# Patient Record
Sex: FEMALE | Race: WHITE | Employment: OTHER | URBAN - METROPOLITAN AREA
[De-identification: names, ages, dates, MRNs, and addresses within clinical notes are randomized per-mention and may not be internally consistent; named-entity substitution may affect disease eponyms.]

---

## 2019-08-29 ENCOUNTER — APPOINTMENT (OUTPATIENT)
Dept: CT IMAGING | Age: 69
DRG: 580 | End: 2019-08-29
Payer: MEDICARE

## 2019-08-29 ENCOUNTER — HOSPITAL ENCOUNTER (INPATIENT)
Age: 69
LOS: 1 days | Discharge: HOME | DRG: 580 | End: 2019-08-30
Attending: EMERGENCY MEDICINE | Admitting: INTERNAL MEDICINE
Payer: MEDICARE

## 2019-08-29 DIAGNOSIS — N63.20 BREAST MASS, LEFT: ICD-10-CM

## 2019-08-29 DIAGNOSIS — E87.1 HYPONATREMIA: Primary | ICD-10-CM

## 2019-08-29 PROBLEM — N63.0 BREAST MASS: Status: ACTIVE | Noted: 2019-08-29

## 2019-08-29 LAB
ALBUMIN SERPL-MCNC: 3.9 G/DL (ref 3.5–5.2)
ALP BLD-CCNC: 113 U/L (ref 35–104)
ALT SERPL-CCNC: 53 U/L (ref 0–32)
ANION GAP SERPL CALCULATED.3IONS-SCNC: 11 MMOL/L (ref 7–16)
APTT: 31.7 SEC (ref 24.5–35.1)
AST SERPL-CCNC: 49 U/L (ref 0–31)
BACTERIA: NORMAL /HPF
BASOPHILS ABSOLUTE: 0.02 E9/L (ref 0–0.2)
BASOPHILS RELATIVE PERCENT: 0.7 % (ref 0–2)
BILIRUB SERPL-MCNC: 0.4 MG/DL (ref 0–1.2)
BILIRUBIN URINE: NEGATIVE
BLOOD, URINE: NORMAL
BUN BLDV-MCNC: 8 MG/DL (ref 8–23)
CALCIUM SERPL-MCNC: 8.9 MG/DL (ref 8.6–10.2)
CHLORIDE BLD-SCNC: 96 MMOL/L (ref 98–107)
CLARITY: CLEAR
CO2: 21 MMOL/L (ref 22–29)
COLOR: YELLOW
CREAT SERPL-MCNC: 0.6 MG/DL (ref 0.5–1)
EKG ATRIAL RATE: 78 BPM
EKG P AXIS: 71 DEGREES
EKG P-R INTERVAL: 146 MS
EKG Q-T INTERVAL: 402 MS
EKG QRS DURATION: 78 MS
EKG QTC CALCULATION (BAZETT): 458 MS
EKG R AXIS: 26 DEGREES
EKG T AXIS: 37 DEGREES
EKG VENTRICULAR RATE: 78 BPM
EOSINOPHILS ABSOLUTE: 0.01 E9/L (ref 0.05–0.5)
EOSINOPHILS RELATIVE PERCENT: 0.3 % (ref 0–6)
GFR AFRICAN AMERICAN: >60
GFR NON-AFRICAN AMERICAN: >60 ML/MIN/1.73
GLUCOSE BLD-MCNC: 91 MG/DL (ref 74–99)
GLUCOSE URINE: NEGATIVE MG/DL
HCT VFR BLD CALC: 33.5 % (ref 34–48)
HEMOGLOBIN: 10.9 G/DL (ref 11.5–15.5)
IMMATURE GRANULOCYTES #: 0.01 E9/L
IMMATURE GRANULOCYTES %: 0.3 % (ref 0–5)
INR BLD: 1.2
KETONES, URINE: NEGATIVE MG/DL
LACTIC ACID: 0.9 MMOL/L (ref 0.5–2.2)
LEUKOCYTE ESTERASE, URINE: NEGATIVE
LYMPHOCYTES ABSOLUTE: 0.72 E9/L (ref 1.5–4)
LYMPHOCYTES RELATIVE PERCENT: 24.2 % (ref 20–42)
MCH RBC QN AUTO: 28 PG (ref 26–35)
MCHC RBC AUTO-ENTMCNC: 32.5 % (ref 32–34.5)
MCV RBC AUTO: 86.1 FL (ref 80–99.9)
MONOCYTES ABSOLUTE: 0.35 E9/L (ref 0.1–0.95)
MONOCYTES RELATIVE PERCENT: 11.8 % (ref 2–12)
NEUTROPHILS ABSOLUTE: 1.86 E9/L (ref 1.8–7.3)
NEUTROPHILS RELATIVE PERCENT: 62.7 % (ref 43–80)
NITRITE, URINE: NEGATIVE
PDW BLD-RTO: 13.8 FL (ref 11.5–15)
PH UA: 6 (ref 5–9)
PLATELET # BLD: 202 E9/L (ref 130–450)
PMV BLD AUTO: 9.8 FL (ref 7–12)
POTASSIUM REFLEX MAGNESIUM: 3.6 MMOL/L (ref 3.5–5)
PRO-BNP: 75 PG/ML (ref 0–125)
PROTEIN UA: NEGATIVE MG/DL
PROTHROMBIN TIME: 13.1 SEC (ref 9.3–12.4)
RBC # BLD: 3.89 E12/L (ref 3.5–5.5)
RBC UA: NORMAL /HPF (ref 0–2)
SODIUM BLD-SCNC: 128 MMOL/L (ref 132–146)
SPECIFIC GRAVITY UA: <=1.005 (ref 1–1.03)
TOTAL PROTEIN: 9.1 G/DL (ref 6.4–8.3)
TROPONIN: <0.01 NG/ML (ref 0–0.03)
UROBILINOGEN, URINE: 0.2 E.U./DL
WBC # BLD: 3 E9/L (ref 4.5–11.5)
WBC UA: NORMAL /HPF (ref 0–5)

## 2019-08-29 PROCEDURE — 85025 COMPLETE CBC W/AUTO DIFF WBC: CPT

## 2019-08-29 PROCEDURE — 2580000003 HC RX 258: Performed by: RADIOLOGY

## 2019-08-29 PROCEDURE — 80053 COMPREHEN METABOLIC PANEL: CPT

## 2019-08-29 PROCEDURE — 2580000003 HC RX 258: Performed by: NURSE PRACTITIONER

## 2019-08-29 PROCEDURE — 6360000004 HC RX CONTRAST MEDICATION: Performed by: RADIOLOGY

## 2019-08-29 PROCEDURE — 99223 1ST HOSP IP/OBS HIGH 75: CPT | Performed by: INTERNAL MEDICINE

## 2019-08-29 PROCEDURE — 85730 THROMBOPLASTIN TIME PARTIAL: CPT

## 2019-08-29 PROCEDURE — 81001 URINALYSIS AUTO W/SCOPE: CPT

## 2019-08-29 PROCEDURE — 2580000003 HC RX 258: Performed by: INTERNAL MEDICINE

## 2019-08-29 PROCEDURE — 83605 ASSAY OF LACTIC ACID: CPT

## 2019-08-29 PROCEDURE — 99285 EMERGENCY DEPT VISIT HI MDM: CPT

## 2019-08-29 PROCEDURE — 93010 ELECTROCARDIOGRAM REPORT: CPT | Performed by: INTERNAL MEDICINE

## 2019-08-29 PROCEDURE — 83880 ASSAY OF NATRIURETIC PEPTIDE: CPT

## 2019-08-29 PROCEDURE — 85610 PROTHROMBIN TIME: CPT

## 2019-08-29 PROCEDURE — 84484 ASSAY OF TROPONIN QUANT: CPT

## 2019-08-29 PROCEDURE — 1200000000 HC SEMI PRIVATE

## 2019-08-29 PROCEDURE — 71260 CT THORAX DX C+: CPT

## 2019-08-29 PROCEDURE — 93005 ELECTROCARDIOGRAM TRACING: CPT | Performed by: NURSE PRACTITIONER

## 2019-08-29 RX ORDER — SODIUM CHLORIDE 0.9 % (FLUSH) 0.9 %
10 SYRINGE (ML) INJECTION EVERY 12 HOURS SCHEDULED
Status: DISCONTINUED | OUTPATIENT
Start: 2019-08-29 | End: 2019-08-30 | Stop reason: HOSPADM

## 2019-08-29 RX ORDER — 0.9 % SODIUM CHLORIDE 0.9 %
1000 INTRAVENOUS SOLUTION INTRAVENOUS ONCE
Status: COMPLETED | OUTPATIENT
Start: 2019-08-29 | End: 2019-08-29

## 2019-08-29 RX ORDER — SODIUM CHLORIDE 0.9 % (FLUSH) 0.9 %
10 SYRINGE (ML) INJECTION PRN
Status: DISCONTINUED | OUTPATIENT
Start: 2019-08-29 | End: 2019-08-30 | Stop reason: HOSPADM

## 2019-08-29 RX ORDER — ONDANSETRON 2 MG/ML
4 INJECTION INTRAMUSCULAR; INTRAVENOUS EVERY 6 HOURS PRN
Status: DISCONTINUED | OUTPATIENT
Start: 2019-08-29 | End: 2019-08-30 | Stop reason: HOSPADM

## 2019-08-29 RX ADMIN — Medication 10 ML: at 21:23

## 2019-08-29 RX ADMIN — SODIUM CHLORIDE 1000 ML: 9 INJECTION, SOLUTION INTRAVENOUS at 14:48

## 2019-08-29 RX ADMIN — IOPAMIDOL 80 ML: 755 INJECTION, SOLUTION INTRAVENOUS at 13:31

## 2019-08-29 RX ADMIN — Medication 10 ML: at 13:31

## 2019-08-29 ASSESSMENT — PAIN SCALES - GENERAL
PAINLEVEL_OUTOF10: 0

## 2019-08-29 NOTE — ED PROVIDER NOTES
neoplasm and has left axillary metastatic lymphadenopathy. WBC 3000, Hyponatremic at 128. Mild bilateral pitting edema but normal BNP. No evidence of CHF. Patient will be admitted for hyponatremia and probable malignant breast mass. Counseling: The emergency provider has spoken with the patient and family member sister and discussed todays results, in addition to providing specific details for the plan of care and counseling regarding the diagnosis and prognosis. Questions are answered at this time and they are agreeable with the plan. Assessment      1. Hyponatremia    2. Breast mass, left      Plan   Admit to med/surg floor  Patient condition is stable    New Medications     Current Discharge Medication List        Electronically signed by ADOLFO Mayers CNP   DD: 8/29/19  **This report was transcribed using voice recognition software. Every effort was made to ensure accuracy; however, inadvertent computerized transcription errors may be present.   END OF ED PROVIDER NOTE       ADOLFO Mayers CNP  08/29/19 [id number]

## 2019-08-29 NOTE — CONSULTS
compensated    Vertigo      Patient Active Problem List   Diagnosis    Hydrocephalus, adult    Hydrocephalus    Hyponatremia    Breast mass        Past Surgical History:      Procedure Laterality Date    ARM SURGERY  9/15/2009    HUMERUS FRACTURE SURGERY  2009    Broke left arm        Family History:  Family History   Problem Relation Age of Onset    Cancer Mother         colon    Heart Disease Father     Diabetes Father        Medications:  Reviewed and reconciled.     Social History:  Social History     Socioeconomic History    Marital status: Single     Spouse name: Not on file    Number of children: Not on file    Years of education: Not on file    Highest education level: Not on file   Occupational History    Not on file   Social Needs    Financial resource strain: Not on file    Food insecurity:     Worry: Not on file     Inability: Not on file    Transportation needs:     Medical: Not on file     Non-medical: Not on file   Tobacco Use    Smoking status: Never Smoker    Smokeless tobacco: Never Used   Substance and Sexual Activity    Alcohol use: Yes     Comment: drinks it occasional on social events    Drug use: No    Sexual activity: Not on file   Lifestyle    Physical activity:     Days per week: Not on file     Minutes per session: Not on file    Stress: Not on file   Relationships    Social connections:     Talks on phone: Not on file     Gets together: Not on file     Attends Judaism service: Not on file     Active member of club or organization: Not on file     Attends meetings of clubs or organizations: Not on file     Relationship status: Not on file    Intimate partner violence:     Fear of current or ex partner: Not on file     Emotionally abused: Not on file     Physically abused: Not on file     Forced sexual activity: Not on file   Other Topics Concern    Not on file   Social History Narrative    Not on file       Allergies:  No Known Allergies     OB/GYN:  Patient is , she had her menarche age of 13, menopause when she was in her 46s, she did not use HRT. Physical Exam:  /62   Pulse 76   Temp 98.5 °F (36.9 °C) (Oral)   Resp 16   Ht 5' 6\" (1.676 m)   Wt 120 lb (54.4 kg)   SpO2 96%   BMI 19.37 kg/m²   GENERAL: Alert, oriented x 3, not in acute distress. HEENT: PERRLA; EOMI. Oropharynx clear. NECK: Supple. No palpable cervical or supraclavicular lymphadenopathy. LUNGS: Good air entry bilaterally. No wheezing, crackles or rhonchi. CARDIOVASCULAR: Regular rate. No murmurs, rubs or gallops. BREASTS: The right breast exam is negative for any skin changes, nipple discharge, no palpable masses, no palpable right axillary adenopathy, the left breast exam is remarkable for a fungating mass, measuring about 8 cm, several skin nodules, palpable left axillary lymphadenopathy. ABDOMEN: Soft. Non-tender, non-distended. Positive bowel sounds. EXTREMITIES: Without clubbing, cyanosis, or edema. NEUROLOGIC: No focal deficits. ECOG PS 1    Impression/Plan:     The patient is a 75-year-old lady, with a past medical history significant for hydrocephalus, who had presented with left breast changes, she had noticed a dark spot on her left breast, then she had developed induration, she thinks that she had the breast changes for months. The patient had never had a mammogram done, had a CT scan of the chest done, revealing a solid left breast mass measuring at least 6.8 x 2.9 cm in size is identified. The mass appears to extend to the skin surface. The deep margin of the mass abuts the chest wall but a fat plane appears to be preserved. Pathologically enlarged left  axillary lymph nodes are seen measuring up to 4.5 x 1.9 cm in size. Her CBCD was remarkable for leukopenia with a white count of 3,000, lymphocytopenia, with an absolute lymphocyte count of 720, sodium 128, total protein 9.1, ALT 53, AST 49.   Patient with at least a locally advanced left breast cancer,

## 2019-08-30 ENCOUNTER — APPOINTMENT (OUTPATIENT)
Dept: NUCLEAR MEDICINE | Age: 69
DRG: 580 | End: 2019-08-30
Payer: MEDICARE

## 2019-08-30 ENCOUNTER — APPOINTMENT (OUTPATIENT)
Dept: CT IMAGING | Age: 69
DRG: 580 | End: 2019-08-30
Payer: MEDICARE

## 2019-08-30 VITALS
WEIGHT: 120 LBS | SYSTOLIC BLOOD PRESSURE: 150 MMHG | DIASTOLIC BLOOD PRESSURE: 68 MMHG | TEMPERATURE: 97.5 F | OXYGEN SATURATION: 100 % | BODY MASS INDEX: 19.29 KG/M2 | HEIGHT: 66 IN | HEART RATE: 68 BPM | RESPIRATION RATE: 16 BRPM

## 2019-08-30 LAB
ANION GAP SERPL CALCULATED.3IONS-SCNC: 11 MMOL/L (ref 7–16)
BUN BLDV-MCNC: 7 MG/DL (ref 8–23)
CALCIUM SERPL-MCNC: 8.7 MG/DL (ref 8.6–10.2)
CHLORIDE BLD-SCNC: 101 MMOL/L (ref 98–107)
CO2: 22 MMOL/L (ref 22–29)
CREAT SERPL-MCNC: 0.6 MG/DL (ref 0.5–1)
GFR AFRICAN AMERICAN: >60
GFR NON-AFRICAN AMERICAN: >60 ML/MIN/1.73
GLUCOSE BLD-MCNC: 79 MG/DL (ref 74–99)
OSMOLALITY URINE: 492 MOSM/KG (ref 300–900)
OSMOLALITY: 291 MOSM/KG (ref 285–310)
POTASSIUM SERPL-SCNC: 3.4 MMOL/L (ref 3.5–5)
SODIUM BLD-SCNC: 134 MMOL/L (ref 132–146)
SODIUM URINE: 56 MMOL/L

## 2019-08-30 PROCEDURE — 36415 COLL VENOUS BLD VENIPUNCTURE: CPT

## 2019-08-30 PROCEDURE — 88305 TISSUE EXAM BY PATHOLOGIST: CPT

## 2019-08-30 PROCEDURE — 99239 HOSP IP/OBS DSCHRG MGMT >30: CPT | Performed by: INTERNAL MEDICINE

## 2019-08-30 PROCEDURE — 88342 IMHCHEM/IMCYTCHM 1ST ANTB: CPT

## 2019-08-30 PROCEDURE — 84300 ASSAY OF URINE SODIUM: CPT

## 2019-08-30 PROCEDURE — 84165 PROTEIN E-PHORESIS SERUM: CPT

## 2019-08-30 PROCEDURE — 83935 ASSAY OF URINE OSMOLALITY: CPT

## 2019-08-30 PROCEDURE — 78306 BONE IMAGING WHOLE BODY: CPT

## 2019-08-30 PROCEDURE — 88185 FLOWCYTOMETRY/TC ADD-ON: CPT

## 2019-08-30 PROCEDURE — 88184 FLOWCYTOMETRY/ TC 1 MARKER: CPT

## 2019-08-30 PROCEDURE — 80048 BASIC METABOLIC PNL TOTAL CA: CPT

## 2019-08-30 PROCEDURE — 07B63ZX EXCISION OF LEFT AXILLARY LYMPHATIC, PERCUTANEOUS APPROACH, DIAGNOSTIC: ICD-10-PCS | Performed by: RADIOLOGY

## 2019-08-30 PROCEDURE — 83930 ASSAY OF BLOOD OSMOLALITY: CPT

## 2019-08-30 PROCEDURE — 74177 CT ABD & PELVIS W/CONTRAST: CPT

## 2019-08-30 PROCEDURE — 88360 TUMOR IMMUNOHISTOCHEM/MANUAL: CPT

## 2019-08-30 PROCEDURE — A9503 TC99M MEDRONATE: HCPCS | Performed by: RADIOLOGY

## 2019-08-30 PROCEDURE — 3430000000 HC RX DIAGNOSTIC RADIOPHARMACEUTICAL: Performed by: RADIOLOGY

## 2019-08-30 PROCEDURE — 2580000003 HC RX 258: Performed by: INTERNAL MEDICINE

## 2019-08-30 PROCEDURE — 0HBUXZX: ICD-10-PCS | Performed by: SURGERY

## 2019-08-30 PROCEDURE — 6360000004 HC RX CONTRAST MEDICATION: Performed by: RADIOLOGY

## 2019-08-30 PROCEDURE — 38505 NEEDLE BIOPSY LYMPH NODES: CPT

## 2019-08-30 PROCEDURE — 77012 CT SCAN FOR NEEDLE BIOPSY: CPT

## 2019-08-30 RX ORDER — LIDOCAINE HYDROCHLORIDE 20 MG/ML
10 INJECTION, SOLUTION EPIDURAL; INFILTRATION; INTRACAUDAL; PERINEURAL ONCE
Status: DISCONTINUED | OUTPATIENT
Start: 2019-08-30 | End: 2019-08-30 | Stop reason: HOSPADM

## 2019-08-30 RX ORDER — LIDOCAINE HYDROCHLORIDE AND EPINEPHRINE 10; 10 MG/ML; UG/ML
20 INJECTION, SOLUTION INFILTRATION; PERINEURAL ONCE
Status: DISCONTINUED | OUTPATIENT
Start: 2019-08-30 | End: 2019-08-30 | Stop reason: HOSPADM

## 2019-08-30 RX ORDER — SODIUM CHLORIDE 0.9 % (FLUSH) 0.9 %
10 SYRINGE (ML) INJECTION PRN
Status: ACTIVE | OUTPATIENT
Start: 2019-08-30 | End: 2019-08-30

## 2019-08-30 RX ORDER — TC 99M MEDRONATE 20 MG/10ML
25 INJECTION, POWDER, LYOPHILIZED, FOR SOLUTION INTRAVENOUS
Status: COMPLETED | OUTPATIENT
Start: 2019-08-30 | End: 2019-08-30

## 2019-08-30 RX ADMIN — IOPAMIDOL 110 ML: 755 INJECTION, SOLUTION INTRAVENOUS at 11:32

## 2019-08-30 RX ADMIN — TC 99M MEDRONATE 25 MILLICURIE: 20 INJECTION, POWDER, LYOPHILIZED, FOR SOLUTION INTRAVENOUS at 09:02

## 2019-08-30 RX ADMIN — Medication 10 ML: at 10:10

## 2019-08-30 RX ADMIN — IOHEXOL 50 ML: 240 INJECTION, SOLUTION INTRATHECAL; INTRAVASCULAR; INTRAVENOUS; ORAL at 11:32

## 2019-08-30 RX ADMIN — Medication 10 ML: at 11:33

## 2019-08-30 NOTE — PROGRESS NOTES
IRFLOWSHEET    Date: 8/30/2019    Time: 3:11 PM     Exam: Image Guided Left Axillary Lymph Node Biopsy    Radiologist Performing Procedure:  [facility]    Nurse Reporting/Phone: [phone]     Nurse Receiving: [place]    Permit signed:      Yes    ID Band Checklist: Yes    Allergies: Patient has no known allergies. TIME OUT: 1517    PROCEDURE START TIME: 1518    Puncture Site: Left Axillary Region    Puncture Time: 6306    Catheters: 18x10    LABS:   Lab Results   Component Value Date    INR 1.2 08/29/2019    PROTIME 13.1 (H) 08/29/2019           Lab Results   Component Value Date    CREATININE 0.6 08/30/2019    BUN 7 (L) 08/30/2019          Lab Results   Component Value Date    HGB 10.9 (L) 08/29/2019    HCT 33.5 (L) 08/29/2019     08/29/2019         PROCEDURE END TIME: 1526    Total Contrast: N/A    Fluoroscopy Time: N/A    Complications:  None    Comments: Pt brought to department from nursing unit. Pt is alert and oriented, Dr. Willams spoke to patient regarding procedure including possible risks. Pt verbalizes understanding of procedure and consent form signed. Pt placed on ct table in supine position, connected to cardiac monitor, oxygen for constant monitoring during procedure. Procedure done under sterile technique and guidance of ct imaging. 2% Lidocaine is used during procedure for comfort measures. Pt tolerated procedure well, a total of 7 samples taken, gel foam used after samples taken. A dressing  Applied to site with no bleeding noted. Pt transferred to cart, post procedure orders placed into epic. Pt transferred back to floor via cart by transport staff.

## 2019-08-30 NOTE — PROGRESS NOTES
[facility] Progress Note    Admitting Date and Time: 8/29/2019 11:46 AM  Admit Dx: Hyponatremia [E87.1]  Hyponatremia [E87.1]  Breast mass [N63.0]    Subjective:  Patient is being followed for Hyponatremia [E87.1]  Hyponatremia [E87.1]  Breast mass [N63.0]   Pt feels pretty good today. The left breast mass began to bleed last night after she hit it accidentally. She says her hip pain has resolved completely. She has not had a bowel movement since she has been here and cannot remember her last bowel movement prior to coming to the hospital. She would like to leave and expressed a strong distrust of medical professionals, as well as people in general.  Per RN: She is very anxious due to poor experiences of family members who dealt with cancer. ROS: Admits to anxiety. Denies pain, fever, chills, night sweats, sob, n/v, diarrhea, HA unless stated above.  sodium chloride flush  10 mL Intravenous 2 times per day    enoxaparin  40 mg Subcutaneous Daily       sodium chloride flush 10 mL PRN   sodium chloride flush 10 mL PRN   magnesium hydroxide 30 mL Daily PRN   ondansetron 4 mg Q6H PRN        Objective:    /60   Pulse 75   Temp 98 °F (36.7 °C) (Oral)   Resp 14   Ht 5' 6\" (1.676 m)   Wt 120 lb (54.4 kg)   SpO2 95%   BMI 19.37 kg/m²     General Appearance: alert and oriented to person, place and time and in no acute distress  Skin: warm and dry.  Erythematous, hard, crusted lesion encompassing most of left breast and displacing nipple  Head: normocephalic and atraumatic  Eyes: pupils equal, round, and reactive to light, extraocular eye movements intact, conjunctivae normal  Neck: neck supple and non tender without mass   Pulmonary/Chest: clear to auscultation bilaterally- no wheezes, rales or rhonchi, normal air movement, no respiratory distress  Cardiovascular: normal rate, normal S1 and S2 and no carotid bruits  Abdomen: soft, non-tender, non-distended, normal bowel sounds, no masses or organomegaly  Extremities: no cyanosis, no clubbing. Bilateral nonpitting edema  Neurologic: no cranial nerve deficit and speech normal        Recent Labs     08/29/19  1211   *   K 3.6   CL 96*   CO2 21*   BUN 8   CREATININE 0.6   GLUCOSE 91   CALCIUM 8.9       Recent Labs     08/29/19  1211   WBC 3.0*   RBC 3.89   HGB 10.9*   HCT 33.5*   MCV 86.1   MCH 28.0   MCHC 32.5   RDW 13.8      MPV 9.8       Radiology:   CT CHEST W CONTRAST (8/29/2019)   Final Result       1. Solid left breast mass concerning for a neoplasm with left axillary   metastatic lymphadenopathy. Correlation with mammography and breast   ultrasound is recommended. 2. Thyroid goiter.       ALERT:  THIS IS AN ABNORMAL REPORT     EKG (8/29/2019)  Normal sinus rhythm  Normal ECG  No previous ECGs available    Assessment:    Active Problems:    Hyponatremia    Breast mass  Resolved Problems:    * No resolved hospital problems. *      Plan:  1. Left breast mass with left metastatic axillary lymph node   L axillary lymph node Bx today   Gen surg following: punch Bx   Heme/onc following: staging workup, CT scan abdomen/pelvis and bone scan today  2. Hyponatremia   Na 128   Get serum/urine osm   Restrict fluids  3. Anemia   Hb 10.9   Get Fe, Fe sat, TIBC, Ferritin    NOTE: This report was transcribed using voice recognition software. Every effort was made to ensure accuracy; however, inadvertent computerized transcription errors may be present.   Electronically signed by Ishaan Rea on 8/30/2019 at 9:00 AM

## 2019-08-30 NOTE — INTERVAL H&P NOTE
H&P Update    Patient's History and Physical from August 29, 2019 was reviewed. Patient examined. There has been no change.     Electronically signed by Brittny Cotton MD on 8/30/19 at 11:00 AM

## 2019-08-30 NOTE — PROGRESS NOTES
[facility] Progress Note    Admitting Date and Time: 8/29/2019 11:46 AM  Admit Dx: Hyponatremia [E87.1]  Hyponatremia [E87.1]  Breast mass [N63.0]    Subjective:  Patient is being followed for Hyponatremia [E87.1]  Hyponatremia [E87.1]  Breast mass [N63.0]   Pt feels ok, had no complaints    ROS: denies fever, chills, cp, sob, n/v, HA unless stated above.       sodium chloride flush  10 mL Intravenous 2 times per day    enoxaparin  40 mg Subcutaneous Daily       sodium chloride flush 10 mL PRN   sodium chloride flush 10 mL PRN   magnesium hydroxide 30 mL Daily PRN   ondansetron 4 mg Q6H PRN        Objective:    /60   Pulse 75   Temp 98 °F (36.7 °C) (Oral)   Resp 14   Ht 5' 6\" (1.676 m)   Wt 120 lb (54.4 kg)   SpO2 95%   BMI 19.37 kg/m²   General Appearance: alert and oriented to person, place and time  Skin: warm and dry  Head: normocephalic and atraumatic  Eyes: pupils equal, round, and reactive to light, extraocular eye movements intact, conjunctivae normal  Neck: neck supple and non tender without mass   Pulmonary/Chest: clear to auscultation bilaterally- no wheezes, rales or rhonchi, normal air movement, no respiratory distress  Left breast with large mass measuring at least 15 cm x 12 cm hard lump, skin lesion with induration and thickness around the nipple involving the nipple with no discharge from the nipple, granulous tissue, nontender  Left axillary lymph node is larger about 10 x 3 cm  Cardiovascular: normal rate, normal S1 and S2 and no carotid bruits  Abdomen: soft, non-tender, non-distended, normal bowel sounds, no masses or organomegaly  Extremities: no cyanosis, no clubbing and +1 edema  Neurologic: no cranial nerve deficit and speech normal        Recent Labs     08/29/19  1211   *   K 3.6   CL 96*   CO2 21*   BUN 8   CREATININE 0.6   GLUCOSE 91   CALCIUM 8.9       Recent Labs     08/29/19  1211   WBC 3.0*   RBC 3.89   HGB 10.9*   HCT 33.5*   MCV 86.1   MCH

## 2019-08-30 NOTE — BRIEF OP NOTE
Brief Postoperative Note    Joseph Birch  YOB: 1950  [id number]    Pre-operative Diagnosis and Procedure: 67 yo F with a new left breast mass and an enlarged left lymph node. Here for biopsy. Post-operative Diagnosis: Same    Anesthesia: Local    Estimated Blood Loss: < 10 cc    Surgeon: Heydi Spring MD    Complications: none    Specimen obtained: 2 cores sent in flow cytometry media and 8 cores sent in formalin. Findings: New left breast mass and an enlarged left lymph node. CT guided biopsies of the lymph node.      Heydi Spring MD   8/30/2019 1:40 PM

## 2019-08-30 NOTE — DISCHARGE SUMMARY
[facility] Physician Discharge Summary       Feliberto Elizabeth MD  [address] [facility]  [place] fuchs [phone]    In 1 week  breast cancer      Activity level: As tolerated    Dispo: Home    Condition on discharge: Stable    Patient ID:  Roseline Rm  [id number]  87 y.o.  1950    Admit date: 2019    Discharge date and time:  2019  3:42 PM    Admission Diagnoses: Active Problems:    Hyponatremia    Breast mass  Resolved Problems:    * No resolved hospital problems. *      Discharge Diagnoses: Active Problems:    Hyponatremia    Breast mass  Resolved Problems:    * No resolved hospital problems. *      Consults:  IP CONSULT TO GENERAL SURGERY  IP CONSULT TO ONCOLOGY    Procedures: Breast punch biopsy, axillary lymph node biopsy    Hospital Course:   Patient Roseline Rm is a 68 y.o. presented with breast mass, will admit the patient for evaluation and treated her for the followin.  Left breast mass most likely cancer with mets to the lymph nodes evident on the CAT scan of the chest,  surgery who performed a punch biopsy, IR did left axillary lymph node biopsy, I appreciate oncology input, we obtained bone scan and CT abd and pelvis with IV contrast with pending results upon discharge. I personally reviewed the images of the bone scan and the patient probably has missed 2 right rib and left hip, pending ER/AR/Her-2 Neucan be discharged after. This with oncology and with the patient, will discharge patient stable to follow-up with surgery as well as oncology for the results of the biopsy and the scans. We will set the patient up with a PCP. Nelli Hartley 2.  Hyponatremia, will give fluid restriction, hyponatremia resolved, unfortunately urine and serum osmolality were not obtained until hyponatremia resolved.   We could not tell whether it was psychogenic dyspnea versus SIADH  3.  Left hip pain,

## 2019-09-03 ENCOUNTER — TELEPHONE (OUTPATIENT)
Dept: ONCOLOGY | Age: 69
End: 2019-09-03

## 2019-09-05 LAB
Lab: NORMAL
REPORT: NORMAL
THIS TEST SENT TO: NORMAL

## 2019-09-06 ENCOUNTER — TELEPHONE (OUTPATIENT)
Dept: SURGERY | Age: 69
End: 2019-09-06

## 2019-09-06 LAB
ALBUMIN SERPL-MCNC: 3.1 G/DL (ref 3.5–4.7)
ALPHA-1-GLOBULIN: 0.2 G/DL (ref 0.2–0.4)
ALPHA-2-GLOBULIN: 0.7 G/FL (ref 0.5–1)
BETA GLOBULIN: 0.8 G/DL (ref 0.8–1.3)
ELECTROPHORESIS: ABNORMAL
GAMMA GLOBULIN: 2.9 G/DL (ref 0.7–1.6)
TOTAL PROTEIN: 7.7 G/DL (ref 6.4–8.3)

## 2019-09-09 ENCOUNTER — HOSPITAL ENCOUNTER (OUTPATIENT)
Age: 69
Discharge: HOME | End: 2019-09-11
Payer: MEDICARE

## 2019-09-09 ENCOUNTER — OFFICE VISIT (OUTPATIENT)
Dept: FAMILY MEDICINE CLINIC | Age: 69
End: 2019-09-09
Payer: MEDICARE

## 2019-09-09 VITALS
HEIGHT: 66 IN | BODY MASS INDEX: 22.98 KG/M2 | DIASTOLIC BLOOD PRESSURE: 62 MMHG | TEMPERATURE: 98.1 F | SYSTOLIC BLOOD PRESSURE: 104 MMHG | HEART RATE: 79 BPM | RESPIRATION RATE: 18 BRPM | WEIGHT: 143 LBS | OXYGEN SATURATION: 99 %

## 2019-09-09 DIAGNOSIS — N63.0 BREAST MASS: ICD-10-CM

## 2019-09-09 DIAGNOSIS — E87.1 HYPONATREMIA: ICD-10-CM

## 2019-09-09 DIAGNOSIS — Z12.11 SCREENING FOR COLON CANCER: ICD-10-CM

## 2019-09-09 DIAGNOSIS — Z00.00 ENCOUNTER FOR MEDICAL EXAMINATION TO ESTABLISH CARE: Primary | ICD-10-CM

## 2019-09-09 PROCEDURE — 1090F PRES/ABSN URINE INCON ASSESS: CPT | Performed by: FAMILY MEDICINE

## 2019-09-09 PROCEDURE — G8427 DOCREV CUR MEDS BY ELIG CLIN: HCPCS | Performed by: FAMILY MEDICINE

## 2019-09-09 PROCEDURE — 99203 OFFICE O/P NEW LOW 30 MIN: CPT | Performed by: FAMILY MEDICINE

## 2019-09-09 PROCEDURE — 1123F ACP DISCUSS/DSCN MKR DOCD: CPT | Performed by: FAMILY MEDICINE

## 2019-09-09 PROCEDURE — G8400 PT W/DXA NO RESULTS DOC: HCPCS | Performed by: FAMILY MEDICINE

## 2019-09-09 PROCEDURE — 1111F DSCHRG MED/CURRENT MED MERGE: CPT | Performed by: FAMILY MEDICINE

## 2019-09-09 PROCEDURE — G8420 CALC BMI NORM PARAMETERS: HCPCS | Performed by: FAMILY MEDICINE

## 2019-09-09 PROCEDURE — 4040F PNEUMOC VAC/ADMIN/RCVD: CPT | Performed by: FAMILY MEDICINE

## 2019-09-09 PROCEDURE — 1036F TOBACCO NON-USER: CPT | Performed by: FAMILY MEDICINE

## 2019-09-09 PROCEDURE — 3017F COLORECTAL CA SCREEN DOC REV: CPT | Performed by: FAMILY MEDICINE

## 2019-09-09 ASSESSMENT — PATIENT HEALTH QUESTIONNAIRE - PHQ9
SUM OF ALL RESPONSES TO PHQ9 QUESTIONS 1 & 2: 0
1. LITTLE INTEREST OR PLEASURE IN DOING THINGS: 0
2. FEELING DOWN, DEPRESSED OR HOPELESS: 0
SUM OF ALL RESPONSES TO PHQ QUESTIONS 1-9: 0
SUM OF ALL RESPONSES TO PHQ QUESTIONS 1-9: 0

## 2019-09-09 NOTE — PATIENT INSTRUCTIONS
Patient Education        Breast Cancer: Care Instructions  Your Care Instructions    Breast cancer occurs when abnormal cells grow out of control in the breast. These cancer cells can spread within the breast, to nearby lymph nodes and other tissues, and to other parts of the body. Being treated for cancer can weaken your body, and you may feel very tired. Get the rest your body needs so you can feel better. Finding out that you have cancer is scary. You may feel many emotions and may need some help coping. Seek out family, friends, and counselors for support. You also can do things at home to make yourself feel better while you go through treatment. Call the [employer] (6-664.378.3212) or visit its website at [employer] for more information. Follow-up care is a key part of your treatment and safety. Be sure to make and go to all appointments, and call your doctor if you are having problems. It's also a good idea to know your test results and keep a list of the medicines you take. How can you care for yourself at home? · Take your medicines exactly as prescribed. Call your doctor if you think you are having a problem with your medicine. You may get medicine for nausea and vomiting if you have these side effects. · Follow your doctor's instructions to relieve pain. Pain from cancer and surgery can almost always be controlled. Use pain medicine when you first notice pain, before it becomes severe. · Eat healthy food. If you do not feel like eating, try to eat food that has protein and extra calories to keep up your strength and prevent weight loss. Drink liquid meal replacements for extra calories and protein. Try to eat your main meal early. · Get some physical activity every day, but do not get too tired. Keep doing the hobbies you enjoy as your energy allows. · Do not smoke. Smoking can make your cancer worse.  If you need help quitting, talk to your doctor about stop-smoking programs and medicines. These can increase your chances of quitting for good. · Take steps to control your stress and workload. Learn relaxation techniques. ? Share your feelings. Stress and tension affect our emotions. By expressing your feelings to others, you may be able to understand and cope with them. ? Consider joining a support group. Talking about a problem with your spouse, a good friend, or other people with similar problems is a good way to reduce tension and stress. ? Express yourself through art. Try writing, crafts, dance, or art to relieve stress. Some dance, writing, or art groups may be available just for people who have cancer. ? Be kind to your body and mind. Getting enough sleep, eating a healthy diet, and taking time to do things you enjoy can contribute to an overall feeling of balance in your life and can help reduce stress. ? Get help if you need it. Discuss your concerns with your doctor or counselor. · If you are vomiting or have diarrhea:  ? Drink plenty of fluids (enough so that your urine is light yellow or clear like water) to prevent dehydration. Choose water and other caffeine-free clear liquids. If you have kidney, heart, or liver disease and have to limit fluids, talk with your doctor before you increase the amount of fluids you drink. ? When you are able to eat, try clear soups, mild foods, and liquids until all symptoms are gone for 12 to 48 hours. Other good choices include dry toast, crackers, cooked cereal, and gelatin dessert, such as Jell-O.  · If you have not already done so, prepare a list of advance directives. Advance directives are instructions to your doctor and family members about what kind of care you want if you become unable to speak or express yourself. When should you call for help? Call 911 anytime you think you may need emergency care.  For example, call if:    · You passed out (lost consciousness).    Call your doctor now or seek immediate medical care if:

## 2019-09-10 ENCOUNTER — TELEPHONE (OUTPATIENT)
Dept: FAMILY MEDICINE CLINIC | Age: 69
End: 2019-09-10

## 2019-09-10 LAB
REASON FOR REJECTION: NORMAL
REJECTED TEST: NORMAL

## 2019-09-10 NOTE — TELEPHONE ENCOUNTER
Lab called stating CMP collected on 9/9/19 was severely hemolyzed & will need to be recollected. Patient is scheduled 9/18/19.

## 2019-09-13 ENCOUNTER — OFFICE VISIT (OUTPATIENT)
Dept: ONCOLOGY | Age: 69
End: 2019-09-13
Payer: MEDICARE

## 2019-09-13 ENCOUNTER — HOSPITAL ENCOUNTER (OUTPATIENT)
Dept: INFUSION THERAPY | Age: 69
Discharge: HOME | End: 2019-09-13
Payer: MEDICARE

## 2019-09-13 VITALS
SYSTOLIC BLOOD PRESSURE: 140 MMHG | DIASTOLIC BLOOD PRESSURE: 62 MMHG | BODY MASS INDEX: 23.21 KG/M2 | HEART RATE: 69 BPM | WEIGHT: 144.4 LBS | HEIGHT: 66 IN | TEMPERATURE: 98.5 F

## 2019-09-13 DIAGNOSIS — C50.919 CARCINOMA OF BREAST METASTATIC TO BONE, UNSPECIFIED LATERALITY (HCC): ICD-10-CM

## 2019-09-13 DIAGNOSIS — N63.0 BREAST MASS: Primary | ICD-10-CM

## 2019-09-13 DIAGNOSIS — C50.612 MALIGNANT NEOPLASM OF AXILLARY TAIL OF LEFT FEMALE BREAST, UNSPECIFIED ESTROGEN RECEPTOR STATUS (HCC): ICD-10-CM

## 2019-09-13 DIAGNOSIS — C79.51 CARCINOMA OF BREAST METASTATIC TO BONE, UNSPECIFIED LATERALITY (HCC): ICD-10-CM

## 2019-09-13 LAB
ALBUMIN SERPL-MCNC: 3.6 G/DL (ref 3.5–5.2)
ALP BLD-CCNC: 175 U/L (ref 35–104)
ALT SERPL-CCNC: 36 U/L (ref 0–32)
ANION GAP SERPL CALCULATED.3IONS-SCNC: 8 MMOL/L (ref 7–16)
AST SERPL-CCNC: 46 U/L (ref 0–31)
BASOPHILS ABSOLUTE: 0.02 E9/L (ref 0–0.2)
BASOPHILS RELATIVE PERCENT: 0.6 % (ref 0–2)
BILIRUB SERPL-MCNC: 0.2 MG/DL (ref 0–1.2)
BUN BLDV-MCNC: 13 MG/DL (ref 8–23)
CALCIUM SERPL-MCNC: 8.9 MG/DL (ref 8.6–10.2)
CEA: 2.1 NG/ML (ref 0–5.2)
CHLORIDE BLD-SCNC: 103 MMOL/L (ref 98–107)
CO2: 27 MMOL/L (ref 22–29)
CREAT SERPL-MCNC: 0.6 MG/DL (ref 0.5–1)
EOSINOPHILS ABSOLUTE: 0.07 E9/L (ref 0.05–0.5)
EOSINOPHILS RELATIVE PERCENT: 2 % (ref 0–6)
GFR AFRICAN AMERICAN: >60
GFR NON-AFRICAN AMERICAN: >60 ML/MIN/1.73
GLUCOSE BLD-MCNC: 97 MG/DL (ref 74–99)
HCT VFR BLD CALC: 33.3 % (ref 34–48)
HEMOGLOBIN: 10.3 G/DL (ref 11.5–15.5)
IMMATURE GRANULOCYTES #: 0.02 E9/L
IMMATURE GRANULOCYTES %: 0.6 % (ref 0–5)
LYMPHOCYTES ABSOLUTE: 0.76 E9/L (ref 1.5–4)
LYMPHOCYTES RELATIVE PERCENT: 21.3 % (ref 20–42)
MCH RBC QN AUTO: 27.5 PG (ref 26–35)
MCHC RBC AUTO-ENTMCNC: 30.9 % (ref 32–34.5)
MCV RBC AUTO: 88.8 FL (ref 80–99.9)
MONOCYTES ABSOLUTE: 0.3 E9/L (ref 0.1–0.95)
MONOCYTES RELATIVE PERCENT: 8.4 % (ref 2–12)
NEUTROPHILS ABSOLUTE: 2.39 E9/L (ref 1.8–7.3)
NEUTROPHILS RELATIVE PERCENT: 67.1 % (ref 43–80)
PDW BLD-RTO: 14.6 FL (ref 11.5–15)
PLATELET # BLD: 253 E9/L (ref 130–450)
PMV BLD AUTO: 10.3 FL (ref 7–12)
POTASSIUM SERPL-SCNC: 4 MMOL/L (ref 3.5–5)
RBC # BLD: 3.75 E12/L (ref 3.5–5.5)
SODIUM BLD-SCNC: 138 MMOL/L (ref 132–146)
TOTAL PROTEIN: 8.7 G/DL (ref 6.4–8.3)
WBC # BLD: 3.6 E9/L (ref 4.5–11.5)

## 2019-09-13 PROCEDURE — 80053 COMPREHEN METABOLIC PANEL: CPT

## 2019-09-13 PROCEDURE — 1111F DSCHRG MED/CURRENT MED MERGE: CPT | Performed by: INTERNAL MEDICINE

## 2019-09-13 PROCEDURE — G8427 DOCREV CUR MEDS BY ELIG CLIN: HCPCS | Performed by: INTERNAL MEDICINE

## 2019-09-13 PROCEDURE — 1036F TOBACCO NON-USER: CPT | Performed by: INTERNAL MEDICINE

## 2019-09-13 PROCEDURE — 4040F PNEUMOC VAC/ADMIN/RCVD: CPT | Performed by: INTERNAL MEDICINE

## 2019-09-13 PROCEDURE — 1090F PRES/ABSN URINE INCON ASSESS: CPT | Performed by: INTERNAL MEDICINE

## 2019-09-13 PROCEDURE — G8400 PT W/DXA NO RESULTS DOC: HCPCS | Performed by: INTERNAL MEDICINE

## 2019-09-13 PROCEDURE — 86300 IMMUNOASSAY TUMOR CA 15-3: CPT

## 2019-09-13 PROCEDURE — 3017F COLORECTAL CA SCREEN DOC REV: CPT | Performed by: INTERNAL MEDICINE

## 2019-09-13 PROCEDURE — G8420 CALC BMI NORM PARAMETERS: HCPCS | Performed by: INTERNAL MEDICINE

## 2019-09-13 PROCEDURE — 82378 CARCINOEMBRYONIC ANTIGEN: CPT

## 2019-09-13 PROCEDURE — 99215 OFFICE O/P EST HI 40 MIN: CPT | Performed by: INTERNAL MEDICINE

## 2019-09-13 PROCEDURE — 36415 COLL VENOUS BLD VENIPUNCTURE: CPT | Performed by: INTERNAL MEDICINE

## 2019-09-13 PROCEDURE — 99214 OFFICE O/P EST MOD 30 MIN: CPT | Performed by: INTERNAL MEDICINE

## 2019-09-13 PROCEDURE — 1123F ACP DISCUSS/DSCN MKR DOCD: CPT | Performed by: INTERNAL MEDICINE

## 2019-09-13 PROCEDURE — 85025 COMPLETE CBC W/AUTO DIFF WBC: CPT

## 2019-09-13 RX ORDER — LETROZOLE 2.5 MG/1
2.5 TABLET, FILM COATED ORAL DAILY
Qty: 30 TABLET | Refills: 1 | Status: SHIPPED | OUTPATIENT
Start: 2019-09-13 | End: 2019-11-07 | Stop reason: SDUPTHER

## 2019-09-13 NOTE — PROGRESS NOTES
Marli Banda  1950 68 y.o. Referring Physician: Inpatient consult    PCP: Edwar Pickens DO    Vitals:    19 0822   BP: (!) 140/62   Pulse: 69   Temp: 98.5 °F (36.9 °C)        Wt Readings from Last 3 Encounters:   19 144 lb 6.4 oz (65.5 kg)   19 143 lb (64.9 kg)   19 120 lb (54.4 kg)        Body mass index is 23.31 kg/m². Chief Complaint: Patient states referred d/t breast cancer, metastatic ductal carcinoma of the left breast, ER positive, [place] positive, Her 2 negative. Chief Complaint   Patient presents with    New Patient         Cancer Staging  No matching staging information was found for the patient. Prior Radiation Therapy? NO    Concurrent Chemo/radiation? NO    Prior Chemotherapy? NO    Prior Hormonal Therapy? NO    Head and Neck Cancer? No, patient does NOT have HN cancer. LMP: unknown    Age at first Menses: 13 yrs    : 0    Para: 0          Current Outpatient Medications:     Multiple Vitamins-Minerals (THERAPEUTIC MULTIVITAMIN-MINERALS) tablet, Take 1 tablet by mouth daily.  Mail order vitamin made by her cardiologist., Disp: , Rfl:        Past Medical History:   Diagnosis Date    Arm fracture, left     Balance problems since     Hydrocephalus     compensated    Vertigo        Past Surgical History:   Procedure Laterality Date    ARM SURGERY  9/15/2009    HUMERUS FRACTURE SURGERY      Broke left arm        Family History   Problem Relation Age of Onset    Colon Cancer Mother 68    Heart Disease Father     Diabetes Father        Social History     Socioeconomic History    Marital status: Single     Spouse name: Not on file    Number of children: Not on file    Years of education: Not on file    Highest education level: Not on file   Occupational History    Not on file   Social Needs    Financial resource strain: Not on file    Food insecurity:     Worry: Not on file     Inability: Not on file   [place]

## 2019-09-16 ENCOUNTER — HOSPITAL ENCOUNTER (OUTPATIENT)
Dept: PET IMAGING | Age: 69
Discharge: HOME | End: 2019-09-18
Payer: MEDICARE

## 2019-09-16 ENCOUNTER — HOSPITAL ENCOUNTER (OUTPATIENT)
Dept: MRI IMAGING | Age: 69
Discharge: HOME | End: 2019-09-18
Payer: MEDICARE

## 2019-09-16 DIAGNOSIS — N63.0 BREAST MASS: ICD-10-CM

## 2019-09-16 DIAGNOSIS — C50.612 MALIGNANT NEOPLASM OF AXILLARY TAIL OF LEFT FEMALE BREAST, UNSPECIFIED ESTROGEN RECEPTOR STATUS (HCC): ICD-10-CM

## 2019-09-16 LAB — CA 15-3: 10 U/ML (ref 0–31)

## 2019-09-16 PROCEDURE — 6360000004 HC RX CONTRAST MEDICATION: Performed by: RADIOLOGY

## 2019-09-16 PROCEDURE — A9577 INJ MULTIHANCE: HCPCS | Performed by: RADIOLOGY

## 2019-09-16 PROCEDURE — 70553 MRI BRAIN STEM W/O & W/DYE: CPT

## 2019-09-16 RX ADMIN — GADOBENATE DIMEGLUMINE 13 ML: 529 INJECTION, SOLUTION INTRAVENOUS at 15:33

## 2019-09-17 ENCOUNTER — HOSPITAL ENCOUNTER (OUTPATIENT)
Dept: PET IMAGING | Age: 69
Discharge: HOME | End: 2019-09-19
Payer: MEDICARE

## 2019-09-17 DIAGNOSIS — N63.0 BREAST MASS: ICD-10-CM

## 2019-09-17 DIAGNOSIS — C50.612 MALIGNANT NEOPLASM OF AXILLARY TAIL OF LEFT FEMALE BREAST, UNSPECIFIED ESTROGEN RECEPTOR STATUS (HCC): ICD-10-CM

## 2019-09-17 LAB — METER GLUCOSE: 77 MG/DL (ref 74–99)

## 2019-09-17 PROCEDURE — A9552 F18 FDG: HCPCS | Performed by: RADIOLOGY

## 2019-09-17 PROCEDURE — 82962 GLUCOSE BLOOD TEST: CPT

## 2019-09-17 PROCEDURE — 78815 PET IMAGE W/CT SKULL-THIGH: CPT

## 2019-09-17 PROCEDURE — 3430000000 HC RX DIAGNOSTIC RADIOPHARMACEUTICAL: Performed by: RADIOLOGY

## 2019-09-17 RX ORDER — FLUDEOXYGLUCOSE F 18 200 MCI/ML
15 INJECTION, SOLUTION INTRAVENOUS
Status: COMPLETED | OUTPATIENT
Start: 2019-09-17 | End: 2019-09-17

## 2019-09-17 RX ADMIN — FLUDEOXYGLUCOSE F 18 15 MILLICURIE: 200 INJECTION, SOLUTION INTRAVENOUS at 10:10

## 2019-09-18 ENCOUNTER — OFFICE VISIT (OUTPATIENT)
Dept: FAMILY MEDICINE CLINIC | Age: 69
End: 2019-09-18
Payer: MEDICARE

## 2019-09-18 VITALS
BODY MASS INDEX: 22.5 KG/M2 | DIASTOLIC BLOOD PRESSURE: 76 MMHG | HEART RATE: 64 BPM | TEMPERATURE: 97.8 F | WEIGHT: 140 LBS | SYSTOLIC BLOOD PRESSURE: 130 MMHG | RESPIRATION RATE: 16 BRPM | HEIGHT: 66 IN | OXYGEN SATURATION: 93 %

## 2019-09-18 DIAGNOSIS — Z00.00 ROUTINE GENERAL MEDICAL EXAMINATION AT A HEALTH CARE FACILITY: Primary | ICD-10-CM

## 2019-09-18 PROCEDURE — G0438 PPPS, INITIAL VISIT: HCPCS | Performed by: FAMILY MEDICINE

## 2019-09-18 PROCEDURE — 3017F COLORECTAL CA SCREEN DOC REV: CPT | Performed by: FAMILY MEDICINE

## 2019-09-18 PROCEDURE — 1123F ACP DISCUSS/DSCN MKR DOCD: CPT | Performed by: FAMILY MEDICINE

## 2019-09-18 PROCEDURE — 4040F PNEUMOC VAC/ADMIN/RCVD: CPT | Performed by: FAMILY MEDICINE

## 2019-09-18 ASSESSMENT — PATIENT HEALTH QUESTIONNAIRE - PHQ9
SUM OF ALL RESPONSES TO PHQ QUESTIONS 1-9: 0
SUM OF ALL RESPONSES TO PHQ QUESTIONS 1-9: 0

## 2019-09-18 ASSESSMENT — LIFESTYLE VARIABLES: HOW OFTEN DO YOU HAVE A DRINK CONTAINING ALCOHOL: 0

## 2019-09-18 NOTE — PROGRESS NOTES
Medicare Annual Wellness Visit  Name: Rehana Meridian Date: 2019   MRN: [id number] Sex: Female   Age: 68 y.o. Ethnicity: Non-/Non    : 1950 Race: Luis M Salmon is here for Medicare AWV    Screenings for behavioral, psychosocial and functional/safety risks, and cognitive dysfunction are all negative except as indicated below. These results, as well as other patient data from the [email or web address] form, are documented in Flowsheets linked to this Encounter. No Known Allergies    Prior to Visit Medications    Medication Sig Taking? Authorizing Provider   palbociclib (IBRANCE) 125 MG capsule Take 125 mg by mouth daily for 3 weeks then off for 1 week and repeat every 28 days. Yes Bettina Sahu MD   letrozole (FEMARA) 2.5 MG tablet Take 1 tablet by mouth daily Yes Bettina Sahu MD   Multiple Vitamins-Minerals (THERAPEUTIC MULTIVITAMIN-MINERALS) tablet Take 1 tablet by mouth daily. Mail order vitamin made by her cardiologist. Yes Historical Provider, MD   palbociclib (IBRANCE) 125 MG capsule Take 125 mg by mouth daily for 21 days, then stop for 7 days.   Bettina Sahu MD       Past Medical History:   Diagnosis Date    Arm fracture, left 2009    Balance problems since     Hydrocephalus     compensated    Vertigo      Past Surgical History:   Procedure Laterality Date    ARM SURGERY  9/15/2009    HUMERUS FRACTURE SURGERY      Broke left arm        Family History   Problem Relation Age of Onset    Colon Cancer Mother 68    Heart Disease Father     Diabetes Father        CareTeam (Including outside providers/suppliers regularly involved in providing care):   Patient Care Team:  Andre Hare DO as PCP - General (Family Medicine)  Andre Hare DO as PCP - [facility] Empaneled Provider  Sue Sosa MD as Consulting Physician (Neurosurgery)    Wt Readings from Last 3 Encounters:   19 140 lb (63.5 kg)   19 144 lb 6.4 oz (65.5 kg) answers are Yes): (!) yes  2 or more falls in past year?: no  Fall with injury in past year?: no  Fall Risk Interventions:    · Home safety tips provided    General Health:  General  In general, how would you say your health is?: Very Good  In the past 7 days, have you experienced any of the following? New or Increased Pain, New or Increased Fatigue, Loneliness, Social Isolation, Stress or Anger?: None of These  Do you get the social and emotional support that you need?: Yes  Do you have a Living Will?: (!) No  General Health Risk Interventions:  · No Living Will: provided the state-specific advance directive document to the patient and patient declined    Health Habits/Nutrition:  Health Habits/Nutrition  Do you exercise for at least 20 minutes 2-3 times per week?: (!) No  Have you lost any weight without trying in the past 3 months?: No  Do you eat fewer than 2 meals per day?: No  Have you seen a dentist within the past year?: (!) No  Body mass index is 22.6 kg/m².   Health Habits/Nutrition Interventions:  · Inadequate physical activity:  patient is not ready to increase his/her physical activity level at this time  · Dental exam overdue:  patient declines dental evaluation, patient has issues with a previous dentist and is declining further evaluation    Safety:  Safety  Do you have working smoke detectors?: (!) No  Have all throw rugs been removed or fastened?: (!) No  Do you have non-slip mats or surfaces in all bathtubs/showers?: (!) No  Do all of your stairways have a railing or banister?: Yes  Are your doorways, halls and stairs free of clutter?: Yes  Do you always fasten your seatbelt when you are in a car?: Yes  Safety Interventions:  · Home safety tips provided    Personalized Preventive Plan   Current Health Maintenance Status  Immunization History   Administered Date(s) Administered    Influenza 10/21/2013    Influenza Vaccine, unspecified formulation 10/21/2013        Health Maintenance   Topic Date

## 2019-09-30 ENCOUNTER — TELEPHONE (OUTPATIENT)
Dept: PHARMACY | Age: 69
End: 2019-09-30

## 2019-10-01 ENCOUNTER — HOSPITAL ENCOUNTER (OUTPATIENT)
Dept: INFUSION THERAPY | Age: 69
Discharge: HOME | End: 2019-10-01
Payer: MEDICARE

## 2019-10-01 ENCOUNTER — OFFICE VISIT (OUTPATIENT)
Dept: ONCOLOGY | Age: 69
End: 2019-10-01
Payer: MEDICARE

## 2019-10-01 VITALS
HEART RATE: 75 BPM | SYSTOLIC BLOOD PRESSURE: 133 MMHG | BODY MASS INDEX: 22.29 KG/M2 | WEIGHT: 138.7 LBS | TEMPERATURE: 98.6 F | DIASTOLIC BLOOD PRESSURE: 60 MMHG | HEIGHT: 66 IN

## 2019-10-01 DIAGNOSIS — C50.612 MALIGNANT NEOPLASM OF AXILLARY TAIL OF LEFT FEMALE BREAST, UNSPECIFIED ESTROGEN RECEPTOR STATUS (HCC): ICD-10-CM

## 2019-10-01 DIAGNOSIS — C50.919 CARCINOMA OF BREAST METASTATIC TO BONE, UNSPECIFIED LATERALITY (HCC): ICD-10-CM

## 2019-10-01 DIAGNOSIS — N63.0 BREAST MASS: Primary | ICD-10-CM

## 2019-10-01 DIAGNOSIS — C79.51 CARCINOMA OF BREAST METASTATIC TO BONE, UNSPECIFIED LATERALITY (HCC): ICD-10-CM

## 2019-10-01 DIAGNOSIS — R53.83 FATIGUE DUE TO TREATMENT: ICD-10-CM

## 2019-10-01 DIAGNOSIS — N63.0 BREAST MASS: ICD-10-CM

## 2019-10-01 LAB
ALBUMIN SERPL-MCNC: 3.9 G/DL (ref 3.5–5.2)
ALP BLD-CCNC: 136 U/L (ref 35–104)
ALT SERPL-CCNC: 24 U/L (ref 0–32)
ANION GAP SERPL CALCULATED.3IONS-SCNC: 7 MMOL/L (ref 7–16)
ANISOCYTOSIS: ABNORMAL
AST SERPL-CCNC: 29 U/L (ref 0–31)
BASOPHILS ABSOLUTE: 0.07 E9/L (ref 0–0.2)
BASOPHILS RELATIVE PERCENT: 3.5 % (ref 0–2)
BILIRUB SERPL-MCNC: 0.3 MG/DL (ref 0–1.2)
BUN BLDV-MCNC: 18 MG/DL (ref 8–23)
CALCIUM SERPL-MCNC: 9.3 MG/DL (ref 8.6–10.2)
CEA: 2 NG/ML (ref 0–5.2)
CHLORIDE BLD-SCNC: 102 MMOL/L (ref 98–107)
CO2: 29 MMOL/L (ref 22–29)
CREAT SERPL-MCNC: 0.9 MG/DL (ref 0.5–1)
EOSINOPHILS ABSOLUTE: 0.04 E9/L (ref 0.05–0.5)
EOSINOPHILS RELATIVE PERCENT: 1.8 % (ref 0–6)
GFR AFRICAN AMERICAN: >60
GFR NON-AFRICAN AMERICAN: >60 ML/MIN/1.73
GLUCOSE BLD-MCNC: 99 MG/DL (ref 74–99)
HCT VFR BLD CALC: 34.1 % (ref 34–48)
HEMOGLOBIN: 10.8 G/DL (ref 11.5–15.5)
LYMPHOCYTES ABSOLUTE: 0.61 E9/L (ref 1.5–4)
LYMPHOCYTES RELATIVE PERCENT: 29.2 % (ref 20–42)
MCH RBC QN AUTO: 28 PG (ref 26–35)
MCHC RBC AUTO-ENTMCNC: 31.7 % (ref 32–34.5)
MCV RBC AUTO: 88.3 FL (ref 80–99.9)
MONOCYTES ABSOLUTE: 0.08 E9/L (ref 0.1–0.95)
MONOCYTES RELATIVE PERCENT: 3.5 % (ref 2–12)
NEUTROPHILS ABSOLUTE: 1.3 E9/L (ref 1.8–7.3)
NEUTROPHILS RELATIVE PERCENT: 61.9 % (ref 43–80)
PDW BLD-RTO: 16.1 FL (ref 11.5–15)
PLATELET # BLD: 146 E9/L (ref 130–450)
PMV BLD AUTO: 9.5 FL (ref 7–12)
POIKILOCYTES: ABNORMAL
POTASSIUM SERPL-SCNC: 4.5 MMOL/L (ref 3.5–5)
RBC # BLD: 3.86 E12/L (ref 3.5–5.5)
SCHISTOCYTES: ABNORMAL
SODIUM BLD-SCNC: 138 MMOL/L (ref 132–146)
TEAR DROP CELLS: ABNORMAL
TOTAL PROTEIN: 9.4 G/DL (ref 6.4–8.3)
WBC # BLD: 2.1 E9/L (ref 4.5–11.5)

## 2019-10-01 PROCEDURE — 99214 OFFICE O/P EST MOD 30 MIN: CPT | Performed by: INTERNAL MEDICINE

## 2019-10-01 PROCEDURE — G8400 PT W/DXA NO RESULTS DOC: HCPCS | Performed by: INTERNAL MEDICINE

## 2019-10-01 PROCEDURE — 3017F COLORECTAL CA SCREEN DOC REV: CPT | Performed by: INTERNAL MEDICINE

## 2019-10-01 PROCEDURE — 1123F ACP DISCUSS/DSCN MKR DOCD: CPT | Performed by: INTERNAL MEDICINE

## 2019-10-01 PROCEDURE — 99213 OFFICE O/P EST LOW 20 MIN: CPT | Performed by: INTERNAL MEDICINE

## 2019-10-01 PROCEDURE — G8420 CALC BMI NORM PARAMETERS: HCPCS | Performed by: INTERNAL MEDICINE

## 2019-10-01 PROCEDURE — 1036F TOBACCO NON-USER: CPT | Performed by: INTERNAL MEDICINE

## 2019-10-01 PROCEDURE — 4040F PNEUMOC VAC/ADMIN/RCVD: CPT | Performed by: INTERNAL MEDICINE

## 2019-10-01 PROCEDURE — 86300 IMMUNOASSAY TUMOR CA 15-3: CPT

## 2019-10-01 PROCEDURE — 85025 COMPLETE CBC W/AUTO DIFF WBC: CPT

## 2019-10-01 PROCEDURE — 82378 CARCINOEMBRYONIC ANTIGEN: CPT

## 2019-10-01 PROCEDURE — 1090F PRES/ABSN URINE INCON ASSESS: CPT | Performed by: INTERNAL MEDICINE

## 2019-10-01 PROCEDURE — G8427 DOCREV CUR MEDS BY ELIG CLIN: HCPCS | Performed by: INTERNAL MEDICINE

## 2019-10-01 PROCEDURE — G8484 FLU IMMUNIZE NO ADMIN: HCPCS | Performed by: INTERNAL MEDICINE

## 2019-10-01 PROCEDURE — 80053 COMPREHEN METABOLIC PANEL: CPT

## 2019-10-03 LAB — CA 15-3: 11 U/ML (ref 0–31)

## 2019-10-08 ENCOUNTER — TELEPHONE (OUTPATIENT)
Dept: PHARMACY | Age: 69
End: 2019-10-08

## 2019-10-08 DIAGNOSIS — C50.919 CARCINOMA OF BREAST METASTATIC TO BONE, UNSPECIFIED LATERALITY (HCC): ICD-10-CM

## 2019-10-08 DIAGNOSIS — C79.51 CARCINOMA OF BREAST METASTATIC TO BONE, UNSPECIFIED LATERALITY (HCC): ICD-10-CM

## 2019-10-08 DIAGNOSIS — N63.0 BREAST MASS: ICD-10-CM

## 2019-10-08 DIAGNOSIS — C50.612 MALIGNANT NEOPLASM OF AXILLARY TAIL OF LEFT FEMALE BREAST, UNSPECIFIED ESTROGEN RECEPTOR STATUS (HCC): ICD-10-CM

## 2019-10-09 ENCOUNTER — TELEPHONE (OUTPATIENT)
Dept: PHARMACY | Age: 69
End: 2019-10-09

## 2019-10-11 ENCOUNTER — OFFICE VISIT (OUTPATIENT)
Dept: ONCOLOGY | Age: 69
End: 2019-10-11
Payer: MEDICARE

## 2019-10-11 ENCOUNTER — HOSPITAL ENCOUNTER (OUTPATIENT)
Dept: INFUSION THERAPY | Age: 69
Discharge: HOME | End: 2019-10-11
Payer: MEDICARE

## 2019-10-11 VITALS
WEIGHT: 140.1 LBS | SYSTOLIC BLOOD PRESSURE: 119 MMHG | TEMPERATURE: 98.3 F | HEIGHT: 66 IN | BODY MASS INDEX: 22.52 KG/M2 | DIASTOLIC BLOOD PRESSURE: 59 MMHG | HEART RATE: 65 BPM

## 2019-10-11 DIAGNOSIS — E03.8 OTHER SPECIFIED HYPOTHYROIDISM: Primary | ICD-10-CM

## 2019-10-11 DIAGNOSIS — C50.919 CARCINOMA OF BREAST METASTATIC TO BONE, UNSPECIFIED LATERALITY (HCC): ICD-10-CM

## 2019-10-11 DIAGNOSIS — C50.612 MALIGNANT NEOPLASM OF AXILLARY TAIL OF LEFT FEMALE BREAST, UNSPECIFIED ESTROGEN RECEPTOR STATUS (HCC): ICD-10-CM

## 2019-10-11 DIAGNOSIS — R53.83 FATIGUE DUE TO TREATMENT: ICD-10-CM

## 2019-10-11 DIAGNOSIS — C79.51 CARCINOMA OF BREAST METASTATIC TO BONE, UNSPECIFIED LATERALITY (HCC): ICD-10-CM

## 2019-10-11 LAB
ALBUMIN SERPL-MCNC: 4 G/DL (ref 3.5–5.2)
ALP BLD-CCNC: 120 U/L (ref 35–104)
ALT SERPL-CCNC: 32 U/L (ref 0–32)
ANION GAP SERPL CALCULATED.3IONS-SCNC: 7 MMOL/L (ref 7–16)
AST SERPL-CCNC: 41 U/L (ref 0–31)
BASOPHILS ABSOLUTE: 0.03 E9/L (ref 0–0.2)
BASOPHILS RELATIVE PERCENT: 1.5 % (ref 0–2)
BILIRUB SERPL-MCNC: 0.2 MG/DL (ref 0–1.2)
BUN BLDV-MCNC: 16 MG/DL (ref 8–23)
CALCIUM SERPL-MCNC: 9.4 MG/DL (ref 8.6–10.2)
CEA: 2.2 NG/ML (ref 0–5.2)
CHLORIDE BLD-SCNC: 103 MMOL/L (ref 98–107)
CO2: 26 MMOL/L (ref 22–29)
CREAT SERPL-MCNC: 0.6 MG/DL (ref 0.5–1)
EOSINOPHILS ABSOLUTE: 0.03 E9/L (ref 0.05–0.5)
EOSINOPHILS RELATIVE PERCENT: 1.5 % (ref 0–6)
GFR AFRICAN AMERICAN: >60
GFR NON-AFRICAN AMERICAN: >60 ML/MIN/1.73
GLUCOSE BLD-MCNC: 101 MG/DL (ref 74–99)
HCT VFR BLD CALC: 34 % (ref 34–48)
HEMOGLOBIN: 11.1 G/DL (ref 11.5–15.5)
IMMATURE GRANULOCYTES #: 0.01 E9/L
IMMATURE GRANULOCYTES %: 0.5 % (ref 0–5)
LYMPHOCYTES ABSOLUTE: 0.63 E9/L (ref 1.5–4)
LYMPHOCYTES RELATIVE PERCENT: 31.5 % (ref 20–42)
MCH RBC QN AUTO: 29.2 PG (ref 26–35)
MCHC RBC AUTO-ENTMCNC: 32.6 % (ref 32–34.5)
MCV RBC AUTO: 89.5 FL (ref 80–99.9)
MONOCYTES ABSOLUTE: 0.23 E9/L (ref 0.1–0.95)
MONOCYTES RELATIVE PERCENT: 11.5 % (ref 2–12)
NEUTROPHILS ABSOLUTE: 1.07 E9/L (ref 1.8–7.3)
NEUTROPHILS RELATIVE PERCENT: 53.5 % (ref 43–80)
PDW BLD-RTO: 17.6 FL (ref 11.5–15)
PLATELET # BLD: 205 E9/L (ref 130–450)
PMV BLD AUTO: 9.3 FL (ref 7–12)
POTASSIUM SERPL-SCNC: 4.1 MMOL/L (ref 3.5–5)
RBC # BLD: 3.8 E12/L (ref 3.5–5.5)
SODIUM BLD-SCNC: 136 MMOL/L (ref 132–146)
TOTAL PROTEIN: 9.6 G/DL (ref 6.4–8.3)
TSH SERPL DL<=0.05 MIU/L-ACNC: 11.67 UIU/ML (ref 0.27–4.2)
WBC # BLD: 2 E9/L (ref 4.5–11.5)

## 2019-10-11 PROCEDURE — 99214 OFFICE O/P EST MOD 30 MIN: CPT | Performed by: INTERNAL MEDICINE

## 2019-10-11 PROCEDURE — G8420 CALC BMI NORM PARAMETERS: HCPCS | Performed by: INTERNAL MEDICINE

## 2019-10-11 PROCEDURE — 1090F PRES/ABSN URINE INCON ASSESS: CPT | Performed by: INTERNAL MEDICINE

## 2019-10-11 PROCEDURE — 1123F ACP DISCUSS/DSCN MKR DOCD: CPT | Performed by: INTERNAL MEDICINE

## 2019-10-11 PROCEDURE — 99212 OFFICE O/P EST SF 10 MIN: CPT | Performed by: INTERNAL MEDICINE

## 2019-10-11 PROCEDURE — 84443 ASSAY THYROID STIM HORMONE: CPT

## 2019-10-11 PROCEDURE — 80053 COMPREHEN METABOLIC PANEL: CPT

## 2019-10-11 PROCEDURE — 82378 CARCINOEMBRYONIC ANTIGEN: CPT

## 2019-10-11 PROCEDURE — G8400 PT W/DXA NO RESULTS DOC: HCPCS | Performed by: INTERNAL MEDICINE

## 2019-10-11 PROCEDURE — 36415 COLL VENOUS BLD VENIPUNCTURE: CPT | Performed by: INTERNAL MEDICINE

## 2019-10-11 PROCEDURE — G8427 DOCREV CUR MEDS BY ELIG CLIN: HCPCS | Performed by: INTERNAL MEDICINE

## 2019-10-11 PROCEDURE — 1036F TOBACCO NON-USER: CPT | Performed by: INTERNAL MEDICINE

## 2019-10-11 PROCEDURE — 86300 IMMUNOASSAY TUMOR CA 15-3: CPT

## 2019-10-11 PROCEDURE — G8484 FLU IMMUNIZE NO ADMIN: HCPCS | Performed by: INTERNAL MEDICINE

## 2019-10-11 PROCEDURE — 4040F PNEUMOC VAC/ADMIN/RCVD: CPT | Performed by: INTERNAL MEDICINE

## 2019-10-11 PROCEDURE — 85025 COMPLETE CBC W/AUTO DIFF WBC: CPT

## 2019-10-11 PROCEDURE — 3017F COLORECTAL CA SCREEN DOC REV: CPT | Performed by: INTERNAL MEDICINE

## 2019-10-13 LAB — CA 15-3: 13 U/ML (ref 0–31)

## 2019-10-14 ENCOUNTER — TELEPHONE (OUTPATIENT)
Dept: PHARMACY | Age: 69
End: 2019-10-14

## 2019-10-25 ENCOUNTER — HOSPITAL ENCOUNTER (OUTPATIENT)
Dept: INFUSION THERAPY | Age: 69
Discharge: HOME | End: 2019-10-25
Payer: MEDICARE

## 2019-10-25 ENCOUNTER — OFFICE VISIT (OUTPATIENT)
Dept: ONCOLOGY | Age: 69
End: 2019-10-25
Payer: MEDICARE

## 2019-10-25 VITALS
TEMPERATURE: 98 F | DIASTOLIC BLOOD PRESSURE: 70 MMHG | HEIGHT: 66 IN | HEART RATE: 74 BPM | SYSTOLIC BLOOD PRESSURE: 122 MMHG | WEIGHT: 140.1 LBS | BODY MASS INDEX: 22.52 KG/M2

## 2019-10-25 DIAGNOSIS — C79.51 CARCINOMA OF BREAST METASTATIC TO BONE, UNSPECIFIED LATERALITY (HCC): ICD-10-CM

## 2019-10-25 DIAGNOSIS — C79.51 CARCINOMA OF BREAST METASTATIC TO BONE, UNSPECIFIED LATERALITY (HCC): Primary | ICD-10-CM

## 2019-10-25 DIAGNOSIS — E03.8 OTHER SPECIFIED HYPOTHYROIDISM: ICD-10-CM

## 2019-10-25 DIAGNOSIS — C50.919 CARCINOMA OF BREAST METASTATIC TO BONE, UNSPECIFIED LATERALITY (HCC): ICD-10-CM

## 2019-10-25 DIAGNOSIS — C50.919 CARCINOMA OF BREAST METASTATIC TO BONE, UNSPECIFIED LATERALITY (HCC): Primary | ICD-10-CM

## 2019-10-25 LAB
ANISOCYTOSIS: ABNORMAL
ATYPICAL LYMPHOCYTE RELATIVE PERCENT: 0.9 % (ref 0–4)
BASOPHILS ABSOLUTE: 0 E9/L (ref 0–0.2)
BASOPHILS RELATIVE PERCENT: 2.3 % (ref 0–2)
EOSINOPHILS ABSOLUTE: 0 E9/L (ref 0.05–0.5)
EOSINOPHILS RELATIVE PERCENT: 0.5 % (ref 0–6)
HCT VFR BLD CALC: 35.5 % (ref 34–48)
HEMOGLOBIN: 11.4 G/DL (ref 11.5–15.5)
LYMPHOCYTES ABSOLUTE: 0.7 E9/L (ref 1.5–4)
LYMPHOCYTES RELATIVE PERCENT: 30.7 % (ref 20–42)
MCH RBC QN AUTO: 29.2 PG (ref 26–35)
MCHC RBC AUTO-ENTMCNC: 32.1 % (ref 32–34.5)
MCV RBC AUTO: 90.8 FL (ref 80–99.9)
MONOCYTES ABSOLUTE: 0.13 E9/L (ref 0.1–0.95)
MONOCYTES RELATIVE PERCENT: 6.1 % (ref 2–12)
NEUTROPHILS ABSOLUTE: 1.36 E9/L (ref 1.8–7.3)
NEUTROPHILS RELATIVE PERCENT: 62.3 % (ref 43–80)
PDW BLD-RTO: 18.7 FL (ref 11.5–15)
PLATELET # BLD: 233 E9/L (ref 130–450)
PMV BLD AUTO: 10.1 FL (ref 7–12)
RBC # BLD: 3.91 E12/L (ref 3.5–5.5)
T3 TOTAL: 114.3 NG/DL (ref 80–200)
T4 FREE: 0.72 NG/DL (ref 0.93–1.7)
WBC # BLD: 2.2 E9/L (ref 4.5–11.5)

## 2019-10-25 PROCEDURE — 99212 OFFICE O/P EST SF 10 MIN: CPT

## 2019-10-25 PROCEDURE — G8400 PT W/DXA NO RESULTS DOC: HCPCS | Performed by: INTERNAL MEDICINE

## 2019-10-25 PROCEDURE — G8420 CALC BMI NORM PARAMETERS: HCPCS | Performed by: INTERNAL MEDICINE

## 2019-10-25 PROCEDURE — 3017F COLORECTAL CA SCREEN DOC REV: CPT | Performed by: INTERNAL MEDICINE

## 2019-10-25 PROCEDURE — 99214 OFFICE O/P EST MOD 30 MIN: CPT | Performed by: INTERNAL MEDICINE

## 2019-10-25 PROCEDURE — 36415 COLL VENOUS BLD VENIPUNCTURE: CPT

## 2019-10-25 PROCEDURE — 84480 ASSAY TRIIODOTHYRONINE (T3): CPT

## 2019-10-25 PROCEDURE — 1036F TOBACCO NON-USER: CPT | Performed by: INTERNAL MEDICINE

## 2019-10-25 PROCEDURE — 4040F PNEUMOC VAC/ADMIN/RCVD: CPT | Performed by: INTERNAL MEDICINE

## 2019-10-25 PROCEDURE — 84439 ASSAY OF FREE THYROXINE: CPT

## 2019-10-25 PROCEDURE — 1090F PRES/ABSN URINE INCON ASSESS: CPT | Performed by: INTERNAL MEDICINE

## 2019-10-25 PROCEDURE — G8484 FLU IMMUNIZE NO ADMIN: HCPCS | Performed by: INTERNAL MEDICINE

## 2019-10-25 PROCEDURE — 1123F ACP DISCUSS/DSCN MKR DOCD: CPT | Performed by: INTERNAL MEDICINE

## 2019-10-25 PROCEDURE — 85025 COMPLETE CBC W/AUTO DIFF WBC: CPT

## 2019-10-25 PROCEDURE — G8427 DOCREV CUR MEDS BY ELIG CLIN: HCPCS | Performed by: INTERNAL MEDICINE

## 2019-10-31 DIAGNOSIS — C79.51 CARCINOMA OF BREAST METASTATIC TO BONE, UNSPECIFIED LATERALITY (HCC): ICD-10-CM

## 2019-10-31 DIAGNOSIS — N63.0 BREAST MASS: ICD-10-CM

## 2019-10-31 DIAGNOSIS — C50.612 MALIGNANT NEOPLASM OF AXILLARY TAIL OF LEFT FEMALE BREAST, UNSPECIFIED ESTROGEN RECEPTOR STATUS (HCC): ICD-10-CM

## 2019-10-31 DIAGNOSIS — C50.919 CARCINOMA OF BREAST METASTATIC TO BONE, UNSPECIFIED LATERALITY (HCC): ICD-10-CM

## 2019-11-07 DIAGNOSIS — N63.0 BREAST MASS: ICD-10-CM

## 2019-11-07 DIAGNOSIS — C50.612 MALIGNANT NEOPLASM OF AXILLARY TAIL OF LEFT FEMALE BREAST, UNSPECIFIED ESTROGEN RECEPTOR STATUS (HCC): ICD-10-CM

## 2019-11-07 DIAGNOSIS — C50.919 CARCINOMA OF BREAST METASTATIC TO BONE, UNSPECIFIED LATERALITY (HCC): ICD-10-CM

## 2019-11-07 DIAGNOSIS — C79.51 CARCINOMA OF BREAST METASTATIC TO BONE, UNSPECIFIED LATERALITY (HCC): ICD-10-CM

## 2019-11-07 RX ORDER — LETROZOLE 2.5 MG/1
2.5 TABLET, FILM COATED ORAL DAILY
Qty: 30 TABLET | Refills: 3 | Status: SHIPPED
Start: 2019-11-07 | End: 2020-02-11 | Stop reason: SDUPTHER

## 2019-11-08 ENCOUNTER — TELEPHONE (OUTPATIENT)
Dept: FAMILY MEDICINE CLINIC | Age: 69
End: 2019-11-08

## 2019-11-08 ENCOUNTER — OFFICE VISIT (OUTPATIENT)
Dept: ONCOLOGY | Age: 69
End: 2019-11-08
Payer: MEDICARE

## 2019-11-08 ENCOUNTER — HOSPITAL ENCOUNTER (OUTPATIENT)
Dept: INFUSION THERAPY | Age: 69
Discharge: HOME | End: 2019-11-08
Payer: MEDICARE

## 2019-11-08 VITALS
BODY MASS INDEX: 22.66 KG/M2 | SYSTOLIC BLOOD PRESSURE: 141 MMHG | TEMPERATURE: 98.2 F | WEIGHT: 141 LBS | HEIGHT: 66 IN | HEART RATE: 74 BPM | DIASTOLIC BLOOD PRESSURE: 65 MMHG

## 2019-11-08 DIAGNOSIS — C50.612 MALIGNANT NEOPLASM OF AXILLARY TAIL OF LEFT FEMALE BREAST, UNSPECIFIED ESTROGEN RECEPTOR STATUS (HCC): ICD-10-CM

## 2019-11-08 DIAGNOSIS — C79.51 CARCINOMA OF BREAST METASTATIC TO BONE, UNSPECIFIED LATERALITY (HCC): ICD-10-CM

## 2019-11-08 DIAGNOSIS — C50.612 MALIGNANT NEOPLASM OF AXILLARY TAIL OF LEFT FEMALE BREAST, UNSPECIFIED ESTROGEN RECEPTOR STATUS (HCC): Primary | ICD-10-CM

## 2019-11-08 DIAGNOSIS — C50.919 CARCINOMA OF BREAST METASTATIC TO BONE, UNSPECIFIED LATERALITY (HCC): ICD-10-CM

## 2019-11-08 LAB
ALBUMIN SERPL-MCNC: 4.1 G/DL (ref 3.5–5.2)
ALP BLD-CCNC: 112 U/L (ref 35–104)
ALT SERPL-CCNC: 33 U/L (ref 0–32)
ANION GAP SERPL CALCULATED.3IONS-SCNC: 9 MMOL/L (ref 7–16)
ANISOCYTOSIS: ABNORMAL
AST SERPL-CCNC: 41 U/L (ref 0–31)
BASOPHILS ABSOLUTE: 0.03 E9/L (ref 0–0.2)
BASOPHILS RELATIVE PERCENT: 1.8 % (ref 0–2)
BILIRUB SERPL-MCNC: 0.3 MG/DL (ref 0–1.2)
BUN BLDV-MCNC: 9 MG/DL (ref 8–23)
CALCIUM SERPL-MCNC: 8.8 MG/DL (ref 8.6–10.2)
CEA: 1.8 NG/ML (ref 0–5.2)
CHLORIDE BLD-SCNC: 102 MMOL/L (ref 98–107)
CO2: 26 MMOL/L (ref 22–29)
CREAT SERPL-MCNC: 0.7 MG/DL (ref 0.5–1)
EOSINOPHILS ABSOLUTE: 0 E9/L (ref 0.05–0.5)
EOSINOPHILS RELATIVE PERCENT: 0.6 % (ref 0–6)
GFR AFRICAN AMERICAN: >60
GFR NON-AFRICAN AMERICAN: >60 ML/MIN/1.73
GLUCOSE BLD-MCNC: 94 MG/DL (ref 74–99)
HCT VFR BLD CALC: 35 % (ref 34–48)
HEMOGLOBIN: 11.2 G/DL (ref 11.5–15.5)
LYMPHOCYTES ABSOLUTE: 0.29 E9/L (ref 1.5–4)
LYMPHOCYTES RELATIVE PERCENT: 17.5 % (ref 20–42)
MCH RBC QN AUTO: 29.9 PG (ref 26–35)
MCHC RBC AUTO-ENTMCNC: 32 % (ref 32–34.5)
MCV RBC AUTO: 93.3 FL (ref 80–99.9)
MONOCYTES ABSOLUTE: 0.16 E9/L (ref 0.1–0.95)
MONOCYTES RELATIVE PERCENT: 9.6 % (ref 2–12)
MYELOCYTE PERCENT: 0.9 % (ref 0–0)
NEUTROPHILS ABSOLUTE: 1.14 E9/L (ref 1.8–7.3)
NEUTROPHILS RELATIVE PERCENT: 70.2 % (ref 43–80)
OVALOCYTES: ABNORMAL
PDW BLD-RTO: 19.2 FL (ref 11.5–15)
PLATELET # BLD: 154 E9/L (ref 130–450)
PMV BLD AUTO: 9.6 FL (ref 7–12)
POIKILOCYTES: ABNORMAL
POTASSIUM SERPL-SCNC: 4.1 MMOL/L (ref 3.5–5)
RBC # BLD: 3.75 E12/L (ref 3.5–5.5)
SODIUM BLD-SCNC: 137 MMOL/L (ref 132–146)
TOTAL PROTEIN: 9.1 G/DL (ref 6.4–8.3)
WBC # BLD: 1.6 E9/L (ref 4.5–11.5)

## 2019-11-08 PROCEDURE — 86300 IMMUNOASSAY TUMOR CA 15-3: CPT

## 2019-11-08 PROCEDURE — 99212 OFFICE O/P EST SF 10 MIN: CPT | Performed by: INTERNAL MEDICINE

## 2019-11-08 PROCEDURE — 1123F ACP DISCUSS/DSCN MKR DOCD: CPT | Performed by: INTERNAL MEDICINE

## 2019-11-08 PROCEDURE — 3017F COLORECTAL CA SCREEN DOC REV: CPT | Performed by: INTERNAL MEDICINE

## 2019-11-08 PROCEDURE — G8420 CALC BMI NORM PARAMETERS: HCPCS | Performed by: INTERNAL MEDICINE

## 2019-11-08 PROCEDURE — 80053 COMPREHEN METABOLIC PANEL: CPT

## 2019-11-08 PROCEDURE — 36415 COLL VENOUS BLD VENIPUNCTURE: CPT | Performed by: INTERNAL MEDICINE

## 2019-11-08 PROCEDURE — 1036F TOBACCO NON-USER: CPT | Performed by: INTERNAL MEDICINE

## 2019-11-08 PROCEDURE — 99214 OFFICE O/P EST MOD 30 MIN: CPT | Performed by: INTERNAL MEDICINE

## 2019-11-08 PROCEDURE — 85025 COMPLETE CBC W/AUTO DIFF WBC: CPT

## 2019-11-08 PROCEDURE — G8484 FLU IMMUNIZE NO ADMIN: HCPCS | Performed by: INTERNAL MEDICINE

## 2019-11-08 PROCEDURE — G8427 DOCREV CUR MEDS BY ELIG CLIN: HCPCS | Performed by: INTERNAL MEDICINE

## 2019-11-08 PROCEDURE — 1090F PRES/ABSN URINE INCON ASSESS: CPT | Performed by: INTERNAL MEDICINE

## 2019-11-08 PROCEDURE — G8400 PT W/DXA NO RESULTS DOC: HCPCS | Performed by: INTERNAL MEDICINE

## 2019-11-08 PROCEDURE — 4040F PNEUMOC VAC/ADMIN/RCVD: CPT | Performed by: INTERNAL MEDICINE

## 2019-11-08 PROCEDURE — 82378 CARCINOEMBRYONIC ANTIGEN: CPT

## 2019-11-10 LAB — CA 15-3: 13 U/ML (ref 0–31)

## 2019-12-06 ENCOUNTER — OFFICE VISIT (OUTPATIENT)
Dept: ONCOLOGY | Age: 69
End: 2019-12-06
Payer: MEDICARE

## 2019-12-06 ENCOUNTER — TELEPHONE (OUTPATIENT)
Dept: INFUSION THERAPY | Age: 69
End: 2019-12-06

## 2019-12-06 ENCOUNTER — HOSPITAL ENCOUNTER (OUTPATIENT)
Dept: INFUSION THERAPY | Age: 69
Discharge: HOME | End: 2019-12-06
Payer: MEDICARE

## 2019-12-06 VITALS
HEIGHT: 66 IN | WEIGHT: 138.5 LBS | TEMPERATURE: 100 F | HEART RATE: 81 BPM | BODY MASS INDEX: 22.26 KG/M2 | DIASTOLIC BLOOD PRESSURE: 61 MMHG | SYSTOLIC BLOOD PRESSURE: 127 MMHG | OXYGEN SATURATION: 99 %

## 2019-12-06 DIAGNOSIS — C50.612 MALIGNANT NEOPLASM OF AXILLARY TAIL OF LEFT FEMALE BREAST, UNSPECIFIED ESTROGEN RECEPTOR STATUS (HCC): Primary | ICD-10-CM

## 2019-12-06 DIAGNOSIS — C50.612 MALIGNANT NEOPLASM OF AXILLARY TAIL OF LEFT FEMALE BREAST, UNSPECIFIED ESTROGEN RECEPTOR STATUS (HCC): ICD-10-CM

## 2019-12-06 LAB
ALBUMIN SERPL-MCNC: 3.7 G/DL (ref 3.5–5.2)
ALP BLD-CCNC: 97 U/L (ref 35–104)
ALT SERPL-CCNC: 38 U/L (ref 0–32)
ANION GAP SERPL CALCULATED.3IONS-SCNC: 8 MMOL/L (ref 7–16)
ANISOCYTOSIS: ABNORMAL
AST SERPL-CCNC: 44 U/L (ref 0–31)
BASOPHILS ABSOLUTE: 0 E9/L (ref 0–0.2)
BASOPHILS RELATIVE PERCENT: 1.5 % (ref 0–2)
BILIRUB SERPL-MCNC: 0.4 MG/DL (ref 0–1.2)
BUN BLDV-MCNC: 10 MG/DL (ref 8–23)
CALCIUM SERPL-MCNC: 8.9 MG/DL (ref 8.6–10.2)
CEA: 1.5 NG/ML (ref 0–5.2)
CHLORIDE BLD-SCNC: 104 MMOL/L (ref 98–107)
CO2: 25 MMOL/L (ref 22–29)
CREAT SERPL-MCNC: 0.7 MG/DL (ref 0.5–1)
EOSINOPHILS ABSOLUTE: 0.02 E9/L (ref 0.05–0.5)
EOSINOPHILS RELATIVE PERCENT: 0.9 % (ref 0–6)
GFR AFRICAN AMERICAN: >60
GFR NON-AFRICAN AMERICAN: >60 ML/MIN/1.73
GLUCOSE BLD-MCNC: 91 MG/DL (ref 74–99)
HCT VFR BLD CALC: 29.6 % (ref 34–48)
HEMOGLOBIN: 9.5 G/DL (ref 11.5–15.5)
HYPOCHROMIA: ABNORMAL
LYMPHOCYTES ABSOLUTE: 0.24 E9/L (ref 1.5–4)
LYMPHOCYTES RELATIVE PERCENT: 12.2 % (ref 20–42)
MCH RBC QN AUTO: 30.4 PG (ref 26–35)
MCHC RBC AUTO-ENTMCNC: 32.1 % (ref 32–34.5)
MCV RBC AUTO: 94.9 FL (ref 80–99.9)
MONOCYTES ABSOLUTE: 0.02 E9/L (ref 0.1–0.95)
MONOCYTES RELATIVE PERCENT: 0.9 % (ref 2–12)
MYELOCYTE PERCENT: 0.9 % (ref 0–0)
NEUTROPHILS ABSOLUTE: 1.72 E9/L (ref 1.8–7.3)
NEUTROPHILS RELATIVE PERCENT: 85.2 % (ref 43–80)
OVALOCYTES: ABNORMAL
PDW BLD-RTO: 16.5 FL (ref 11.5–15)
PLATELET # BLD: 177 E9/L (ref 130–450)
PMV BLD AUTO: 9.7 FL (ref 7–12)
POIKILOCYTES: ABNORMAL
POLYCHROMASIA: ABNORMAL
POTASSIUM SERPL-SCNC: 3.9 MMOL/L (ref 3.5–5)
RBC # BLD: 3.12 E12/L (ref 3.5–5.5)
SCHISTOCYTES: ABNORMAL
SODIUM BLD-SCNC: 137 MMOL/L (ref 132–146)
TOTAL PROTEIN: 8.6 G/DL (ref 6.4–8.3)
WBC # BLD: 2 E9/L (ref 4.5–11.5)

## 2019-12-06 PROCEDURE — G8420 CALC BMI NORM PARAMETERS: HCPCS | Performed by: INTERNAL MEDICINE

## 2019-12-06 PROCEDURE — 82378 CARCINOEMBRYONIC ANTIGEN: CPT

## 2019-12-06 PROCEDURE — 99214 OFFICE O/P EST MOD 30 MIN: CPT | Performed by: INTERNAL MEDICINE

## 2019-12-06 PROCEDURE — 99213 OFFICE O/P EST LOW 20 MIN: CPT | Performed by: INTERNAL MEDICINE

## 2019-12-06 PROCEDURE — 1090F PRES/ABSN URINE INCON ASSESS: CPT | Performed by: INTERNAL MEDICINE

## 2019-12-06 PROCEDURE — 36415 COLL VENOUS BLD VENIPUNCTURE: CPT | Performed by: INTERNAL MEDICINE

## 2019-12-06 PROCEDURE — 85025 COMPLETE CBC W/AUTO DIFF WBC: CPT

## 2019-12-06 PROCEDURE — 3017F COLORECTAL CA SCREEN DOC REV: CPT | Performed by: INTERNAL MEDICINE

## 2019-12-06 PROCEDURE — 80053 COMPREHEN METABOLIC PANEL: CPT

## 2019-12-06 PROCEDURE — 1123F ACP DISCUSS/DSCN MKR DOCD: CPT | Performed by: INTERNAL MEDICINE

## 2019-12-06 PROCEDURE — 4040F PNEUMOC VAC/ADMIN/RCVD: CPT | Performed by: INTERNAL MEDICINE

## 2019-12-06 PROCEDURE — G8484 FLU IMMUNIZE NO ADMIN: HCPCS | Performed by: INTERNAL MEDICINE

## 2019-12-06 PROCEDURE — 86300 IMMUNOASSAY TUMOR CA 15-3: CPT

## 2019-12-06 PROCEDURE — G8427 DOCREV CUR MEDS BY ELIG CLIN: HCPCS | Performed by: INTERNAL MEDICINE

## 2019-12-06 PROCEDURE — 1036F TOBACCO NON-USER: CPT | Performed by: INTERNAL MEDICINE

## 2019-12-06 PROCEDURE — G8400 PT W/DXA NO RESULTS DOC: HCPCS | Performed by: INTERNAL MEDICINE

## 2019-12-09 ENCOUNTER — OFFICE VISIT (OUTPATIENT)
Dept: FAMILY MEDICINE CLINIC | Age: 69
End: 2019-12-09
Payer: MEDICARE

## 2019-12-09 ENCOUNTER — HOSPITAL ENCOUNTER (OUTPATIENT)
Age: 69
Discharge: HOME | End: 2019-12-11
Payer: MEDICARE

## 2019-12-09 VITALS
HEART RATE: 75 BPM | WEIGHT: 140.2 LBS | TEMPERATURE: 97.9 F | OXYGEN SATURATION: 97 % | RESPIRATION RATE: 14 BRPM | BODY MASS INDEX: 22.53 KG/M2 | HEIGHT: 66 IN | SYSTOLIC BLOOD PRESSURE: 120 MMHG | DIASTOLIC BLOOD PRESSURE: 66 MMHG

## 2019-12-09 DIAGNOSIS — R53.82 CHRONIC FATIGUE, UNSPECIFIED: ICD-10-CM

## 2019-12-09 DIAGNOSIS — R79.89 ABNORMAL TSH: Primary | ICD-10-CM

## 2019-12-09 DIAGNOSIS — E55.9 VITAMIN D DEFICIENCY: ICD-10-CM

## 2019-12-09 DIAGNOSIS — R79.89 ABNORMAL TSH: ICD-10-CM

## 2019-12-09 LAB — CA 15-3: 14 U/ML (ref 0–31)

## 2019-12-09 PROCEDURE — 99213 OFFICE O/P EST LOW 20 MIN: CPT | Performed by: FAMILY MEDICINE

## 2019-12-09 PROCEDURE — 1090F PRES/ABSN URINE INCON ASSESS: CPT | Performed by: FAMILY MEDICINE

## 2019-12-09 PROCEDURE — 84443 ASSAY THYROID STIM HORMONE: CPT

## 2019-12-09 PROCEDURE — 82306 VITAMIN D 25 HYDROXY: CPT

## 2019-12-09 PROCEDURE — G8427 DOCREV CUR MEDS BY ELIG CLIN: HCPCS | Performed by: FAMILY MEDICINE

## 2019-12-09 PROCEDURE — 3017F COLORECTAL CA SCREEN DOC REV: CPT | Performed by: FAMILY MEDICINE

## 2019-12-09 PROCEDURE — G8420 CALC BMI NORM PARAMETERS: HCPCS | Performed by: FAMILY MEDICINE

## 2019-12-09 PROCEDURE — 1123F ACP DISCUSS/DSCN MKR DOCD: CPT | Performed by: FAMILY MEDICINE

## 2019-12-09 PROCEDURE — 4040F PNEUMOC VAC/ADMIN/RCVD: CPT | Performed by: FAMILY MEDICINE

## 2019-12-09 PROCEDURE — 1036F TOBACCO NON-USER: CPT | Performed by: FAMILY MEDICINE

## 2019-12-09 PROCEDURE — G8400 PT W/DXA NO RESULTS DOC: HCPCS | Performed by: FAMILY MEDICINE

## 2019-12-09 PROCEDURE — 84439 ASSAY OF FREE THYROXINE: CPT

## 2019-12-09 PROCEDURE — G8484 FLU IMMUNIZE NO ADMIN: HCPCS | Performed by: FAMILY MEDICINE

## 2019-12-09 ASSESSMENT — ENCOUNTER SYMPTOMS
CONSTIPATION: 0
WHEEZING: 0
RHINORRHEA: 0
BACK PAIN: 0
ABDOMINAL PAIN: 0
SINUS PRESSURE: 0
VOMITING: 0
DIARRHEA: 0
SORE THROAT: 0
COUGH: 0
SHORTNESS OF BREATH: 0
NAUSEA: 0

## 2019-12-10 ENCOUNTER — TELEPHONE (OUTPATIENT)
Dept: FAMILY MEDICINE CLINIC | Age: 69
End: 2019-12-10

## 2019-12-10 ENCOUNTER — TELEPHONE (OUTPATIENT)
Dept: PHARMACY | Age: 69
End: 2019-12-10

## 2019-12-10 DIAGNOSIS — E55.9 VITAMIN D DEFICIENCY: ICD-10-CM

## 2019-12-10 DIAGNOSIS — E03.9 HYPOTHYROIDISM, UNSPECIFIED TYPE: Primary | ICD-10-CM

## 2019-12-10 LAB
T4 FREE: 0.84 NG/DL (ref 0.93–1.7)
TSH SERPL DL<=0.05 MIU/L-ACNC: 6.46 UIU/ML (ref 0.27–4.2)
VITAMIN D 25-HYDROXY: 16 NG/ML (ref 30–100)

## 2019-12-10 RX ORDER — ERGOCALCIFEROL 1.25 MG/1
50000 CAPSULE ORAL WEEKLY
Qty: 12 CAPSULE | Refills: 1 | Status: SHIPPED
Start: 2019-12-10 | End: 2020-04-13

## 2019-12-10 RX ORDER — LEVOTHYROXINE SODIUM 0.03 MG/1
25 TABLET ORAL DAILY
Qty: 30 TABLET | Refills: 1 | Status: SHIPPED | OUTPATIENT
Start: 2019-12-10 | End: 2020-01-20 | Stop reason: SDUPTHER

## 2019-12-17 ENCOUNTER — HOSPITAL ENCOUNTER (OUTPATIENT)
Dept: CT IMAGING | Age: 69
Discharge: HOME | End: 2019-12-19
Payer: MEDICARE

## 2019-12-17 ENCOUNTER — HOSPITAL ENCOUNTER (OUTPATIENT)
Dept: NUCLEAR MEDICINE | Age: 69
Discharge: HOME | End: 2019-12-17
Payer: MEDICARE

## 2019-12-17 ENCOUNTER — TELEPHONE (OUTPATIENT)
Dept: PHARMACY | Age: 69
End: 2019-12-17

## 2019-12-17 DIAGNOSIS — C50.612 MALIGNANT NEOPLASM OF AXILLARY TAIL OF LEFT FEMALE BREAST, UNSPECIFIED ESTROGEN RECEPTOR STATUS (HCC): ICD-10-CM

## 2019-12-17 PROCEDURE — A9503 TC99M MEDRONATE: HCPCS | Performed by: RADIOLOGY

## 2019-12-17 PROCEDURE — 6360000004 HC RX CONTRAST MEDICATION: Performed by: RADIOLOGY

## 2019-12-17 PROCEDURE — 74177 CT ABD & PELVIS W/CONTRAST: CPT

## 2019-12-17 PROCEDURE — 3430000000 HC RX DIAGNOSTIC RADIOPHARMACEUTICAL: Performed by: RADIOLOGY

## 2019-12-17 PROCEDURE — 71260 CT THORAX DX C+: CPT

## 2019-12-17 PROCEDURE — 78306 BONE IMAGING WHOLE BODY: CPT

## 2019-12-17 RX ORDER — TC 99M MEDRONATE 20 MG/10ML
25 INJECTION, POWDER, LYOPHILIZED, FOR SOLUTION INTRAVENOUS
Status: COMPLETED | OUTPATIENT
Start: 2019-12-17 | End: 2019-12-17

## 2019-12-17 RX ADMIN — IOHEXOL 50 ML: 240 INJECTION, SOLUTION INTRATHECAL; INTRAVASCULAR; INTRAVENOUS; ORAL at 10:13

## 2019-12-17 RX ADMIN — TC 99M MEDRONATE 25 MILLICURIE: 20 INJECTION, POWDER, LYOPHILIZED, FOR SOLUTION INTRAVENOUS at 12:12

## 2019-12-17 RX ADMIN — IOPAMIDOL 110 ML: 755 INJECTION, SOLUTION INTRAVENOUS at 10:13

## 2020-01-02 ENCOUNTER — TELEPHONE (OUTPATIENT)
Dept: INFUSION THERAPY | Age: 70
End: 2020-01-02

## 2020-01-03 ENCOUNTER — OFFICE VISIT (OUTPATIENT)
Dept: ONCOLOGY | Age: 70
End: 2020-01-03
Payer: MEDICARE

## 2020-01-03 ENCOUNTER — HOSPITAL ENCOUNTER (OUTPATIENT)
Dept: INFUSION THERAPY | Age: 70
Discharge: HOME | End: 2020-01-03
Payer: MEDICARE

## 2020-01-03 VITALS
SYSTOLIC BLOOD PRESSURE: 124 MMHG | HEART RATE: 72 BPM | DIASTOLIC BLOOD PRESSURE: 70 MMHG | WEIGHT: 135.7 LBS | BODY MASS INDEX: 21.81 KG/M2 | HEIGHT: 66 IN | TEMPERATURE: 98.6 F | RESPIRATION RATE: 18 BRPM

## 2020-01-03 PROCEDURE — 99212 OFFICE O/P EST SF 10 MIN: CPT | Performed by: INTERNAL MEDICINE

## 2020-01-03 PROCEDURE — 1036F TOBACCO NON-USER: CPT | Performed by: INTERNAL MEDICINE

## 2020-01-03 PROCEDURE — 1090F PRES/ABSN URINE INCON ASSESS: CPT | Performed by: INTERNAL MEDICINE

## 2020-01-03 PROCEDURE — 3017F COLORECTAL CA SCREEN DOC REV: CPT | Performed by: INTERNAL MEDICINE

## 2020-01-03 PROCEDURE — G8420 CALC BMI NORM PARAMETERS: HCPCS | Performed by: INTERNAL MEDICINE

## 2020-01-03 PROCEDURE — 4040F PNEUMOC VAC/ADMIN/RCVD: CPT | Performed by: INTERNAL MEDICINE

## 2020-01-03 PROCEDURE — G8484 FLU IMMUNIZE NO ADMIN: HCPCS | Performed by: INTERNAL MEDICINE

## 2020-01-03 PROCEDURE — 1123F ACP DISCUSS/DSCN MKR DOCD: CPT | Performed by: INTERNAL MEDICINE

## 2020-01-03 PROCEDURE — 99214 OFFICE O/P EST MOD 30 MIN: CPT | Performed by: INTERNAL MEDICINE

## 2020-01-03 PROCEDURE — G8427 DOCREV CUR MEDS BY ELIG CLIN: HCPCS | Performed by: INTERNAL MEDICINE

## 2020-01-03 PROCEDURE — G8400 PT W/DXA NO RESULTS DOC: HCPCS | Performed by: INTERNAL MEDICINE

## 2020-01-03 NOTE — PROGRESS NOTES
[address] MED ONCOLOGY  [facility] [address]  Dept: [phone]  Attending Progress Note      Reason for Visit:   Metastatic breast cancer. PCP:  Terri Abbott DO    History of Present Illness: The patient is a 68 y.o. lady, with a past medical history significant for hydrocephalus, who had presented with left breast changes, she had noticed a dark spot on her left breast, then she had developed induration, she thinks that she had the breast changes for months. The patient had never had a mammogram done, had a CT scan of the chest done, revealing a solid left breast mass measuring at least 6.8 x 2.9 cm in size is identified. The mass appears to extend to the skin surface. The deep margin of the mass abuts the chest wall but a fat plane appears to be preserved. Pathologically enlarged left  axillary lymph nodes are seen measuring up to 4.5 x 1.9 cm in size. CT abdomen and pelvis was done on 8/30/2019 revealing partial visualization of soft tissue mass in the left breast, nonspecific lymph nodes in the upper abdomen. Bone scan was done on 8/30/2019, revealing a Prominent focus of radiotracer uptake in the left anterior superior iliac spine, with corresponding abnormality on recent CT, suggestive of metastasis. More subtle focal radiotracer uptake in the right anterior superior iliac spine could be due to metastasis or  degenerative change.   2. Focal radiotracer uptake in the lateral aspect of the right ninth  rib, which appears to be due to a nondisplaced healing fracture. Correlation with history of recent trauma is recommended. In light of  other findings, this could represent a pathologic fracture with an  underlying metastatic lesion. 3. Osteoarthritic uptake in the shoulders, knees, wrists, and hands.     She underwent on 8/30/2019 a biopsy of 1 of the left axillary lymph nodes, she was consistent with metastatic ductal carcinoma, nuclear grade 2, ER positive greater than 95% PA +70% HER-2/elder 1+, by IHC, negative. She has been having drainage from the left breast mass, her sister has been assisting with the dressing changes. The patient was started on systemic therapy with Femara and Ibrance on 9/13/2019. She denies any side effects from the treatment. The breast tumor had dried up, no drainage, doing well overall. Review of Systems;  CONSTITUTIONAL: No fever, chills. Good appetite and energy level. ENMT: Eyes: No diplopia; Nose: No epistaxis. Mouth: No sore throat. RESPIRATORY: No hemoptysis, shortness of breath, cough. CARDIOVASCULAR: No chest pain, palpitations. GASTROINTESTINAL: No nausea/vomiting, abdominal pain, diarrhea/constipation. GENITOURINARY: No dysuria, urinary frequency, hematuria. NEURO: No syncope, presyncope, headache. MSK: She has pain in the left hip. Remainder:  ROS NEGATIVE    Past Medical History:      Diagnosis Date    Arm fracture, left 2009    Balance problems since April, 2011    Hydrocephalus [facility])     compensated    Vertigo      Patient Active Problem List   Diagnosis    [facility]    Hydrocephalus [facility]    Hyponatremia    Breast mass    Malignant neoplasm of axillary tail of left female breast ([facility]    Breast cancer metastasized to bone (HCC)    Carcinoma of breast metastatic to bone [facility])        Past Surgical History:      Procedure Laterality Date    ARM SURGERY  9/15/2009    HUMERUS FRACTURE SURGERY  2009    Broke left arm        Family History:  Family History   Problem Relation Age of Onset    Colon Cancer Mother 68    Heart Disease Father     Diabetes Father        Medications:  Reviewed and reconciled.     Social History:  Social History     Socioeconomic History    Marital status: Single     Spouse name: Not on file    Number of children: Not on file    Years of education: Not on file    Highest education level: Not on file   Occupational History  Not on file   Social Needs    Financial resource strain: Not on file    Food insecurity:     Worry: Not on file     Inability: Not on file    Transportation needs:     Medical: Not on file     Non-medical: Not on file   Tobacco Use    Smoking status: Never Smoker    Smokeless tobacco: Never Used   Substance and Sexual Activity    Alcohol use: Yes     Comment: drinks it occasional on social events    Drug use: No    Sexual activity: Never   Lifestyle    Physical activity:     Days per week: Not on file     Minutes per session: Not on file    Stress: Not on file   Relationships    Social connections:     Talks on phone: Not on file     Gets together: Not on file     Attends [employer] service: Not on file     Active member of club or organization: Not on file     Attends meetings of clubs or organizations: Not on file     Relationship status: Not on file    Intimate partner violence:     Fear of current or ex partner: Not on file     Emotionally abused: Not on file     Physically abused: Not on file     Forced sexual activity: Not on file   Other Topics Concern    Not on file   Social History Narrative    Not on file       Allergies:  No Known Allergies    Physical Exam:  /70 (Site: Left Upper Arm, Position: Sitting, Cuff Size: Medium Adult)   Pulse 72   Temp 98.6 °F (37 °C) (Temporal)   Resp 18   Ht 5' 6\" (1.676 m)   Wt 135 lb 11.2 oz (61.6 kg)   BMI 21.90 kg/m²     GENERAL: Alert, oriented x 3, not in acute distress. HEENT: PERRLA; EOMI. Oropharynx clear. NECK: Supple. No palpable cervical or supraclavicular lymphadenopathy. LUNGS: Good air entry bilaterally. No wheezing, crackles or rhonchi. CARDIOVASCULAR: Regular rate. No murmurs, rubs or gallops.    BREASTS: The right breast exam is negative for any skin changes, nipple discharge, no palpable masses, no palpable right axillary adenopathy, left breast appears had markedly improved, the nodules nodules had resolved, the mass at the dental clinic, we are waiting for until clearance to start Xgeva. Paraproteinemia, could be secondary to polyclonal gammopathy secondary to malignancy,ordred SPEP. Bone scan done on 12/17/2019: Redemonstration of focus of radiotracer uptake involving posterior lateral right ninth rib suggestive of healing fracture. New radiotracer activity is seen at level of lateral right 10th rib and anterior right seventh rib which may be related to new healing fractures. Metastases is unlikely. Clinical correlation recommended. No additional abnormal areas of increased radiotracer uptake. CT chest done on 12/17/2019: Significant interval decrease in size of the large mass seen in the left breast 8/29/2019. Interval decrease in size of enlarged left axillary lymph nodes. Cardiomegaly. Enlarged thyroid, unchanged. Small hiatal hernia. Diffuse interstitial changes and multifocal atelectasis or scarring in both lungs. Stable small nodular density in the right upper lobe. Sclerotic changes involving the lateral right ninth rib. CT abdomen/pelvis done on 12/17/2019: Small right renal cyst. Retroverted uterus. Distended colon and rectum containing a large amount of stool. Mildly enlarged retroperitoneal and periportal lymph nodes, unchanged. Findings in the left iliac bone as described. The results/images of the scans were reviewed with the patient and her daughter, she is responding nicely to the [facility], will continue the same treatment, she is scheduled to start cycle #5 on 1/14/2020, blood work done prior to starting the [employer]. RTC start of the next cycle. Thank you for allowing us to participate in the care of Ms. Melissa Ferguson.     Ginger Stanton MD   HEMATOLOGY/MEDICAL ONCOLOGY  [address] [place] [address] MED ONCOLOGY  [facility] [address] [facility] [address]  Dept: [phone]

## 2020-01-14 ENCOUNTER — OFFICE VISIT (OUTPATIENT)
Dept: ONCOLOGY | Age: 70
End: 2020-01-14
Payer: MEDICARE

## 2020-01-14 ENCOUNTER — HOSPITAL ENCOUNTER (OUTPATIENT)
Dept: INFUSION THERAPY | Age: 70
Discharge: HOME | End: 2020-01-14
Payer: MEDICARE

## 2020-01-14 VITALS
BODY MASS INDEX: 22.24 KG/M2 | OXYGEN SATURATION: 100 % | SYSTOLIC BLOOD PRESSURE: 155 MMHG | HEART RATE: 71 BPM | HEIGHT: 66 IN | DIASTOLIC BLOOD PRESSURE: 68 MMHG | TEMPERATURE: 99 F | WEIGHT: 138.4 LBS

## 2020-01-14 LAB
ALBUMIN SERPL-MCNC: 3.8 G/DL (ref 3.5–5.2)
ALP BLD-CCNC: 90 U/L (ref 35–104)
ALT SERPL-CCNC: 23 U/L (ref 0–32)
ANION GAP SERPL CALCULATED.3IONS-SCNC: 11 MMOL/L (ref 7–16)
AST SERPL-CCNC: 33 U/L (ref 0–31)
BASOPHILS ABSOLUTE: 0.07 E9/L (ref 0–0.2)
BASOPHILS RELATIVE PERCENT: 3.2 % (ref 0–2)
BILIRUB SERPL-MCNC: 0.3 MG/DL (ref 0–1.2)
BUN BLDV-MCNC: 8 MG/DL (ref 8–23)
CALCIUM SERPL-MCNC: 9 MG/DL (ref 8.6–10.2)
CEA: 2 NG/ML (ref 0–5.2)
CHLORIDE BLD-SCNC: 99 MMOL/L (ref 98–107)
CO2: 24 MMOL/L (ref 22–29)
CREAT SERPL-MCNC: 0.7 MG/DL (ref 0.5–1)
EOSINOPHILS ABSOLUTE: 0.03 E9/L (ref 0.05–0.5)
EOSINOPHILS RELATIVE PERCENT: 1.4 % (ref 0–6)
GFR AFRICAN AMERICAN: >60
GFR NON-AFRICAN AMERICAN: >60 ML/MIN/1.73
GLUCOSE BLD-MCNC: 100 MG/DL (ref 74–99)
HCT VFR BLD CALC: 31 % (ref 34–48)
HEMOGLOBIN: 9.5 G/DL (ref 11.5–15.5)
IMMATURE GRANULOCYTES #: 0.01 E9/L
IMMATURE GRANULOCYTES %: 0.5 % (ref 0–5)
LYMPHOCYTES ABSOLUTE: 0.67 E9/L (ref 1.5–4)
LYMPHOCYTES RELATIVE PERCENT: 30.3 % (ref 20–42)
MCH RBC QN AUTO: 31.1 PG (ref 26–35)
MCHC RBC AUTO-ENTMCNC: 30.6 % (ref 32–34.5)
MCV RBC AUTO: 101.6 FL (ref 80–99.9)
MONOCYTES ABSOLUTE: 0.35 E9/L (ref 0.1–0.95)
MONOCYTES RELATIVE PERCENT: 15.8 % (ref 2–12)
NEUTROPHILS ABSOLUTE: 1.08 E9/L (ref 1.8–7.3)
NEUTROPHILS RELATIVE PERCENT: 48.8 % (ref 43–80)
PDW BLD-RTO: 19 FL (ref 11.5–15)
PLATELET # BLD: 191 E9/L (ref 130–450)
PMV BLD AUTO: 9.8 FL (ref 7–12)
POTASSIUM SERPL-SCNC: 3.8 MMOL/L (ref 3.5–5)
RBC # BLD: 3.05 E12/L (ref 3.5–5.5)
SODIUM BLD-SCNC: 134 MMOL/L (ref 132–146)
TOTAL PROTEIN: 8.7 G/DL (ref 6.4–8.3)
WBC # BLD: 2.2 E9/L (ref 4.5–11.5)

## 2020-01-14 PROCEDURE — 99214 OFFICE O/P EST MOD 30 MIN: CPT | Performed by: INTERNAL MEDICINE

## 2020-01-14 PROCEDURE — G8400 PT W/DXA NO RESULTS DOC: HCPCS | Performed by: INTERNAL MEDICINE

## 2020-01-14 PROCEDURE — 4040F PNEUMOC VAC/ADMIN/RCVD: CPT | Performed by: INTERNAL MEDICINE

## 2020-01-14 PROCEDURE — 1123F ACP DISCUSS/DSCN MKR DOCD: CPT | Performed by: INTERNAL MEDICINE

## 2020-01-14 PROCEDURE — 85025 COMPLETE CBC W/AUTO DIFF WBC: CPT

## 2020-01-14 PROCEDURE — 82378 CARCINOEMBRYONIC ANTIGEN: CPT

## 2020-01-14 PROCEDURE — 99212 OFFICE O/P EST SF 10 MIN: CPT | Performed by: INTERNAL MEDICINE

## 2020-01-14 PROCEDURE — 36415 COLL VENOUS BLD VENIPUNCTURE: CPT | Performed by: INTERNAL MEDICINE

## 2020-01-14 PROCEDURE — G8427 DOCREV CUR MEDS BY ELIG CLIN: HCPCS | Performed by: INTERNAL MEDICINE

## 2020-01-14 PROCEDURE — 3017F COLORECTAL CA SCREEN DOC REV: CPT | Performed by: INTERNAL MEDICINE

## 2020-01-14 PROCEDURE — 1036F TOBACCO NON-USER: CPT | Performed by: INTERNAL MEDICINE

## 2020-01-14 PROCEDURE — 1090F PRES/ABSN URINE INCON ASSESS: CPT | Performed by: INTERNAL MEDICINE

## 2020-01-14 PROCEDURE — G8484 FLU IMMUNIZE NO ADMIN: HCPCS | Performed by: INTERNAL MEDICINE

## 2020-01-14 PROCEDURE — G8420 CALC BMI NORM PARAMETERS: HCPCS | Performed by: INTERNAL MEDICINE

## 2020-01-14 PROCEDURE — 86300 IMMUNOASSAY TUMOR CA 15-3: CPT

## 2020-01-14 PROCEDURE — 80053 COMPREHEN METABOLIC PANEL: CPT

## 2020-01-14 NOTE — PROGRESS NOTES
[address] MED ONCOLOGY  [address] [facility] [address]  Dept: [phone]  Attending Progress Note      Reason for Visit:   Metastatic breast cancer. PCP:  Sanjeev Calzada DO    History of Present Illness: The patient is a 68 y.o. lady, with a past medical history significant for hydrocephalus, who had presented with left breast changes, she had noticed a dark spot on her left breast, then she had developed induration, she thinks that she had the breast changes for months. The patient had never had a mammogram done, had a CT scan of the chest done, revealing a solid left breast mass measuring at least 6.8 x 2.9 cm in size is identified. The mass appears to extend to the skin surface. The deep margin of the mass abuts the chest wall but a fat plane appears to be preserved. Pathologically enlarged left  axillary lymph nodes are seen measuring up to 4.5 x 1.9 cm in size. CT abdomen and pelvis was done on 8/30/2019 revealing partial visualization of soft tissue mass in the left breast, nonspecific lymph nodes in the upper abdomen. Bone scan was done on 8/30/2019, revealing a Prominent focus of radiotracer uptake in the left anterior superior iliac spine, with corresponding abnormality on recent CT, suggestive of metastasis. More subtle focal radiotracer uptake in the right anterior superior iliac spine could be due to metastasis or  degenerative change.   2. Focal radiotracer uptake in the lateral aspect of the right ninth  rib, which appears to be due to a nondisplaced healing fracture. Correlation with history of recent trauma is recommended. In light of  other findings, this could represent a pathologic fracture with an  underlying metastatic lesion. 3. Osteoarthritic uptake in the shoulders, knees, wrists, and hands.     She underwent on 8/30/2019 a biopsy of 1 of the left axillary lymph nodes, she was consistent with metastatic ductal carcinoma, Occupational History    Not on file   Social Needs    Financial resource strain: Not on file    Food insecurity:     Worry: Not on file     Inability: Not on file    Transportation needs:     Medical: Not on file     Non-medical: Not on file   Tobacco Use    Smoking status: Never Smoker    Smokeless tobacco: Never Used   Substance and Sexual Activity    Alcohol use: Yes     Comment: drinks it occasional on social events    Drug use: No    Sexual activity: Never   Lifestyle    Physical activity:     Days per week: Not on file     Minutes per session: Not on file    Stress: Not on file   Relationships    Social connections:     Talks on phone: Not on file     Gets together: Not on file     Attends [employer] service: Not on file     Active member of club or organization: Not on file     Attends meetings of clubs or organizations: Not on file     Relationship status: Not on file    Intimate partner violence:     Fear of current or ex partner: Not on file     Emotionally abused: Not on file     Physically abused: Not on file     Forced sexual activity: Not on file   Other Topics Concern    Not on file   Social History Narrative    Not on file       Allergies:  No Known Allergies    Physical Exam:  BP (!) 155/68 (Site: Left Upper Arm, Position: Sitting, Cuff Size: Medium Adult)   Pulse 71   Temp 99 °F (37.2 °C) (Temporal)   Ht 5' 6\" (1.676 m)   Wt 138 lb 6.4 oz (62.8 kg)   SpO2 100%   BMI 22.34 kg/m²     GENERAL: Alert, oriented x 3, not in acute distress. HEENT: PERRLA; EOMI. Oropharynx clear. NECK: Supple. No palpable cervical or supraclavicular lymphadenopathy. LUNGS: Good air entry bilaterally. No wheezing, crackles or rhonchi. CARDIOVASCULAR: Regular rate. No murmurs, rubs or gallops.    BREASTS: The right breast exam is negative for any skin changes, nipple discharge, no palpable masses, no palpable right axillary adenopathy, left breast appears had markedly improved, the nodules nodules had resolved, the mass decreased in size, no drainage, no bleeding, palpable left axillary lymphadenopathy  ABDOMEN: Soft. Non-tender, non-distended. Positive bowel sounds. EXTREMITIES: mild edema of the LLE. NEUROLOGIC: No focal deficits. ECOG PS 1     Impression/Plan:     The patient is a 68 y.o. lady, with a past medical history significant for hydrocephalus, who had presented with left breast changes, who was diagnosed with a left breast ductal carcinoma, ER positive greater than 95% OH +70% HER-2/elder 1+, she has a very locally advanced disease, and metastatic disease to the bones. I discussed with the patient and her sister today her diagnosis, prognosis and recommendations for treatment. Will complete the staging work-up, the patient will have a brain MRI with and without contrast done, and a PET scan. For her HR pos, HER-2/elder negative metastatic breast cancer, I recommended treatment with Femara and Ibrance, the schedule and side effects of the treatment were reviewed with her. She has metastatic disease to the bones, she would benefit from treatment with Xgeva, dental clearance will be obtained prior to starting Xgeva. Referral was placed to [address] [place] [address]. I discussed with the patient palliative radiation therapy to the left breast, which will help to decrease the size of the tumor and stop the drainage, the patient would like to hold off on radiation therapy at this time. She is not interested in having home health care or wound clinic referral at this time. MRI brain done on 09/16/2019: no evidence of metastatic disease. PET/CT on 09/17/2019: The lytic soft tissue finding in the iliac crest in the left pelvis shows hypermetabolic FDG uptake, with maximum measurements in the 5.7 SUV range, strongly suggesting metastatic disease. She has uptake in the thyroid, could be physiologic, ordered TFTs. Femara/Ibrance were started on 09/13/2019.   Had received 4 cycles to MD   HEMATOLOGY/MEDICAL ONCOLOGY  Kathy  MED ONCOLOGY  [facility] [address]  Dept: [phone]

## 2020-01-16 LAB — CA 15-3: 21 U/ML (ref 0–31)

## 2020-01-20 RX ORDER — LEVOTHYROXINE SODIUM 0.03 MG/1
25 TABLET ORAL DAILY
Qty: 30 TABLET | Refills: 2 | Status: SHIPPED
Start: 2020-01-20 | End: 2020-04-13

## 2020-01-21 ENCOUNTER — TELEPHONE (OUTPATIENT)
Dept: ONCOLOGY | Age: 70
End: 2020-01-21

## 2020-01-21 ENCOUNTER — TELEPHONE (OUTPATIENT)
Dept: PHARMACY | Age: 70
End: 2020-01-21

## 2020-01-21 NOTE — TELEPHONE ENCOUNTER
Received a call from Abby Cho, [address] [facility] sister, asking if her Jhon Maillard could be ordered in so that when she comes in on 2-11-20 it will be here for pickup. Called to have refilled and shipped and was told that a new prescription was needed to be called in so I called and spoke to Rizwan Hernandez in doctors office. He is to call 2- [phone] with new prescription. Said he would call right away. Called Abby Cho back to let her know that the Jhon Maillard will be here for Cary's appointment on 2-11-20. She was very appreciative.

## 2020-01-21 NOTE — TELEPHONE ENCOUNTER
Ibrance reordered with Elvis Baltazar.      ADOLFO Garcia [facility]  MEDICAL ONCOLOGY  [facility] [address] [facility] [address]  Dept: [phone]  January 21, 2020

## 2020-01-27 ENCOUNTER — TELEPHONE (OUTPATIENT)
Dept: PHARMACY | Age: 70
End: 2020-01-27

## 2020-02-11 ENCOUNTER — OFFICE VISIT (OUTPATIENT)
Dept: ONCOLOGY | Age: 70
End: 2020-02-11
Payer: MEDICARE

## 2020-02-11 ENCOUNTER — TELEPHONE (OUTPATIENT)
Dept: PHARMACY | Age: 70
End: 2020-02-11

## 2020-02-11 ENCOUNTER — HOSPITAL ENCOUNTER (OUTPATIENT)
Dept: INFUSION THERAPY | Age: 70
Discharge: HOME | End: 2020-02-11
Payer: MEDICARE

## 2020-02-11 VITALS
HEIGHT: 66 IN | DIASTOLIC BLOOD PRESSURE: 61 MMHG | TEMPERATURE: 98.5 F | BODY MASS INDEX: 21.71 KG/M2 | WEIGHT: 135.1 LBS | OXYGEN SATURATION: 98 % | HEART RATE: 61 BPM | SYSTOLIC BLOOD PRESSURE: 128 MMHG

## 2020-02-11 DIAGNOSIS — C50.612 MALIGNANT NEOPLASM OF AXILLARY TAIL OF LEFT FEMALE BREAST, UNSPECIFIED ESTROGEN RECEPTOR STATUS (HCC): ICD-10-CM

## 2020-02-11 LAB
ALBUMIN SERPL-MCNC: 4.2 G/DL (ref 3.5–5.2)
ALP BLD-CCNC: 94 U/L (ref 35–104)
ALT SERPL-CCNC: 29 U/L (ref 0–32)
ANION GAP SERPL CALCULATED.3IONS-SCNC: 11 MMOL/L (ref 7–16)
ANISOCYTOSIS: ABNORMAL
AST SERPL-CCNC: 33 U/L (ref 0–31)
BASOPHILS ABSOLUTE: 0.04 E9/L (ref 0–0.2)
BASOPHILS RELATIVE PERCENT: 2.6 % (ref 0–2)
BILIRUB SERPL-MCNC: 0.4 MG/DL (ref 0–1.2)
BUN BLDV-MCNC: 15 MG/DL (ref 8–23)
CALCIUM SERPL-MCNC: 9.1 MG/DL (ref 8.6–10.2)
CEA: 2 NG/ML (ref 0–5.2)
CHLORIDE BLD-SCNC: 102 MMOL/L (ref 98–107)
CO2: 23 MMOL/L (ref 22–29)
CREAT SERPL-MCNC: 0.7 MG/DL (ref 0.5–1)
EOSINOPHILS ABSOLUTE: 0.03 E9/L (ref 0.05–0.5)
EOSINOPHILS RELATIVE PERCENT: 1.8 % (ref 0–6)
GFR AFRICAN AMERICAN: >60
GFR NON-AFRICAN AMERICAN: >60 ML/MIN/1.73
GLUCOSE BLD-MCNC: 94 MG/DL (ref 74–99)
HCT VFR BLD CALC: 33.4 % (ref 34–48)
HEMOGLOBIN: 10.6 G/DL (ref 11.5–15.5)
LYMPHOCYTES ABSOLUTE: 0.41 E9/L (ref 1.5–4)
LYMPHOCYTES RELATIVE PERCENT: 28.9 % (ref 20–42)
MCH RBC QN AUTO: 31.6 PG (ref 26–35)
MCHC RBC AUTO-ENTMCNC: 31.7 % (ref 32–34.5)
MCV RBC AUTO: 99.7 FL (ref 80–99.9)
METAMYELOCYTES RELATIVE PERCENT: 0.9 % (ref 0–1)
MONOCYTES ABSOLUTE: 0.1 E9/L (ref 0.1–0.95)
MONOCYTES RELATIVE PERCENT: 7 % (ref 2–12)
NEUTROPHILS ABSOLUTE: 0.84 E9/L (ref 1.8–7.3)
NEUTROPHILS RELATIVE PERCENT: 58.8 % (ref 43–80)
PDW BLD-RTO: 17.4 FL (ref 11.5–15)
PLATELET # BLD: 111 E9/L (ref 130–450)
PMV BLD AUTO: 10.2 FL (ref 7–12)
POTASSIUM SERPL-SCNC: 4.3 MMOL/L (ref 3.5–5)
RBC # BLD: 3.35 E12/L (ref 3.5–5.5)
SODIUM BLD-SCNC: 136 MMOL/L (ref 132–146)
TOTAL PROTEIN: 9.3 G/DL (ref 6.4–8.3)
WBC # BLD: 1.4 E9/L (ref 4.5–11.5)

## 2020-02-11 PROCEDURE — 85025 COMPLETE CBC W/AUTO DIFF WBC: CPT

## 2020-02-11 PROCEDURE — 80053 COMPREHEN METABOLIC PANEL: CPT

## 2020-02-11 PROCEDURE — 1123F ACP DISCUSS/DSCN MKR DOCD: CPT | Performed by: INTERNAL MEDICINE

## 2020-02-11 PROCEDURE — 1036F TOBACCO NON-USER: CPT | Performed by: INTERNAL MEDICINE

## 2020-02-11 PROCEDURE — 1090F PRES/ABSN URINE INCON ASSESS: CPT | Performed by: INTERNAL MEDICINE

## 2020-02-11 PROCEDURE — 99212 OFFICE O/P EST SF 10 MIN: CPT | Performed by: INTERNAL MEDICINE

## 2020-02-11 PROCEDURE — 3017F COLORECTAL CA SCREEN DOC REV: CPT | Performed by: INTERNAL MEDICINE

## 2020-02-11 PROCEDURE — G8484 FLU IMMUNIZE NO ADMIN: HCPCS | Performed by: INTERNAL MEDICINE

## 2020-02-11 PROCEDURE — 99214 OFFICE O/P EST MOD 30 MIN: CPT | Performed by: INTERNAL MEDICINE

## 2020-02-11 PROCEDURE — 36415 COLL VENOUS BLD VENIPUNCTURE: CPT | Performed by: INTERNAL MEDICINE

## 2020-02-11 PROCEDURE — 82378 CARCINOEMBRYONIC ANTIGEN: CPT

## 2020-02-11 PROCEDURE — 86300 IMMUNOASSAY TUMOR CA 15-3: CPT

## 2020-02-11 PROCEDURE — G8400 PT W/DXA NO RESULTS DOC: HCPCS | Performed by: INTERNAL MEDICINE

## 2020-02-11 PROCEDURE — G8420 CALC BMI NORM PARAMETERS: HCPCS | Performed by: INTERNAL MEDICINE

## 2020-02-11 PROCEDURE — G8427 DOCREV CUR MEDS BY ELIG CLIN: HCPCS | Performed by: INTERNAL MEDICINE

## 2020-02-11 PROCEDURE — 4040F PNEUMOC VAC/ADMIN/RCVD: CPT | Performed by: INTERNAL MEDICINE

## 2020-02-11 RX ORDER — LETROZOLE 2.5 MG/1
2.5 TABLET, FILM COATED ORAL DAILY
Qty: 30 TABLET | Refills: 3 | Status: SHIPPED
Start: 2020-02-11 | End: 2020-05-01 | Stop reason: SDUPTHER

## 2020-02-11 NOTE — TELEPHONE ENCOUNTER
Placed a call to [facility] for shipment of [employer]. Medication will be delivered to Oncology pharmacy on 2-. Med is to he give to Brady Campbell from here out. Michelle Nicole will dispense the medication the Autumn Agudelo herself. Number to call for refills is [phone].

## 2020-02-11 NOTE — PROGRESS NOTES
[address] [facility] ONCOLOGY  [address] [facility] [address]  Dept: [phone]  Attending Progress Note      Reason for Visit:   Metastatic breast cancer. PCP:  Thor Dawkins DO    History of Present Illness: The patient is a 68 y.o. lady, with a past medical history significant for hydrocephalus, who had presented with left breast changes, she had noticed a dark spot on her left breast, then she had developed induration, she thinks that she had the breast changes for months. The patient had never had a mammogram done, had a CT scan of the chest done, revealing a solid left breast mass measuring at least 6.8 x 2.9 cm in size is identified. The mass appears to extend to the skin surface. The deep margin of the mass abuts the chest wall but a fat plane appears to be preserved. Pathologically enlarged left  axillary lymph nodes are seen measuring up to 4.5 x 1.9 cm in size. CT abdomen and pelvis was done on 8/30/2019 revealing partial visualization of soft tissue mass in the left breast, nonspecific lymph nodes in the upper abdomen. Bone scan was done on 8/30/2019, revealing a Prominent focus of radiotracer uptake in the left anterior superior iliac spine, with corresponding abnormality on recent CT, suggestive of metastasis. More subtle focal radiotracer uptake in the right anterior superior iliac spine could be due to metastasis or  degenerative change.   2. Focal radiotracer uptake in the lateral aspect of the right ninth  rib, which appears to be due to a nondisplaced healing fracture. Correlation with history of recent trauma is recommended. In light of  other findings, this could represent a pathologic fracture with an  underlying metastatic lesion. 3. Osteoarthritic uptake in the shoulders, knees, wrists, and hands.     She underwent on 8/30/2019 a biopsy of 1 of the left axillary lymph nodes, she was consistent with metastatic ductal carcinoma, nuclear grade 2, ER positive greater than 95% LA +70% HER-2/elder 1+, by IHC, negative. She has been having drainage from the left breast mass, her sister has been assisting with the dressing changes. The patient was started on systemic therapy with Femara and Ibrance on 9/13/2019. She denies any side effects from the treatment. The breast tumor is shrinking, the skin had dried up, no drainage, doing well overall. Review of Systems;  CONSTITUTIONAL: No fever, chills. Good appetite and energy level. ENMT: Eyes: No diplopia; Nose: No epistaxis. Mouth: No sore throat. RESPIRATORY: No hemoptysis, shortness of breath, cough. CARDIOVASCULAR: No chest pain, palpitations. GASTROINTESTINAL: No nausea/vomiting, abdominal pain, diarrhea/constipation. GENITOURINARY: No dysuria, urinary frequency, hematuria. NEURO: No syncope, presyncope, headache. MSK: She has pain in the left hip. Remainder:  ROS NEGATIVE    Past Medical History:      Diagnosis Date    Arm fracture, left 2009    Balance problems since April, 2011    Hydrocephalus [facility])     compensated    Vertigo      Patient Active Problem List   Diagnosis    [facility]    Hydrocephalus ([facility]    Hyponatremia    Breast mass    Malignant neoplasm of axillary tail of left female breast ([facility]    Breast cancer metastasized to bone (HCC)    Carcinoma of breast metastatic to bone [facility])        Past Surgical History:      Procedure Laterality Date    ARM SURGERY  9/15/2009    HUMERUS FRACTURE SURGERY  2009    Broke left arm        Family History:  Family History   Problem Relation Age of Onset    Colon Cancer Mother 68    Heart Disease Father     Diabetes Father        Medications:  Reviewed and reconciled.     Social History:  Social History     Socioeconomic History    Marital status: Single     Spouse name: Not on file    Number of children: Not on file    Years of education: Not on file    Highest education level: Not on file secondary to malignancy,ordred SPEP. Bone scan done on 12/17/2019: Redemonstration of focus of radiotracer uptake involving posterior lateral right ninth rib suggestive of healing fracture. New radiotracer activity is seen at level of lateral right 10th rib and anterior right seventh rib which may be related to new healing fractures. Metastases is unlikely. Clinical correlation recommended. No additional abnormal areas of increased radiotracer uptake. CT chest done on 12/17/2019: Significant interval decrease in size of the large mass seen in the left breast 8/29/2019. Interval decrease in size of enlarged left axillary lymph nodes. Cardiomegaly. Enlarged thyroid, unchanged. Small hiatal hernia. Diffuse interstitial changes and multifocal atelectasis or scarring in both lungs. Stable small nodular density in the right upper lobe. Sclerotic changes involving the lateral right ninth rib. CT abdomen/pelvis done on 12/17/2019: Small right renal cyst. Retroverted uterus. Distended colon and rectum containing a large amount of stool. Mildly enlarged retroperitoneal and periportal lymph nodes, unchanged. Findings in the left iliac bone as described. The results/images of the scans were reviewed with the patient and her sister, she is responding nicely to the Femara and Zahra Grapes, will continue the same treatment. Cycle #5 started on 1/3/2020. Cycle # 6 Ibrance started on 01/31/2020. The patient has not been taking the Ibrance as prescribed, she is not at the start of the seventh cycle, she is still in the third week of the 6th cycle, she has leukopenia and grade 3 neutropenia, but will not change the dose because is not at the start of the cycle. She did have teeth extraction on 1/20/2020, she received Xgeva with her next visit. RTC on 2/28 with Xgeva. Thank you for allowing us to participate in the care of Ms. Skye Tavares     Kanchan Ervin MD   HEMATOLOGY/MEDICAL ONCOLOGY  [facility]

## 2020-02-14 LAB — CA 15-3: 17 U/ML (ref 0–31)

## 2020-02-26 ENCOUNTER — HOSPITAL ENCOUNTER (OUTPATIENT)
Age: 70
Discharge: HOME | End: 2020-02-26
Payer: MEDICARE

## 2020-02-26 LAB
ALBUMIN SERPL-MCNC: 4.4 G/DL (ref 3.5–5.2)
ALP BLD-CCNC: 137 U/L (ref 35–104)
ALT SERPL-CCNC: 52 U/L (ref 0–32)
ANION GAP SERPL CALCULATED.3IONS-SCNC: 10 MMOL/L (ref 7–16)
AST SERPL-CCNC: 53 U/L (ref 0–31)
BASOPHILS ABSOLUTE: 0.07 E9/L (ref 0–0.2)
BASOPHILS RELATIVE PERCENT: 1.9 % (ref 0–2)
BILIRUB SERPL-MCNC: 0.3 MG/DL (ref 0–1.2)
BUN BLDV-MCNC: 28 MG/DL (ref 8–23)
CALCIUM SERPL-MCNC: 9.5 MG/DL (ref 8.6–10.2)
CEA: 2.3 NG/ML (ref 0–5.2)
CHLORIDE BLD-SCNC: 96 MMOL/L (ref 98–107)
CO2: 24 MMOL/L (ref 22–29)
CREAT SERPL-MCNC: 0.8 MG/DL (ref 0.5–1)
EOSINOPHILS ABSOLUTE: 0.03 E9/L (ref 0.05–0.5)
EOSINOPHILS RELATIVE PERCENT: 0.8 % (ref 0–6)
GFR AFRICAN AMERICAN: >60
GFR NON-AFRICAN AMERICAN: >60 ML/MIN/1.73
GLUCOSE BLD-MCNC: 94 MG/DL (ref 74–99)
HCT VFR BLD CALC: 37.2 % (ref 34–48)
HEMOGLOBIN: 12 G/DL (ref 11.5–15.5)
IMMATURE GRANULOCYTES #: 0.03 E9/L
IMMATURE GRANULOCYTES %: 0.8 % (ref 0–5)
LYMPHOCYTES ABSOLUTE: 0.69 E9/L (ref 1.5–4)
LYMPHOCYTES RELATIVE PERCENT: 18.9 % (ref 20–42)
MCH RBC QN AUTO: 31.3 PG (ref 26–35)
MCHC RBC AUTO-ENTMCNC: 32.3 % (ref 32–34.5)
MCV RBC AUTO: 97.1 FL (ref 80–99.9)
MONOCYTES ABSOLUTE: 0.42 E9/L (ref 0.1–0.95)
MONOCYTES RELATIVE PERCENT: 11.5 % (ref 2–12)
NEUTROPHILS ABSOLUTE: 2.42 E9/L (ref 1.8–7.3)
NEUTROPHILS RELATIVE PERCENT: 66.1 % (ref 43–80)
PDW BLD-RTO: 16.4 FL (ref 11.5–15)
PLATELET # BLD: 294 E9/L (ref 130–450)
PMV BLD AUTO: 10.5 FL (ref 7–12)
POTASSIUM SERPL-SCNC: 4.2 MMOL/L (ref 3.5–5)
RBC # BLD: 3.83 E12/L (ref 3.5–5.5)
SODIUM BLD-SCNC: 130 MMOL/L (ref 132–146)
TOTAL PROTEIN: 8.8 G/DL (ref 6.4–8.3)
WBC # BLD: 3.7 E9/L (ref 4.5–11.5)

## 2020-02-26 PROCEDURE — 84165 PROTEIN E-PHORESIS SERUM: CPT

## 2020-02-26 PROCEDURE — 86300 IMMUNOASSAY TUMOR CA 15-3: CPT

## 2020-02-26 PROCEDURE — 80053 COMPREHEN METABOLIC PANEL: CPT

## 2020-02-26 PROCEDURE — 85025 COMPLETE CBC W/AUTO DIFF WBC: CPT

## 2020-02-26 PROCEDURE — 36415 COLL VENOUS BLD VENIPUNCTURE: CPT

## 2020-02-26 PROCEDURE — 82378 CARCINOEMBRYONIC ANTIGEN: CPT

## 2020-02-28 ENCOUNTER — OFFICE VISIT (OUTPATIENT)
Dept: ONCOLOGY | Age: 70
End: 2020-02-28
Payer: MEDICARE

## 2020-02-28 VITALS
HEIGHT: 66 IN | WEIGHT: 134 LBS | BODY MASS INDEX: 21.53 KG/M2 | HEART RATE: 62 BPM | SYSTOLIC BLOOD PRESSURE: 137 MMHG | TEMPERATURE: 98.2 F | OXYGEN SATURATION: 100 % | DIASTOLIC BLOOD PRESSURE: 65 MMHG

## 2020-02-28 LAB
ALBUMIN SERPL-MCNC: 3.7 G/DL (ref 3.5–4.7)
ALPHA-1-GLOBULIN: 0.3 G/DL (ref 0.2–0.4)
ALPHA-2-GLOBULIN: 0.8 G/FL (ref 0.5–1)
BETA GLOBULIN: 1.2 G/DL (ref 0.8–1.3)
CA 15-3: 17 U/ML (ref 0–31)
ELECTROPHORESIS: ABNORMAL
GAMMA GLOBULIN: 3.5 G/DL (ref 0.7–1.6)
TOTAL PROTEIN: 9.5 G/DL (ref 6.4–8.3)

## 2020-02-28 PROCEDURE — G8484 FLU IMMUNIZE NO ADMIN: HCPCS | Performed by: INTERNAL MEDICINE

## 2020-02-28 PROCEDURE — G8427 DOCREV CUR MEDS BY ELIG CLIN: HCPCS | Performed by: INTERNAL MEDICINE

## 2020-02-28 PROCEDURE — 4040F PNEUMOC VAC/ADMIN/RCVD: CPT | Performed by: INTERNAL MEDICINE

## 2020-02-28 PROCEDURE — 1123F ACP DISCUSS/DSCN MKR DOCD: CPT | Performed by: INTERNAL MEDICINE

## 2020-02-28 PROCEDURE — 99214 OFFICE O/P EST MOD 30 MIN: CPT | Performed by: INTERNAL MEDICINE

## 2020-02-28 PROCEDURE — G8420 CALC BMI NORM PARAMETERS: HCPCS | Performed by: INTERNAL MEDICINE

## 2020-02-28 PROCEDURE — 99212 OFFICE O/P EST SF 10 MIN: CPT

## 2020-02-28 PROCEDURE — 1036F TOBACCO NON-USER: CPT | Performed by: INTERNAL MEDICINE

## 2020-02-28 PROCEDURE — 3017F COLORECTAL CA SCREEN DOC REV: CPT | Performed by: INTERNAL MEDICINE

## 2020-02-28 PROCEDURE — 1090F PRES/ABSN URINE INCON ASSESS: CPT | Performed by: INTERNAL MEDICINE

## 2020-02-28 PROCEDURE — G8400 PT W/DXA NO RESULTS DOC: HCPCS | Performed by: INTERNAL MEDICINE

## 2020-02-28 NOTE — PROGRESS NOTES
Gave patient and sister Alanna 125 mg BriovaRX# [id number], 2-12-20, quantity 21, Lot Number [id number], Exp: 06/2023, received by [facility]. Today will be day 1 of Cycle 7. Notified Oma Colon, Lead RN, to contact dental clinic for clearance, per sister and patient state \"dental work is complete\". She expresses understanding and acceptance of instructions.     Electronically signed by MARTITA Diaz [facility] on 2/28/2020 at 1:55 PM

## 2020-02-28 NOTE — PROGRESS NOTES
[address] MED ONCOLOGY  [address] [facility] [address]  Dept: [phone]  Attending Progress Note      Reason for Visit:   Metastatic breast cancer. PCP:  Louisa Bejarano DO    History of Present Illness: The patient is a 68 y.o. lady, with a past medical history significant for hydrocephalus, who had presented with left breast changes, she had noticed a dark spot on her left breast, then she had developed induration, she thinks that she had the breast changes for months. The patient had never had a mammogram done, had a CT scan of the chest done, revealing a solid left breast mass measuring at least 6.8 x 2.9 cm in size is identified. The mass appears to extend to the skin surface. The deep margin of the mass abuts the chest wall but a fat plane appears to be preserved. Pathologically enlarged left  axillary lymph nodes are seen measuring up to 4.5 x 1.9 cm in size. CT abdomen and pelvis was done on 8/30/2019 revealing partial visualization of soft tissue mass in the left breast, nonspecific lymph nodes in the upper abdomen. Bone scan was done on 8/30/2019, revealing a Prominent focus of radiotracer uptake in the left anterior superior iliac spine, with corresponding abnormality on recent CT, suggestive of metastasis. More subtle focal radiotracer uptake in the right anterior superior iliac spine could be due to metastasis or  degenerative change.   2. Focal radiotracer uptake in the lateral aspect of the right ninth  rib, which appears to be due to a nondisplaced healing fracture. Correlation with history of recent trauma is recommended. In light of  other findings, this could represent a pathologic fracture with an  underlying metastatic lesion. 3. Osteoarthritic uptake in the shoulders, knees, wrists, and hands.     She underwent on 8/30/2019 a biopsy of 1 of the left axillary lymph nodes, she was consistent with metastatic ductal carcinoma, breast appears had markedly improved, the nodules nodules had resolved, the mass decreased in size, no drainage, no bleeding, palpable left axillary lymphadenopathy  ABDOMEN: Soft. Non-tender, non-distended. Positive bowel sounds. EXTREMITIES: mild edema of the LLE. NEUROLOGIC: No focal deficits. ECOG PS 1     Impression/Plan:     The patient is a 68 y.o. lady, with a past medical history significant for hydrocephalus, who had presented with left breast changes, who was diagnosed with a left breast ductal carcinoma, ER positive greater than 95% VA +70% HER-2/elder 1+, she has a very locally advanced disease, and metastatic disease to the bones. I discussed with the patient and her sister today her diagnosis, prognosis and recommendations for treatment. Will complete the staging work-up, the patient will have a brain MRI with and without contrast done, and a PET scan. For her HR pos, HER-2/elder negative metastatic breast cancer, I recommended treatment with Femara and Ibrance, the schedule and side effects of the treatment were reviewed with her. She has metastatic disease to the bones, she would benefit from treatment with Xgeva, dental clearance will be obtained prior to starting Xgeva. Referral was placed to [address] [place] [address]. I discussed with the patient palliative radiation therapy to the left breast, which will help to decrease the size of the tumor and stop the drainage, the patient would like to hold off on radiation therapy at this time. She is not interested in having home health care or wound clinic referral at this time. MRI brain done on 09/16/2019: no evidence of metastatic disease. PET/CT on 09/17/2019: The lytic soft tissue finding in the iliac crest in the left pelvis shows hypermetabolic FDG uptake, with maximum measurements in the 5.7 SUV range, strongly suggesting metastatic disease. She has uptake in the thyroid, could be physiologic, ordered TFTs.     Femara/Ibrance were started on 09/13/2019. Had received 4 cycles to date. Continue follow-up at the dental clinic, we are waiting for until clearance to start Xgeva. Paraproteinemia, could be secondary to polyclonal gammopathy secondary to malignancy,ordred SPEP. Bone scan done on 12/17/2019: Redemonstration of focus of radiotracer uptake involving posterior lateral right ninth rib suggestive of healing fracture. New radiotracer activity is seen at level of lateral right 10th rib and anterior right seventh rib which may be related to new healing fractures. Metastases is unlikely. Clinical correlation recommended. No additional abnormal areas of increased radiotracer uptake. CT chest done on 12/17/2019: Significant interval decrease in size of the large mass seen in the left breast 8/29/2019. Interval decrease in size of enlarged left axillary lymph nodes. Cardiomegaly. Enlarged thyroid, unchanged. Small hiatal hernia. Diffuse interstitial changes and multifocal atelectasis or scarring in both lungs. Stable small nodular density in the right upper lobe. Sclerotic changes involving the lateral right ninth rib. CT abdomen/pelvis done on 12/17/2019: Small right renal cyst. Retroverted uterus. Distended colon and rectum containing a large amount of stool. Mildly enlarged retroperitoneal and periportal lymph nodes, unchanged. Findings in the left iliac bone as described. The results/images of the scans were reviewed with the patient and her sister, she is responding nicely to the Femara and Cathryn Catena, will continue the same treatment. Cycle #5 started on 1/3/2020. Cycle # 6 Ibrance started on 01/31/2020. She did have teeth extraction on 1/20/2020, we are waiting for the dental clearance to give her the Xgeva. Today 2/28/2020, the patient will start cycle #7 of Ibrance/Femara, labs reviewed, no indication for dose adjustment of the dose of the Ibrance.     Hyperproteinemia, SPEP is negative for monoclonal

## 2020-03-20 ENCOUNTER — TELEPHONE (OUTPATIENT)
Dept: PHARMACY | Age: 70
End: 2020-03-20

## 2020-03-25 ENCOUNTER — TELEPHONE (OUTPATIENT)
Dept: PHARMACY | Age: 70
End: 2020-03-25

## 2020-03-25 NOTE — TELEPHONE ENCOUNTER
Spoke with Jeancarlos Corral regarding Cary's OV appointment change from 4-3 to this Friday 3-27-20 due to scheduling error with treatment cycles. Oral compliance monitoring, cycle 7 to start and medication provided for 3-27-20. Jeancarlos Corral confirmed OV for 2:15 pm on 3-27-20. She had the opportunity to ask questions with all questions being answered to her satisfaction. I told her to call if there are any questions. She expresses understanding and acceptance of instructions.      Electronically signed by Freda Vidal, [address] [place] [address] on 3/25/2020 at 10:46 AM

## 2020-03-27 ENCOUNTER — HOSPITAL ENCOUNTER (OUTPATIENT)
Dept: INFUSION THERAPY | Age: 70
Discharge: HOME | End: 2020-03-27
Payer: MEDICARE

## 2020-03-27 ENCOUNTER — OFFICE VISIT (OUTPATIENT)
Dept: ONCOLOGY | Age: 70
End: 2020-03-27
Payer: MEDICARE

## 2020-03-27 VITALS
WEIGHT: 139 LBS | HEIGHT: 66 IN | DIASTOLIC BLOOD PRESSURE: 67 MMHG | HEART RATE: 63 BPM | TEMPERATURE: 98.9 F | BODY MASS INDEX: 22.34 KG/M2 | OXYGEN SATURATION: 100 % | SYSTOLIC BLOOD PRESSURE: 149 MMHG

## 2020-03-27 LAB
ALBUMIN SERPL-MCNC: 4 G/DL (ref 3.5–5.2)
ALP BLD-CCNC: 112 U/L (ref 35–104)
ALT SERPL-CCNC: 23 U/L (ref 0–32)
ANION GAP SERPL CALCULATED.3IONS-SCNC: 12 MMOL/L (ref 7–16)
ANISOCYTOSIS: ABNORMAL
AST SERPL-CCNC: 27 U/L (ref 0–31)
BASOPHILS ABSOLUTE: 0.08 E9/L (ref 0–0.2)
BASOPHILS RELATIVE PERCENT: 5.3 % (ref 0–2)
BILIRUB SERPL-MCNC: 0.2 MG/DL (ref 0–1.2)
BUN BLDV-MCNC: 15 MG/DL (ref 8–23)
CALCIUM SERPL-MCNC: 9.2 MG/DL (ref 8.6–10.2)
CEA: 1.7 NG/ML (ref 0–5.2)
CHLORIDE BLD-SCNC: 100 MMOL/L (ref 98–107)
CO2: 24 MMOL/L (ref 22–29)
CREAT SERPL-MCNC: 0.6 MG/DL (ref 0.5–1)
EOSINOPHILS ABSOLUTE: 0.01 E9/L (ref 0.05–0.5)
EOSINOPHILS RELATIVE PERCENT: 0.9 % (ref 0–6)
GFR AFRICAN AMERICAN: >60
GFR NON-AFRICAN AMERICAN: >60 ML/MIN/1.73
GLUCOSE BLD-MCNC: 98 MG/DL (ref 74–99)
HCT VFR BLD CALC: 31.1 % (ref 34–48)
HEMOGLOBIN: 9.6 G/DL (ref 11.5–15.5)
LYMPHOCYTES ABSOLUTE: 0.53 E9/L (ref 1.5–4)
LYMPHOCYTES RELATIVE PERCENT: 35.4 % (ref 20–42)
MCH RBC QN AUTO: 30.4 PG (ref 26–35)
MCHC RBC AUTO-ENTMCNC: 30.9 % (ref 32–34.5)
MCV RBC AUTO: 98.4 FL (ref 80–99.9)
MONOCYTES ABSOLUTE: 0.12 E9/L (ref 0.1–0.95)
MONOCYTES RELATIVE PERCENT: 8 % (ref 2–12)
NEUTROPHILS ABSOLUTE: 0.75 E9/L (ref 1.8–7.3)
NEUTROPHILS RELATIVE PERCENT: 50.4 % (ref 43–80)
PDW BLD-RTO: 16.1 FL (ref 11.5–15)
PLATELET # BLD: 170 E9/L (ref 130–450)
PMV BLD AUTO: 9.4 FL (ref 7–12)
POTASSIUM SERPL-SCNC: 4 MMOL/L (ref 3.5–5)
RBC # BLD: 3.16 E12/L (ref 3.5–5.5)
SODIUM BLD-SCNC: 136 MMOL/L (ref 132–146)
TOTAL PROTEIN: 8.7 G/DL (ref 6.4–8.3)
WBC # BLD: 1.5 E9/L (ref 4.5–11.5)

## 2020-03-27 PROCEDURE — G8400 PT W/DXA NO RESULTS DOC: HCPCS | Performed by: INTERNAL MEDICINE

## 2020-03-27 PROCEDURE — 96372 THER/PROPH/DIAG INJ SC/IM: CPT

## 2020-03-27 PROCEDURE — 4040F PNEUMOC VAC/ADMIN/RCVD: CPT | Performed by: INTERNAL MEDICINE

## 2020-03-27 PROCEDURE — 80053 COMPREHEN METABOLIC PANEL: CPT

## 2020-03-27 PROCEDURE — 6360000002 HC RX W HCPCS: Performed by: INTERNAL MEDICINE

## 2020-03-27 PROCEDURE — 85025 COMPLETE CBC W/AUTO DIFF WBC: CPT

## 2020-03-27 PROCEDURE — 3017F COLORECTAL CA SCREEN DOC REV: CPT | Performed by: INTERNAL MEDICINE

## 2020-03-27 PROCEDURE — G8484 FLU IMMUNIZE NO ADMIN: HCPCS | Performed by: INTERNAL MEDICINE

## 2020-03-27 PROCEDURE — 99214 OFFICE O/P EST MOD 30 MIN: CPT | Performed by: INTERNAL MEDICINE

## 2020-03-27 PROCEDURE — 1036F TOBACCO NON-USER: CPT | Performed by: INTERNAL MEDICINE

## 2020-03-27 PROCEDURE — 1123F ACP DISCUSS/DSCN MKR DOCD: CPT | Performed by: INTERNAL MEDICINE

## 2020-03-27 PROCEDURE — 1090F PRES/ABSN URINE INCON ASSESS: CPT | Performed by: INTERNAL MEDICINE

## 2020-03-27 PROCEDURE — G8427 DOCREV CUR MEDS BY ELIG CLIN: HCPCS | Performed by: INTERNAL MEDICINE

## 2020-03-27 PROCEDURE — 86300 IMMUNOASSAY TUMOR CA 15-3: CPT

## 2020-03-27 PROCEDURE — 99213 OFFICE O/P EST LOW 20 MIN: CPT

## 2020-03-27 PROCEDURE — G8420 CALC BMI NORM PARAMETERS: HCPCS | Performed by: INTERNAL MEDICINE

## 2020-03-27 PROCEDURE — 82378 CARCINOEMBRYONIC ANTIGEN: CPT

## 2020-03-27 RX ADMIN — DENOSUMAB 120 MG: 120 INJECTION SUBCUTANEOUS at 15:39

## 2020-03-27 NOTE — PROGRESS NOTES
[address] [facility] ONCOLOGY  [address] [place] [id number]  Dept: [phone]  Attending Progress Note      Reason for Visit:   Metastatic breast cancer. PCP:  Alonso Reynolds DO    History of Present Illness: The patient is a 68 y.o. lady, with a past medical history significant for hydrocephalus, who had presented with left breast changes, she had noticed a dark spot on her left breast, then she had developed induration, she thinks that she had the breast changes for months. The patient had never had a mammogram done, had a CT scan of the chest done, revealing a solid left breast mass measuring at least 6.8 x 2.9 cm in size is identified. The mass appears to extend to the skin surface. The deep margin of the mass abuts the chest wall but a fat plane appears to be preserved. Pathologically enlarged left  axillary lymph nodes are seen measuring up to 4.5 x 1.9 cm in size. CT abdomen and pelvis was done on 8/30/2019 revealing partial visualization of soft tissue mass in the left breast, nonspecific lymph nodes in the upper abdomen. Bone scan was done on 8/30/2019, revealing a Prominent focus of radiotracer uptake in the left anterior superior iliac spine, with corresponding abnormality on recent CT, suggestive of metastasis. More subtle focal radiotracer uptake in the right anterior superior iliac spine could be due to metastasis or  degenerative change.   2. Focal radiotracer uptake in the lateral aspect of the right ninth  rib, which appears to be due to a nondisplaced healing fracture. Correlation with history of recent trauma is recommended. In light of  other findings, this could represent a pathologic fracture with an  underlying metastatic lesion. 3. Osteoarthritic uptake in the shoulders, knees, wrists, and hands.     She underwent on 8/30/2019 a biopsy of 1 of the left axillary lymph nodes, she was consistent with metastatic ductal carcinoma, Highest education level: Not on file   Occupational History    Not on file   Social Needs    Financial resource strain: Not on file    Food insecurity     Worry: Not on file     Inability: Not on file    Transportation needs     Medical: Not on file     Non-medical: Not on file   Tobacco Use    Smoking status: Never Smoker    Smokeless tobacco: Never Used   Substance and Sexual Activity    Alcohol use: Yes     Comment: drinks it occasional on social events    Drug use: No    Sexual activity: Never   Lifestyle    Physical activity     Days per week: Not on file     Minutes per session: Not on file    Stress: Not on file   Relationships    Social connections     Talks on phone: Not on file     Gets together: Not on file     Attends Orthodoxy service: Not on file     Active member of club or organization: Not on file     Attends meetings of clubs or organizations: Not on file     Relationship status: Not on file    Intimate partner violence     Fear of current or ex partner: Not on file     Emotionally abused: Not on file     Physically abused: Not on file     Forced sexual activity: Not on file   Other Topics Concern    Not on file   Social History Narrative    Not on file       Allergies:  No Known Allergies    Physical Exam:  BP (!) 149/67   Pulse 63   Temp 98.9 °F (37.2 °C) (Temporal)   Ht 5' 6\" (1.676 m)   Wt 139 lb (63 kg)   SpO2 100%   BMI 22.44 kg/m²     GENERAL: Alert, oriented x 3, not in acute distress. HEENT: PERRLA; EOMI. Oropharynx clear. NECK: Supple. No palpable cervical or supraclavicular lymphadenopathy. LUNGS: Good air entry bilaterally. No wheezing, crackles or rhonchi. CARDIOVASCULAR: Regular rate. No murmurs, rubs or gallops.    BREASTS: The right breast exam is negative for any skin changes, nipple discharge, no palpable masses, no palpable right axillary adenopathy, left breast appears had markedly improved, the nodules nodules had resolved, the mass decreased in size, left breast 8/29/2019. Interval decrease in size of enlarged left axillary lymph nodes. Cardiomegaly. Enlarged thyroid, unchanged. Small hiatal hernia. Diffuse interstitial changes and multifocal atelectasis or scarring in both lungs. Stable small nodular density in the right upper lobe. Sclerotic changes involving the lateral right ninth rib. CT abdomen/pelvis done on 12/17/2019: Small right renal cyst. Retroverted uterus. Distended colon and rectum containing a large amount of stool. Mildly enlarged retroperitoneal and periportal lymph nodes, unchanged. Findings in the left iliac bone as described. The results/images of the scans were reviewed with the patient and her sister, she is responding nicely to the Femara and Abby Otoolek, will continue the same treatment. She did have teeth extraction on 1/20/2020, dental clearance has been obtained. Today 3/28/2020, the patient is scheduled to start Ibrance and Femara cycle #8, but she has leukopenia and grade 3 neutropenia, will hold Ibrance, she will start it in 1 week (4/3/2020). If she has greater than grade 2 neutropenia with the start of cycle #9, will need to decrease the dose of the Ibrance to 100 mg. 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 Proceed with Jacob Holland today 3/27/2020. Restaging CT scans of the chest elbows and a bone scan were ordered to be done prior to her next visit. RTC in 5 weeks. Thank you for allowing us to participate in the care of Ms. Cam Valverde.     Jeny Mercado MD   HEMATOLOGY/MEDICAL ONCOLOGY  [address] [place] [address] ONCOLOGY  [facility]  [facility] [address]  Dept: [phone]

## 2020-03-30 LAB — CA 15-3: 14 U/ML (ref 0–31)

## 2020-04-03 ENCOUNTER — TELEPHONE (OUTPATIENT)
Dept: PHARMACY | Age: 70
End: 2020-04-03

## 2020-04-03 NOTE — TELEPHONE ENCOUNTER
Spoke with Cary's sister, David Can, regarding restarting Ibrance today at 125 mg dosing after held for 1 week due to [address] last week. David Can is at [place] and taking medication today. Cycle 8, day 1 Ibrance  125 mg restarted 4-3-20. Patient will follow up on 5-1-20 OV with Dr. Rukhsana Mulligan for labs, Xgeva, and Cycle 9, day Ibrance. Pending labs either dose reduction to 100 mg C9d1 or resume 125 mg. Both Cleve Chinchilla and David Can had the opportunity to ask questions with all questions being answered to their satisfaction. I told them to call if there are any questions. The patient expresses understanding and acceptance of instructions.      Electronically signed by Frank Gregg, [facility] on 4/3/2020 at 10:29 AM

## 2020-04-13 RX ORDER — LEVOTHYROXINE SODIUM 0.03 MG/1
TABLET ORAL
Qty: 30 TABLET | Refills: 0 | Status: SHIPPED
Start: 2020-04-13 | End: 2020-05-18

## 2020-04-13 RX ORDER — ERGOCALCIFEROL 1.25 MG/1
CAPSULE ORAL
Qty: 12 CAPSULE | Refills: 0 | Status: SHIPPED
Start: 2020-04-13 | End: 2020-07-14 | Stop reason: SDUPTHER

## 2020-04-24 ENCOUNTER — HOSPITAL ENCOUNTER (OUTPATIENT)
Dept: CT IMAGING | Age: 70
Discharge: HOME | End: 2020-04-26
Payer: MEDICARE

## 2020-04-24 ENCOUNTER — HOSPITAL ENCOUNTER (OUTPATIENT)
Dept: NUCLEAR MEDICINE | Age: 70
Discharge: HOME | End: 2020-04-24
Payer: MEDICARE

## 2020-04-24 PROCEDURE — 74177 CT ABD & PELVIS W/CONTRAST: CPT

## 2020-04-24 PROCEDURE — 71260 CT THORAX DX C+: CPT

## 2020-04-24 PROCEDURE — 3430000000 HC RX DIAGNOSTIC RADIOPHARMACEUTICAL: Performed by: RADIOLOGY

## 2020-04-24 PROCEDURE — 78306 BONE IMAGING WHOLE BODY: CPT

## 2020-04-24 PROCEDURE — A9503 TC99M MEDRONATE: HCPCS | Performed by: RADIOLOGY

## 2020-04-24 PROCEDURE — 6360000004 HC RX CONTRAST MEDICATION: Performed by: RADIOLOGY

## 2020-04-24 RX ORDER — TC 99M MEDRONATE 20 MG/10ML
25 INJECTION, POWDER, LYOPHILIZED, FOR SOLUTION INTRAVENOUS
Status: COMPLETED | OUTPATIENT
Start: 2020-04-24 | End: 2020-04-24

## 2020-04-24 RX ORDER — SODIUM CHLORIDE 0.9 % (FLUSH) 0.9 %
10 SYRINGE (ML) INJECTION PRN
Status: DISCONTINUED | OUTPATIENT
Start: 2020-04-24 | End: 2020-04-27 | Stop reason: HOSPADM

## 2020-04-24 RX ADMIN — TC 99M MEDRONATE 25 MILLICURIE: 20 INJECTION, POWDER, LYOPHILIZED, FOR SOLUTION INTRAVENOUS at 08:45

## 2020-04-24 RX ADMIN — IOHEXOL 50 ML: 240 INJECTION, SOLUTION INTRATHECAL; INTRAVASCULAR; INTRAVENOUS; ORAL at 10:28

## 2020-04-24 RX ADMIN — IOPAMIDOL 110 ML: 755 INJECTION, SOLUTION INTRAVENOUS at 10:28

## 2020-04-29 ENCOUNTER — HOSPITAL ENCOUNTER (OUTPATIENT)
Age: 70
Discharge: HOME | End: 2020-04-29
Payer: MEDICARE

## 2020-04-29 LAB
ALBUMIN SERPL-MCNC: 4.4 G/DL (ref 3.5–5.2)
ALP BLD-CCNC: 95 U/L (ref 35–104)
ALT SERPL-CCNC: 17 U/L (ref 0–32)
ANION GAP SERPL CALCULATED.3IONS-SCNC: 9 MMOL/L (ref 7–16)
ANISOCYTOSIS: ABNORMAL
AST SERPL-CCNC: 28 U/L (ref 0–31)
BASOPHILS ABSOLUTE: 0 E9/L (ref 0–0.2)
BASOPHILS RELATIVE PERCENT: 0 % (ref 0–2)
BILIRUB SERPL-MCNC: 0.5 MG/DL (ref 0–1.2)
BUN BLDV-MCNC: 15 MG/DL (ref 8–23)
CALCIUM SERPL-MCNC: 8.9 MG/DL (ref 8.6–10.2)
CEA: 1.7 NG/ML (ref 0–5.2)
CHLORIDE BLD-SCNC: 100 MMOL/L (ref 98–107)
CO2: 25 MMOL/L (ref 22–29)
CREAT SERPL-MCNC: 0.8 MG/DL (ref 0.5–1)
EOSINOPHILS ABSOLUTE: 0.01 E9/L (ref 0.05–0.5)
EOSINOPHILS RELATIVE PERCENT: 0.9 % (ref 0–6)
GFR AFRICAN AMERICAN: >60
GFR NON-AFRICAN AMERICAN: >60 ML/MIN/1.73
GLUCOSE BLD-MCNC: 96 MG/DL (ref 74–99)
HCT VFR BLD CALC: 36.1 % (ref 34–48)
HEMOGLOBIN: 11.9 G/DL (ref 11.5–15.5)
HYPOCHROMIA: ABNORMAL
LYMPHOCYTES ABSOLUTE: 0.32 E9/L (ref 1.5–4)
LYMPHOCYTES RELATIVE PERCENT: 22.6 % (ref 20–42)
MCH RBC QN AUTO: 31.6 PG (ref 26–35)
MCHC RBC AUTO-ENTMCNC: 33 % (ref 32–34.5)
MCV RBC AUTO: 96 FL (ref 80–99.9)
MONOCYTES ABSOLUTE: 0.13 E9/L (ref 0.1–0.95)
MONOCYTES RELATIVE PERCENT: 8.7 % (ref 2–12)
NEUTROPHILS ABSOLUTE: 0.95 E9/L (ref 1.8–7.3)
NEUTROPHILS RELATIVE PERCENT: 67.8 % (ref 43–80)
NUCLEATED RED BLOOD CELLS: 0 /100 WBC
PDW BLD-RTO: 17.2 FL (ref 11.5–15)
PLATELET # BLD: 146 E9/L (ref 130–450)
PMV BLD AUTO: 10.1 FL (ref 7–12)
POLYCHROMASIA: ABNORMAL
POTASSIUM SERPL-SCNC: 4.6 MMOL/L (ref 3.5–5)
RBC # BLD: 3.76 E12/L (ref 3.5–5.5)
SODIUM BLD-SCNC: 134 MMOL/L (ref 132–146)
TOTAL PROTEIN: 9.8 G/DL (ref 6.4–8.3)
WBC # BLD: 1.4 E9/L (ref 4.5–11.5)

## 2020-04-29 PROCEDURE — 36415 COLL VENOUS BLD VENIPUNCTURE: CPT

## 2020-04-29 PROCEDURE — 86300 IMMUNOASSAY TUMOR CA 15-3: CPT

## 2020-04-29 PROCEDURE — 82378 CARCINOEMBRYONIC ANTIGEN: CPT

## 2020-04-29 PROCEDURE — 85025 COMPLETE CBC W/AUTO DIFF WBC: CPT

## 2020-04-29 PROCEDURE — 80053 COMPREHEN METABOLIC PANEL: CPT

## 2020-04-30 NOTE — PROGRESS NOTES
[address] MED ONCOLOGY  [address] [place] [id number]  Dept: [phone]  Attending Progress Note      Reason for Visit:   Metastatic breast cancer. PCP:  Mary Oliver DO    History of Present Illness: The patient is a 65 y.o. lady, with a past medical history significant for hydrocephalus, who had presented with left breast changes, she had noticed a dark spot on her left breast, then she had developed induration, she thinks that she had the breast changes for months. The patient had never had a mammogram done, had a CT scan of the chest done, revealing a solid left breast mass measuring at least 6.8 x 2.9 cm in size is identified. The mass appears to extend to the skin surface. The deep margin of the mass abuts the chest wall but a fat plane appears to be preserved. Pathologically enlarged left  axillary lymph nodes are seen measuring up to 4.5 x 1.9 cm in size. CT abdomen and pelvis was done on 8/30/2019 revealing partial visualization of soft tissue mass in the left breast, nonspecific lymph nodes in the upper abdomen. Bone scan was done on 8/30/2019, revealing a Prominent focus of radiotracer uptake in the left anterior superior iliac spine, with corresponding abnormality on recent CT, suggestive of metastasis. More subtle focal radiotracer uptake in the right anterior superior iliac spine could be due to metastasis or  degenerative change.   2. Focal radiotracer uptake in the lateral aspect of the right ninth  rib, which appears to be due to a nondisplaced healing fracture. Correlation with history of recent trauma is recommended. In light of  other findings, this could represent a pathologic fracture with an  underlying metastatic lesion. 3. Osteoarthritic uptake in the shoulders, knees, wrists, and hands.     She underwent on 8/30/2019 a biopsy of 1 of the left axillary lymph nodes, she was consistent with metastatic ductal carcinoma, today, 05/01/2020. RTC in 4 weeks. Thank you for allowing us to participate in the care of Ms. Paulo Son.     Maggi Parry MD   HEMATOLOGY/MEDICAL ONCOLOGY  [address] [place] [address] ONCOLOGY  [facility] [address] [facility] [id number]  Dept: [phone]

## 2020-05-01 ENCOUNTER — OFFICE VISIT (OUTPATIENT)
Dept: ONCOLOGY | Age: 70
End: 2020-05-01
Payer: MEDICARE

## 2020-05-01 ENCOUNTER — HOSPITAL ENCOUNTER (OUTPATIENT)
Dept: INFUSION THERAPY | Age: 70
Discharge: HOME | End: 2020-05-01
Payer: MEDICARE

## 2020-05-01 VITALS
HEART RATE: 69 BPM | SYSTOLIC BLOOD PRESSURE: 136 MMHG | BODY MASS INDEX: 22.47 KG/M2 | OXYGEN SATURATION: 100 % | WEIGHT: 139.8 LBS | TEMPERATURE: 98.2 F | DIASTOLIC BLOOD PRESSURE: 62 MMHG | HEIGHT: 66 IN

## 2020-05-01 DIAGNOSIS — C79.51 CARCINOMA OF BREAST METASTATIC TO BONE, UNSPECIFIED LATERALITY (HCC): ICD-10-CM

## 2020-05-01 DIAGNOSIS — C50.919 CARCINOMA OF BREAST METASTATIC TO BONE, UNSPECIFIED LATERALITY (HCC): ICD-10-CM

## 2020-05-01 DIAGNOSIS — C50.612 MALIGNANT NEOPLASM OF AXILLARY TAIL OF LEFT FEMALE BREAST, UNSPECIFIED ESTROGEN RECEPTOR STATUS (HCC): Primary | ICD-10-CM

## 2020-05-01 LAB — CA 15-3: 17 U/ML (ref 0–31)

## 2020-05-01 PROCEDURE — G8400 PT W/DXA NO RESULTS DOC: HCPCS | Performed by: INTERNAL MEDICINE

## 2020-05-01 PROCEDURE — 4040F PNEUMOC VAC/ADMIN/RCVD: CPT | Performed by: INTERNAL MEDICINE

## 2020-05-01 PROCEDURE — 3017F COLORECTAL CA SCREEN DOC REV: CPT | Performed by: INTERNAL MEDICINE

## 2020-05-01 PROCEDURE — 6360000002 HC RX W HCPCS: Performed by: INTERNAL MEDICINE

## 2020-05-01 PROCEDURE — G8427 DOCREV CUR MEDS BY ELIG CLIN: HCPCS | Performed by: INTERNAL MEDICINE

## 2020-05-01 PROCEDURE — 96372 THER/PROPH/DIAG INJ SC/IM: CPT

## 2020-05-01 PROCEDURE — G8420 CALC BMI NORM PARAMETERS: HCPCS | Performed by: INTERNAL MEDICINE

## 2020-05-01 PROCEDURE — 1036F TOBACCO NON-USER: CPT | Performed by: INTERNAL MEDICINE

## 2020-05-01 PROCEDURE — 1123F ACP DISCUSS/DSCN MKR DOCD: CPT | Performed by: INTERNAL MEDICINE

## 2020-05-01 PROCEDURE — 99212 OFFICE O/P EST SF 10 MIN: CPT

## 2020-05-01 PROCEDURE — 99214 OFFICE O/P EST MOD 30 MIN: CPT | Performed by: INTERNAL MEDICINE

## 2020-05-01 PROCEDURE — 1090F PRES/ABSN URINE INCON ASSESS: CPT | Performed by: INTERNAL MEDICINE

## 2020-05-01 RX ORDER — LETROZOLE 2.5 MG/1
2.5 TABLET, FILM COATED ORAL DAILY
Qty: 30 TABLET | Refills: 3 | Status: SHIPPED
Start: 2020-05-01 | End: 2020-09-18 | Stop reason: SDUPTHER

## 2020-05-01 RX ORDER — PALBOCICLIB 100 MG/1
100 TABLET, FILM COATED ORAL DAILY
Qty: 21 TABLET | Refills: 2 | Status: SHIPPED
Start: 2020-05-29 | End: 2020-07-24 | Stop reason: SDUPTHER

## 2020-05-01 RX ADMIN — DENOSUMAB 120 MG: 120 INJECTION SUBCUTANEOUS at 12:12

## 2020-05-15 NOTE — PROGRESS NOTES
carcinoma, nuclear grade 2, ER positive greater than 95% HI +70% HER-2/elder 1+, by IHC, negative. The patient was started on systemic therapy with Femara and Ibrance on 9/13/2019. She denies any side effects from the treatment. She denies any new problems. The appearance of the left breast has significant improved, no drainage. She is accompanied by her sister. She has been taking the Kathyrn Jansky and Femara as prescribed. Review of Systems;  CONSTITUTIONAL: No fever, chills. Good appetite and energy level. ENMT: Eyes: No diplopia; Nose: No epistaxis. Mouth: No sore throat. RESPIRATORY: No hemoptysis, shortness of breath, cough. CARDIOVASCULAR: No chest pain, palpitations. GASTROINTESTINAL: No nausea/vomiting, abdominal pain, diarrhea/constipation. GENITOURINARY: No dysuria, urinary frequency, hematuria. NEURO: No syncope, presyncope, headache. MSK: She has pain in the left hip. Remainder:  ROS NEGATIVE    Past Medical History:      Diagnosis Date    Arm fracture, left 2009    Balance problems since April, 2011    Hydrocephalus [facility])     compensated    Vertigo      Patient Active Problem List   Diagnosis    [facility] [address]    [facility]cephalus [facility] [address] [facility]    Hyponatremia    Breast mass    Malignant neoplasm of axillary tail of left female breast ([facility] [address]    Breast cancer metastasized to bone (HCC)    Carcinoma of breast metastatic to bone [facility])        Past Surgical History:      Procedure Laterality Date    ARM SURGERY  9/15/2009    HUMERUS FRACTURE SURGERY  2009    Broke left arm        Family History:  Family History   Problem Relation Age of Onset    Colon Cancer Mother 68    Heart Disease Father     Diabetes Father        Medications:  Reviewed and reconciled.     Social History:  Social History     Socioeconomic History    Marital status: Single     Spouse name: Not on file    Number of children: Not on file    Years of education: Not on file    Highest education level: Not on file   Occupational History    Not on file   Social Needs    Financial resource strain: Not on file    Food insecurity     Worry: Not on file     Inability: Not on file    Transportation needs     Medical: Not on file     Non-medical: Not on file   Tobacco Use    Smoking status: Never Smoker    Smokeless tobacco: Never Used   Substance and Sexual Activity    Alcohol use: Yes     Comment: drinks it occasional on social events    Drug use: No    Sexual activity: Never   Lifestyle    Physical activity     Days per week: Not on file     Minutes per session: Not on file    Stress: Not on file   Relationships    Social connections     Talks on phone: Not on file     Gets together: Not on file     Attends Gn[employer] service: Not on file     Active member of club or organization: Not on file     Attends meetings of clubs or organizations: Not on file     Relationship status: Not on file    Intimate partner violence     Fear of current or ex partner: Not on file     Emotionally abused: Not on file     Physically abused: Not on file     Forced sexual activity: Not on file   Other Topics Concern    Not on file   Social History Narrative    Not on file       Allergies:  No Known Allergies    Physical Exam:  VSS  GENERAL: Alert, oriented x 3, not in acute distress. HEENT: PERRLA; EOMI. Oropharynx clear. NECK: Supple. No palpable cervical or supraclavicular lymphadenopathy. LUNGS: Good air entry bilaterally. No wheezing, crackles or rhonchi. CARDIOVASCULAR: Regular rate. No murmurs, rubs or gallops. BREASTS: The right breast exam is negative for any skin changes, nipple discharge, no palpable masses, no palpable right axillary adenopathy, left breast appearance had markedly improved, the nodules had resolved, the mass decreased in size, no drainage, no bleeding, decrease in the size of the left axillary lymphadenopathy  ABDOMEN: Soft. Non-tender, non-distended. Positive bowel sounds.   EXTREMITIES: Trace edema of the LE  NEUROLOGIC: No focal deficits. ECOG PS 1     Impression/Plan:     The patient is a 65 y.o. lady, with a past medical history significant for hydrocephalus, who had presented with left breast changes, who was diagnosed with a left breast ductal carcinoma, ER positive greater than 95% VA +70% HER-2/elder 1+, she has a very locally advanced disease, and metastatic disease to the bones. I discussed with the patient and her sister today her diagnosis, prognosis and recommendations for treatment. Will complete the staging work-up, the patient will have a brain MRI with and without contrast done, and a PET scan. For her HR pos, HER-2/elder negative metastatic breast cancer, I recommended treatment with Femara and Ibrance, the schedule and side effects of the treatment were reviewed with her. She has metastatic disease to the bones, recommended treatment with Xgeva. MRI brain done on 09/16/2019: no evidence of metastatic disease. PET/CT on 09/17/2019: The lytic soft tissue finding in the iliac crest in the left pelvis shows hypermetabolic FDG uptake, with maximum measurements in the 5.7 SUV range, strongly suggesting metastatic disease. She has uptake in the thyroid, could be physiologic. Femara/Ibrance were started on 09/13/2019. Bone scan done on 12/17/2019: Redemonstration of focus of radiotracer uptake involving posterior lateral right ninth rib suggestive of healing fracture. New radiotracer activity is seen at level of lateral right 10th rib and anterior right seventh rib which may be related to new healing fractures. Metastases is unlikely. Clinical correlation recommended. No additional abnormal areas of increased radiotracer uptake. CT chest done on 12/17/2019: Significant interval decrease in size of the large mass seen in the left breast 8/29/2019. Interval decrease in size of enlarged left axillary lymph nodes. Cardiomegaly. Enlarged thyroid, unchanged. Small hiatal hernia. Diffuse interstitial changes and multifocal atelectasis or scarring in both lungs. Stable small nodular density in the right upper lobe. Sclerotic changes involving the lateral right ninth rib. CT abdomen/pelvis done on 12/17/2019: Small right renal cyst. Retroverted uterus. Distended colon and rectum containing a large amount of stool. Mildly enlarged retroperitoneal and periportal lymph nodes, unchanged. Findings in the left iliac bone as described. The results/images of the scans were reviewed with the patient and her sister, she is responding nicely to the Femara and Corina Giron, will continue the same treatment. She did have teeth extraction on 1/20/2020, dental clearance has been obtained. On 4/3/2020, the patient was started on Ibrance and Femara cycle #8. Restaging CT Chest/Abdomen/Pelvis and bone scan on 04/24/2020:  CT Chest noted stable small bilateral pulmonary nodules; CT abdomen/pelvis no evidence of new active neoplasm compared to prior. Bone scan 3 foci of radiotracer uptake in the lower right ribs, 2 of which demonstrate less activity on the current study, possibly indicative of healing fractures. Radiotracer in the more anterior focus is more persistent, and may be a metastatic lesion. The results/images of the scans were reviewed with the patient and her sister, the patient has an overall stable disease. We will continue with Femara and Ibrance.    -On 03/27/2020:  CEA 1.7 ng/m/L; CA 15-3 was 14  -On 04/29/2020:  CEA 1.7 ng.m/L; CA 15-3 was 17    On 05/01/2020 started Cycle # 9, Day 1 Ibrance (dose reduced to 100 mg) and Femara 2.5 mg daily. White count of 1.4, , hemoglobin and platelet count are within normal range. Presents today, 05/29/2020 for Cycle # 10, Day 1 Ibrance 100 mg (+ Xgeva) and Femara 2.5 mg daily. Labs today:  WBC 1.5; Hb 10.8; ; CMP essentially normal (protien 9.4); CEA  2.9; Ca 15.3 pending.   Discussed with Dr. Judy Eldridge, she is afebrile and

## 2020-05-18 ENCOUNTER — TELEPHONE (OUTPATIENT)
Dept: PHARMACY | Age: 70
End: 2020-05-18

## 2020-05-18 RX ORDER — LEVOTHYROXINE SODIUM 0.03 MG/1
TABLET ORAL
Qty: 30 TABLET | Refills: 0 | Status: SHIPPED
Start: 2020-05-18 | End: 2020-06-22

## 2020-05-27 ENCOUNTER — HOSPITAL ENCOUNTER (OUTPATIENT)
Age: 70
Discharge: HOME | End: 2020-05-27
Payer: MEDICARE

## 2020-05-27 LAB
ALBUMIN SERPL-MCNC: 4.2 G/DL (ref 3.5–5.2)
ALP BLD-CCNC: 82 U/L (ref 35–104)
ALT SERPL-CCNC: 18 U/L (ref 0–32)
ANION GAP SERPL CALCULATED.3IONS-SCNC: 5 MMOL/L (ref 7–16)
ANISOCYTOSIS: ABNORMAL
AST SERPL-CCNC: 28 U/L (ref 0–31)
ATYPICAL LYMPHOCYTE RELATIVE PERCENT: 0.9 % (ref 0–4)
BASOPHILS ABSOLUTE: 0.05 E9/L (ref 0–0.2)
BASOPHILS RELATIVE PERCENT: 3.4 % (ref 0–2)
BILIRUB SERPL-MCNC: 0.4 MG/DL (ref 0–1.2)
BUN BLDV-MCNC: 19 MG/DL (ref 8–23)
CALCIUM SERPL-MCNC: 9.1 MG/DL (ref 8.6–10.2)
CEA: 2.9 NG/ML (ref 0–5.2)
CHLORIDE BLD-SCNC: 103 MMOL/L (ref 98–107)
CO2: 25 MMOL/L (ref 22–29)
CREAT SERPL-MCNC: 0.7 MG/DL (ref 0.5–1)
EOSINOPHILS ABSOLUTE: 0.01 E9/L (ref 0.05–0.5)
EOSINOPHILS RELATIVE PERCENT: 0.8 % (ref 0–6)
GFR AFRICAN AMERICAN: >60
GFR NON-AFRICAN AMERICAN: >60 ML/MIN/1.73
GLUCOSE BLD-MCNC: 80 MG/DL (ref 74–99)
HCT VFR BLD CALC: 33.2 % (ref 34–48)
HEMOGLOBIN: 10.8 G/DL (ref 11.5–15.5)
LYMPHOCYTES ABSOLUTE: 0.41 E9/L (ref 1.5–4)
LYMPHOCYTES RELATIVE PERCENT: 25.9 % (ref 20–42)
MCH RBC QN AUTO: 32.2 PG (ref 26–35)
MCHC RBC AUTO-ENTMCNC: 32.5 % (ref 32–34.5)
MCV RBC AUTO: 99.1 FL (ref 80–99.9)
MONOCYTES ABSOLUTE: 0.15 E9/L (ref 0.1–0.95)
MONOCYTES RELATIVE PERCENT: 9.5 % (ref 2–12)
NEUTROPHILS ABSOLUTE: 0.9 E9/L (ref 1.8–7.3)
NEUTROPHILS RELATIVE PERCENT: 59.5 % (ref 43–80)
NUCLEATED RED BLOOD CELLS: 0 /100 WBC
PDW BLD-RTO: 17.2 FL (ref 11.5–15)
PLATELET # BLD: 136 E9/L (ref 130–450)
PMV BLD AUTO: 9.9 FL (ref 7–12)
POLYCHROMASIA: ABNORMAL
POTASSIUM SERPL-SCNC: 4.2 MMOL/L (ref 3.5–5)
RBC # BLD: 3.35 E12/L (ref 3.5–5.5)
SODIUM BLD-SCNC: 133 MMOL/L (ref 132–146)
TOTAL PROTEIN: 9.4 G/DL (ref 6.4–8.3)
WBC # BLD: 1.5 E9/L (ref 4.5–11.5)

## 2020-05-27 PROCEDURE — 80053 COMPREHEN METABOLIC PANEL: CPT

## 2020-05-27 PROCEDURE — 82378 CARCINOEMBRYONIC ANTIGEN: CPT

## 2020-05-27 PROCEDURE — 86300 IMMUNOASSAY TUMOR CA 15-3: CPT

## 2020-05-27 PROCEDURE — 85025 COMPLETE CBC W/AUTO DIFF WBC: CPT

## 2020-05-27 PROCEDURE — 36415 COLL VENOUS BLD VENIPUNCTURE: CPT

## 2020-05-29 ENCOUNTER — OFFICE VISIT (OUTPATIENT)
Dept: ONCOLOGY | Age: 70
End: 2020-05-29
Payer: MEDICARE

## 2020-05-29 ENCOUNTER — HOSPITAL ENCOUNTER (OUTPATIENT)
Dept: INFUSION THERAPY | Age: 70
Discharge: HOME | End: 2020-05-29
Payer: MEDICARE

## 2020-05-29 VITALS
SYSTOLIC BLOOD PRESSURE: 137 MMHG | HEIGHT: 66 IN | BODY MASS INDEX: 22.79 KG/M2 | DIASTOLIC BLOOD PRESSURE: 65 MMHG | TEMPERATURE: 97.8 F | HEART RATE: 66 BPM | WEIGHT: 141.8 LBS

## 2020-05-29 DIAGNOSIS — C50.612 MALIGNANT NEOPLASM OF AXILLARY TAIL OF LEFT FEMALE BREAST, UNSPECIFIED ESTROGEN RECEPTOR STATUS (HCC): Primary | ICD-10-CM

## 2020-05-29 DIAGNOSIS — C50.919 CARCINOMA OF BREAST METASTATIC TO BONE, UNSPECIFIED LATERALITY (HCC): ICD-10-CM

## 2020-05-29 DIAGNOSIS — C79.51 CARCINOMA OF BREAST METASTATIC TO BONE, UNSPECIFIED LATERALITY (HCC): ICD-10-CM

## 2020-05-29 PROCEDURE — G8420 CALC BMI NORM PARAMETERS: HCPCS | Performed by: NURSE PRACTITIONER

## 2020-05-29 PROCEDURE — 3017F COLORECTAL CA SCREEN DOC REV: CPT | Performed by: NURSE PRACTITIONER

## 2020-05-29 PROCEDURE — G8427 DOCREV CUR MEDS BY ELIG CLIN: HCPCS | Performed by: NURSE PRACTITIONER

## 2020-05-29 PROCEDURE — 1090F PRES/ABSN URINE INCON ASSESS: CPT | Performed by: NURSE PRACTITIONER

## 2020-05-29 PROCEDURE — 6360000002 HC RX W HCPCS: Performed by: INTERNAL MEDICINE

## 2020-05-29 PROCEDURE — 1036F TOBACCO NON-USER: CPT | Performed by: NURSE PRACTITIONER

## 2020-05-29 PROCEDURE — 99212 OFFICE O/P EST SF 10 MIN: CPT

## 2020-05-29 PROCEDURE — 96372 THER/PROPH/DIAG INJ SC/IM: CPT

## 2020-05-29 PROCEDURE — G8400 PT W/DXA NO RESULTS DOC: HCPCS | Performed by: NURSE PRACTITIONER

## 2020-05-29 PROCEDURE — 4040F PNEUMOC VAC/ADMIN/RCVD: CPT | Performed by: NURSE PRACTITIONER

## 2020-05-29 PROCEDURE — 99214 OFFICE O/P EST MOD 30 MIN: CPT | Performed by: NURSE PRACTITIONER

## 2020-05-29 PROCEDURE — 1123F ACP DISCUSS/DSCN MKR DOCD: CPT | Performed by: NURSE PRACTITIONER

## 2020-05-29 RX ADMIN — DENOSUMAB 120 MG: 120 INJECTION SUBCUTANEOUS at 11:38

## 2020-05-30 LAB — CA 15-3: 15 U/ML (ref 0–31)

## 2020-06-08 ENCOUNTER — TELEPHONE (OUTPATIENT)
Dept: INFUSION THERAPY | Age: 70
End: 2020-06-08

## 2020-06-11 ENCOUNTER — TELEPHONE (OUTPATIENT)
Dept: ADMINISTRATIVE | Age: 70
End: 2020-06-11

## 2020-06-22 RX ORDER — LEVOTHYROXINE SODIUM 0.03 MG/1
TABLET ORAL
Qty: 30 TABLET | Refills: 0 | Status: SHIPPED
Start: 2020-06-22 | End: 2020-07-14 | Stop reason: SDUPTHER

## 2020-06-24 ENCOUNTER — HOSPITAL ENCOUNTER (OUTPATIENT)
Age: 70
Discharge: HOME | End: 2020-06-24
Payer: MEDICARE

## 2020-06-24 LAB
ALBUMIN SERPL-MCNC: 4.2 G/DL (ref 3.5–5.2)
ALP BLD-CCNC: 72 U/L (ref 35–104)
ALT SERPL-CCNC: 16 U/L (ref 0–32)
ANION GAP SERPL CALCULATED.3IONS-SCNC: 6 MMOL/L (ref 7–16)
ANISOCYTOSIS: ABNORMAL
AST SERPL-CCNC: 28 U/L (ref 0–31)
BASOPHILS ABSOLUTE: 0.01 E9/L (ref 0–0.2)
BASOPHILS RELATIVE PERCENT: 0.9 % (ref 0–2)
BILIRUB SERPL-MCNC: 0.4 MG/DL (ref 0–1.2)
BUN BLDV-MCNC: 14 MG/DL (ref 8–23)
CALCIUM SERPL-MCNC: 9.1 MG/DL (ref 8.6–10.2)
CEA: 2.4 NG/ML (ref 0–5.2)
CHLORIDE BLD-SCNC: 102 MMOL/L (ref 98–107)
CO2: 28 MMOL/L (ref 22–29)
CREAT SERPL-MCNC: 0.8 MG/DL (ref 0.5–1)
EOSINOPHILS ABSOLUTE: 0.06 E9/L (ref 0.05–0.5)
EOSINOPHILS RELATIVE PERCENT: 4.3 % (ref 0–6)
GFR AFRICAN AMERICAN: >60
GFR NON-AFRICAN AMERICAN: >60 ML/MIN/1.73
GLUCOSE BLD-MCNC: 83 MG/DL (ref 74–99)
HCT VFR BLD CALC: 33 % (ref 34–48)
HEMOGLOBIN: 10.8 G/DL (ref 11.5–15.5)
LYMPHOCYTES ABSOLUTE: 0.25 E9/L (ref 1.5–4)
LYMPHOCYTES RELATIVE PERCENT: 19.1 % (ref 20–42)
MCH RBC QN AUTO: 32.1 PG (ref 26–35)
MCHC RBC AUTO-ENTMCNC: 32.7 % (ref 32–34.5)
MCV RBC AUTO: 98.2 FL (ref 80–99.9)
METAMYELOCYTES RELATIVE PERCENT: 0.9 % (ref 0–1)
MONOCYTES ABSOLUTE: 0.1 E9/L (ref 0.1–0.95)
MONOCYTES RELATIVE PERCENT: 7.8 % (ref 2–12)
NEUTROPHILS ABSOLUTE: 0.88 E9/L (ref 1.8–7.3)
NEUTROPHILS RELATIVE PERCENT: 67 % (ref 43–80)
NUCLEATED RED BLOOD CELLS: 0 /100 WBC
PDW BLD-RTO: 14.6 FL (ref 11.5–15)
PLATELET # BLD: 145 E9/L (ref 130–450)
PMV BLD AUTO: 10.1 FL (ref 7–12)
POTASSIUM SERPL-SCNC: 3.9 MMOL/L (ref 3.5–5)
RBC # BLD: 3.36 E12/L (ref 3.5–5.5)
SODIUM BLD-SCNC: 136 MMOL/L (ref 132–146)
TOTAL PROTEIN: 9.7 G/DL (ref 6.4–8.3)
WBC # BLD: 1.3 E9/L (ref 4.5–11.5)

## 2020-06-24 PROCEDURE — 86300 IMMUNOASSAY TUMOR CA 15-3: CPT

## 2020-06-24 PROCEDURE — 85025 COMPLETE CBC W/AUTO DIFF WBC: CPT

## 2020-06-24 PROCEDURE — 36415 COLL VENOUS BLD VENIPUNCTURE: CPT

## 2020-06-24 PROCEDURE — 80053 COMPREHEN METABOLIC PANEL: CPT

## 2020-06-24 PROCEDURE — 82378 CARCINOEMBRYONIC ANTIGEN: CPT

## 2020-06-26 ENCOUNTER — OFFICE VISIT (OUTPATIENT)
Dept: ONCOLOGY | Age: 70
End: 2020-06-26
Payer: MEDICARE

## 2020-06-26 ENCOUNTER — HOSPITAL ENCOUNTER (OUTPATIENT)
Dept: INFUSION THERAPY | Age: 70
Discharge: HOME | End: 2020-06-26
Payer: MEDICARE

## 2020-06-26 VITALS
DIASTOLIC BLOOD PRESSURE: 63 MMHG | TEMPERATURE: 98.1 F | WEIGHT: 138 LBS | BODY MASS INDEX: 22.18 KG/M2 | OXYGEN SATURATION: 99 % | SYSTOLIC BLOOD PRESSURE: 137 MMHG | HEIGHT: 66 IN | HEART RATE: 63 BPM

## 2020-06-26 DIAGNOSIS — C50.919 CARCINOMA OF BREAST METASTATIC TO BONE, UNSPECIFIED LATERALITY (HCC): ICD-10-CM

## 2020-06-26 DIAGNOSIS — C79.51 CARCINOMA OF BREAST METASTATIC TO BONE, UNSPECIFIED LATERALITY (HCC): ICD-10-CM

## 2020-06-26 DIAGNOSIS — C50.612 MALIGNANT NEOPLASM OF AXILLARY TAIL OF LEFT FEMALE BREAST, UNSPECIFIED ESTROGEN RECEPTOR STATUS (HCC): Primary | ICD-10-CM

## 2020-06-26 LAB — CA 15-3: 14 U/ML (ref 0–31)

## 2020-06-26 PROCEDURE — G8400 PT W/DXA NO RESULTS DOC: HCPCS | Performed by: NURSE PRACTITIONER

## 2020-06-26 PROCEDURE — 99212 OFFICE O/P EST SF 10 MIN: CPT

## 2020-06-26 PROCEDURE — 4040F PNEUMOC VAC/ADMIN/RCVD: CPT | Performed by: NURSE PRACTITIONER

## 2020-06-26 PROCEDURE — 6360000002 HC RX W HCPCS: Performed by: INTERNAL MEDICINE

## 2020-06-26 PROCEDURE — 96372 THER/PROPH/DIAG INJ SC/IM: CPT

## 2020-06-26 PROCEDURE — 3017F COLORECTAL CA SCREEN DOC REV: CPT | Performed by: NURSE PRACTITIONER

## 2020-06-26 PROCEDURE — 1090F PRES/ABSN URINE INCON ASSESS: CPT | Performed by: NURSE PRACTITIONER

## 2020-06-26 PROCEDURE — G8427 DOCREV CUR MEDS BY ELIG CLIN: HCPCS | Performed by: NURSE PRACTITIONER

## 2020-06-26 PROCEDURE — 1036F TOBACCO NON-USER: CPT | Performed by: NURSE PRACTITIONER

## 2020-06-26 PROCEDURE — G8420 CALC BMI NORM PARAMETERS: HCPCS | Performed by: NURSE PRACTITIONER

## 2020-06-26 PROCEDURE — 99214 OFFICE O/P EST MOD 30 MIN: CPT | Performed by: NURSE PRACTITIONER

## 2020-06-26 PROCEDURE — 1123F ACP DISCUSS/DSCN MKR DOCD: CPT | Performed by: NURSE PRACTITIONER

## 2020-06-26 RX ADMIN — DENOSUMAB 120 MG: 120 INJECTION SUBCUTANEOUS at 10:27

## 2020-06-26 NOTE — PROGRESS NOTES
Occupational History    Not on file   Social Needs    Financial resource strain: Not on file    Food insecurity     Worry: Not on file     Inability: Not on file    Transportation needs     Medical: Not on file     Non-medical: Not on file   Tobacco Use    Smoking status: Never Smoker    Smokeless tobacco: Never Used   Substance and Sexual Activity    Alcohol use: Yes     Comment: drinks it occasional on social events    Drug use: No    Sexual activity: Never   Lifestyle    Physical activity     Days per week: Not on file     Minutes per session: Not on file    Stress: Not on file   Relationships    Social connections     Talks on phone: Not on file     Gets together: Not on file     Attends [employer] service: Not on file     Active member of club or organization: Not on file     Attends meetings of clubs or organizations: Not on file     Relationship status: Not on file    Intimate partner violence     Fear of current or ex partner: Not on file     Emotionally abused: Not on file     Physically abused: Not on file     Forced sexual activity: Not on file   Other Topics Concern    Not on file   Social History Narrative    Not on file       Allergies:  No Known Allergies    Physical Exam:  VSS  GENERAL: Alert, oriented x 3, not in acute distress. HEENT: PERRLA; EOMI. Oropharynx clear. NECK: Supple. No palpable cervical or supraclavicular lymphadenopathy. LUNGS: Good air entry bilaterally. No wheezing, crackles or rhonchi. CARDIOVASCULAR: Regular rate. No murmurs, rubs or gallops. BREASTS: The right breast exam is negative for any skin changes, nipple discharge, no palpable masses, no palpable right axillary adenopathy, left breast appearance had markedly improved, the nodules had resolved, the mass decreased in size, no drainage, no bleeding, decrease in the size of the left axillary lymphadenopathy  ABDOMEN: Soft. Non-tender, non-distended. Positive bowel sounds.   EXTREMITIES: Trace edema of

## 2020-06-26 NOTE — PROGRESS NOTES
[address] MED ONCOLOGY  [phone] [facility]. [id number]  Dept: [phone]  Progress Note      Reason for Visit:   Metastatic breast cancer. PCP:  Tru Winkler DO    History of Present Illness: The patient is a 65 y.o. lady, with a past medical history significant for hydrocephalus, who had presented with left breast changes, she had noticed a dark spot on her left breast, then she had developed induration, she thinks that she had the breast changes for months. The patient had never had a mammogram done, had a CT scan of the chest done, revealing a solid left breast mass measuring at least 6.8 x 2.9 cm in size is identified. The mass appears to extend to the skin surface. The deep margin of the mass abuts the chest wall but a fat plane appears to be preserved. Pathologically enlarged left  axillary lymph nodes are seen measuring up to 4.5 x 1.9 cm in size. CT abdomen and pelvis was done on 8/30/2019 revealing partial visualization of soft tissue mass in the left breast, nonspecific lymph nodes in the upper abdomen. Bone scan was done on 8/30/2019, revealing a Prominent focus of radiotracer uptake in the left anterior superior iliac spine, with corresponding abnormality on recent CT, suggestive of metastasis. More subtle focal radiotracer uptake in the right anterior superior iliac spine could be due to metastasis or  degenerative change.   2. Focal radiotracer uptake in the lateral aspect of the right ninth  rib, which appears to be due to a nondisplaced healing fracture. Correlation with history of recent trauma is recommended. In light of  other findings, this could represent a pathologic fracture with an  underlying metastatic lesion. 3. Osteoarthritic uptake in the shoulders, knees, wrists, and hands.     She underwent on 8/30/2019 a biopsy of 1 of the left axillary lymph nodes, she was consistent with metastatic ductal carcinoma, nuclear grade 2, +70% HER-2/elder 1+, she has a very locally advanced disease, and metastatic disease to the bones. I discussed with the patient and her sister today her diagnosis, prognosis and recommendations for treatment. Will complete the staging work-up, the patient will have a brain MRI with and without contrast done, and a PET scan. For her HR pos, HER-2/elder negative metastatic breast cancer, I recommended treatment with Femara and Ibrance, the schedule and side effects of the treatment were reviewed with her. She has metastatic disease to the bones, recommended treatment with Xgeva. MRI brain done on 09/16/2019: no evidence of metastatic disease. PET/CT on 09/17/2019: The lytic soft tissue finding in the iliac crest in the left pelvis shows hypermetabolic FDG uptake, with maximum measurements in the 5.7 SUV range, strongly suggesting metastatic disease. She has uptake in the thyroid, could be physiologic. Femara/Ibrance were started on 09/13/2019. Bone scan done on 12/17/2019: Redemonstration of focus of radiotracer uptake involving posterior lateral right ninth rib suggestive of healing fracture. New radiotracer activity is seen at level of lateral right 10th rib and anterior right seventh rib which may be related to new healing fractures. Metastases is unlikely. Clinical correlation recommended. No additional abnormal areas of increased radiotracer uptake. CT chest done on 12/17/2019: Significant interval decrease in size of the large mass seen in the left breast 8/29/2019. Interval decrease in size of enlarged left axillary lymph nodes. Cardiomegaly. Enlarged thyroid, unchanged. Small hiatal hernia. Diffuse interstitial changes and multifocal atelectasis or scarring in both lungs. Stable small nodular density in the right upper lobe. Sclerotic changes involving the lateral right ninth rib. CT abdomen/pelvis done on 12/17/2019: Small right renal cyst. Retroverted uterus.  Distended colon and rectum containing a large amount of stool. Mildly enlarged retroperitoneal and periportal lymph nodes, unchanged. Findings in the left iliac bone as described. The results/images of the scans were reviewed with the patient and her sister, she is responding nicely to the Femara and Venancio Cowan, will continue the same treatment. She did have teeth extraction on 1/20/2020, dental clearance has been obtained. On 4/3/2020, the patient was started on Ibrance and Femara cycle #8. Restaging CT Chest/Abdomen/Pelvis and bone scan on 04/24/2020:  CT Chest noted stable small bilateral pulmonary nodules; CT abdomen/pelvis no evidence of new active neoplasm compared to prior. Bone scan 3 foci of radiotracer uptake in the lower right ribs, 2 of which demonstrate less activity on the current study, possibly indicative of healing fractures. Radiotracer in the more anterior focus is more persistent, and may be a metastatic lesion. The results/images of the scans were reviewed with the patient and her sister, the patient has an overall stable disease. We will continue with Femara and Ibrance.    -On 03/27/2020:  CEA 1.7 ng/m/L; CA 15-3 was 14  -On 04/29/2020:  CEA 1.7 ng.m/L; CA 15-3 was 17    On 05/01/2020 started Cycle # 9, Day 1 Ibrance (dose reduced to 100 mg) and Femara 2.5 mg daily. White count of 1.4, , hemoglobin and platelet count are within normal range. On 05/29/2020 started Cycle # 10, Day 1 Ibrance 100 mg (+ Xgeva) and Femara 2.5 mg daily. WBC 1.5; Hb 10.8; ; CMP essentially normal (protien 9.4); CEA  2.9; Ca 15.3 pending. Starting today 06/26/2020 for Cycle # 11, Day 1 Ibrance 100 mg and Femara 2.5 mg daily. White count of 1.3, , hemoglobin 10.8 and platelet count is within normal range. Proceed with Lito Boogie today, 06/26/2020. RTC in 4 weeks with labs, toxicity check.     ADOLFO Christopher CNP  MEDICAL ONCOLOGY  [facility] [address]  [address] [facility] [id number]  Dept:

## 2020-07-14 ENCOUNTER — OFFICE VISIT (OUTPATIENT)
Dept: FAMILY MEDICINE CLINIC | Age: 70
End: 2020-07-14
Payer: MEDICARE

## 2020-07-14 VITALS
HEIGHT: 66 IN | DIASTOLIC BLOOD PRESSURE: 76 MMHG | WEIGHT: 138 LBS | HEART RATE: 68 BPM | TEMPERATURE: 97.1 F | RESPIRATION RATE: 18 BRPM | BODY MASS INDEX: 22.18 KG/M2 | OXYGEN SATURATION: 96 % | SYSTOLIC BLOOD PRESSURE: 128 MMHG

## 2020-07-14 PROCEDURE — 1090F PRES/ABSN URINE INCON ASSESS: CPT | Performed by: FAMILY MEDICINE

## 2020-07-14 PROCEDURE — 1123F ACP DISCUSS/DSCN MKR DOCD: CPT | Performed by: FAMILY MEDICINE

## 2020-07-14 PROCEDURE — G8427 DOCREV CUR MEDS BY ELIG CLIN: HCPCS | Performed by: FAMILY MEDICINE

## 2020-07-14 PROCEDURE — G8420 CALC BMI NORM PARAMETERS: HCPCS | Performed by: FAMILY MEDICINE

## 2020-07-14 PROCEDURE — 99214 OFFICE O/P EST MOD 30 MIN: CPT | Performed by: FAMILY MEDICINE

## 2020-07-14 PROCEDURE — G8400 PT W/DXA NO RESULTS DOC: HCPCS | Performed by: FAMILY MEDICINE

## 2020-07-14 PROCEDURE — 3017F COLORECTAL CA SCREEN DOC REV: CPT | Performed by: FAMILY MEDICINE

## 2020-07-14 PROCEDURE — 4040F PNEUMOC VAC/ADMIN/RCVD: CPT | Performed by: FAMILY MEDICINE

## 2020-07-14 PROCEDURE — 1036F TOBACCO NON-USER: CPT | Performed by: FAMILY MEDICINE

## 2020-07-14 RX ORDER — ERGOCALCIFEROL 1.25 MG/1
CAPSULE ORAL
Qty: 12 CAPSULE | Refills: 1 | Status: SHIPPED
Start: 2020-07-14 | End: 2021-03-01

## 2020-07-14 RX ORDER — PALBOCICLIB 100 MG/1
TABLET, FILM COATED ORAL
COMMUNITY
Start: 2020-06-08 | End: 2020-07-24 | Stop reason: SDUPTHER

## 2020-07-14 RX ORDER — LEVOTHYROXINE SODIUM 0.03 MG/1
TABLET ORAL
Qty: 90 TABLET | Refills: 1 | Status: SHIPPED
Start: 2020-07-14 | End: 2021-03-01

## 2020-07-14 NOTE — PROGRESS NOTES
Hypothyroidism:  Patient is here today to follow up chronic hypothyroidism. This is   generally controlled on current medication regimen. Takes medication as directed and tolerates well. Symptoms from thyroid standpoint include nothing. Most recent labs reviewed with patient and are remarkable. Thyroid function in particular is  controlled. She states that her hair thinned out but she thinks that is from the chemo. She has metastatic breast cancer and is following with oncology. She is currently getting chemo monthly. She is tolerating her chemo therapy well. She is following with oncology on a monthly basis. Patient's past medical, surgical, social and/or family history reviewed, updated in chart, and are non-contributory (unless otherwise stated). Medications and allergies also reviewed and updated in chart. Review of Systems:  Constitutional:  No fever, no fatigue, no chills, no headaches, no weight change. Positive for hair loss. Dermatology:  No rash, no mole, no dry or sensitive skin  ENT:  No cough, no sore throat, no sinus pain, no runny nose, no ear pain  Cardiology:  No chest pain, no palpitations, no leg edema, no shortness of breath, no PND  Gastroenterology:  No dysphagia, no abdominal pain, no nausea, no vomiting, no constipation, no diarrhea, no heartburn  Musculoskeletal:  No joint pain, no leg cramps, no back pain, no muscle aches  Respiratory:  No shortness of breath, no orthopnea, no wheezing, no SCHAFFER, no hemoptysis  Urology:  No blood in the urine, no urinary frequency, no urinary incontinence, no urinary urgency, no nocturia, no dysuria      Vitals:    07/14/20 1007   BP: 128/76   Site: Right Upper Arm   Position: Sitting   Cuff Size: Large Adult   Pulse: 68   Resp: 18   Temp: 97.1 °F (36.2 °C)   TempSrc: Infrared   SpO2: 96%   Weight: 138 lb (62.6 kg)   Height: 5' 6\" (1.676 m)       Physical Exam  Vitals signs and nursing note reviewed.    Constitutional: complications,  especially if left uncontrolled. Counseled regarding the possible side effects, risks, benefits and alternatives to treatment; patient and/or guardian verbalizes understanding, agrees, feels comfortable with and wishes to proceed with above treatment plan. Advised patient to call with any new medication issues, and read all Rx info from pharmacy to assure aware of all possible risks and side effects of medication before taking. Reviewed age and gender appropriate health screening exams and vaccinations. Advised patient regarding importance of keeping up with recommended health maintenance and to schedule as soon as possible if overdue, as this is important in assessing for undiagnosed pathology, especially cancer, as well as protecting against potentially harmful/life threatening disease. Patient and/or guardian verbalizes understanding and agrees with above counseling, assessment and plan. All questions answered. Kirsten Antunez DO  7/14/2020    I have personally reviewed and updated the chief complaint, HPI, Past Medical, Family and Social History, as well as the above Review of Systems.

## 2020-07-14 NOTE — PATIENT INSTRUCTIONS
Patient Education        Hypothyroidism: Care Instructions  Your Care Instructions     When you have hypothyroidism, your body doesn't make enough thyroid hormone. This hormone helps your body use energy. If your thyroid level is low, you may feel tired, be constipated, have an increase in your blood pressure, or have dry skin or memory problems. You may also get cold easily, even when it is warm. Women with low thyroid levels may have heavy menstrual periods. A blood test to find your thyroid-stimulating hormone (TSH) level is used to check for hypothyroidism. A high TSH level may mean that you have it. The treatment for hypothyroidism is thyroid hormone pills. You should start to feel better in 1 to 2 weeks. Most people need treatment for the rest of their lives. You will need regular visits with your doctor to make sure you are doing well and that you have the right dose of medicine. Follow-up care is a key part of your treatment and safety. Be sure to make and go to all appointments, and call your doctor if you are having problems. It's also a good idea to know your test results and keep a list of the medicines you take. How can you care for yourself at home? · Take your thyroid hormone medicine exactly as prescribed. Call your doctor if you think you are having a problem with your medicine. Most people do not have side effects if they take the right amount of medicine regularly. ? Take the medicine 30 minutes before breakfast, and do not take it with calcium, vitamins, or iron. ? Do not take extra doses of your thyroid medicine. It will not help you get better any faster, and it may cause side effects. ? If you forget to take a dose, do NOT take a double dose of medicine. Take your usual dose the next day. · Tell your doctor about all prescription, herbal, or over-the-counter products you take. · Take care of yourself. Eat a healthy diet, get enough sleep, and get regular exercise.   When should you call for help? [id number] anytime you think you may need emergency care. For example, call if:  · You passed out (lost consciousness). · You have severe trouble breathing. · You have a very slow heartbeat (less than 60 beats a minute). · You have a low body temperature (95°F or below). Call your doctor now or seek immediate medical care if:  · You feel tired, sluggish, or weak. · You have trouble remembering things or concentrating. · You do not begin to feel better 2 weeks after starting your medicine. Watch closely for changes in your health, and be sure to contact your doctor if you have any problems. Where can you learn more? Go to https://Expert360.Sidecar. org and sign in to your [employer] account. Enter R961 in the MyNextRun box to learn more about \"Hypothyroidism: Care Instructions. \"     If you do not have an account, please click on the \"Sign Up Now\" link. Current as of: July 29, 2019               Content Version: 12.5  © 5659-3170 [employer]wise, Incorporated. Care instructions adapted under license by [facility] ([facility]). If you have questions about a medical condition or this instruction, always ask your healthcare professional. [address] any warranty or liability for your use of this information.

## 2020-07-22 ENCOUNTER — HOSPITAL ENCOUNTER (OUTPATIENT)
Age: 70
Discharge: HOME | End: 2020-07-22
Payer: MEDICARE

## 2020-07-22 LAB
T4 FREE: 1.13 NG/DL (ref 0.93–1.7)
TSH SERPL DL<=0.05 MIU/L-ACNC: 2.89 UIU/ML (ref 0.27–4.2)
VITAMIN D 25-HYDROXY: 40 NG/ML (ref 30–100)

## 2020-07-22 PROCEDURE — 84439 ASSAY OF FREE THYROXINE: CPT

## 2020-07-22 PROCEDURE — 36415 COLL VENOUS BLD VENIPUNCTURE: CPT

## 2020-07-22 PROCEDURE — 82306 VITAMIN D 25 HYDROXY: CPT

## 2020-07-22 PROCEDURE — 84443 ASSAY THYROID STIM HORMONE: CPT

## 2020-07-24 ENCOUNTER — HOSPITAL ENCOUNTER (OUTPATIENT)
Dept: INFUSION THERAPY | Age: 70
Discharge: HOME | End: 2020-07-24
Payer: MEDICARE

## 2020-07-24 ENCOUNTER — OFFICE VISIT (OUTPATIENT)
Dept: ONCOLOGY | Age: 70
End: 2020-07-24
Payer: MEDICARE

## 2020-07-24 VITALS
SYSTOLIC BLOOD PRESSURE: 131 MMHG | HEIGHT: 66 IN | BODY MASS INDEX: 22.24 KG/M2 | TEMPERATURE: 97.7 F | HEART RATE: 60 BPM | DIASTOLIC BLOOD PRESSURE: 62 MMHG | WEIGHT: 138.4 LBS

## 2020-07-24 DIAGNOSIS — C50.919 CARCINOMA OF BREAST METASTATIC TO BONE, UNSPECIFIED LATERALITY (HCC): ICD-10-CM

## 2020-07-24 DIAGNOSIS — N63.0 BREAST MASS: ICD-10-CM

## 2020-07-24 DIAGNOSIS — C79.51 CARCINOMA OF BREAST METASTATIC TO BONE, UNSPECIFIED LATERALITY (HCC): ICD-10-CM

## 2020-07-24 DIAGNOSIS — C50.612 MALIGNANT NEOPLASM OF AXILLARY TAIL OF LEFT FEMALE BREAST, UNSPECIFIED ESTROGEN RECEPTOR STATUS (HCC): ICD-10-CM

## 2020-07-24 PROCEDURE — 99212 OFFICE O/P EST SF 10 MIN: CPT | Performed by: INTERNAL MEDICINE

## 2020-07-24 RX ORDER — PALBOCICLIB 100 MG/1
100 TABLET, FILM COATED ORAL DAILY
Qty: 21 TABLET | Refills: 3 | Status: SHIPPED | OUTPATIENT
Start: 2020-07-24 | End: 2020-08-14

## 2020-07-24 NOTE — PROGRESS NOTES
Diagnosis    Breast ca    Interim history    Pt denies any N/V/D. No pain      Oncology history    Pt had a CT scan of the chest done, revealing a solid left breast mass measuring at least 6.8 x 2.9 cm in size is identified. The mass appears to extend to the skin surface. The deep margin of the mass abuts the chest wall but a fat plane appears to be preserved. Pathologically enlarged left  axillary lymph nodes are seen measuring up to 4.5 x 1.9 cm in size.      CT abdomen and pelvis was done on 8/30/2019 revealing partial visualization of soft tissue mass in the left breast, nonspecific lymph nodes in the upper abdomen.     Bone scan was done on 8/30/2019, revealing a Prominent focus of radiotracer uptake in the left anterior superior iliac spine, with corresponding abnormality on recent CT, suggestive of metastasis. More subtle focal radiotracer uptake in the right anterior superior iliac spine could be due to metastasis or  degenerative change.   2. Focal radiotracer uptake in the lateral aspect of the right ninth  rib, which appears to be due to a nondisplaced healing fracture. Correlation with history of recent trauma is recommended. In light of  other findings, this could represent a pathologic fracture with an  underlying metastatic lesion. 3. Osteoarthritic uptake in the shoulders, knees, wrists, and hands.     She underwent on 8/30/2019 a biopsy of 1 of the left axillary lymph nodes, she was consistent with metastatic ductal carcinoma, nuclear grade 2, ER positive greater than 95% NC +70% HER-2/elder 1+, by IHC, negative. PE    HEAD NC AT  CHEST Clear BS BL  HEART S1S2 no m/r/g  ABD Soft ND NT  EXT no edema  NEURO A+Ox3    A/P    This is a 66 y/o lady with metastatic breast ca.     Femara  Palbociclib      F/u in 2 weeks

## 2020-08-09 ENCOUNTER — APPOINTMENT (OUTPATIENT)
Dept: CT IMAGING | Age: 70
End: 2020-08-09
Payer: MEDICARE

## 2020-08-09 ENCOUNTER — APPOINTMENT (OUTPATIENT)
Dept: GENERAL RADIOLOGY | Age: 70
End: 2020-08-09
Payer: MEDICARE

## 2020-08-09 ENCOUNTER — HOSPITAL ENCOUNTER (EMERGENCY)
Age: 70
Discharge: ANOTHER HOSPITAL | End: 2020-08-10
Attending: EMERGENCY MEDICINE
Payer: MEDICARE

## 2020-08-09 LAB
ALBUMIN SERPL-MCNC: 3.1 G/DL (ref 3.5–5.2)
ALP BLD-CCNC: 69 U/L (ref 35–104)
ALT SERPL-CCNC: 39 U/L (ref 0–32)
ANION GAP SERPL CALCULATED.3IONS-SCNC: 13 MMOL/L (ref 7–16)
AST SERPL-CCNC: 54 U/L (ref 0–31)
BACTERIA: ABNORMAL /HPF
BASOPHILS ABSOLUTE: 0 E9/L (ref 0–0.2)
BASOPHILS RELATIVE PERCENT: 0 % (ref 0–2)
BILIRUB SERPL-MCNC: 0.5 MG/DL (ref 0–1.2)
BILIRUBIN URINE: NEGATIVE
BLOOD, URINE: ABNORMAL
BUN BLDV-MCNC: 20 MG/DL (ref 8–23)
CALCIUM SERPL-MCNC: 8.1 MG/DL (ref 8.6–10.2)
CHLORIDE BLD-SCNC: 93 MMOL/L (ref 98–107)
CHLORIDE URINE RANDOM: 22 MMOL/L
CLARITY: CLEAR
CO2: 20 MMOL/L (ref 22–29)
COLOR: YELLOW
CREAT SERPL-MCNC: 0.7 MG/DL (ref 0.5–1)
EOSINOPHILS ABSOLUTE: 0 E9/L (ref 0.05–0.5)
EOSINOPHILS RELATIVE PERCENT: 0 % (ref 0–6)
GFR AFRICAN AMERICAN: >60
GFR NON-AFRICAN AMERICAN: >60 ML/MIN/1.73
GLUCOSE BLD-MCNC: 113 MG/DL (ref 74–99)
GLUCOSE URINE: NEGATIVE MG/DL
HCT VFR BLD CALC: 28.1 % (ref 34–48)
HEMOGLOBIN: 9.6 G/DL (ref 11.5–15.5)
KETONES, URINE: 15 MG/DL
LACTIC ACID, SEPSIS: 0.9 MMOL/L (ref 0.5–1.9)
LEUKOCYTE ESTERASE, URINE: ABNORMAL
LYMPHOCYTES ABSOLUTE: 0.28 E9/L (ref 1.5–4)
LYMPHOCYTES RELATIVE PERCENT: 4.3 % (ref 20–42)
MAGNESIUM: 2 MG/DL (ref 1.6–2.6)
MCH RBC QN AUTO: 31.3 PG (ref 26–35)
MCHC RBC AUTO-ENTMCNC: 34.2 % (ref 32–34.5)
MCV RBC AUTO: 91.5 FL (ref 80–99.9)
MONOCYTES ABSOLUTE: 0.07 E9/L (ref 0.1–0.95)
MONOCYTES RELATIVE PERCENT: 0.9 % (ref 2–12)
NEUTROPHILS ABSOLUTE: 6.75 E9/L (ref 1.8–7.3)
NEUTROPHILS RELATIVE PERCENT: 94.8 % (ref 43–80)
NITRITE, URINE: POSITIVE
NUCLEATED RED BLOOD CELLS: 0 /100 WBC
PDW BLD-RTO: 13.5 FL (ref 11.5–15)
PH UA: 5.5 (ref 5–9)
PLATELET # BLD: 217 E9/L (ref 130–450)
PMV BLD AUTO: 9.9 FL (ref 7–12)
POTASSIUM SERPL-SCNC: 3 MMOL/L (ref 3.5–5)
POTASSIUM, UR: 19.6 MMOL/L
PROTEIN UA: NEGATIVE MG/DL
RBC # BLD: 3.07 E12/L (ref 3.5–5.5)
RBC # BLD: NORMAL 10*6/UL
RBC UA: ABNORMAL /HPF (ref 0–2)
SODIUM BLD-SCNC: 126 MMOL/L (ref 132–146)
SODIUM URINE: <20 MMOL/L
SPECIFIC GRAVITY UA: 1.01 (ref 1–1.03)
TOTAL PROTEIN: 7.5 G/DL (ref 6.4–8.3)
TROPONIN: <0.01 NG/ML (ref 0–0.03)
UROBILINOGEN, URINE: 0.2 E.U./DL
WBC # BLD: 7.1 E9/L (ref 4.5–11.5)
WBC UA: ABNORMAL /HPF (ref 0–5)

## 2020-08-09 PROCEDURE — 85025 COMPLETE CBC W/AUTO DIFF WBC: CPT

## 2020-08-09 PROCEDURE — 71045 X-RAY EXAM CHEST 1 VIEW: CPT

## 2020-08-09 PROCEDURE — 93005 ELECTROCARDIOGRAM TRACING: CPT | Performed by: STUDENT IN AN ORGANIZED HEALTH CARE EDUCATION/TRAINING PROGRAM

## 2020-08-09 PROCEDURE — 83605 ASSAY OF LACTIC ACID: CPT

## 2020-08-09 PROCEDURE — 36415 COLL VENOUS BLD VENIPUNCTURE: CPT

## 2020-08-09 PROCEDURE — 81001 URINALYSIS AUTO W/SCOPE: CPT

## 2020-08-09 PROCEDURE — 84300 ASSAY OF URINE SODIUM: CPT

## 2020-08-09 PROCEDURE — 84133 ASSAY OF URINE POTASSIUM: CPT

## 2020-08-09 PROCEDURE — 80053 COMPREHEN METABOLIC PANEL: CPT

## 2020-08-09 PROCEDURE — 96361 HYDRATE IV INFUSION ADD-ON: CPT

## 2020-08-09 PROCEDURE — 87186 SC STD MICRODIL/AGAR DIL: CPT

## 2020-08-09 PROCEDURE — 83935 ASSAY OF URINE OSMOLALITY: CPT

## 2020-08-09 PROCEDURE — 83930 ASSAY OF BLOOD OSMOLALITY: CPT

## 2020-08-09 PROCEDURE — 84484 ASSAY OF TROPONIN QUANT: CPT

## 2020-08-09 PROCEDURE — 70450 CT HEAD/BRAIN W/O DYE: CPT

## 2020-08-09 PROCEDURE — 82436 ASSAY OF URINE CHLORIDE: CPT

## 2020-08-09 PROCEDURE — 83735 ASSAY OF MAGNESIUM: CPT

## 2020-08-09 PROCEDURE — 2580000003 HC RX 258: Performed by: STUDENT IN AN ORGANIZED HEALTH CARE EDUCATION/TRAINING PROGRAM

## 2020-08-09 PROCEDURE — 96374 THER/PROPH/DIAG INJ IV PUSH: CPT

## 2020-08-09 PROCEDURE — 99285 EMERGENCY DEPT VISIT HI MDM: CPT

## 2020-08-09 PROCEDURE — 87088 URINE BACTERIA CULTURE: CPT

## 2020-08-09 RX ORDER — 0.9 % SODIUM CHLORIDE 0.9 %
1000 INTRAVENOUS SOLUTION INTRAVENOUS ONCE
Status: COMPLETED | OUTPATIENT
Start: 2020-08-09 | End: 2020-08-09

## 2020-08-09 RX ADMIN — SODIUM CHLORIDE 1000 ML: 9 INJECTION, SOLUTION INTRAVENOUS at 22:00

## 2020-08-09 ASSESSMENT — ENCOUNTER SYMPTOMS
SORE THROAT: 0
ABDOMINAL PAIN: 0
SHORTNESS OF BREATH: 0
WHEEZING: 0
CHEST TIGHTNESS: 0
VOMITING: 0
NAUSEA: 0
DIARRHEA: 0
COUGH: 0
PHOTOPHOBIA: 0

## 2020-08-09 NOTE — ED PROVIDER NOTES
Negative for cough, chest tightness, shortness of breath and wheezing. Cardiovascular: Negative for chest pain and syncope. Gastrointestinal: Positive for anorexia. Negative for abdominal pain, diarrhea, melena, nausea and vomiting. Genitourinary: Negative for decreased urine volume, dysuria, frequency and urgency. Musculoskeletal: Negative for myalgias. Skin: Negative for pallor and rash. Neurological: Positive for weakness. Negative for dizziness, light-headedness, numbness and headaches. Physical Exam  Vitals signs and nursing note reviewed. Constitutional:       General: She is not in acute distress. Appearance: She is underweight. She is ill-appearing. She is not diaphoretic. HENT:      Head: Normocephalic and atraumatic. Mouth/Throat:      Mouth: Mucous membranes are moist.   Eyes:      Extraocular Movements: Extraocular movements intact. Pupils: Pupils are equal, round, and reactive to light. Cardiovascular:      Rate and Rhythm: Normal rate and regular rhythm. Pulses: Normal pulses. Heart sounds: Normal heart sounds. Pulmonary:      Effort: Pulmonary effort is normal. No respiratory distress. Breath sounds: Normal breath sounds. No wheezing or rhonchi. Abdominal:      Palpations: Abdomen is soft. Tenderness: There is no abdominal tenderness. There is no guarding or rebound. Musculoskeletal:         General: No swelling or tenderness. Lymphadenopathy:      Cervical: No cervical adenopathy. Skin:     General: Skin is warm and dry. Coloration: Skin is pale. Neurological:      Mental Status: She is alert and oriented to person, place, and time. Motor: Weakness (Generalized weakness) present.           Procedures     MDM  Number of Diagnoses or Management Options  Acute cystitis without hematuria:   Generalized weakness:   Hypokalemia:   Hyponatremia:   Diagnosis management comments: Patient is a 75-year-old female that presents the emergency room for evaluation of difficulty walking and fatigue. Patient resting comfortably and in no apparent distress on exam.  Vital signs are normal.  Blood work was remarkable for hyponatremia with a sodium of 126, hypokalemia of 3.0. Urinalysis consistent with urinary tract infection. Patient given IV normal saline as patient is likely dehydrated and oral potassium for repletion. Patient was also given IV Rocephin for her urinary tract infection. CT scan of the head showed evidence of hydrocephalus which was consistent with patient's previous CT scans. It was noted that this could be contributing to patient's difficulty walking. Patient does not have any altered mental status, unstable gait or urinary incontinence. Spoke with hospitalist who requested patient be transferred downtown for evaluation by neurology in case hydrocephalus was contributing to the patient's difficulty walking. Discussed with hospitalist at [facility] and they agreed to accept patient for further treatment and evaluation. Transferred in stable condition.          --------------------------------------------- PAST HISTORY ---------------------------------------------  Past Medical History:  has a past medical history of Arm fracture, left, Balance problems, Hydrocephalus ([address] and Vertigo. Past Surgical History:  has a past surgical history that includes Humerus fracture surgery (2009) and Arm Surgery (9/15/2009). Social History:  reports that she has never smoked. She has never used smokeless tobacco. She reports current alcohol use. She reports that she does not use drugs. Family History: family history includes Colon Cancer (age of onset: 68) in her mother; Diabetes in her father; Heart Disease in her father. The patients home medications have been reviewed. Allergies: Patient has no known allergies.     -------------------------------------------------- RESULTS -------------------------------------------------    Lab  Results for orders placed or performed during the hospital encounter of 08/09/20   CBC Auto Differential   Result Value Ref Range    WBC 7.1 4.5 - 11.5 E9/L    RBC 3.07 (L) 3.50 - 5.50 E12/L    Hemoglobin 9.6 (L) 11.5 - 15.5 g/dL    Hematocrit 28.1 (L) 34.0 - 48.0 %    MCV 91.5 80.0 - 99.9 fL    MCH 31.3 26.0 - 35.0 pg    MCHC 34.2 32.0 - 34.5 %    RDW 13.5 11.5 - 15.0 fL    Platelets 627 551 - 331 E9/L    MPV 9.9 7.0 - 12.0 fL    Neutrophils % 94.8 (H) 43.0 - 80.0 %    Lymphocytes % 4.3 (L) 20.0 - 42.0 %    Monocytes % 0.9 (L) 2.0 - 12.0 %    Eosinophils % 0.0 0.0 - 6.0 %    Basophils % 0.0 0.0 - 2.0 %    Neutrophils Absolute 6.75 1.80 - 7.30 E9/L    Lymphocytes Absolute 0.28 (L) 1.50 - 4.00 E9/L    Monocytes Absolute 0.07 (L) 0.10 - 0.95 E9/L    Eosinophils Absolute 0.00 (L) 0.05 - 0.50 E9/L    Basophils Absolute 0.00 0.00 - 0.20 E9/L    nRBC 0.0 /100 WBC    RBC Morphology Normal    Comprehensive Metabolic Panel   Result Value Ref Range    Sodium 126 (L) 132 - 146 mmol/L    Potassium 3.0 (L) 3.5 - 5.0 mmol/L    Chloride 93 (L) 98 - 107 mmol/L    CO2 20 (L) 22 - 29 mmol/L    Anion Gap 13 7 - 16 mmol/L    Glucose 113 (H) 74 - 99 mg/dL    BUN 20 8 - 23 mg/dL    CREATININE 0.7 0.5 - 1.0 mg/dL    GFR Non-African American >60 >=60 mL/min/1.73    GFR African American >60     Calcium 8.1 (L) 8.6 - 10.2 mg/dL    Total Protein 7.5 6.4 - 8.3 g/dL    Alb 3.1 (L) 3.5 - 5.2 g/dL    Total Bilirubin 0.5 0.0 - 1.2 mg/dL    Alkaline Phosphatase 69 35 - 104 U/L    ALT 39 (H) 0 - 32 U/L    AST 54 (H) 0 - 31 U/L   Lactate, Sepsis   Result Value Ref Range    Lactic Acid, Sepsis 0.9 0.5 - 1.9 mmol/L   Troponin   Result Value Ref Range    Troponin <0.01 0.00 - 0.03 ng/mL   Urinalysis   Result Value Ref Range    Color, UA Yellow Straw/Yellow    Clarity, UA Clear Clear    Glucose, Ur Negative Negative mg/dL    Bilirubin Urine Negative Negative    Ketones, Urine 15 (A) Negative mg/dL Weight   08/09/20 2259 (!) 138/57 98.7 °F (37.1 °C) Oral 84 16 98 % -- --   08/09/20 2230 (!) 143/60 -- -- 92 -- 97 % -- --   08/09/20 2200 (!) 116/56 99.4 °F (37.4 °C) Oral 78 16 93 % -- --   08/09/20 2115 (!) 112/56 -- -- 90 -- 97 % -- --   08/09/20 2045 (!) 117/56 -- -- 90 16 97 % -- --   08/09/20 2030 (!) 130/59 -- -- 88 -- 97 % -- --   08/09/20 1945 -- -- -- 94 -- -- -- --   08/09/20 1939 (!) 116/59 98.8 °F (37.1 °C) Oral 98 16 96 % -- --   08/09/20 1929 (!) 123/58 99 °F (37.2 °C) Oral 90 16 96 % 5' 6\" (1.676 m) 138 lb (62.6 kg)       Oxygen Saturation Interpretation: Normal      ------------------------------------------ PROGRESS NOTES ------------------------------------------  Re-evaluation(s):  Time: 2130. Patients symptoms show no change  Repeat physical examination is not changed    Time: 2200. Patients symptoms show no change  Repeat physical examination is not changed    I have spoken with the patient and discussed todays results, in addition to providing specific details for the plan of care and counseling regarding the diagnosis and prognosis. Their questions are answered at this time and they are agreeable with the plan. I have discussed the risks and benefits of transfer and they wish to proceed with the transfer. --------------------------------- ADDITIONAL PROVIDER NOTES ---------------------------------  Consultations:  Spoke with Dr. Shannon Mratin (Medicine). Discussed case. They requested patient be transferred downtown for evaluation by neurology. Spoke with Dr. Ti Zamorano (Medicine). Discussed case. They will admit this patient. Reason for transfer: Hyponatremia, evaluation by neurology.     This patient's ED course included: a personal history and physicial examination, re-evaluation prior to disposition, multiple bedside re-evaluations, IV medications, cardiac monitoring and continuous pulse oximetry    This patient has remained hemodynamically stable and been closely monitored during their ED course. Clinical Impression  1. Generalized weakness    2. Hyponatremia    3. Hypokalemia          Disposition  Patient's disposition: Transfer to [facility]. Transferred by: ground. Patient's condition is stable.        Karina Lopez., DO  Resident  08/10/20 [id number]

## 2020-08-10 ENCOUNTER — HOSPITAL ENCOUNTER (INPATIENT)
Age: 70
LOS: 1 days | Discharge: NURSING, REHAB OR LONG-TERM CARE | DRG: 641 | End: 2020-08-11
Attending: INTERNAL MEDICINE | Admitting: INTERNAL MEDICINE
Payer: MEDICARE

## 2020-08-10 VITALS
DIASTOLIC BLOOD PRESSURE: 83 MMHG | TEMPERATURE: 99 F | SYSTOLIC BLOOD PRESSURE: 140 MMHG | OXYGEN SATURATION: 97 % | RESPIRATION RATE: 16 BRPM | WEIGHT: 138 LBS | BODY MASS INDEX: 22.18 KG/M2 | HEART RATE: 92 BPM | HEIGHT: 66 IN

## 2020-08-10 LAB
ALBUMIN SERPL-MCNC: 3.1 G/DL (ref 3.5–5.2)
ALP BLD-CCNC: 65 U/L (ref 35–104)
ALT SERPL-CCNC: 37 U/L (ref 0–32)
ANION GAP SERPL CALCULATED.3IONS-SCNC: 12 MMOL/L (ref 7–16)
ANION GAP SERPL CALCULATED.3IONS-SCNC: 14 MMOL/L (ref 7–16)
ANION GAP SERPL CALCULATED.3IONS-SCNC: 15 MMOL/L (ref 7–16)
ANION GAP SERPL CALCULATED.3IONS-SCNC: 17 MMOL/L (ref 7–16)
AST SERPL-CCNC: 50 U/L (ref 0–31)
BILIRUB SERPL-MCNC: 0.5 MG/DL (ref 0–1.2)
BUN BLDV-MCNC: 14 MG/DL (ref 8–23)
BUN BLDV-MCNC: 14 MG/DL (ref 8–23)
BUN BLDV-MCNC: 15 MG/DL (ref 8–23)
BUN BLDV-MCNC: 16 MG/DL (ref 8–23)
CALCIUM IONIZED: 1.12 MMOL/L (ref 1.15–1.33)
CALCIUM SERPL-MCNC: 7.3 MG/DL (ref 8.6–10.2)
CALCIUM SERPL-MCNC: 7.8 MG/DL (ref 8.6–10.2)
CALCIUM SERPL-MCNC: 7.9 MG/DL (ref 8.6–10.2)
CALCIUM SERPL-MCNC: 8 MG/DL (ref 8.6–10.2)
CHLORIDE BLD-SCNC: 101 MMOL/L (ref 98–107)
CHLORIDE BLD-SCNC: 96 MMOL/L (ref 98–107)
CHLORIDE BLD-SCNC: 97 MMOL/L (ref 98–107)
CHLORIDE BLD-SCNC: 99 MMOL/L (ref 98–107)
CHOLESTEROL, TOTAL: 113 MG/DL (ref 0–199)
CO2: 18 MMOL/L (ref 22–29)
CO2: 19 MMOL/L (ref 22–29)
CO2: 19 MMOL/L (ref 22–29)
CO2: 20 MMOL/L (ref 22–29)
CREAT SERPL-MCNC: 0.8 MG/DL (ref 0.5–1)
EKG ATRIAL RATE: 96 BPM
EKG P AXIS: 28 DEGREES
EKG P-R INTERVAL: 106 MS
EKG Q-T INTERVAL: 362 MS
EKG QRS DURATION: 74 MS
EKG QTC CALCULATION (BAZETT): 457 MS
EKG R AXIS: 44 DEGREES
EKG T AXIS: 43 DEGREES
EKG VENTRICULAR RATE: 96 BPM
FERRITIN: 431 NG/ML
FOLATE: 13.6 NG/ML (ref 4.8–24.2)
GFR AFRICAN AMERICAN: >60
GFR NON-AFRICAN AMERICAN: >60 ML/MIN/1.73
GLUCOSE BLD-MCNC: 143 MG/DL (ref 74–99)
GLUCOSE BLD-MCNC: 145 MG/DL (ref 74–99)
GLUCOSE BLD-MCNC: 90 MG/DL (ref 74–99)
GLUCOSE BLD-MCNC: 96 MG/DL (ref 74–99)
HBA1C MFR BLD: 5.2 % (ref 4–5.6)
HCT VFR BLD CALC: 27.8 % (ref 34–48)
HDLC SERPL-MCNC: 19 MG/DL
HEMOGLOBIN: 9.4 G/DL (ref 11.5–15.5)
IMMATURE RETIC FRACT: 18.8 % (ref 3–15.9)
IRON SATURATION: 8 % (ref 15–50)
IRON: 13 MCG/DL (ref 37–145)
LDL CHOLESTEROL CALCULATED: 74 MG/DL (ref 0–99)
MAGNESIUM: 2.2 MG/DL (ref 1.6–2.6)
MCH RBC QN AUTO: 31.8 PG (ref 26–35)
MCHC RBC AUTO-ENTMCNC: 33.8 % (ref 32–34.5)
MCV RBC AUTO: 93.9 FL (ref 80–99.9)
OSMOLALITY URINE: 367 MOSM/KG (ref 300–900)
OSMOLALITY: 275 MOSM/KG (ref 285–310)
OSMOLALITY: 279 MOSM/KG (ref 285–310)
PDW BLD-RTO: 13.8 FL (ref 11.5–15)
PHOSPHORUS: 2.1 MG/DL (ref 2.5–4.5)
PLATELET # BLD: 214 E9/L (ref 130–450)
PMV BLD AUTO: 10.4 FL (ref 7–12)
POTASSIUM SERPL-SCNC: 3.3 MMOL/L (ref 3.5–5)
POTASSIUM SERPL-SCNC: 3.5 MMOL/L (ref 3.5–5)
POTASSIUM SERPL-SCNC: 3.6 MMOL/L (ref 3.5–5)
POTASSIUM SERPL-SCNC: 3.9 MMOL/L (ref 3.5–5)
RBC # BLD: 2.96 E12/L (ref 3.5–5.5)
RETIC HGB EQUIVALENT: 31.9 PG (ref 28.2–36.6)
RETICULOCYTE ABSOLUTE COUNT: 0.03 E12/L
RETICULOCYTE COUNT PCT: 1 % (ref 0.4–1.9)
SODIUM BLD-SCNC: 128 MMOL/L (ref 132–146)
SODIUM BLD-SCNC: 129 MMOL/L (ref 132–146)
SODIUM BLD-SCNC: 133 MMOL/L (ref 132–146)
SODIUM BLD-SCNC: 137 MMOL/L (ref 132–146)
TOTAL IRON BINDING CAPACITY: 169 MCG/DL (ref 250–450)
TOTAL PROTEIN: 7.5 G/DL (ref 6.4–8.3)
TRANSFERRIN: 128 MG/DL (ref 200–360)
TRIGL SERPL-MCNC: 101 MG/DL (ref 0–149)
TSH SERPL DL<=0.05 MIU/L-ACNC: 2 UIU/ML (ref 0.27–4.2)
VITAMIN B-12: >2000 PG/ML (ref 211–946)
VLDLC SERPL CALC-MCNC: 20 MG/DL
WBC # BLD: 7.3 E9/L (ref 4.5–11.5)

## 2020-08-10 PROCEDURE — 6370000000 HC RX 637 (ALT 250 FOR IP): Performed by: INTERNAL MEDICINE

## 2020-08-10 PROCEDURE — 6360000002 HC RX W HCPCS: Performed by: INTERNAL MEDICINE

## 2020-08-10 PROCEDURE — 83735 ASSAY OF MAGNESIUM: CPT

## 2020-08-10 PROCEDURE — 2140000000 HC CCU INTERMEDIATE R&B

## 2020-08-10 PROCEDURE — 80061 LIPID PANEL: CPT

## 2020-08-10 PROCEDURE — 84100 ASSAY OF PHOSPHORUS: CPT

## 2020-08-10 PROCEDURE — 99222 1ST HOSP IP/OBS MODERATE 55: CPT | Performed by: NURSE PRACTITIONER

## 2020-08-10 PROCEDURE — 92610 EVALUATE SWALLOWING FUNCTION: CPT

## 2020-08-10 PROCEDURE — 2580000003 HC RX 258: Performed by: INTERNAL MEDICINE

## 2020-08-10 PROCEDURE — 85027 COMPLETE CBC AUTOMATED: CPT

## 2020-08-10 PROCEDURE — 83935 ASSAY OF URINE OSMOLALITY: CPT

## 2020-08-10 PROCEDURE — 6370000000 HC RX 637 (ALT 250 FOR IP): Performed by: STUDENT IN AN ORGANIZED HEALTH CARE EDUCATION/TRAINING PROGRAM

## 2020-08-10 PROCEDURE — 93010 ELECTROCARDIOGRAM REPORT: CPT | Performed by: INTERNAL MEDICINE

## 2020-08-10 PROCEDURE — 97535 SELF CARE MNGMENT TRAINING: CPT

## 2020-08-10 PROCEDURE — 97166 OT EVAL MOD COMPLEX 45 MIN: CPT

## 2020-08-10 PROCEDURE — 82746 ASSAY OF FOLIC ACID SERUM: CPT

## 2020-08-10 PROCEDURE — 84133 ASSAY OF URINE POTASSIUM: CPT

## 2020-08-10 PROCEDURE — 97162 PT EVAL MOD COMPLEX 30 MIN: CPT

## 2020-08-10 PROCEDURE — 36415 COLL VENOUS BLD VENIPUNCTURE: CPT

## 2020-08-10 PROCEDURE — 80048 BASIC METABOLIC PNL TOTAL CA: CPT

## 2020-08-10 PROCEDURE — 83540 ASSAY OF IRON: CPT

## 2020-08-10 PROCEDURE — 87088 URINE BACTERIA CULTURE: CPT

## 2020-08-10 PROCEDURE — 82330 ASSAY OF CALCIUM: CPT

## 2020-08-10 PROCEDURE — 82607 VITAMIN B-12: CPT

## 2020-08-10 PROCEDURE — 85045 AUTOMATED RETICULOCYTE COUNT: CPT

## 2020-08-10 PROCEDURE — 99223 1ST HOSP IP/OBS HIGH 75: CPT | Performed by: INTERNAL MEDICINE

## 2020-08-10 PROCEDURE — 97530 THERAPEUTIC ACTIVITIES: CPT

## 2020-08-10 PROCEDURE — 83036 HEMOGLOBIN GLYCOSYLATED A1C: CPT

## 2020-08-10 PROCEDURE — 6360000002 HC RX W HCPCS: Performed by: STUDENT IN AN ORGANIZED HEALTH CARE EDUCATION/TRAINING PROGRAM

## 2020-08-10 PROCEDURE — 82728 ASSAY OF FERRITIN: CPT

## 2020-08-10 PROCEDURE — 83550 IRON BINDING TEST: CPT

## 2020-08-10 PROCEDURE — 84466 ASSAY OF TRANSFERRIN: CPT

## 2020-08-10 PROCEDURE — 84443 ASSAY THYROID STIM HORMONE: CPT

## 2020-08-10 PROCEDURE — 80053 COMPREHEN METABOLIC PANEL: CPT

## 2020-08-10 PROCEDURE — 82570 ASSAY OF URINE CREATININE: CPT

## 2020-08-10 PROCEDURE — 83930 ASSAY OF BLOOD OSMOLALITY: CPT

## 2020-08-10 PROCEDURE — 82436 ASSAY OF URINE CHLORIDE: CPT

## 2020-08-10 PROCEDURE — 2580000003 HC RX 258: Performed by: STUDENT IN AN ORGANIZED HEALTH CARE EDUCATION/TRAINING PROGRAM

## 2020-08-10 PROCEDURE — 84300 ASSAY OF URINE SODIUM: CPT

## 2020-08-10 RX ORDER — POTASSIUM CHLORIDE 7.45 MG/ML
10 INJECTION INTRAVENOUS
Status: DISCONTINUED | OUTPATIENT
Start: 2020-08-10 | End: 2020-08-10

## 2020-08-10 RX ORDER — LETROZOLE 2.5 MG/1
2.5 TABLET, FILM COATED ORAL DAILY
Status: DISCONTINUED | OUTPATIENT
Start: 2020-08-10 | End: 2020-08-11 | Stop reason: HOSPADM

## 2020-08-10 RX ORDER — M-VIT,TX,IRON,MINS/CALC/FOLIC 27MG-0.4MG
1 TABLET ORAL DAILY
Status: DISCONTINUED | OUTPATIENT
Start: 2020-08-10 | End: 2020-08-11 | Stop reason: HOSPADM

## 2020-08-10 RX ORDER — SODIUM CHLORIDE 0.9 % (FLUSH) 0.9 %
10 SYRINGE (ML) INJECTION PRN
Status: DISCONTINUED | OUTPATIENT
Start: 2020-08-10 | End: 2020-08-11 | Stop reason: HOSPADM

## 2020-08-10 RX ORDER — POTASSIUM CHLORIDE 20 MEQ/1
40 TABLET, EXTENDED RELEASE ORAL ONCE
Status: COMPLETED | OUTPATIENT
Start: 2020-08-10 | End: 2020-08-10

## 2020-08-10 RX ORDER — POTASSIUM CHLORIDE 20 MEQ/1
40 TABLET, EXTENDED RELEASE ORAL
Status: DISCONTINUED | OUTPATIENT
Start: 2020-08-10 | End: 2020-08-10

## 2020-08-10 RX ORDER — POTASSIUM CHLORIDE 7.45 MG/ML
10 INJECTION INTRAVENOUS PRN
Status: DISCONTINUED | OUTPATIENT
Start: 2020-08-10 | End: 2020-08-11 | Stop reason: HOSPADM

## 2020-08-10 RX ORDER — POLYETHYLENE GLYCOL 3350 17 G/17G
17 POWDER, FOR SOLUTION ORAL DAILY PRN
Status: DISCONTINUED | OUTPATIENT
Start: 2020-08-10 | End: 2020-08-11 | Stop reason: HOSPADM

## 2020-08-10 RX ORDER — PROMETHAZINE HYDROCHLORIDE 25 MG/1
12.5 TABLET ORAL EVERY 6 HOURS PRN
Status: DISCONTINUED | OUTPATIENT
Start: 2020-08-10 | End: 2020-08-11 | Stop reason: HOSPADM

## 2020-08-10 RX ORDER — SODIUM CHLORIDE 0.9 % (FLUSH) 0.9 %
10 SYRINGE (ML) INJECTION EVERY 12 HOURS SCHEDULED
Status: DISCONTINUED | OUTPATIENT
Start: 2020-08-10 | End: 2020-08-11 | Stop reason: HOSPADM

## 2020-08-10 RX ORDER — OYSTER SHELL CALCIUM WITH VITAMIN D 500; 200 MG/1; [IU]/1
1 TABLET, FILM COATED ORAL DAILY
Status: DISCONTINUED | OUTPATIENT
Start: 2020-08-10 | End: 2020-08-11 | Stop reason: HOSPADM

## 2020-08-10 RX ORDER — ACETAMINOPHEN 325 MG/1
650 TABLET ORAL EVERY 6 HOURS PRN
Status: DISCONTINUED | OUTPATIENT
Start: 2020-08-10 | End: 2020-08-11 | Stop reason: HOSPADM

## 2020-08-10 RX ORDER — SODIUM CHLORIDE 9 MG/ML
INJECTION, SOLUTION INTRAVENOUS CONTINUOUS
Status: DISCONTINUED | OUTPATIENT
Start: 2020-08-10 | End: 2020-08-11

## 2020-08-10 RX ORDER — POTASSIUM CHLORIDE 20 MEQ/1
40 TABLET, EXTENDED RELEASE ORAL PRN
Status: DISCONTINUED | OUTPATIENT
Start: 2020-08-10 | End: 2020-08-11 | Stop reason: HOSPADM

## 2020-08-10 RX ORDER — ACETAMINOPHEN 650 MG/1
650 SUPPOSITORY RECTAL EVERY 6 HOURS PRN
Status: DISCONTINUED | OUTPATIENT
Start: 2020-08-10 | End: 2020-08-11 | Stop reason: HOSPADM

## 2020-08-10 RX ORDER — ONDANSETRON 2 MG/ML
4 INJECTION INTRAMUSCULAR; INTRAVENOUS EVERY 6 HOURS PRN
Status: DISCONTINUED | OUTPATIENT
Start: 2020-08-10 | End: 2020-08-11 | Stop reason: HOSPADM

## 2020-08-10 RX ORDER — LEVOTHYROXINE SODIUM 0.03 MG/1
25 TABLET ORAL DAILY
Status: DISCONTINUED | OUTPATIENT
Start: 2020-08-10 | End: 2020-08-11 | Stop reason: HOSPADM

## 2020-08-10 RX ADMIN — ENOXAPARIN SODIUM 40 MG: 40 INJECTION SUBCUTANEOUS at 11:16

## 2020-08-10 RX ADMIN — POTASSIUM CHLORIDE 40 MEQ: 1500 TABLET, EXTENDED RELEASE ORAL at 18:18

## 2020-08-10 RX ADMIN — LEVOTHYROXINE SODIUM 25 MCG: 0.03 TABLET ORAL at 11:17

## 2020-08-10 RX ADMIN — MULTIPLE VITAMINS W/ MINERALS TAB 1 TABLET: TAB at 11:17

## 2020-08-10 RX ADMIN — POTASSIUM CHLORIDE 40 MEQ: 1500 TABLET, EXTENDED RELEASE ORAL at 22:25

## 2020-08-10 RX ADMIN — Medication 1 TABLET: at 11:17

## 2020-08-10 RX ADMIN — SODIUM CHLORIDE: 9 INJECTION, SOLUTION INTRAVENOUS at 18:19

## 2020-08-10 RX ADMIN — POTASSIUM CHLORIDE 40 MEQ: 20 TABLET, EXTENDED RELEASE ORAL at 00:22

## 2020-08-10 RX ADMIN — LETROZOLE 2.5 MG: 2.5 TABLET ORAL at 11:17

## 2020-08-10 RX ADMIN — SODIUM CHLORIDE, PRESERVATIVE FREE 10 ML: 5 INJECTION INTRAVENOUS at 22:26

## 2020-08-10 RX ADMIN — WATER 1 G: 1 INJECTION INTRAMUSCULAR; INTRAVENOUS; SUBCUTANEOUS at 00:22

## 2020-08-10 RX ADMIN — SODIUM CHLORIDE, PRESERVATIVE FREE 10 ML: 5 INJECTION INTRAVENOUS at 11:16

## 2020-08-10 ASSESSMENT — PAIN SCALES - GENERAL
PAINLEVEL_OUTOF10: 0
PAINLEVEL_OUTOF10: 0

## 2020-08-10 NOTE — CONSULTS
I had seen her in the past.  Lately she is following with Dr Deonte Morfin.   Discussed with family  Recommend Consult Dr Deonte Morfin

## 2020-08-10 NOTE — CONSULTS
Femara 2.5 mg daily. WBC 1.5; Hb 10.8; ; CMP essentially normal (protien 9.4); CEA  2.9; Ca 15.3 pending.       On 06/26/2020 started Cycle # 11, Day 1 Ibrance 100 mg and Femara 2.5 mg daily. White count of 1.3, , hemoglobin 10.8 and platelet count was within normal range. On 07/24/2020 started Cycle # 12, Day 1 Ibrance 100 mg and Femara 2.5 mg daily. Admitted to ED on 08/09/2020 with generalized weakness, hyponatremia, and hypokalemia. CT head 08/09/2020:   Very prominent lateral and third ventriculomegaly/hydrocephalus, which is not acute, and it is uncertain if it is contributing to the patient's ambulatory dysfunction. The fourth ventricle remains nondistended. No intracranial hemorrhage, pathologic extra-axial fluid collection, or calvarial acute pathologic findings are noted-including sinus disease. CXR 08/09/2020: No evidence of acute cardiopulmonary pathology. On 08/09/2020:   Hb 9.6, Hct 28.1, MCV 91.5, WBC 7.1, ANC 6.75,   Na 126, K+ 3.0, Ca 8.1      On 08/10/2020:   Hb 9.4. Hct 27.8, MCV 93.9, WBC 7.3,   Fe 13, TIBC 169, FeSat 8%, Ferritin 431  Transferrin 128  Vit B-12 ?2000, Folate 13.6  Reticulocytes 1.0%  Na 137, K+ 3.5, Ca 7.8, Phosph 2.1    Review of Systems;  CONSTITUTIONAL: No fever, chills. Good appetite and energy level. ENMT: Eyes: No diplopia; Nose: No epistaxis. Mouth: No sore throat. RESPIRATORY: No hemoptysis, shortness of breath, cough. CARDIOVASCULAR: No chest pain, palpitations. GASTROINTESTINAL: No nausea/vomiting, abdominal pain, diarrhea/constipation. GENITOURINARY: No dysuria, urinary frequency, hematuria. NEURO: No syncope, presyncope, headache.   Remainder:  ROS NEGATIVE    Past Medical History:      Diagnosis Date    Arm fracture, left 2009    Balance problems since April, 2011    Hydrocephalus [facility])     compensated    Thyroid disease     Vertigo        Past Surgical History:      Procedure Laterality Date    ARM SURGERY  9/15/2009    FRACTURE SURGERY      HUMERUS FRACTURE SURGERY  2009    Broke left arm        Family History:  Family History   Problem Relation Age of Onset    Colon Cancer Mother 68    Heart Disease Father     Diabetes Father        Medications:  Reviewed and reconciled.     Social History:  Social History     Socioeconomic History    Marital status: Single     Spouse name: Not on file    Number of children: Not on file    Years of education: Not on file    Highest education level: Not on file   Occupational History    Not on file   Social Needs    Financial resource strain: Not on file    Food insecurity     Worry: Not on file     Inability: Not on file    Transportation needs     Medical: Not on file     Non-medical: Not on file   Tobacco Use    Smoking status: Never Smoker    Smokeless tobacco: Never Used   Substance and Sexual Activity    Alcohol use: Yes     Comment: drinks it occasional on social events    Drug use: No    Sexual activity: Never   Lifestyle    Physical activity     Days per week: Not on file     Minutes per session: Not on file    Stress: Not on file   Relationships    Social connections     Talks on phone: Not on file     Gets together: Not on file     Attends Protestant service: Not on file     Active member of club or organization: Not on file     Attends meetings of clubs or organizations: Not on file     Relationship status: Not on file    Intimate partner violence     Fear of current or ex partner: Not on file     Emotionally abused: Not on file     Physically abused: Not on file     Forced sexual activity: Not on file   Other Topics Concern    Not on file   Social History Narrative    Not on file       Allergies:  No Known Allergies    Physical Exam:  BP (!) 116/57   Pulse 82   Temp 99.2 °F (37.3 °C) (Oral)   Resp 18   Ht 5' 5\" (1.651 m)   Wt 134 lb 12.8 oz (61.1 kg)   SpO2 98%   BMI 22.43 kg/m²   GENERAL: Alert, oriented x 3, not in acute distress. HEENT: PERRLA; EOMI. Oropharynx clear. NECK: Supple. Without lymphadenopathy. LUNGS: Good air entry bilaterally. No wheezing, crackles or ronchi. CARDIOVASCULAR: Regular rate. No murmurs, rubs or gallops. ABDOMEN: Soft. Non-tender, non-distended. Positive bowel sounds. EXTREMITIES: Without clubbing, cyanosis, or edema. NEUROLOGIC: No focal deficits. ECOG PS 1    Recent Labs     08/09/20  2047 08/10/20  0308   WBC 7.1 7.3   RBC 3.07* 2.96*   HGB 9.6* 9.4*   HCT 28.1* 27.8*   MCV 91.5 93.9   MCH 31.3 31.8   MCHC 34.2 33.8   RDW 13.5 13.8    214   MPV 9.9 10.4        Recent Labs     08/09/20  2047 08/10/20  0308 08/10/20  0536   * 137 133   K 3.0* 3.5 3.9   CL 93* 101 99   CO2 20* 19* 19*   BUN 20 15 14   CREATININE 0.7 0.8 0.8   GLUCOSE 113* 96 90   CALCIUM 8.1* 7.8* 8.0*   PROT 7.5 7.5  --    LABALBU 3.1* 3.1*  --    BILITOT 0.5 0.5  --    ALKPHOS 69 65  --    AST 54* 50*  --    ALT 39* 37*  --         Impression/Plan:  The patient is a 65 y.o. lady, with a past medical history significant for hydrocephalus, who had presented with left breast changes, who was diagnosed with a left breast ductal carcinoma, ER positive greater than 95% [place] +70% HER-2/elder 1+, she has a very locally advanced disease, and metastatic disease to the bones. Discussed with the patient and her sister her diagnosis, prognosis and recommendations for treatment. Completed the staging work-up, the patient will have a brain MRI with and without contrast done, and a PET scan. For her HR pos, HER-2/elder negative metastatic breast cancer, we recommended treatment with Femara and Ibrance, the schedule and side effects of the treatment were reviewed with her. She has metastatic disease to the bones, recommended treatment with Xgeva.     MRI brain done on 09/16/2019: no evidence of metastatic disease. PET/CT on 09/17/2019:  The lytic soft tissue finding in the iliac crest in the left pelvis shows hypermetabolic FDG uptake, with maximum measurements in the 5.7 SUV range, strongly suggesting metastatic disease. She has uptake in the thyroid, could be physiologic.     Femara/Ibrance were started on 09/13/2019.       Bone scan done on 12/17/2019: Redemonstration of focus of radiotracer uptake involving posterior lateral right ninth rib suggestive of healing fracture. New radiotracer activity is seen at level of lateral right 10th rib and anterior right seventh rib which may be related to new healing fractures. Metastases is unlikely. Clinical correlation recommended. No additional abnormal areas of increased radiotracer uptake.     CT chest done on 12/17/2019: Significant interval decrease in size of the large mass seen in the left breast 8/29/2019. Interval decrease in size of enlarged left axillary lymph nodes. Cardiomegaly. Enlarged thyroid, unchanged. Small hiatal hernia. Diffuse interstitial changes and multifocal atelectasis or scarring in both lungs. Stable small nodular density in the right upper lobe. Sclerotic changes involving the lateral right ninth rib.     CT abdomen/pelvis done on 12/17/2019: Small right renal cyst. Retroverted uterus. Distended colon and rectum containing a large amount of stool. Mildly enlarged retroperitoneal and periportal lymph nodes, unchanged. Findings in the left iliac bone as described.     The results/images of the scans were reviewed with the patient and her sister, she is responding nicely to the Femara and Casey Sales, will continue the same treatment. She did have teeth extraction on 01/20/2020, dental clearance has been obtained.     On 4/3/2020, the patient was started on Ibrance and Femara cycle #8.     Restaging CT Chest/Abdomen/Pelvis and bone scan on 04/24/2020:  CT Chest noted stable small bilateral pulmonary nodules; CT abdomen/pelvis no evidence of new active neoplasm compared to prior.   Bone scan 3 foci of radiotracer uptake in the lower right ribs, 2 of which demonstrate multifactorial, secondary to iron deficiency, chronic inflammation, and Ibrance.    -Will monitor CBCD  -check OB stools, if pos will need GI workup.  -Transfuse with pRBCs if Hb <7 g/dL  -Nephrology team on board  -NS team on board  -PT/OT  -Will continue to follow     Thank you for allowing us to participate in the care of [facility], APRN [facility]  [address] [address]  August 10, 2020     The patient was seen and examined with Terese Bautista CNP, I agree with his H&P, assessment and plan as outlined.     Lenetta Gitelman, MD   HEMATOLOGY/MEDICAL [address] [facility],  [address]  [address] [facility]  [facility] [address] [facility] [address]  Dept: [phone]

## 2020-08-10 NOTE — PROGRESS NOTES
Physical Therapy  Physical Therapy Initial Assessment     Name: Enma Gatiac  : 1950  MRN: [id number]    Referring Provider: Justo Khan DO    Date of Service: 8/10/2020    Evaluating PT: Mariya Danielson, PT, DPT, [id number]    Room #: 4986/2474-R  Diagnosis: Hyponatremia  PMHx/PSHx: Vertigo, hydrocephalus, thyroid disease  Precautions: Fall risk, NPO, PureWick TAPS, TSM, alarm    SUBJECTIVE:    Pt lives alone in a 2 story house with 4 stair(s) and 0 rail(s) to enter through front entrance or 5 stairs and 0 rails to enter through the garage. 4 stairs and 0 rails down to basement through garage entrance. Bed is on basement floor and bath is on basement floor. Pt ambulated without AD Independent prior to admission. OBJECTIVE:   Initial Evaluation  Date: 8/10/20 Treatment Date: Short Term/ Long Term   Goals   AM-PAC 6 Clicks 92/10     Was pt agreeable to Eval/treatment? Yes     Does pt have pain? No current complaints of pain     Bed Mobility  Rolling: Mod A  Supine to sit: Mod A  Sit to supine: NT  Scooting: SBA to EOB  Rolling: Supervision  Supine to sit: Supervision  Sit to supine: Supervision  Scooting: Supervision   Transfers Sit to stand: Mod A from EOB, Max A from low chair, Mod A from medium chair  Stand to sit: Min A  Stand pivot: Min A with [address]  Sit to stand: Supervision  Stand to sit: Supervision  Stand pivot: Supervision with [address]   Ambulation   8 feet x2 with [address] with Min A  >100 feet with [address] with Supervision   Stair negotiation: ascended and descended NT  >4 step(s) with 1 rail(s) with SBA   ROM BUE: Refer to OT note  BLE: WFL     Strength BUE: Refer to OT note  BLE: NT     Balance Sitting EOB: SBA  Dynamic Standing: Min A with [address]  Sitting EOB: Independent   Dynamic Standing: Supervision with [address]     Pt is A & O x: 4 to person, place, month/year, and situation. Sensation: Pt denies numbness and tingling of extremities. Edema: Unremarkable.     Patient education  Pt educated on hand placement used as well as appropriate assistive devices or modalities to obtain goals. Patient and family education will also be administered as needed. Frequency of treatments: 2-5x/week x 3-5 days. Time in: 0930  Time out: 1000    Total Treatment Time 25 minutes     Evaluation Time includes thorough review of current medical information, gathering information on past medical history/social history and prior level of function, completion of standardized testing/informal observation of tasks, assessment of data and education on plan of care and goals.     CPT codes:  [] Low Complexity PT evaluation 73852  [x] Moderate Complexity PT evaluation 95511  [] High Complexity PT evaluation 02495  [] PT Re-evaluation 00702  [] Gait training 22774 0 minutes  [] Manual therapy 52390 0 minutes  [x] Therapeutic activities 08857 25 minutes  [] Therapeutic exercises 89786 0 minutes  [] Neuromuscular reeducation 78322 0 minutes     Nathalie Jeans, PT, DPT  [id number]

## 2020-08-10 NOTE — PROGRESS NOTES
Messaged Dr. Rhodes Shouts to see if patient able to have a diet. She swallowed water ok and is more alert this morning.

## 2020-08-10 NOTE — ED NOTES
Call placed to sister, Keely Polanco, to update on room assignment at [facility] and that nursing is requesting her to bring pt's Ibrance to the hospital. Questions and concerns addressed.       Divya Barahona RN  08/10/20 4518

## 2020-08-10 NOTE — PROGRESS NOTES
[address] @ 1023, per Dr. Sadi Elder. Requesting transfer to [place] [address] for Hyponatremia.      Per Dr. Sadi Elder and Dr. Henley Mouse:     No ICU  No Isolation  No Respiratory Failure  No Vent  No Covid concerns/Test    Requested Hospitalist.

## 2020-08-10 NOTE — ED NOTES
Call placed to Tanisha Briones on [address] at [facility] on update of pt medication administration.       David Martinez RN  08/10/20 2658

## 2020-08-10 NOTE — PROGRESS NOTES
Occupational Therapy  OCCUPATIONAL THERAPY INITIAL EVALUATION      Date:8/10/2020  Patient Name: Ashley Sun  MRN: [id number]  : 1950  Room: [address] [place] [address]    Referring Provider:  Shadi Cabezas DO     Evaluating OT: [facility] OTR/L #[id number]     AM-PAC Daily Activity Raw Score:     Recommended Adaptive Equipment:  TBD     Diagnosis: Hyponatremia    Patient presented to the hospital difficulty ambulating and fatigue     Pertinent Medical History:    Past Medical History:   Diagnosis Date    Arm fracture, left 2009    Balance problems since     Hydrocephalus [facility])     compensated    Thyroid disease     Vertigo       Precautions:  Falls, TSM, bed alarm, TAPS     Home Living: Pt lives alone in a 2 story home with 4 JYOTHI and no hand rails.  5 JYOTHI down to basement from side (4+5 steps down from 1st floor) - no hand rails  Bathroom setup: walk-in shower in basement   Equipment owned: none    Prior Level of Function: Independent with ADLs , Independent with IADLs; ambulated without AD  Driving: no  Occupation: na    Pain Level: Pt denies pain this session    Cognition: A&O: 4/4; Follows 1-2 step directions   Memory:  fair   Sequencing:  fair   Problem solving:  fair   Judgement/safety:  fair     Functional Assessment:   Initial Eval Status  Date: 8/10/20 Treatment Status  Date: Short Term Goals = Long Term Goals  Treatment frequency: 1-4x/wk; PRN   Feeding Independent       Grooming Stand by Assist   Modified [place]    UB Dressing Minimal Assist   Modified [place]    LB Dressing Maximal Assist   Minimal Assist    Bathing Moderate Assist  Minimal Assist    Toileting Maximal Assist   Minimal Assist    Bed Mobility  Supine to sit: Moderate Assist   Sit to supine: NT   Supine to sit: Stand by Assist   Sit to supine: Stand by Assist    Functional Transfers Maximal Assist   (low chair)    Moderate Assist  (bed, higher chair)  Contact Guard Assist    Functional Mobility Minimal Assist Ambulated short distances in room with w/w (multiple trials); cuing on posture, sequencing and safety   Stand by Assist    Balance Sitting:     Static:  SBA    Dynamic:CGA / Min A  Standing: min A     Activity Tolerance F-    Light activity   F+   Visual/  Perceptual Glasses: yes  wfl                    Hand dominance: right     Strength ROM Additional Info:    RUE   4-/5 wfl good  and wfl FMC/dexterity noted during ADL tasks     LUE 4-/5 wfl good  and wfl FMC/dexterity noted during ADL tasks     Hearing: wfl  Sensation:wfl  Tone: wfl  Edema:none noted                             Comments: Upon arrival, patient supine in bed and agreeable to OT session to PT collaboration. Pt demonstrating fair understanding of education/techniques, requiring additional training / education. At end of session, patient seated in chair with call light and phone within reach, all lines intact - nursing notified and agreeable. Pt would benefit from continued skilled OT to increase safety,  independence and quality of life. Treatment:  OT services provided: Facilitation of bed mobility, unsupported sitting balance, functional transfers (chair x3, bed; sit to stands / stand pivots), standing tolerance tasks (addressing posture, balance and activity tolerance while incorporating light functional reaching) and short functional ambulation tasks with w/w (cuing on posture, w/w management and safety) - skilled cuing on sequencing, hand placement, posture, body mechanics, energy conservation techniques and safety. Therapist facilitated self-care retraining: UB/LB self-care tasks (gown, socks), toileting hygiene task and seated grooming task while educating pt on modified techniques, posture, safety and energy conservation techniques. Skilled monitoring of HR, O2 sats and pts response to treatment.         Eval Complexity: .mod  Profile and History- med (extensive chart review)  Assessment of Occupational Performance and meals?: None  AM-PAC Inpatient Daily Activity Raw Score: 16  AM-PAC Inpatient ADL T-Scale Score : 35.96  ADL Inpatient CMS 0-100% Score: 53.32  ADL Inpatient CMS G-Code Modifier : CK       mod Evaluation +     Treatment Time In:940            Treatment Time Out: 1005              Treatment Charges: Mins Units   Ther Ex  01804     Manual Therapy 90505     Thera Activities 70939 13 1   ADL/Home Mgt 68077 12 1   Neuro Re-ed 57460     Group Therapy      Orthotic manage/training  73978     Non-Billable Time     Total Timed Treatment 25 2             [facility] OTR/L #[id number]

## 2020-08-10 NOTE — H&P
Inpatient H&P      PCP:  Pauly Pedroza DO  Admitting Physician:  Benito Gutierrez DO  Consultants:  Neurosurgery, nephrololgy, oncology  Chief Complaint:  No chief complaint on file. History of Present Illness  Anand Ortega is a 65 y.o. female who presents to [facility] ER complaining of difficulty ambulating, fatigue. Anand Ortega has a past medical history that includes metastatic breast cancer, hypothyroidism. Africa Leary presented to the ER with difficulty ambulating and fatigue. Sister states that she has had issues ambulating around her house at home. When I spoke to Africa Leary, she said she had been crawling around daughter hands and knees because this was easier. Symptoms of gotten worse over the past few weeks. She denies any fever or urinary symptoms. She does have a history of breast cancer. She was found to be hyponatremic in the ER. She was also found to have a urinary tract infection. On CT patient was found to have hydrocephalus. Emergency room wanted patient to be admitted for evaluation also by neurosurgery. ER Course  Upon presentation to the ER, routine labwork was performed which revealed sodium of 126, potassium of 3.0, chloride 93 CO2 20, ALT of 39, AST of 54, hemoglobin of 9.6. Imaging results are as outlined below in the Imaging section of this note. EKG revealed sinus. Upon arrival to the ER, patient was 132/58.   The patient received NS and rocephin, potassium in the emergency room and was admitted under the care of [address] Admission - 8/29/19    Hyponatremia    Breast mass    Last Echocardiogram -   None in EMR     ED TRIAGE VITALS  BP: 135/61, Temp: 99.1 °F (37.3 °C), Pulse: 104, Resp: 22, SpO2: 97 %    Vitals:    08/10/20 0109 08/10/20 0126   BP: 135/61    Pulse: 104    Resp: 22    Temp: 99.1 °F (37.3 °C)    TempSrc: Infrared    SpO2: 97%    Weight:  134 lb 12.8 oz (61.1 kg)   Height:  5' 5\" (1.651 m)         Histories  Past Medical History:   Diagnosis Date    Arm fracture, left 2009    Balance problems since April, 2011    Hydrocephalus [facility])     compensated    Thyroid disease     Vertigo      Past Surgical History:   Procedure Laterality Date    ARM SURGERY  9/15/2009    FRACTURE SURGERY      HUMERUS FRACTURE SURGERY  2009    Broke left arm      Family History   Problem Relation Age of Onset    Colon Cancer Mother 68    Heart Disease Father     Diabetes Father        Home Medications  Prior to Admission medications    Medication Sig Start Date End Date Taking? Authorizing Provider   palbociclib (IBRANCE) 100 MG tablet Take 100 mg by mouth daily for 21 days then off for 7 days and repeat every 28 days  Patient taking differently: Take 100 mg by mouth daily then off for 7 days and repeat every 28 days    Due to be off the medication beginng 8/22/20 for 7 days and then on for 21 days 7/24/20 8/14/20  ADOLFO Duarte - CNP   levothyroxine (SYNTHROID) 25 MCG tablet TAKE ONE TABLET BY MOUTH DAILY 7/14/20   Abdulkadir Orta DO   vitamin D (ERGOCALCIFEROL) 1.25 MG (46525 UT) CAPS capsule TAKE ONE CAPSULE BY MOUTH ONCE A WEEK 7/14/20   Abdulkadir Orta DO   letrozole [facility]) 2.5 MG tablet Take 1 tablet by mouth daily 5/1/20   Judith Torres MD   Multiple Vitamins-Minerals (THERAPEUTIC MULTIVITAMIN-MINERALS) tablet Take 1 tablet by mouth daily. Mail order vitamin made by her cardiologist.    Historical Provider, MD       Allergies  Patient has no known allergies.     Social Hx  Social History     Socioeconomic History    Marital status: Single     Spouse name: Not on file    Number of children: Not on file    Years of education: Not on file    Highest education level: Not on file   Occupational History    Not on file   Social Needs    Financial resource strain: Not on file    Food insecurity     Worry: Not on file     Inability: Not on file    Transportation needs     Medical: Not on file     Non-medical: Not on file   Tobacco Use    Smoking status: Never Smoker    Smokeless tobacco: Never Used   Substance and Sexual Activity    Alcohol use: Yes     Comment: drinks it occasional on social events    Drug use: No    Sexual activity: Never   Lifestyle    Physical activity     Days per week: Not on file     Minutes per session: Not on file    Stress: Not on file   Relationships    Social connections     Talks on phone: Not on file     Gets together: Not on file     Attends Orthodoxy service: Not on file     Active member of club or organization: Not on file     Attends meetings of clubs or organizations: Not on file     Relationship status: Not on file    Intimate partner violence     Fear of current or ex partner: Not on file     Emotionally abused: Not on file     Physically abused: Not on file     Forced sexual activity: Not on file   Other Topics Concern    Not on file   Social History Narrative    Not on file       Review of Systems  All bolded are positive; please see HPI  General:  Fever, chills, diaphoresis, fatigue, malaise, night sweats, weight loss, ambulatory dysfunction  Psychological:  Anxiety, disorientation, hallucinations. ENT:  Epistaxis, headaches, vertigo, visual changes. Cardiovascular:  Chest pain, irregular heartbeats, palpitations, paroxysmal nocturnal dyspnea. Respiratory:  Shortness of breath, coughing, sputum production, hemoptysis, wheezing, orthopnea.   Gastrointestinal:  Nausea, vomiting, diarrhea, heartburn, constipation, abdominal pain, hematemesis, hematochezia, melena, acholic stools  Genito-Urinary:  Dysuria, urgency, frequency, hematuria  Musculoskeletal:  Joint pain, joint stiffness, joint swelling, muscle pain  Neurology:  Headache, focal neurological deficits, weakness, numbness, paresthesia  Derm:  Rashes, ulcers, excoriations, bruising  Extremities:  Decreased ROM, peripheral edema, mottling    Physical Examination  Vitals:  /61   Pulse 104   Temp 99.1 °F (37.3 °C) (Infrared) Resp 22   Ht 5' 5\" (1.651 m)   Wt 134 lb 12.8 oz (61.1 kg)   SpO2 97%   BMI 22.43 kg/m²   General Appearance:  awake, alert, and oriented to person, place, time, and purpose; appears stated age and cooperative; no apparent distress no labored breathing  HEENT:  NCAT; PERRL; conjunctiva pink, sclera clear  Neck:  no adenopathy, bruit, JVD, tenderness, masses, or nodules; supple, symmetrical, trachea midline, thyroid not enlarged  Lung:  clear to auscultation bilaterally; no use of accessory muscles; no rhonchi, rales, or crackles  Heart:  regular rate and regular rhythm without murmur, rub, or gallop  Abdomen:  soft, nontender, nondistended; normoactive bowel sounds; no organomegaly  Extremities:  extremities normal, atraumatic, no cyanosis +edema  Musculokeletal:  no joint swelling, no muscle tenderness. ROM normal in all joints of extremities.    Neurologic:  mental status A&Ox3, thought content appropriate; CN II-XII grossly intact; sensation intact, motor strength 5/5 globally; no slurred speech      Laboratory Data  Recent Results (from the past 24 hour(s))   EKG 12 Lead    Collection Time: 08/09/20  8:04 PM   Result Value Ref Range    Ventricular Rate 96 BPM    Atrial Rate 96 BPM    P-R Interval 106 ms    QRS Duration 74 ms    Q-T Interval 362 ms    QTc Calculation (Bazett) 457 ms    P Axis 28 degrees    R Axis 44 degrees    T Axis 43 degrees   CBC Auto Differential    Collection Time: 08/09/20  8:47 PM   Result Value Ref Range    WBC 7.1 4.5 - 11.5 E9/L    RBC 3.07 (L) 3.50 - 5.50 E12/L    Hemoglobin 9.6 (L) 11.5 - 15.5 g/dL    Hematocrit 28.1 (L) 34.0 - 48.0 %    MCV 91.5 80.0 - 99.9 fL    MCH 31.3 26.0 - 35.0 pg    MCHC 34.2 32.0 - 34.5 %    RDW 13.5 11.5 - 15.0 fL    Platelets 095 292 - 676 E9/L    MPV 9.9 7.0 - 12.0 fL    Neutrophils % 94.8 (H) 43.0 - 80.0 %    Lymphocytes % 4.3 (L) 20.0 - 42.0 %    Monocytes % 0.9 (L) 2.0 - 12.0 %    Eosinophils % 0.0 0.0 - 6.0 %    Basophils % 0.0 0.0 - 2.0 % Neutrophils Absolute 6.75 1.80 - 7.30 E9/L    Lymphocytes Absolute 0.28 (L) 1.50 - 4.00 E9/L    Monocytes Absolute 0.07 (L) 0.10 - 0.95 E9/L    Eosinophils Absolute 0.00 (L) 0.05 - 0.50 E9/L    Basophils Absolute 0.00 0.00 - 0.20 E9/L    nRBC 0.0 /100 WBC    RBC Morphology Normal    Comprehensive Metabolic Panel    Collection Time: 08/09/20  8:47 PM   Result Value Ref Range    Sodium 126 (L) 132 - 146 mmol/L    Potassium 3.0 (L) 3.5 - 5.0 mmol/L    Chloride 93 (L) 98 - 107 mmol/L    CO2 20 (L) 22 - 29 mmol/L    Anion Gap 13 7 - 16 mmol/L    Glucose 113 (H) 74 - 99 mg/dL    BUN 20 8 - 23 mg/dL    CREATININE 0.7 0.5 - 1.0 mg/dL    GFR Non-African American >60 >=60 mL/min/1.73    GFR African American >60     Calcium 8.1 (L) 8.6 - 10.2 mg/dL    Total Protein 7.5 6.4 - 8.3 g/dL    Alb 3.1 (L) 3.5 - 5.2 g/dL    Total Bilirubin 0.5 0.0 - 1.2 mg/dL    Alkaline Phosphatase 69 35 - 104 U/L    ALT 39 (H) 0 - 32 U/L    AST 54 (H) 0 - 31 U/L   Lactate, Sepsis    Collection Time: 08/09/20  8:47 PM   Result Value Ref Range    Lactic Acid, Sepsis 0.9 0.5 - 1.9 mmol/L   Troponin    Collection Time: 08/09/20  8:47 PM   Result Value Ref Range    Troponin <0.01 0.00 - 0.03 ng/mL   Magnesium    Collection Time: 08/09/20  8:47 PM   Result Value Ref Range    Magnesium 2.0 1.6 - 2.6 mg/dL   Urinalysis    Collection Time: 08/09/20 10:12 PM   Result Value Ref Range    Color, UA Yellow Straw/Yellow    Clarity, UA Clear Clear    Glucose, Ur Negative Negative mg/dL    Bilirubin Urine Negative Negative    Ketones, Urine 15 (A) Negative mg/dL    Specific Gravity, UA 1.010 1.005 - 1.030    Blood, Urine TRACE-INTACT Negative    pH, UA 5.5 5.0 - 9.0    Protein, UA Negative Negative mg/dL    Urobilinogen, Urine 0.2 <2.0 E.U./dL    Nitrite, Urine POSITIVE (A) Negative    Leukocyte Esterase, Urine SMALL (A) Negative   Microscopic Urinalysis    Collection Time: 08/09/20 10:12 PM   Result Value Ref Range    WBC, UA 5-10 (A) 0 - 5 /HPF    RBC, UA 0-1 0 - 2 /HPF    Bacteria, UA MANY (A) None Seen /HPF   URINE ELECTROLYTES    Collection Time: 20 10:13 PM   Result Value Ref Range    Sodium, Ur <20 Not Established mmol/L    Potassium, Ur 19.6 Not Established mmol/L    Chloride 22 Not Established mmol/L       Imaging  Ct Head Wo Contrast    Result Date: 2020  Patient MRN:  [id number] : 1950 Age: 65 years Gender: Female Order Date:  2020 8:10 PM EXAM: CT HEAD WO CONTRAST NUMBER OF IMAGES:  148 INDICATION: Difficulty ambulating COMPARISON: None TECHNIQUE: Axial noncontrast images were extended through the head, and are reformatted in all 3 imaging planes. Images reviewed at soft tissue and bone window settings. Low-dose CT  acquisition technique included one of following options; 1 . Automated exposure control, 2. Adjustment of MA and or KV according to patient's size or 3. Use of iterative reconstruction. FINDINGS: Intracranial anatomy again shows massive lateral ventricular and third ventricular dilation, which is chronic, and which is related to aqueduct stenosis. Hydrocephalus can contribute to ambulatory dysfunction. There is no evidence of hemorrhage, parenchymal edema, or pathologic extra-axial fluid collections. There is no evidence of skeletal trauma or sinus opacification. Extracranial soft tissues show no evidence of acute pathology. Mastoid air cells and middle ear cavities are normally aerated. Very prominent lateral and third ventriculomegaly/hydrocephalus, which is not acute, and it is uncertain if it is contributing to the patient's ambulatory dysfunction. The fourth ventricle remains nondistended. No intracranial hemorrhage, pathologic extra-axial fluid collection, or calvarial acute pathologic findings are noted-including sinus disease.     Xr Chest Portable    Result Date: 2020  Patient MRN: [id number] : 1950 Age:  65 years Gender: Female Order Date: 2020 8:14 PM Exam: XR CHEST PORTABLE Number of Images: 1 view Indication:  Depressed level of awareness Comparison: CT chest April 24, 2020 and CT chest December 17, 2019 Findings: The lungs are symmetrically expanded, and are clear. There is no evidence of pneumothorax or pleural effusion. Cardiovascular shadows are normal in appearance. There is no evidence of cardiac enlargement or decompensation. Skeletal structures show no evidence of acute pathology. A fixation plate and screws is identified along the proximal humerus, and overlying EKG leads are noted. No evidence of acute cardiopulmonary pathology. Assessment and Plan  Patient is a 65 y.o. female who presented with difficulty ambulating. The active problem list is as follows:    · Urinary tract infection  · Hyponatremia  · Hypokalemia  · HAGMA  · Hydrocephalus  · Metastatic breast cancer  · Chronic anemia  · Hypothyroidism    -Neurosurgery consulted for hydrocephalus  -Hyponatremia likely secondary to dehydration, nephrology has been consulted to follow  -Replace potassium  -Oncology consulted for breast cancer and anemia. · Routine labs in the morning. · DVT prophylaxis. · Please see orders for further management and care.         Bianca Calix DO  4:11 AM  8/10/2020

## 2020-08-10 NOTE — ED NOTES
Nurse to nurse given to Ana Cristina Schwartz on [address] at [facility]. Questions and concerns addressed. UPdated that ambulance ETA is midnight to 0010.       Lora Graham RN  08/10/20 1153

## 2020-08-10 NOTE — PROGRESS NOTES
0508 Access center called at this time with a bed assignment patient going to room 0912N call Nurse to Nurse to [phone]

## 2020-08-10 NOTE — PROGRESS NOTES
SPEECH/LANGUAGE PATHOLOGY  BEDSIDE SWALLOWING EVALUATION    PATIENT NAME:  Rickey Gomez      :  1950          TODAY'S DATE:  8/10/2020 ROOM:  1616/6479-C      SUMMARY OF EVALUATION     DYSPHAGIA DIAGNOSIS:  Oral dysphagia secondary to dentition      DIET RECOMMENDATIONS:  Dysphagia 3, Soft/advanced (Soft & Bite-sized) solids with  thin liquids     FEEDING RECOMMENDATIONS:     Assistance level:  No assistance needed      Compensatory strategies recommended: No strategies are recommended at this time    THERAPY RECOMMENDATIONS:      Dysphagia therapy is not recommended                     PROCEDURE     Consistencies Administered During the Evaluation   Liquids: thin liquid   Solids:  pureed foods and solid foods      Method of Intake:   cup, straw, spoon  Self fed, Fed by clinician      Position:   Seated, upright                  RESULTS     Oral Stage:         Decreased mastication due to:  poor/missing dentition        Pharyngeal Stage:      No signs of aspiration were noted during this evaluation however, silent aspiration cannot be ruled out at bedside. If silent aspiration is suspected, a Videofluoroscopic Study of Swallowing (MBS) is recommended and requires a physician order. CPT code:  31843  bedside swallow eval      [x]The admitting diagnosis and active problem list, as listed below have been reviewed prior to initiation of this evaluation.      ADMITTING DIAGNOSIS: Hyponatremia [E87.1]     ACTIVE PROBLEM LIST:   Patient Active Problem List   Diagnosis    Hydrocephalus, adult ([address]    Hydrocephalus (HCC)    Hyponatremia    Breast mass    Malignant neoplasm of axillary tail of left female breast ([address]    Breast cancer metastasized to bone (HCC)    Carcinoma of breast metastatic to bone ([address]

## 2020-08-10 NOTE — CONSULTS
initial evaluation she was resting comfortably complaining mainly of fatigue and generalized weakness. She has difficulty holding up her head when sitting. Past Medical History:   Diagnosis Date    Arm fracture, left 2009    Balance problems since April, 2011    Hydrocephalus [facility])     compensated    Thyroid disease     Vertigo        Past Surgical History:   Procedure Laterality Date    ARM SURGERY  9/15/2009    FRACTURE SURGERY      HUMERUS FRACTURE SURGERY  2009    Broke left arm        Family History   Problem Relation Age of Onset    Colon Cancer Mother 68    Heart Disease Father     Diabetes Father         reports that she has never smoked. She has never used smokeless tobacco. She reports current alcohol use. She reports that she does not use drugs. Allergies:  Patient has no known allergies.     Current Medications:    therapeutic multivitamin-minerals 1 tablet, Daily  levothyroxine (SYNTHROID) tablet 25 mcg, Daily  letrozole [facility]) tablet 2.5 mg, Daily  [START ON 8/11/2020] cefTRIAXone (ROCEPHIN) 1 g in sterile water 10 mL IV syringe, Q24H  sodium chloride flush 0.9 % injection 10 mL, 2 times per day  sodium chloride flush 0.9 % injection 10 mL, PRN  acetaminophen (TYLENOL) tablet 650 mg, Q6H PRN    Or  acetaminophen (TYLENOL) suppository 650 mg, Q6H PRN  polyethylene glycol (GLYCOLAX) packet 17 g, Daily PRN  promethazine (PHENERGAN) tablet 12.5 mg, Q6H PRN    Or  ondansetron (ZOFRAN) injection 4 mg, Q6H PRN  enoxaparin (LOVENOX) injection 40 mg, Daily  potassium chloride (KLOR-CON M) extended release tablet 40 mEq, PRN    Or  potassium bicarb-citric acid (EFFER-K) effervescent tablet 40 mEq, PRN    Or  potassium chloride 10 mEq/100 mL IVPB (Peripheral Line), PRN  sodium phosphate 9.78 mmol in dextrose 5 % 250 mL IVPB, PRN    Or  sodium phosphate 19.56 mmol in dextrose 5 % 250 mL IVPB, PRN  calcium-vitamin D (OSCAL-500) 500-200 MG-UNIT per tablet 1 tablet, Daily  palbociclib (IBRANCE) tablet TABS 100 mg, Daily        Review of Systems:   A comprehensive review of systems was negative. Except as noted in the HPI    Physical exam:   BP (!) 116/57   Pulse 82   Temp 99.2 °F (37.3 °C) (Oral)   Resp 18   Ht 5' 5\" (1.651 m)   Wt 134 lb 12.8 oz (61.1 kg)   SpO2 98%   BMI 22.43 kg/m²   Pleasant age-appropriate thin frail-appearing white woman in no apparent distress  NC/AT EOMI sclera conjunctiva clear and anicteric mucous membranes are dry  Neck soft supple trachea midline no bruit.   Sitting so unable to assess JVD  CTA bilaterally  Regular rhythm normal S1 and S2 no murmur no rub  Abdomen soft nontender nondistended normal active bowel sounds  Distal extremity 1+ ankle edema  Pulses 1-2+ x4  Moves all 4 extremities  Cranial nerves II through XII grossly intact  Awake alert appropriate, interactive  No rash or lesion on visible portions of integument    Data:   Labs:  CBC with Differential:    Lab Results   Component Value Date    WBC 7.3 08/10/2020    RBC 2.96 08/10/2020    HGB 9.4 08/10/2020    HCT 27.8 08/10/2020     08/10/2020    MCV 93.9 08/10/2020    MCH 31.8 08/10/2020    MCHC 33.8 08/10/2020    RDW 13.8 08/10/2020    NRBC 0.0 08/09/2020    METASPCT 0.9 06/24/2020    LYMPHOPCT 4.3 08/09/2020    MONOPCT 0.9 08/09/2020    MYELOPCT 0.9 12/06/2019    BASOPCT 0.0 08/09/2020    MONOSABS 0.07 08/09/2020    LYMPHSABS 0.28 08/09/2020    EOSABS 0.00 08/09/2020    BASOSABS 0.00 08/09/2020     CMP:    Lab Results   Component Value Date     08/10/2020    K 3.9 08/10/2020    K 3.6 08/29/2019    CL 99 08/10/2020    CO2 19 08/10/2020    BUN 14 08/10/2020    CREATININE 0.8 08/10/2020    GFRAA >60 08/10/2020    LABGLOM >60 08/10/2020    GLUCOSE 90 08/10/2020    GLUCOSE 110 04/13/2011    PROT 7.5 08/10/2020    LABALBU 3.1 08/10/2020    CALCIUM 8.0 08/10/2020    BILITOT 0.5 08/10/2020    ALKPHOS 65 08/10/2020    AST 50 08/10/2020    ALT 37 08/10/2020     Ionized Calcium:  No results found for: IONCA  Magnesium:    Lab Results   Component Value Date    MG 2.2 08/10/2020     Phosphorus:    Lab Results   Component Value Date    PHOS 2.1 08/10/2020     U/A:    Lab Results   Component Value Date    COLORU Yellow 08/09/2020    PHUR 5.5 08/09/2020    WBCUA 5-10 08/09/2020    RBCUA 0-1 08/09/2020    BACTERIA MANY 08/09/2020    CLARITYU Clear 08/09/2020    SPECGRAV 1.010 08/09/2020    LEUKOCYTESUR SMALL 08/09/2020    UROBILINOGEN 0.2 08/09/2020    BILIRUBINUR Negative 08/09/2020    BLOODU TRACE-INTACT 08/09/2020    GLUCOSEU Negative 08/09/2020     Microalbumen/Creatinine ratio:  No components found for: RUCREAT  Iron Saturation:  No components found for: PERCENTFE  TIBC:    Lab Results   Component Value Date    TIBC 169 08/10/2020     FERRITIN:    Lab Results   Component Value Date    FERRITIN 431 08/10/2020        Imaging:  No orders to display       Assessment  1. Hyponatremia, hypoosmolar, acute superimposed on mild chronic with baseline serum sodiums in the low normal range. Urinary indices are consistent with hypovolemia and she appears hypovolemic on exam.  Improved after IV normal saline bolus, though too brisk rate. Stopping the IV fluid was appropriate measure, and sodium did come back down somewhat    Recommendations  1. Await results of laboratory studies already ordered  2. Once those results are known, if sodium drifting down again, will resume IV normal saline  3. Follow labs, UO  4. Avoid nephrotoxins      Thank you for the opportunity to participate in the care of your patient. We look forward to following along with you.       Electronically signed by Carly Marcos MD on 8/10/2020 at 3:21 PM

## 2020-08-10 NOTE — ED NOTES
Brandon Campos and Karry Sandifer, sister and [facility] [phone]     Tayler Jones RN  08/09/20 9023

## 2020-08-10 NOTE — ED NOTES
Pt has multiple open areas, excoriation and redness to buttocks/kathleen rectum. Pt states that she sat in wet clothing from incontinence at home.       Jamir Horton RN  08/09/20 4209

## 2020-08-10 NOTE — CARE COORDINATION
Social Work/Discharge Planning; Met with pt this a.m. and then with pt and her sister Krista Girard. Discussed discharge planning and possible need for kiera at discharge. Pt lives alone in a two story plan home with a basement. Pt's main support is her sister Krista Girard and brother-in law Silvino([phone]). Pt's sister states,  they have found pt on the floor x2. and she(sister) feels pt would benefit from rehab. However, pt states she is going home at discharge and does not want to go to rehab. Explained short term kiera and as discussed with pt and her sister, gave her kiera list to review. Will await pt/ot evals. Krista Girard will review kiera list with pt and also with her  as she states he is more familiar with area facilities as he used to play piano in several of them. Pt's pcp is Dr. John Saeed and [facility]. Will await pt/ot evals and continue kiera discussion with pt. The Plan for Transition of Care is related to the following treatment goals: kiera    The Patient and her sister was provided with a choice of provider and agrees   with the discharge plan. [x] Yes [] No    Freedom of choice list was provided with basic dialogue that supports the patient's individualized plan of care/goals, treatment preferences and shares the quality data associated with the providers.  [x] Yes [] No  [facility]  [phone]

## 2020-08-11 VITALS
HEART RATE: 78 BPM | SYSTOLIC BLOOD PRESSURE: 115 MMHG | DIASTOLIC BLOOD PRESSURE: 55 MMHG | OXYGEN SATURATION: 100 % | HEIGHT: 65 IN | WEIGHT: 133.5 LBS | BODY MASS INDEX: 22.24 KG/M2 | RESPIRATION RATE: 18 BRPM | TEMPERATURE: 98.7 F

## 2020-08-11 LAB
ALBUMIN SERPL-MCNC: 2.5 G/DL (ref 3.5–5.2)
ALP BLD-CCNC: 64 U/L (ref 35–104)
ALT SERPL-CCNC: 31 U/L (ref 0–32)
ANION GAP SERPL CALCULATED.3IONS-SCNC: 11 MMOL/L (ref 7–16)
AST SERPL-CCNC: 38 U/L (ref 0–31)
BILIRUB SERPL-MCNC: 0.4 MG/DL (ref 0–1.2)
BUN BLDV-MCNC: 12 MG/DL (ref 8–23)
CALCIUM SERPL-MCNC: 7.1 MG/DL (ref 8.6–10.2)
CHLORIDE BLD-SCNC: 102 MMOL/L (ref 98–107)
CHLORIDE URINE RANDOM: 40 MMOL/L
CO2: 21 MMOL/L (ref 22–29)
CREAT SERPL-MCNC: 0.8 MG/DL (ref 0.5–1)
CREATININE URINE: 102 MG/DL (ref 29–226)
GFR AFRICAN AMERICAN: >60
GFR NON-AFRICAN AMERICAN: >60 ML/MIN/1.73
GLUCOSE BLD-MCNC: 106 MG/DL (ref 74–99)
HCT VFR BLD CALC: 25.2 % (ref 34–48)
HEMOGLOBIN: 8.4 G/DL (ref 11.5–15.5)
MAGNESIUM: 2.1 MG/DL (ref 1.6–2.6)
MCH RBC QN AUTO: 31.8 PG (ref 26–35)
MCHC RBC AUTO-ENTMCNC: 33.3 % (ref 32–34.5)
MCV RBC AUTO: 95.5 FL (ref 80–99.9)
OSMOLALITY URINE: 545 MOSM/KG (ref 300–900)
PDW BLD-RTO: 14.2 FL (ref 11.5–15)
PHOSPHORUS: 1.7 MG/DL (ref 2.5–4.5)
PLATELET # BLD: 185 E9/L (ref 130–450)
PMV BLD AUTO: 10.3 FL (ref 7–12)
POTASSIUM SERPL-SCNC: 3.6 MMOL/L (ref 3.5–5)
POTASSIUM, UR: 32.3 MMOL/L
RBC # BLD: 2.64 E12/L (ref 3.5–5.5)
SODIUM BLD-SCNC: 134 MMOL/L (ref 132–146)
SODIUM URINE: 20 MMOL/L
TOTAL PROTEIN: 6.9 G/DL (ref 6.4–8.3)
WBC # BLD: 5.5 E9/L (ref 4.5–11.5)

## 2020-08-11 PROCEDURE — 2580000003 HC RX 258: Performed by: INTERNAL MEDICINE

## 2020-08-11 PROCEDURE — 6360000002 HC RX W HCPCS: Performed by: INTERNAL MEDICINE

## 2020-08-11 PROCEDURE — 85027 COMPLETE CBC AUTOMATED: CPT

## 2020-08-11 PROCEDURE — 36415 COLL VENOUS BLD VENIPUNCTURE: CPT

## 2020-08-11 PROCEDURE — 6370000000 HC RX 637 (ALT 250 FOR IP): Performed by: FAMILY MEDICINE

## 2020-08-11 PROCEDURE — 2500000003 HC RX 250 WO HCPCS: Performed by: INTERNAL MEDICINE

## 2020-08-11 PROCEDURE — 6370000000 HC RX 637 (ALT 250 FOR IP): Performed by: INTERNAL MEDICINE

## 2020-08-11 PROCEDURE — 99233 SBSQ HOSP IP/OBS HIGH 50: CPT | Performed by: INTERNAL MEDICINE

## 2020-08-11 PROCEDURE — 80053 COMPREHEN METABOLIC PANEL: CPT

## 2020-08-11 PROCEDURE — 84100 ASSAY OF PHOSPHORUS: CPT

## 2020-08-11 PROCEDURE — 83735 ASSAY OF MAGNESIUM: CPT

## 2020-08-11 RX ORDER — NITROFURANTOIN MACROCRYSTALS 50 MG/1
50 CAPSULE ORAL EVERY 6 HOURS SCHEDULED
Status: DISCONTINUED | OUTPATIENT
Start: 2020-08-11 | End: 2020-08-11 | Stop reason: HOSPADM

## 2020-08-11 RX ORDER — NITROFURANTOIN MACROCRYSTALS 50 MG/1
50 CAPSULE ORAL 4 TIMES DAILY
Qty: 20 CAPSULE | Refills: 0 | Status: SHIPPED | OUTPATIENT
Start: 2020-08-11 | End: 2020-08-16

## 2020-08-11 RX ADMIN — ENOXAPARIN SODIUM 40 MG: 40 INJECTION SUBCUTANEOUS at 08:30

## 2020-08-11 RX ADMIN — ACETAMINOPHEN 650 MG: 325 TABLET, FILM COATED ORAL at 00:07

## 2020-08-11 RX ADMIN — CEFTRIAXONE SODIUM 1 G: 1 INJECTION, POWDER, FOR SOLUTION INTRAMUSCULAR; INTRAVENOUS at 00:11

## 2020-08-11 RX ADMIN — LETROZOLE 2.5 MG: 2.5 TABLET ORAL at 08:31

## 2020-08-11 RX ADMIN — Medication 1 TABLET: at 08:31

## 2020-08-11 RX ADMIN — NITROFURANTOIN MACROCRYSTALS 50 MG: 50 CAPSULE ORAL at 17:35

## 2020-08-11 RX ADMIN — Medication: at 15:01

## 2020-08-11 RX ADMIN — MULTIPLE VITAMINS W/ MINERALS TAB 1 TABLET: TAB at 08:31

## 2020-08-11 RX ADMIN — LEVOTHYROXINE SODIUM 25 MCG: 0.03 TABLET ORAL at 08:31

## 2020-08-11 RX ADMIN — POTASSIUM PHOSPHATE, MONOBASIC POTASSIUM PHOSPHATE, DIBASIC 30 MMOL: 224; 236 INJECTION, SOLUTION, CONCENTRATE INTRAVENOUS at 13:18

## 2020-08-11 ASSESSMENT — PAIN SCALES - GENERAL
PAINLEVEL_OUTOF10: 0
PAINLEVEL_OUTOF10: 3

## 2020-08-11 NOTE — CARE COORDINATION
SOCIAL WORK/DISCHARGE PLANNING;  Met with pt in room. Discussed kiera again with pt. She got upset with me and stated she is going home. Pt refusing rehab. Discussed concern for safety and low ampac scores. Pt insist she can walk and is safe to go home. She also adamantly refused HHC. Pt states\"I don't want anybody in my house\". I placed a call to pt's sister Bulmaro Sims. Left message for her to call back. [facility]  [phone]    Pt's sister,Chelly, here and we discussed rehab with pt. She is agreeing to kiera. Per Latiapaddy Rosenbergpaddy, choices are [facility]. Referral made to [facility]. [facility]  [phone]    [facility] can accept pt when medically ready for discharge. Pt's family will need to be able to bring in pt's [facility] medication . I have called pt's sister and left a message to confirm she will be able to do this in order for pt to go there. Await return call. Completed HENS and envelope and placed in chart. [facility]  [phone]    Spoke with pt's sister and she can bring the Ibrance to [facility] [place]. from home. [facility] can accept pt today at skilled level (medicare wavier still in place). I notified pt's sister that [address] setting up transport for  6-6:30pm. RN notified and will check with physician for discharge order. She will notify pt as they are doing procedure on pt's roommate at this time. If pt not ready for discharge please notify her sister [id number]. [facility]  [phone]    Per [facility] [place] Elias Carriers will pick pt up at 6:30pm. Notified nursing unit.   [facility]  [phone]

## 2020-08-11 NOTE — DISCHARGE INSTR - COC
(60.6 kg)   SpO2 100%   BMI 22.22 kg/m²     Last documented pain score (0-10 scale): Pain Level: 0  Last Weight:   Wt Readings from Last 1 Encounters:   20 133 lb 8 oz (60.6 kg)     Mental Status:  {IP PT MENTAL STATUS:}    IV Access:  { KANDI IV ACCESS:125703350}    Nursing Mobility/ADLs:  Walking   {P DME MWMV:936152190}  Transfer  {[facility] DME DRDN:461419194}  Bathing  {CHP DME IYBV:525782064}  Dressing  {CHP DME TVNU:752642578}  Toileting  {CHP DME UINR:403451549}  Feeding  {[facility] DME HQLD:160509091}  Med Admin  {P DME ZYML:249912310}  Med Delivery   { KANDI MED Delivery:922860655}    Wound Care Documentation and Therapy:        Elimination:  Continence:   · Bowel: {YES / FS:48547}  · Bladder: {YES / ZI:26194}  Urinary Catheter: {Urinary Catheter:323421155}   Colostomy/Ileostomy/Ileal Conduit: {YES / GL:83398}       Date of Last BM: ***    Intake/Output Summary (Last 24 hours) at 2020 1400  Last data filed at 2020 0627  Gross per 24 hour   Intake 967 ml   Output 950 ml   Net 17 ml     I/O last 3 completed shifts: In: 2167 [P.O.:1800;  I.V.:367]  Out: 950 [Urine:950]    Safety Concerns:     { KANDI Safety Concerns:784534580}    Impairments/Disabilities:      { KANDI Impairments/Disabilities:317879292}    Nutrition Therapy:  Current Nutrition Therapy:   [address] KANDI Diet List:632281125}    Routes of Feeding: {[facility] DME Other Feedings:012541844}  Liquids: {Slp liquid thickness:43843}  Daily Fluid Restriction: {P DME Yes amt example:666032295}  Last Modified Barium Swallow with Video (Video Swallowing Test): {Done Not Done DUUU:237208678}    Treatments at the Time of Hospital Discharge:   Respiratory Treatments: ***  Oxygen Therapy:  {Therapy; copd oxygen:80177}  Ventilator:    { CC Vent QUOT:060188852}    Rehab Therapies: {THERAPEUTIC INTERVENTION:1210042693}  Weight Bearing Status/Restrictions: { CC Weight Bearin}  Other Medical Equipment (for information only, NOT a DME order): {EQUIPMENT:080100838}  Other Treatments: ***    Patient's personal belongings (please select all that are sent with patient):  {CHP DME Belongings:975575344}    RN SIGNATURE:  {Esignature:814040845}    CASE MANAGEMENT/SOCIAL WORK SECTION    Inpatient Status Date: ***    Readmission Risk Assessment Score:  Readmission Risk              Risk of Unplanned Readmission:        22           Discharging to Facility/ Agency   · Name: [facility]  · Address:  · Phone:[phone]  · Fax:    Dialysis Facility (if applicable)   · Name:  · Address:  · Dialysis Schedule:  · Phone:  · Fax:    / signature: Electronically signed by LOVE Matthew on 8/11/2020 at 4:30 PM      PHYSICIAN SECTION    Prognosis: Fair    Condition at Discharge: Stable    Rehab Potential (if transferring to Rehab): Fair    Recommended Labs or Other Treatments After Discharge: ***    Physician Certification: I certify the above information and transfer of Tyrone Green  is necessary for the continuing treatment of the diagnosis listed and that she requires [facility] for less 30 days.      Update Admission H&P: No change in H&P    PHYSICIAN SIGNATURE:  Electronically signed by Jose Quiroga DO on 8/11/20 at 4:46 PM EDT

## 2020-08-11 NOTE — PROGRESS NOTES
effort  Cardiovascular: Regular rate rhythm, normal S1-S2  Abdomen: Soft, nontender, nondistended  Musculoskeletal: No clubbing, cyanosis, no bilateral lower extremity edema. Brisk capillary refill. Skin:  No rashes  on visible skin  Neurologic: awake, alert and following commands     ASSESSMENT:  1. Hyponatremia, hypoosmolar, acute superimposed on chronic  2. Urinary tract infection  3. Hydrocephalus  4. Hypothyroidism  5. Chronic anemia 2/2 iron deficiency  6. History of metastatic breast cancer     PLAN:  1. Nephrology following  2. Check occult blood, if positive will need GI work-up  3. Neurosurgery consult  4. Monitor serum electrolytes  5. Continue Ibrance, levothyroxine, letrozole      Medications:  REVIEWED DAILY    Infusion Medications    sodium chloride 80 mL/hr at 08/10/20 1819     Scheduled Medications    therapeutic multivitamin-minerals  1 tablet Oral Daily    levothyroxine  25 mcg Oral Daily    letrozole  2.5 mg Oral Daily    cefTRIAXone (ROCEPHIN) IV  1 g Intravenous Q24H    sodium chloride flush  10 mL Intravenous 2 times per day    enoxaparin  40 mg Subcutaneous Daily    calcium-vitamin D  1 tablet Oral Daily    palbociclib  100 mg Oral Daily     PRN Meds: sodium chloride flush, acetaminophen **OR** acetaminophen, polyethylene glycol, promethazine **OR** ondansetron, potassium chloride **OR** potassium alternative oral replacement **OR** potassium chloride    Labs:     Recent Labs     08/09/20 2047 08/10/20  0308 08/11/20  0736   WBC 7.1 7.3 5.5   HGB 9.6* 9.4* 8.4*   HCT 28.1* 27.8* 25.2*    214 185       Recent Labs     08/10/20  0308  08/10/20  1513 08/10/20  2009 08/11/20  0736      < > 128* 129* 134   K 3.5   < > 3.3* 3.6 3.6      < > 96* 97* 102   CO2 19*   < > 20* 18* 21*   BUN 15   < > 14 16 12   CREATININE 0.8   < > 0.8 0.8 0.8   CALCIUM 7.8*   < > 7.9* 7.3* 7.1*   PHOS 2.1*  --   --   --  1.7*    < > = values in this interval not displayed.

## 2020-08-11 NOTE — PROGRESS NOTES
Message sent to Dr. Bridger Chandler via [employer]  regarding if patient is ok for discharge. Patient got accepted to a nursing home and is tentatively set up for 6pm if ready. Dr. Marina Stallings back and states patient is ok for discharge.    Tony Rebollar

## 2020-08-11 NOTE — FLOWSHEET NOTE
Inpatient Wound Care(initial evaluation)  6408a    Admit Date: 8/10/2020  1:04 AM    Reason for consult:  buttocks    Significant history:    presents to [facility] ER complaining of difficulty ambulating, fatigue  past medical history that includes metastatic breast cancer, hypothyroidism  Sister states that she has had issues ambulating around her house at home; crawling around on hands and knees because it was easier  found to have a urinary tract infection and hydrocephalus    Findings:       08/11/20 1310   Skin Integrity   Skin Integrity Bruising   Location right hip, knees   Skin Integrity Site 2   Skin Integrity Location 2   (erosion, dried scabs)   Location 2 buttocks   Skin Integrity Site 4   Skin Integrity Location 4 Redness   Location 4 kathleen-area, groins   purewick in place, bed completely soiled with urine, stool on purwick- purewick removed and discontinued    Impression:  IAD    Plan:  Antifungal ointment  Will need continued preventative care    Jessica Roman 8/11/2020 1:54 PM

## 2020-08-11 NOTE — CONSULTS
Inpatient Medical Oncology  Consult Note      Reason for Visit:  Consultation on a patient with Metastatic Breast Cancer admitted with Generalized Weakness. Referring Physician:  Elie Lucas DO    PCP:  Deo Kaminski DO    History of Present Illness: The patient is a 65 y.o. lady, with a past medical history significant for hydrocephalus, who had presented with left breast changes, who was diagnosed with a left breast ductal carcinoma, ER positive greater than 95% NJ +70% HER-2/elder 1+, she has a very locally advanced disease, and metastatic disease to the bones. Discussed with the patient and her sister her diagnosis, prognosis and recommendations for treatment. Completed the staging work-up, the patient will have a brain MRI with and without contrast done, and a PET scan. For her HR pos, HER-2/elder negative metastatic breast cancer, we recommended treatment with Femara and Ibrance, the schedule and side effects of the treatment were reviewed with her. She has metastatic disease to the bones, recommended treatment with Xgeva.     MRI brain done on 09/16/2019: no evidence of metastatic disease. PET/CT on 09/17/2019: The lytic soft tissue finding in the iliac crest in the left pelvis shows hypermetabolic FDG uptake, with maximum measurements in the 5.7 SUV range, strongly suggesting metastatic disease. She has uptake in the thyroid, could be physiologic.     Femara/Ibrance were started on 09/13/2019.       Bone scan done on 12/17/2019: Redemonstration of focus of radiotracer uptake involving posterior lateral right ninth rib suggestive of healing fracture. New radiotracer activity is seen at level of lateral right 10th rib and anterior right seventh rib which may be related to new healing fractures. Metastases is unlikely. Clinical correlation recommended.  No additional abnormal areas of increased radiotracer uptake.     CT chest done on 12/17/2019: Significant interval decrease in size of the Femara 2.5 mg daily. WBC 1.5; Hb 10.8; ; CMP essentially normal (protien 9.4); CEA  2.9; Ca 15.3 pending.       On 06/26/2020 started Cycle # 11, Day 1 Ibrance 100 mg and Femara 2.5 mg daily. White count of 1.3, , hemoglobin 10.8 and platelet count was within normal range. On 07/24/2020 started Cycle # 12, Day 1 Ibrance 100 mg and Femara 2.5 mg daily. Admitted to ED on 08/09/2020 with generalized weakness, hyponatremia, and hypokalemia. CT head 08/09/2020:   Very prominent lateral and third ventriculomegaly/hydrocephalus, which is not acute, and it is uncertain if it is contributing to the patient's ambulatory dysfunction. The fourth ventricle remains nondistended. No intracranial hemorrhage, pathologic extra-axial fluid collection, or calvarial acute pathologic findings are noted-including sinus disease. CXR 08/09/2020: No evidence of acute cardiopulmonary pathology. On 08/09/2020:   Hb 9.6, Hct 28.1, MCV 91.5, WBC 7.1, ANC 6.75,   Na 126, K+ 3.0, Ca 8.1      On 08/10/2020:   Hb 9.4. Hct 27.8, MCV 93.9, WBC 7.3,   Fe 13, TIBC 169, FeSat 8%, Ferritin 431  Transferrin 128  Vit B-12 ?2000, Folate 13.6  Reticulocytes 1.0%  Na 137, K+ 3.5, Ca 7.8, Phosph 2.1    Review of Systems;  CONSTITUTIONAL: No fever, chills. Good appetite and energy level. ENMT: Eyes: No diplopia; Nose: No epistaxis. Mouth: No sore throat. RESPIRATORY: No hemoptysis, shortness of breath, cough. CARDIOVASCULAR: No chest pain, palpitations. GASTROINTESTINAL: No nausea/vomiting, abdominal pain, diarrhea/constipation. GENITOURINARY: No dysuria, urinary frequency, hematuria. NEURO: No syncope, presyncope, headache.   Remainder:  ROS NEGATIVE    Past Medical History:      Diagnosis Date    Arm fracture, left 2009    Balance problems since April, 2011    [facility]cephal[facility])     compensated    Thyroid disease     Vertigo        Past Surgical History:      Procedure Laterality Date    ARM SURGERY  9/15/2009    FRACTURE SURGERY      HUMERUS FRACTURE SURGERY  2009    Broke left arm        Family History:  Family History   Problem Relation Age of Onset    Colon Cancer Mother 68    Heart Disease Father     Diabetes Father        Medications:  Reviewed and reconciled.     Social History:  Social History     Socioeconomic History    Marital status: Single     Spouse name: Not on file    Number of children: Not on file    Years of education: Not on file    Highest education level: Not on file   Occupational History    Not on file   Social Needs    Financial resource strain: Not on file    Food insecurity     Worry: Not on file     Inability: Not on file    Transportation needs     Medical: Not on file     Non-medical: Not on file   Tobacco Use    Smoking status: Never Smoker    Smokeless tobacco: Never Used   Substance and Sexual Activity    Alcohol use: Yes     Comment: drinks it occasional on social events    Drug use: No    Sexual activity: Never   Lifestyle    Physical activity     Days per week: Not on file     Minutes per session: Not on file    Stress: Not on file   Relationships    Social connections     Talks on phone: Not on file     Gets together: Not on file     Attends Restorationism service: Not on file     Active member of club or organization: Not on file     Attends meetings of clubs or organizations: Not on file     Relationship status: Not on file    Intimate partner violence     Fear of current or ex partner: Not on file     Emotionally abused: Not on file     Physically abused: Not on file     Forced sexual activity: Not on file   Other Topics Concern    Not on file   Social History Narrative    Not on file       Allergies:  No Known Allergies    Physical Exam:  BP (!) 115/55   Pulse 78   Temp 98.7 °F (37.1 °C) (Infrared)   Resp 18   Ht 5' 5\" (1.651 m)   Wt 133 lb 8 oz (60.6 kg)   SpO2 100%   BMI 22.22 kg/m²   GENERAL: Alert, oriented x 3, not in acute distress. HEENT: PERRLA; EOMI. Oropharynx clear. NECK: Supple. Without lymphadenopathy. LUNGS: Good air entry bilaterally. No wheezing, crackles or ronchi. CARDIOVASCULAR: Regular rate. No murmurs, rubs or gallops. ABDOMEN: Soft. Non-tender, non-distended. Positive bowel sounds. EXTREMITIES: Without clubbing, cyanosis, or edema. NEUROLOGIC: No focal deficits. ECOG PS 1    Recent Labs     08/09/20  2047 08/10/20  0308 08/11/20  0736   WBC 7.1 7.3 5.5   RBC 3.07* 2.96* 2.64*   HGB 9.6* 9.4* 8.4*   HCT 28.1* 27.8* 25.2*   MCV 91.5 93.9 95.5   MCH 31.3 31.8 31.8   MCHC 34.2 33.8 33.3   RDW 13.5 13.8 14.2    214 185   MPV 9.9 10.4 10.3        Recent Labs     08/09/20  2047 08/10/20  0308  08/10/20  1513 08/10/20  2009 08/11/20  0736   * 137   < > 128* 129* 134   K 3.0* 3.5   < > 3.3* 3.6 3.6   CL 93* 101   < > 96* 97* 102   CO2 20* 19*   < > 20* 18* 21*   BUN 20 15   < > 14 16 12   CREATININE 0.7 0.8   < > 0.8 0.8 0.8   GLUCOSE 113* 96   < > 145* 143* 106*   CALCIUM 8.1* 7.8*   < > 7.9* 7.3* 7.1*   PROT 7.5 7.5  --   --   --  6.9   LABALBU 3.1* 3.1*  --   --   --  2.5*   BILITOT 0.5 0.5  --   --   --  0.4   ALKPHOS 69 65  --   --   --  64   AST 54* 50*  --   --   --  38*   ALT 39* 37*  --   --   --  31    < > = values in this interval not displayed. Impression/Plan:  The patient is a 65 y.o. lady, with a past medical history significant for hydrocephalus, who had presented with left breast changes, who was diagnosed with a left breast ductal carcinoma, ER positive greater than 95% NV +70% HER-2/elder 1+, she has a very locally advanced disease, and metastatic disease to the bones. Discussed with the patient and her sister her diagnosis, prognosis and recommendations for treatment. Completed the staging work-up, the patient will have a brain MRI with and without contrast done, and a PET scan.  For her HR pos, HER-2/elder negative metastatic breast cancer, we recommended treatment with Femara and Ibrance, the schedule and side effects of the treatment were reviewed with her. She has metastatic disease to the bones, recommended treatment with Xgeva.     MRI brain done on 09/16/2019: no evidence of metastatic disease. PET/CT on 09/17/2019: The lytic soft tissue finding in the iliac crest in the left pelvis shows hypermetabolic FDG uptake, with maximum measurements in the 5.7 SUV range, strongly suggesting metastatic disease. She has uptake in the thyroid, could be physiologic.     Femara/Ibrance were started on 09/13/2019.       Bone scan done on 12/17/2019: Redemonstration of focus of radiotracer uptake involving posterior lateral right ninth rib suggestive of healing fracture. New radiotracer activity is seen at level of lateral right 10th rib and anterior right seventh rib which may be related to new healing fractures. Metastases is unlikely. Clinical correlation recommended. No additional abnormal areas of increased radiotracer uptake.     CT chest done on 12/17/2019: Significant interval decrease in size of the large mass seen in the left breast 8/29/2019. Interval decrease in size of enlarged left axillary lymph nodes. Cardiomegaly. Enlarged thyroid, unchanged. Small hiatal hernia. Diffuse interstitial changes and multifocal atelectasis or scarring in both lungs. Stable small nodular density in the right upper lobe. Sclerotic changes involving the lateral right ninth rib.     CT abdomen/pelvis done on 12/17/2019: Small right renal cyst. Retroverted uterus. Distended colon and rectum containing a large amount of stool. Mildly enlarged retroperitoneal and periportal lymph nodes, unchanged. Findings in the left iliac bone as described.     The results/images of the scans were reviewed with the patient and her sister, she is responding nicely to the [place] and Alban Olvin, will continue the same treatment.    She did have teeth extraction on 01/20/2020, dental clearance has been obtained.     On 4/3/2020, pathology. On 08/09/2020:   Hb 9.6, Hct 28.1, MCV 91.5, WBC 7.1, ANC 6.75,   Na 126, K+ 3.0, Ca 8.1      On 08/10/2020:   Hb 9.4. Hct 27.8, MCV 93.9, WBC 7.3,   Fe 13, TIBC 169, FeSat 8%, Ferritin 431  Transferrin 128  Vit B-12 >2000, Folate 13.6  Reticulocytes 1.0%  Na 137, K+ 3.5, Ca 7.8, Phosph 2.1    Anemia is multifactorial, secondary to iron deficiency, chronic inflammation, and Ibrance.  Stable    -Will monitor CBCD  -check OB stools, if pos will need GI workup.  -Transfuse with pRBCs if Hb <7 g/dL  -Nephrology team on board  -NS team on board  -PT/OT  -Will continue to follow   - pt for discharge to Nursing home today     Thank you for allowing us to participate in the care of Jeanine Franco MD, PGY-3  Internal Medicine Resident      ATTENDING:   Carlos Johnson MD   HEMATOLOGY/MEDICAL [address] [facility] [address] [facility] [address] [phone] [facility] [address]  Dept: [phone]

## 2020-08-11 NOTE — PROGRESS NOTES
[facility] regarding if patient is ok for discharge she is tentatively set up for [facility]

## 2020-08-11 NOTE — PROGRESS NOTES
[facility] MD Carlos Enrique Marks, MD Raisa Alvarenga MD Raymondo Ny, CNP Arvilla Krabbe, ANP  Consultation  Patient's Name: Love Campbell  5:43 PM  8/11/2020      In NAD   Having lunch        Past Medical History:   Diagnosis Date    Arm fracture, left 2009    Balance problems since April, 2011    Hydrocephalus [facility])     compensated    Thyroid disease     Vertigo        Past Surgical History:   Procedure Laterality Date    ARM SURGERY  9/15/2009    FRACTURE SURGERY      HUMERUS FRACTURE SURGERY  2009    Broke left arm        Family History   Problem Relation Age of Onset    Colon Cancer Mother 68    Heart Disease Father     Diabetes Father         reports that she has never smoked. She has never used smokeless tobacco. She reports current alcohol use. She reports that she does not use drugs. Allergies:  Patient has no known allergies.     Current Medications:    potassium phosphate 30 mmol in sodium chloride 0.9 % 500 mL IVPB, Once  miconazole nitrate 2 % ointment, BID    And  miconazole nitrate 2 % ointment, TID PRN  nitrofurantoin (MACRODANTIN) capsule 50 mg, 4 times per day  therapeutic multivitamin-minerals 1 tablet, Daily  levothyroxine (SYNTHROID) tablet 25 mcg, Daily  letrozole [facility]) tablet 2.5 mg, Daily  sodium chloride flush 0.9 % injection 10 mL, 2 times per day  sodium chloride flush 0.9 % injection 10 mL, PRN  acetaminophen (TYLENOL) tablet 650 mg, Q6H PRN    Or  acetaminophen (TYLENOL) suppository 650 mg, Q6H PRN  polyethylene glycol (GLYCOLAX) packet 17 g, Daily PRN  promethazine (PHENERGAN) tablet 12.5 mg, Q6H PRN    Or  ondansetron (ZOFRAN) injection 4 mg, Q6H PRN  enoxaparin (LOVENOX) injection 40 mg, Daily  potassium chloride (KLOR-CON M) extended release tablet 40 mEq, PRN    Or  potassium bicarb-citric acid (EFFER-K) effervescent tablet 40 mEq, PRN    Or  potassium chloride 10 mEq/100 mL IVPB (Peripheral Line), PRN  calcium-vitamin D (OSCAL-500) 500-200 MG-UNIT per tablet 1 tablet, Daily  palbociclib (IBRANCE) tablet TABS 100 mg, Daily        Review of Systems:   A comprehensive review of systems was negative. Except as noted in the HPI    Physical exam:   BP (!) 115/55   Pulse 78   Temp 98.7 °F (37.1 °C) (Infrared)   Resp 18   Ht 5' 5\" (1.651 m)   Wt 133 lb 8 oz (60.6 kg)   SpO2 100%   BMI 22.22 kg/m²   Pleasant age-appropriate thin frail-appearing white woman in no apparent distress  NC/AT EOMI sclera conjunctiva clear and anicteric mucous membranes are dry  Neck soft supple trachea midline no bruit.   Sitting so unable to assess JVD  CTA bilaterally  Regular rhythm normal S1 and S2 no murmur no rub  Abdomen soft nontender nondistended normal active bowel sounds  Distal extremity 1+ ankle edema  Pulses 1-2+ x4  Moves all 4 extremities  Cranial nerves II through XII grossly intact  Awake alert appropriate, interactive  No rash or lesion on visible portions of integument    Data:   Labs:  CBC with Differential:    Lab Results   Component Value Date    WBC 5.5 08/11/2020    RBC 2.64 08/11/2020    HGB 8.4 08/11/2020    HCT 25.2 08/11/2020     08/11/2020    MCV 95.5 08/11/2020    MCH 31.8 08/11/2020    MCHC 33.3 08/11/2020    RDW 14.2 08/11/2020    NRBC 0.0 08/09/2020    METASPCT 0.9 06/24/2020    LYMPHOPCT 4.3 08/09/2020    MONOPCT 0.9 08/09/2020    MYELOPCT 0.9 12/06/2019    BASOPCT 0.0 08/09/2020    MONOSABS 0.07 08/09/2020    LYMPHSABS 0.28 08/09/2020    EOSABS 0.00 08/09/2020    BASOSABS 0.00 08/09/2020     CMP:    Lab Results   Component Value Date     08/11/2020    K 3.6 08/11/2020    K 3.6 08/29/2019     08/11/2020    CO2 21 08/11/2020    BUN 12 08/11/2020    CREATININE 0.8 08/11/2020    GFRAA >60 08/11/2020    LABGLOM >60 08/11/2020    GLUCOSE 106 08/11/2020    GLUCOSE 110 04/13/2011    PROT 6.9 08/11/2020    LABALBU 2.5 08/11/2020    CALCIUM 7.1 08/11/2020    BILITOT 0.4 08/11/2020 ALKPHOS 64 08/11/2020    AST 38 08/11/2020    ALT 31 08/11/2020     Ionized Calcium:  No results found for: IONCA  Magnesium:    Lab Results   Component Value Date    MG 2.1 08/11/2020     Phosphorus:    Lab Results   Component Value Date    PHOS 1.7 08/11/2020     U/A:    Lab Results   Component Value Date    COLORU Yellow 08/09/2020    PHUR 5.5 08/09/2020    WBCUA 5-10 08/09/2020    RBCUA 0-1 08/09/2020    BACTERIA MANY 08/09/2020    CLARITYU Clear 08/09/2020    SPECGRAV 1.010 08/09/2020    LEUKOCYTESUR SMALL 08/09/2020    UROBILINOGEN 0.2 08/09/2020    BILIRUBINUR Negative 08/09/2020    BLOODU TRACE-INTACT 08/09/2020    GLUCOSEU Negative 08/09/2020     Microalbumen/Creatinine ratio:  No components found for: RUCREAT  Iron Saturation:  No components found for: PERCENTFE  TIBC:    Lab Results   Component Value Date    TIBC 169 08/10/2020     FERRITIN:    Lab Results   Component Value Date    FERRITIN 431 08/10/2020        Imaging:  No orders to display       Assessment  1. Hyponatremia, hypoosmolar, acute superimposed on mild chronic with baseline serum sodiums in the low normal range. Urinary indices are consistent with hypovolemia and she appears hypovolemic on exam.  Improved after IV normal saline bolus, though too brisk rate.   Stopping the IV fluid was appropriate measure, and sodium did come back down somewhat    Recommendations    Na up to 134   She is having good PO intake   Will stop iv fluids and follow up her BMP in am            Electronically signed by Esperanza Ramos MD on 8/11/2020 at 5:43 PM

## 2020-08-11 NOTE — PROGRESS NOTES
Inpatient Medical Oncology  Follow Up Note      Reason for Visit:  F/up on a patient with Metastatic Breast Cancer admitted with Generalized Weakness. Subjective: The patient has no complaints. Physical Exam:  BP (!) 115/55   Pulse 78   Temp 98.7 °F (37.1 °C) (Infrared)   Resp 18   Ht 5' 5\" (1.651 m)   Wt 133 lb 8 oz (60.6 kg)   SpO2 100%   BMI 22.22 kg/m²   GENERAL: Alert, oriented x 3, not in acute distress. HEENT: PERRLA; EOMI. Oropharynx clear. NECK: Supple. Without lymphadenopathy. LUNGS: Good air entry bilaterally. No wheezing, crackles or ronchi. CARDIOVASCULAR: Regular rate. No murmurs, rubs or gallops. ABDOMEN: Soft. Non-tender, non-distended. Positive bowel sounds. EXTREMITIES: Without clubbing, cyanosis, or edema. NEUROLOGIC: No focal deficits. ECOG PS 2    Impression/Plan:  The patient is a 65 y.o. lady, with a past medical history significant for hydrocephalus, who had presented with left breast changes, who was diagnosed with a left breast ductal carcinoma, ER positive greater than 95% MA +70% HER-2/elder 1+, she has a very locally advanced disease, and metastatic disease to the bones. Discussed with the patient and her sister her diagnosis, prognosis and recommendations for treatment. Completed the staging work-up, the patient will have a brain MRI with and without contrast done, and a PET scan. For her HR pos, HER-2/elder negative metastatic breast cancer, we recommended treatment with Femara and Ibrance, the schedule and side effects of the treatment were reviewed with her. She has metastatic disease to the bones, recommended treatment with Xgeva.     MRI brain done on 09/16/2019: no evidence of metastatic disease. PET/CT on 09/17/2019: The lytic soft tissue finding in the iliac crest in the left pelvis shows hypermetabolic FDG uptake, with maximum measurements in the 5.7 SUV range, strongly suggesting metastatic disease.   She has uptake in the thyroid, could be physiologic.     Femara/Ibrance were started on 09/13/2019.       Bone scan done on 12/17/2019: Redemonstration of focus of radiotracer uptake involving posterior lateral right ninth rib suggestive of healing fracture. New radiotracer activity is seen at level of lateral right 10th rib and anterior right seventh rib which may be related to new healing fractures. Metastases is unlikely. Clinical correlation recommended. No additional abnormal areas of increased radiotracer uptake.     CT chest done on 12/17/2019: Significant interval decrease in size of the large mass seen in the left breast 8/29/2019. Interval decrease in size of enlarged left axillary lymph nodes. Cardiomegaly. Enlarged thyroid, unchanged. Small hiatal hernia. Diffuse interstitial changes and multifocal atelectasis or scarring in both lungs. Stable small nodular density in the right upper lobe. Sclerotic changes involving the lateral right ninth rib.     CT abdomen/pelvis done on 12/17/2019: Small right renal cyst. Retroverted uterus. Distended colon and rectum containing a large amount of stool. Mildly enlarged retroperitoneal and periportal lymph nodes, unchanged. Findings in the left iliac bone as described.     The results/images of the scans were reviewed with the patient and her sister, she is responding nicely to the Femara and Danis Octavio, will continue the same treatment. She did have teeth extraction on 01/20/2020, dental clearance has been obtained.     On 4/3/2020, the patient was started on Ibrance and Femara cycle #8.     Restaging CT Chest/Abdomen/Pelvis and bone scan on 04/24/2020:  CT Chest noted stable small bilateral pulmonary nodules; CT abdomen/pelvis no evidence of new active neoplasm compared to prior. Bone scan 3 foci of radiotracer uptake in the lower right ribs, 2 of which demonstrate less activity on the current study, possibly indicative of healing fractures.   Radiotracer in the more anterior focus is more persistent, and may be a metastatic lesion. The results/images of the scans were reviewed with the patient and her sister, the patient has an overall stable disease. We will continue with Femara and Ibrance.     -On 03/27/2020:  CEA 1.7 ng/m/L; CA 15-3 was 14  -On 04/29/2020:  CEA 1.7 ng.m/L; CA 15-3 was 17     On 05/01/2020 started Cycle # 9, Day 1 Ibrance (dose reduced to 100 mg) and Femara 2.5 mg daily. White count of 1.4, , hemoglobin and platelet count were within normal range.       On 05/29/2020 started Cycle # 10, Day 1 Ibrance 100 mg (+ Xgeva) and Femara 2.5 mg daily. WBC 1.5; Hb 10.8; ; CMP essentially normal (protien 9.4); CEA  2.9; Ca 15.3 pending.       On 06/26/2020 started Cycle # 11, Day 1 Ibrance 100 mg and Femara 2.5 mg daily. White count of 1.3, , hemoglobin 10.8 and platelet count was within normal range. On 07/24/2020 started Cycle # 12, Day 1 Ibrance 100 mg and Femara 2.5 mg daily. Admitted to ED on 08/09/2020 with generalized weakness, hyponatremia, and hypokalemia. CT head 08/09/2020:   Very prominent lateral and third ventriculomegaly/hydrocephalus, which is not acute, and it is uncertain if it is contributing to the patient's ambulatory dysfunction. The fourth ventricle remains nondistended. No intracranial hemorrhage, pathologic extra-axial fluid collection, or calvarial acute pathologic findings are noted-including sinus disease. CXR 08/09/2020: No evidence of acute cardiopulmonary pathology. On 08/09/2020:   Hb 9.6, Hct 28.1, MCV 91.5, WBC 7.1, ANC 6.75,   Na 126, K+ 3.0, Ca 8.1      On 08/10/2020:   Hb 9.4. Hct 27.8, MCV 93.9, WBC 7.3,   Fe 13, TIBC 169, FeSat 8%, Ferritin 431  Transferrin 128  Vit B-12 >2000, Folate 13.6  Reticulocytes 1.0%  Na 137, K+ 3.5, Ca 7.8, Phosph 2.1    Anemia is multifactorial, secondary to iron deficiency, chronic inflammation, and Ibrance. Overall stable.     -Will monitor CBCD  - OB stools ordered and pending, if pos will need GI workup.  -Transfuse with pRBCs if Hb <7 g/dL  -Nephrology team on board  -NS team on board  -PT/OT  -Will continue to follow   - plan for discharge to Nursing home today     Thank you for allowing us to participate in the care of Ana Maria Banuelos MD, PGY-3  Internal Medicine Resident    The patient was seen and examined withJuan Chung MD., I agree with his findings assessment and plan as outlined.     Mary Ann Ballard MD   HEMATOLOGY/MEDICAL [address] [facility] [address] [place] [address] [id number] [facility] [address]  Dept: [phone]

## 2020-08-12 LAB
ORGANISM: ABNORMAL
URINE CULTURE, ROUTINE: ABNORMAL

## 2020-08-13 LAB — URINE CULTURE, ROUTINE: NORMAL

## 2020-08-21 ENCOUNTER — OFFICE VISIT (OUTPATIENT)
Dept: ONCOLOGY | Age: 70
End: 2020-08-21
Payer: COMMERCIAL

## 2020-08-21 ENCOUNTER — HOSPITAL ENCOUNTER (OUTPATIENT)
Dept: INFUSION THERAPY | Age: 70
Discharge: HOME | End: 2020-08-21
Payer: COMMERCIAL

## 2020-08-21 VITALS
HEART RATE: 75 BPM | TEMPERATURE: 98.3 F | WEIGHT: 136 LBS | OXYGEN SATURATION: 98 % | SYSTOLIC BLOOD PRESSURE: 131 MMHG | BODY MASS INDEX: 22.66 KG/M2 | HEIGHT: 65 IN | DIASTOLIC BLOOD PRESSURE: 59 MMHG

## 2020-08-21 DIAGNOSIS — C50.612 MALIGNANT NEOPLASM OF AXILLARY TAIL OF LEFT FEMALE BREAST, UNSPECIFIED ESTROGEN RECEPTOR STATUS (HCC): ICD-10-CM

## 2020-08-21 DIAGNOSIS — C79.51 CARCINOMA OF BREAST METASTATIC TO BONE, UNSPECIFIED LATERALITY (HCC): ICD-10-CM

## 2020-08-21 DIAGNOSIS — C50.919 CARCINOMA OF BREAST METASTATIC TO BONE, UNSPECIFIED LATERALITY (HCC): ICD-10-CM

## 2020-08-21 DIAGNOSIS — C50.612 MALIGNANT NEOPLASM OF AXILLARY TAIL OF LEFT FEMALE BREAST, UNSPECIFIED ESTROGEN RECEPTOR STATUS (HCC): Primary | ICD-10-CM

## 2020-08-21 DIAGNOSIS — N63.0 BREAST MASS: ICD-10-CM

## 2020-08-21 LAB
ALBUMIN SERPL-MCNC: 3.5 G/DL (ref 3.5–5.2)
ALP BLD-CCNC: 80 U/L (ref 35–104)
ALT SERPL-CCNC: 15 U/L (ref 0–32)
ANION GAP SERPL CALCULATED.3IONS-SCNC: 11 MMOL/L (ref 7–16)
AST SERPL-CCNC: 21 U/L (ref 0–31)
ATYPICAL LYMPHOCYTE RELATIVE PERCENT: 0.9 % (ref 0–4)
BASOPHILS ABSOLUTE: 0.04 E9/L (ref 0–0.2)
BASOPHILS RELATIVE PERCENT: 1.8 % (ref 0–2)
BILIRUB SERPL-MCNC: <0.2 MG/DL (ref 0–1.2)
BUN BLDV-MCNC: 19 MG/DL (ref 8–23)
CALCIUM SERPL-MCNC: 9.1 MG/DL (ref 8.6–10.2)
CEA: 1.8 NG/ML (ref 0–5.2)
CHLORIDE BLD-SCNC: 99 MMOL/L (ref 98–107)
CO2: 25 MMOL/L (ref 22–29)
CREAT SERPL-MCNC: 0.9 MG/DL (ref 0.5–1)
EOSINOPHILS ABSOLUTE: 0 E9/L (ref 0.05–0.5)
EOSINOPHILS RELATIVE PERCENT: 1 % (ref 0–6)
GFR AFRICAN AMERICAN: >60
GFR NON-AFRICAN AMERICAN: >60 ML/MIN/1.73
GLUCOSE BLD-MCNC: 91 MG/DL (ref 74–99)
HCT VFR BLD CALC: 27.8 % (ref 34–48)
HEMOGLOBIN: 9 G/DL (ref 11.5–15.5)
LYMPHOCYTES ABSOLUTE: 0.46 E9/L (ref 1.5–4)
LYMPHOCYTES RELATIVE PERCENT: 22.1 % (ref 20–42)
MCH RBC QN AUTO: 31.5 PG (ref 26–35)
MCHC RBC AUTO-ENTMCNC: 32.4 % (ref 32–34.5)
MCV RBC AUTO: 97.2 FL (ref 80–99.9)
MONOCYTES ABSOLUTE: 0.1 E9/L (ref 0.1–0.95)
MONOCYTES RELATIVE PERCENT: 5.3 % (ref 2–12)
NEUTROPHILS ABSOLUTE: 1.4 E9/L (ref 1.8–7.3)
NEUTROPHILS RELATIVE PERCENT: 69.9 % (ref 43–80)
PDW BLD-RTO: 14 FL (ref 11.5–15)
PLATELET # BLD: 175 E9/L (ref 130–450)
PMV BLD AUTO: 9.8 FL (ref 7–12)
POIKILOCYTES: ABNORMAL
POLYCHROMASIA: ABNORMAL
POTASSIUM SERPL-SCNC: 4.5 MMOL/L (ref 3.5–5)
RBC # BLD: 2.86 E12/L (ref 3.5–5.5)
SODIUM BLD-SCNC: 135 MMOL/L (ref 132–146)
TARGET CELLS: ABNORMAL
TOTAL PROTEIN: 8.4 G/DL (ref 6.4–8.3)
WBC # BLD: 2 E9/L (ref 4.5–11.5)

## 2020-08-21 PROCEDURE — 85025 COMPLETE CBC W/AUTO DIFF WBC: CPT

## 2020-08-21 PROCEDURE — 96372 THER/PROPH/DIAG INJ SC/IM: CPT

## 2020-08-21 PROCEDURE — 86300 IMMUNOASSAY TUMOR CA 15-3: CPT

## 2020-08-21 PROCEDURE — 82378 CARCINOEMBRYONIC ANTIGEN: CPT

## 2020-08-21 PROCEDURE — 1090F PRES/ABSN URINE INCON ASSESS: CPT | Performed by: INTERNAL MEDICINE

## 2020-08-21 PROCEDURE — G8420 CALC BMI NORM PARAMETERS: HCPCS | Performed by: INTERNAL MEDICINE

## 2020-08-21 PROCEDURE — 1111F DSCHRG MED/CURRENT MED MERGE: CPT | Performed by: INTERNAL MEDICINE

## 2020-08-21 PROCEDURE — 4040F PNEUMOC VAC/ADMIN/RCVD: CPT | Performed by: INTERNAL MEDICINE

## 2020-08-21 PROCEDURE — 99214 OFFICE O/P EST MOD 30 MIN: CPT | Performed by: INTERNAL MEDICINE

## 2020-08-21 PROCEDURE — G8400 PT W/DXA NO RESULTS DOC: HCPCS | Performed by: INTERNAL MEDICINE

## 2020-08-21 PROCEDURE — G8427 DOCREV CUR MEDS BY ELIG CLIN: HCPCS | Performed by: INTERNAL MEDICINE

## 2020-08-21 PROCEDURE — 1123F ACP DISCUSS/DSCN MKR DOCD: CPT | Performed by: INTERNAL MEDICINE

## 2020-08-21 PROCEDURE — 80053 COMPREHEN METABOLIC PANEL: CPT

## 2020-08-21 PROCEDURE — 6360000002 HC RX W HCPCS: Performed by: INTERNAL MEDICINE

## 2020-08-21 PROCEDURE — 1036F TOBACCO NON-USER: CPT | Performed by: INTERNAL MEDICINE

## 2020-08-21 PROCEDURE — 3017F COLORECTAL CA SCREEN DOC REV: CPT | Performed by: INTERNAL MEDICINE

## 2020-08-21 PROCEDURE — 99212 OFFICE O/P EST SF 10 MIN: CPT

## 2020-08-21 RX ADMIN — DENOSUMAB 120 MG: 120 INJECTION SUBCUTANEOUS at 11:47

## 2020-08-21 NOTE — PROGRESS NOTES
[address] [facility]OG[facility]  [address] [place] [id number]  Dept: [phone]  Attending Progress Note      Reason for Visit:   Metastatic breast cancer. PCP:  Dominick Grant DO    History of Present Illness: The patient is a 65 y.o. lady, with a past medical history significant for hydrocephalus, who had presented with left breast changes, she had noticed a dark spot on her left breast, then she had developed induration, she thinks that she had the breast changes for months. The patient had never had a mammogram done, had a CT scan of the chest done, revealing a solid left breast mass measuring at least 6.8 x 2.9 cm in size is identified. The mass appears to extend to the skin surface. The deep margin of the mass abuts the chest wall but a fat plane appears to be preserved. Pathologically enlarged left  axillary lymph nodes are seen measuring up to 4.5 x 1.9 cm in size. CT abdomen and pelvis was done on 8/30/2019 revealing partial visualization of soft tissue mass in the left breast, nonspecific lymph nodes in the upper abdomen. Bone scan was done on 8/30/2019, revealing a Prominent focus of radiotracer uptake in the left anterior superior iliac spine, with corresponding abnormality on recent CT, suggestive of metastasis. More subtle focal radiotracer uptake in the right anterior superior iliac spine could be due to metastasis or  degenerative change.   2. Focal radiotracer uptake in the lateral aspect of the right ninth  rib, which appears to be due to a nondisplaced healing fracture. Correlation with history of recent trauma is recommended. In light of  other findings, this could represent a pathologic fracture with an  underlying metastatic lesion. 3. Osteoarthritic uptake in the shoulders, knees, wrists, and hands.     She underwent on 8/30/2019 a biopsy of 1 of the left axillary lymph nodes, she was consistent with metastatic ductal carcinoma, nuclear grade 2, ER positive greater than 95% [place] +70% HER-2/elder 1+, by IHC, negative. The patient was started on systemic therapy with Femara and Ibrance on 9/13/2019. She denies any side effects from the treatment. She denies any new problems. The appearance of the left breast has significant improved, no drainage. She is accompanied by her sister. She is currently in rehab. She has been taking the Williamson and Femara as prescribed. Next week will be her week off Ibrance. Review of Systems;  CONSTITUTIONAL: No fever, chills. Decreased appetite and energy level. ENMT: Eyes: No diplopia; Nose: No epistaxis. Mouth: No sore throat. RESPIRATORY: No hemoptysis, shortness of breath, cough. CARDIOVASCULAR: No chest pain, palpitations. GASTROINTESTINAL: No nausea/vomiting, abdominal pain, diarrhea/constipation. GENITOURINARY: No dysuria, urinary frequency, hematuria. NEURO: No syncope, presyncope, headache. MSK: She has pain in the left hip. Remainder:  ROS NEGATIVE    Past Medical History:      Diagnosis Date    Arm fracture, left 2009    Balance problems since April, 2011    Hydrocephalus [facility])     compensated    Thyroid disease     Vertigo      Patient Active Problem List   Diagnosis    Hydrocephalus, adult ([address]    Hydrocephalus ([address]    Hyponatremia    Breast mass    Malignant neoplasm of axillary tail of left female breast ([address]    Breast cancer metastasized to bone (HCC)    Carcinoma of breast metastatic to bone ([address]    Anemia        Past Surgical History:      Procedure Laterality Date    ARM SURGERY  9/15/2009    FRACTURE SURGERY      HUMERUS FRACTURE SURGERY  2009    Broke left arm        Family History:  Family History   Problem Relation Age of Onset    Colon Cancer Mother 68    Heart Disease Father     Diabetes Father        Medications:  Reviewed and reconciled.     Social History:  Social History     Socioeconomic History    Marital status: Single     Spouse name: Not done on 12/17/2019: Significant interval decrease in size of the large mass seen in the left breast 8/29/2019. Interval decrease in size of enlarged left axillary lymph nodes. Cardiomegaly. Enlarged thyroid, unchanged. Small hiatal hernia. Diffuse interstitial changes and multifocal atelectasis or scarring in both lungs. Stable small nodular density in the right upper lobe. Sclerotic changes involving the lateral right ninth rib. CT abdomen/pelvis done on 12/17/2019: Small right renal cyst. Retroverted uterus. Distended colon and rectum containing a large amount of stool. Mildly enlarged retroperitoneal and periportal lymph nodes, unchanged. Findings in the left iliac bone as described. The results/images of the scans were reviewed with the patient and her sister, she is responding nicely to the Jose Mckeon, will continue the same treatment. She did have teeth extraction on 1/20/2020, dental clearance has been obtained. On 4/3/2020, the patient was started on Ibrance and Femara cycle #8. Restaging CT Chest/Abdomen/Pelvis and bone scan on 04/24/2020:  CT Chest noted stable small bilateral pulmonary nodules; CT abdomen/pelvis no evidence of new active neoplasm compared to prior. Bone scan 3 foci of radiotracer uptake in the lower right ribs, 2 of which demonstrate less activity on the current study, possibly indicative of healing fractures. Radiotracer in the more anterior focus is more persistent, and may be a metastatic lesion. The results/images of the scans were reviewed with the patient and her sister, the patient has an overall stable disease. Recommended to continue with Femara and Ibrance. Presents today 8/21/2020 for a follow-up visit, she is on her 12th cycle of Femara and Ibrance, next week will be her week off. She is currently in rehab. She was admitted to the hospital recently for failure to thrive and hyponatremia. She is accompanied by her sister.   She is scheduled for Orlando Moose today 8/21/2020, labs reviewed, proceed with Xgeva. She has leukopenia with grade 2 neutropenia, no indication for dose adjustment of the Ibrance. 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 RTC in 4 weeks. Thank you for allowing us to participate in the care of Ms. Marva Medellin.     Bran Tejada MD   HEMATOLOGY/MEDICAL ONCOLOGY  [address] [place] [address] ONCOLOGY  [address]  [place] [id number]  Dept: [phone]

## 2020-08-24 LAB — CA 15-3: 13 U/ML (ref 0–31)

## 2020-09-15 ENCOUNTER — CARE COORDINATION (OUTPATIENT)
Dept: CASE MANAGEMENT | Age: 70
End: 2020-09-15

## 2020-09-15 NOTE — CARE COORDINATION
Care Transitions    Called pt and introduced myself. Stated she was Neema Pozo. Declined services. Said that she never should have been brought to us and her sister needs to mind her own \"curseword\" business.  Then hung up    Closing episode as pt does not want our services    Luis F Kelly, TIFFANIEN, RN, [phone]

## 2020-09-16 ENCOUNTER — HOSPITAL ENCOUNTER (OUTPATIENT)
Age: 70
Discharge: HOME | End: 2020-09-16
Payer: MEDICARE

## 2020-09-16 LAB
ALBUMIN SERPL-MCNC: 3.8 G/DL (ref 3.5–5.2)
ALP BLD-CCNC: 69 U/L (ref 35–104)
ALT SERPL-CCNC: 12 U/L (ref 0–32)
ANION GAP SERPL CALCULATED.3IONS-SCNC: 5 MMOL/L (ref 7–16)
ANISOCYTOSIS: ABNORMAL
AST SERPL-CCNC: 19 U/L (ref 0–31)
ATYPICAL LYMPHOCYTE RELATIVE PERCENT: 2.7 % (ref 0–4)
BASOPHILS ABSOLUTE: 0.03 E9/L (ref 0–0.2)
BASOPHILS RELATIVE PERCENT: 2.6 % (ref 0–2)
BILIRUB SERPL-MCNC: 0.4 MG/DL (ref 0–1.2)
BUN BLDV-MCNC: 22 MG/DL (ref 8–23)
CALCIUM SERPL-MCNC: 9.3 MG/DL (ref 8.6–10.2)
CEA: 1.6 NG/ML (ref 0–5.2)
CHLORIDE BLD-SCNC: 105 MMOL/L (ref 98–107)
CO2: 27 MMOL/L (ref 22–29)
CREAT SERPL-MCNC: 0.9 MG/DL (ref 0.5–1)
EOSINOPHILS ABSOLUTE: 0.05 E9/L (ref 0.05–0.5)
EOSINOPHILS RELATIVE PERCENT: 4.4 % (ref 0–6)
GFR AFRICAN AMERICAN: >60
GFR NON-AFRICAN AMERICAN: >60 ML/MIN/1.73
GLUCOSE BLD-MCNC: 94 MG/DL (ref 74–99)
HCT VFR BLD CALC: 29.9 % (ref 34–48)
HEMOGLOBIN: 9.9 G/DL (ref 11.5–15.5)
LYMPHOCYTES ABSOLUTE: 0.31 E9/L (ref 1.5–4)
LYMPHOCYTES RELATIVE PERCENT: 23 % (ref 20–42)
MCH RBC QN AUTO: 32.8 PG (ref 26–35)
MCHC RBC AUTO-ENTMCNC: 33.1 % (ref 32–34.5)
MCV RBC AUTO: 99 FL (ref 80–99.9)
METAMYELOCYTES RELATIVE PERCENT: 0.9 % (ref 0–1)
MONOCYTES ABSOLUTE: 0.1 E9/L (ref 0.1–0.95)
MONOCYTES RELATIVE PERCENT: 8 % (ref 2–12)
NEUTROPHILS ABSOLUTE: 0.71 E9/L (ref 1.8–7.3)
NEUTROPHILS RELATIVE PERCENT: 58.4 % (ref 43–80)
NUCLEATED RED BLOOD CELLS: 0 /100 WBC
OVALOCYTES: ABNORMAL
PDW BLD-RTO: 17.1 FL (ref 11.5–15)
PLATELET # BLD: 128 E9/L (ref 130–450)
PMV BLD AUTO: 9.5 FL (ref 7–12)
POIKILOCYTES: ABNORMAL
POLYCHROMASIA: ABNORMAL
POTASSIUM SERPL-SCNC: 4.3 MMOL/L (ref 3.5–5)
RBC # BLD: 3.02 E12/L (ref 3.5–5.5)
SODIUM BLD-SCNC: 137 MMOL/L (ref 132–146)
TOTAL PROTEIN: 8.3 G/DL (ref 6.4–8.3)
WBC # BLD: 1.2 E9/L (ref 4.5–11.5)

## 2020-09-16 PROCEDURE — 82378 CARCINOEMBRYONIC ANTIGEN: CPT

## 2020-09-16 PROCEDURE — 36415 COLL VENOUS BLD VENIPUNCTURE: CPT

## 2020-09-16 PROCEDURE — 85025 COMPLETE CBC W/AUTO DIFF WBC: CPT

## 2020-09-16 PROCEDURE — 86300 IMMUNOASSAY TUMOR CA 15-3: CPT

## 2020-09-16 PROCEDURE — 80053 COMPREHEN METABOLIC PANEL: CPT

## 2020-09-18 ENCOUNTER — OFFICE VISIT (OUTPATIENT)
Dept: ONCOLOGY | Age: 70
End: 2020-09-18
Payer: MEDICARE

## 2020-09-18 ENCOUNTER — HOSPITAL ENCOUNTER (OUTPATIENT)
Dept: INFUSION THERAPY | Age: 70
Discharge: HOME | End: 2020-09-18
Payer: MEDICARE

## 2020-09-18 VITALS
BODY MASS INDEX: 23.32 KG/M2 | HEART RATE: 70 BPM | DIASTOLIC BLOOD PRESSURE: 61 MMHG | TEMPERATURE: 98.5 F | OXYGEN SATURATION: 100 % | WEIGHT: 140 LBS | HEIGHT: 65 IN | SYSTOLIC BLOOD PRESSURE: 130 MMHG

## 2020-09-18 DIAGNOSIS — C50.612 MALIGNANT NEOPLASM OF AXILLARY TAIL OF LEFT FEMALE BREAST, UNSPECIFIED ESTROGEN RECEPTOR STATUS (HCC): Primary | ICD-10-CM

## 2020-09-18 DIAGNOSIS — C79.51 CARCINOMA OF BREAST METASTATIC TO BONE, UNSPECIFIED LATERALITY (HCC): ICD-10-CM

## 2020-09-18 DIAGNOSIS — C50.919 CARCINOMA OF BREAST METASTATIC TO BONE, UNSPECIFIED LATERALITY (HCC): ICD-10-CM

## 2020-09-18 PROCEDURE — 4040F PNEUMOC VAC/ADMIN/RCVD: CPT | Performed by: INTERNAL MEDICINE

## 2020-09-18 PROCEDURE — G8420 CALC BMI NORM PARAMETERS: HCPCS | Performed by: INTERNAL MEDICINE

## 2020-09-18 PROCEDURE — 1036F TOBACCO NON-USER: CPT | Performed by: INTERNAL MEDICINE

## 2020-09-18 PROCEDURE — 99213 OFFICE O/P EST LOW 20 MIN: CPT

## 2020-09-18 PROCEDURE — G8427 DOCREV CUR MEDS BY ELIG CLIN: HCPCS | Performed by: INTERNAL MEDICINE

## 2020-09-18 PROCEDURE — 1123F ACP DISCUSS/DSCN MKR DOCD: CPT | Performed by: INTERNAL MEDICINE

## 2020-09-18 PROCEDURE — 6360000002 HC RX W HCPCS: Performed by: INTERNAL MEDICINE

## 2020-09-18 PROCEDURE — 96372 THER/PROPH/DIAG INJ SC/IM: CPT

## 2020-09-18 PROCEDURE — 1090F PRES/ABSN URINE INCON ASSESS: CPT | Performed by: INTERNAL MEDICINE

## 2020-09-18 PROCEDURE — G8400 PT W/DXA NO RESULTS DOC: HCPCS | Performed by: INTERNAL MEDICINE

## 2020-09-18 PROCEDURE — 3017F COLORECTAL CA SCREEN DOC REV: CPT | Performed by: INTERNAL MEDICINE

## 2020-09-18 PROCEDURE — 99214 OFFICE O/P EST MOD 30 MIN: CPT | Performed by: INTERNAL MEDICINE

## 2020-09-18 RX ORDER — PALBOCICLIB 75 MG/1
75 TABLET, FILM COATED ORAL DAILY
Qty: 21 TABLET | Refills: 1 | Status: SHIPPED
Start: 2020-10-23 | End: 2020-11-20 | Stop reason: SDUPTHER

## 2020-09-18 RX ORDER — PALBOCICLIB 75 MG/1
75 TABLET, FILM COATED ORAL DAILY
Qty: 21 TABLET | Refills: 0 | COMMUNITY
Start: 2020-09-25 | End: 2020-10-16 | Stop reason: ALTCHOICE

## 2020-09-18 RX ORDER — LETROZOLE 2.5 MG/1
2.5 TABLET, FILM COATED ORAL DAILY
Qty: 30 TABLET | Refills: 3 | Status: SHIPPED
Start: 2020-09-18 | End: 2020-11-20 | Stop reason: SDUPTHER

## 2020-09-18 RX ADMIN — DENOSUMAB 120 MG: 120 INJECTION SUBCUTANEOUS at 11:09

## 2020-09-18 NOTE — PROGRESS NOTES
Spoke with patient about dose reduction of oral chemotherapy (Ibrance 75 mg). Start of therapy on dose reduced 75 mg Shabana Del Angel will be on 9-25-20     Samples of Ibrance 75 mg were given to the patient, quantity 21, Lot Number [id number], EXP 05/2021. We went over the protocol to be used, what to expect as far as side effects, and when to call with problems. This was intended to reinforce the education done by Dr. Julio Rhodes. Patient was provided medication handouts to the patient to take home and told patient to call if there are any questions once they had a chance to absorb the information. Patient had the opportunity to ask questions with all questions being answered to their satisfaction. The patient expresses understanding and acceptance of instructions.     Electronically signed by Rylee Roy [facility] on 9/18/2020 at 10:17 AM

## 2020-09-19 LAB — CA 15-3: 17 U/ML (ref 0–31)

## 2020-09-19 NOTE — PROGRESS NOTES
[address] MED ONCOLOGY  [facility] [facility] [id number]  Dept: [phone]  Attending Progress Note      Reason for Visit:   Metastatic breast cancer. PCP:  Enedina Rucker DO    History of Present Illness: The patient is a 65 y.o. lady, with a past medical history significant for hydrocephalus, who had presented with left breast changes, she had noticed a dark spot on her left breast, then she had developed induration, she thinks that she had the breast changes for months. The patient had never had a mammogram done, had a CT scan of the chest done, revealing a solid left breast mass measuring at least 6.8 x 2.9 cm in size is identified. The mass appears to extend to the skin surface. The deep margin of the mass abuts the chest wall but a fat plane appears to be preserved. Pathologically enlarged left  axillary lymph nodes are seen measuring up to 4.5 x 1.9 cm in size. CT abdomen and pelvis was done on 8/30/2019 revealing partial visualization of soft tissue mass in the left breast, nonspecific lymph nodes in the upper abdomen. Bone scan was done on 8/30/2019, revealing a Prominent focus of radiotracer uptake in the left anterior superior iliac spine, with corresponding abnormality on recent CT, suggestive of metastasis. More subtle focal radiotracer uptake in the right anterior superior iliac spine could be due to metastasis or  degenerative change.   2. Focal radiotracer uptake in the lateral aspect of the right ninth  rib, which appears to be due to a nondisplaced healing fracture. Correlation with history of recent trauma is recommended. In light of  other findings, this could represent a pathologic fracture with an  underlying metastatic lesion. 3. Osteoarthritic uptake in the shoulders, knees, wrists, and hands.     She underwent on 8/30/2019 a biopsy of 1 of the left axillary lymph nodes, she was consistent with metastatic ductal carcinoma, education level: Not on file   Occupational History    Not on file   Social Needs    Financial resource strain: Not on file    Food insecurity     Worry: Not on file     Inability: Not on file    Transportation needs     Medical: Not on file     Non-medical: Not on file   Tobacco Use    Smoking status: Never Smoker    Smokeless tobacco: Never Used   Substance and Sexual Activity    Alcohol use: Yes     Comment: drinks it occasional on social events    Drug use: No    Sexual activity: Never   Lifestyle    Physical activity     Days per week: Not on file     Minutes per session: Not on file    Stress: Not on file   Relationships    Social connections     Talks on phone: Not on file     Gets together: Not on file     Attends Sabianist service: Not on file     Active member of club or organization: Not on file     Attends meetings of clubs or organizations: Not on file     Relationship status: Not on file    Intimate partner violence     Fear of current or ex partner: Not on file     Emotionally abused: Not on file     Physically abused: Not on file     Forced sexual activity: Not on file   Other Topics Concern    Not on file   Social History Narrative    Not on file       Allergies:  No Known Allergies    Physical Exam:  /61   Pulse 70   Temp 98.5 °F (36.9 °C)   Ht 5' 5\" (1.651 m)   Wt 140 lb (63.5 kg)   SpO2 100%   BMI 23.30 kg/m²     GENERAL: Alert, oriented x 3, not in acute distress. HEENT: PERRLA; EOMI. Oropharynx clear. NECK: Supple. No palpable cervical or supraclavicular lymphadenopathy. LUNGS: Good air entry bilaterally. No wheezing, crackles or rhonchi. CARDIOVASCULAR: Regular rate. No murmurs, rubs or gallops.    BREASTS: The right breast exam is negative for any skin changes, nipple discharge, no palpable masses, no palpable right axillary adenopathy, left breast appearance had markedly improved, the nodules had resolved, the mass decreased in size, no drainage, no bleeding, decrease in the size of the left axillary lymphadenopathy  ABDOMEN: Soft. Non-tender, non-distended. Positive bowel sounds. EXTREMITIES: mild chronic edema of the LLE  NEUROLOGIC: No focal deficits. ECOG PS 1     Impression/Plan:     The patient is a 65 y.o. lady, with a past medical history significant for hydrocephalus, who had presented with left breast changes, who was diagnosed with a left breast ductal carcinoma, ER positive greater than 95% [place] +70% HER-2/elder 1+, she has a very locally advanced disease, and metastatic disease to the bones. I discussed with the patient and her sister today her diagnosis, prognosis and recommendations for treatment. Will complete the staging work-up, the patient will have a brain MRI with and without contrast done, and a PET scan. For her HR pos, HER-2/elder negative metastatic breast cancer, I recommended treatment with Femara and Ibrance, the schedule and side effects of the treatment were reviewed with her. She has metastatic disease to the bones, recommended treatment with Xgeva. MRI brain done on 09/16/2019: no evidence of metastatic disease. PET/CT on 09/17/2019: The lytic soft tissue finding in the iliac crest in the left pelvis shows hypermetabolic FDG uptake, with maximum measurements in the 5.7 SUV range, strongly suggesting metastatic disease. She has uptake in the thyroid, could be physiologic. Femara/Ibrance were started on 09/13/2019. Bone scan done on 12/17/2019: Redemonstration of focus of radiotracer uptake involving posterior lateral right ninth rib suggestive of healing fracture. New radiotracer activity is seen at level of lateral right 10th rib and anterior right seventh rib which may be related to new healing fractures. Metastases is unlikely. Clinical correlation recommended. No additional abnormal areas of increased radiotracer uptake.     CT chest done on 12/17/2019: Significant interval decrease in size of the large mass seen in the left breast 8/29/2019. Interval decrease in size of enlarged left axillary lymph nodes. Cardiomegaly. Enlarged thyroid, unchanged. Small hiatal hernia. Diffuse interstitial changes and multifocal atelectasis or scarring in both lungs. Stable small nodular density in the right upper lobe. Sclerotic changes involving the lateral right ninth rib. CT abdomen/pelvis done on 12/17/2019: Small right renal cyst. Retroverted uterus. Distended colon and rectum containing a large amount of stool. Mildly enlarged retroperitoneal and periportal lymph nodes, unchanged. Findings in the left iliac bone as described. The results/images of the scans were reviewed with the patient and her sister, she is responding nicely to the Femara and Patience Rolling, will continue the same treatment. She did have teeth extraction on 1/20/2020, dental clearance has been obtained. On 4/3/2020, the patient was started on Ibrance and Femara cycle #8. Restaging CT Chest/Abdomen/Pelvis and bone scan on 04/24/2020:  CT Chest noted stable small bilateral pulmonary nodules; CT abdomen/pelvis no evidence of new active neoplasm compared to prior. Bone scan 3 foci of radiotracer uptake in the lower right ribs, 2 of which demonstrate less activity on the current study, possibly indicative of healing fractures. Radiotracer in the more anterior focus is more persistent, and may be a metastatic lesion. The results/images of the scans were reviewed with the patient and her sister, the patient has an overall stable disease. Recommended to continue with Femara and Ibrance. Presents today 9/18/2020 for a follow-up visit, she had completed 13 cycles of Femara and Ibrance, she has leukopenia, white count 1.2, grade 3 neutropenia, the patient will start Ibrance in 1 week, the dose will be reduced to 75 g p.o. daily. New prescription was sent to specialty pharmacy. She is scheduled for Xgeva today 9/19/2020, labs reviewed, proceed with Xgeva.   We will order restaging PET scan to be done prior to her next visit. 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 RTC in 5 weeks. Thank you for allowing us to participate in the care of Ms. Willene Favre.     Sandi Covington MD   HEMATOLOGY/MEDICAL ONCOLOGY  [address] [place] [address] ONCOLOGY  [facility] [address] [facility] [id number]  Dept: [phone]

## 2020-09-29 ENCOUNTER — OFFICE VISIT (OUTPATIENT)
Dept: FAMILY MEDICINE CLINIC | Age: 70
End: 2020-09-29
Payer: MEDICARE

## 2020-09-29 VITALS
OXYGEN SATURATION: 97 % | TEMPERATURE: 99.8 F | DIASTOLIC BLOOD PRESSURE: 64 MMHG | HEIGHT: 65 IN | RESPIRATION RATE: 18 BRPM | BODY MASS INDEX: 23.32 KG/M2 | HEART RATE: 77 BPM | WEIGHT: 140 LBS | SYSTOLIC BLOOD PRESSURE: 102 MMHG

## 2020-09-29 PROCEDURE — G8427 DOCREV CUR MEDS BY ELIG CLIN: HCPCS | Performed by: FAMILY MEDICINE

## 2020-09-29 PROCEDURE — 4040F PNEUMOC VAC/ADMIN/RCVD: CPT | Performed by: FAMILY MEDICINE

## 2020-09-29 PROCEDURE — 99214 OFFICE O/P EST MOD 30 MIN: CPT | Performed by: FAMILY MEDICINE

## 2020-09-29 PROCEDURE — G8420 CALC BMI NORM PARAMETERS: HCPCS | Performed by: FAMILY MEDICINE

## 2020-09-29 PROCEDURE — 3017F COLORECTAL CA SCREEN DOC REV: CPT | Performed by: FAMILY MEDICINE

## 2020-09-29 PROCEDURE — 1123F ACP DISCUSS/DSCN MKR DOCD: CPT | Performed by: FAMILY MEDICINE

## 2020-09-29 PROCEDURE — G8400 PT W/DXA NO RESULTS DOC: HCPCS | Performed by: FAMILY MEDICINE

## 2020-09-29 PROCEDURE — 1090F PRES/ABSN URINE INCON ASSESS: CPT | Performed by: FAMILY MEDICINE

## 2020-09-29 PROCEDURE — 1036F TOBACCO NON-USER: CPT | Performed by: FAMILY MEDICINE

## 2020-09-29 RX ORDER — QUETIAPINE FUMARATE 25 MG/1
25 TABLET, FILM COATED ORAL NIGHTLY
Qty: 30 TABLET | Refills: 3 | Status: SHIPPED
Start: 2020-09-29 | End: 2020-11-02 | Stop reason: ALTCHOICE

## 2020-09-29 RX ORDER — ESCITALOPRAM OXALATE 5 MG/1
5 TABLET ORAL DAILY
Qty: 30 TABLET | Refills: 5 | Status: SHIPPED
Start: 2020-09-29 | End: 2021-11-17

## 2020-09-29 ASSESSMENT — PATIENT HEALTH QUESTIONNAIRE - PHQ9
2. FEELING DOWN, DEPRESSED OR HOPELESS: 0
SUM OF ALL RESPONSES TO PHQ9 QUESTIONS 1 & 2: 0
1. LITTLE INTEREST OR PLEASURE IN DOING THINGS: 0
SUM OF ALL RESPONSES TO PHQ QUESTIONS 1-9: 0
SUM OF ALL RESPONSES TO PHQ QUESTIONS 1-9: 0

## 2020-09-29 NOTE — PROGRESS NOTES
Chief Complaint   Patient presents with    Follow-up       HPI:  Patient is here for follow-up of being in a nursing home due to low potassium. Per her sister in law, she was called and told that she needed help. Her sister in law about an hour and a half away. When they arrived at her house, she was too weak to move. They wanted to call an ambulance and she refused. They fed her and got her on the couch. The next day, her sister in law found her on the floor again. She was diagnosed with hydrocephalus. She was transferred to [facility]. They found that her potassium was low. They recommended that she go to rehab because she was so weak. She did rehab for 1 month. She states did not want to be there so her sister in law brought her home. She is still living alone. They recommended discharged to assisted living but she removed so is home living alone. She was discharged from nursing home September 11th. She has meals prepared by her sister in law. She is refusing home health. She is also refusing any assistive device. patient was given escitalopram 5mg and Seroquel 25mg  And wanted to know why she was on them. She was started on the medications at assisted living. Patient's past medical, surgical, social and/or family history reviewed, updated in chart, and are non-contributory (unless otherwise stated). Medications and allergies also reviewed and updated in chart.     Review of Systems:  Constitutional:  No fever, + fatigue, no chills, no headaches, no weight change  Dermatology:  No rash, no mole, no dry or sensitive skin  ENT:  No cough, no sore throat, no sinus pain, no runny nose, no ear pain  Cardiology:  No chest pain, no palpitations, no leg edema, no shortness of breath, no PND  Gastroenterology:  No dysphagia, no abdominal pain, no nausea, no vomiting, no constipation, no diarrhea, no heartburn  Musculoskeletal:  No joint pain, no leg cramps, no back pain, no muscle aches  Respiratory:  No shortness of breath, no orthopnea, no wheezing, no SCHAFFER, no hemoptysis  Urology:  No blood in the urine, no urinary frequency, no urinary incontinence, no urinary urgency, no nocturia, no dysuria      Vitals:    09/29/20 1153   BP: 102/64   Site: Right Upper Arm   Position: Sitting   Cuff Size: Large Adult   Pulse: 77   Resp: 18   Temp: 99.8 °F (37.7 °C)   TempSrc: Infrared   SpO2: 97%   Weight: 140 lb (63.5 kg)   Height: 5' 5\" (1.651 m)       Physical Exam  Vitals signs and nursing note reviewed. Constitutional:       Appearance: She is well-developed. HENT:      Head: Normocephalic and atraumatic. Right Ear: External ear normal.      Left Ear: External ear normal.      Nose: Nose normal.   Eyes:      Conjunctiva/sclera: Conjunctivae normal.      Pupils: Pupils are equal, round, and reactive to light. Neck:      Musculoskeletal: Normal range of motion and neck supple. Thyroid: No thyromegaly. Cardiovascular:      Rate and Rhythm: Normal rate and regular rhythm. Heart sounds: Normal heart sounds. Pulmonary:      Effort: Pulmonary effort is normal.      Breath sounds: Normal breath sounds. No wheezing. Abdominal:      General: Bowel sounds are normal.      Palpations: Abdomen is soft. Tenderness: There is no abdominal tenderness. Musculoskeletal: Normal range of motion. Skin:     General: Skin is warm and dry. Findings: No rash. Neurological:      Mental Status: She is alert and oriented to person, place, and time. Deep Tendon Reflexes: Reflexes are normal and symmetric. Psychiatric:         Behavior: Behavior is agitated. Assessment/Plan:      Juanita Fuentes was seen today for follow-up. Diagnoses and all orders for this visit:    Hydrocephalus, unspecified type ([address]  Needs to follow up with neurosurgery    Chronic fatigue, unspecified   Likely secondary to cancer.    Anemia noted on most recent labs    Hypokalemia  Resolved    Agitation  - QUEtiapine (SEROQUEL) 25 MG tablet; Take 1 tablet by mouth nightly  -     escitalopram (LEXAPRO) 5 MG tablet; Take 1 tablet by mouth daily  Patient very agitated when she got to the office today. Fighting with sister in law  Actually left office and said she was walking home. Sister in law able to get her back in office. As above. Call or go to ED immediately if symptoms worsen or persist.  Return in about 3 months (around 12/29/2020) for AWV., or sooner if necessary. Educational materials and/or home exercises printed for patient's review and were included in patient instructions on his/her After Visit Summary and given to patient at the end of visit. Counseled regarding above diagnosis, including possible risks and complications,  especially if left uncontrolled. Counseled regarding the possible side effects, risks, benefits and alternatives to treatment; patient and/or guardian verbalizes understanding, agrees, feels comfortable with and wishes to proceed with above treatment plan. Advised patient to call with any new medication issues, and read all Rx info from pharmacy to assure aware of all possible risks and side effects of medication before taking. Reviewed age and gender appropriate health screening exams and vaccinations. Advised patient regarding importance of keeping up with recommended health maintenance and to schedule as soon as possible if overdue, as this is important in assessing for undiagnosed pathology, especially cancer, as well as protecting against potentially harmful/life threatening disease. Patient and/or guardian verbalizes understanding and agrees with above counseling, assessment and plan. All questions answered. Remigio Sullivan DO  9/29/2020    I have personally reviewed and updated the chief complaint, HPI, Past Medical, Family and Social History, as well as the above Review of Systems.

## 2020-09-29 NOTE — PATIENT INSTRUCTIONS
Patient Education        Learning About Pseudotumor Cerebri  What is pseudotumor cerebri? Pseudotumor cerebri (say \"dqk-oxo-KKK-christal SAIR-uh-henri\") is an increase in pressure of the fluid that surrounds the brain. Your doctor may also call the condition \"idiopathic intracranial hypertension. \" Normally, this clear fluid acts like a buffer to protect the brain. It is called cerebrospinal fluid, or CSF. It's not clear what makes the CSF pressure rise. Some medicines may cause it. Sleep apnea, obesity, and anemia may also play a part. The pressure may build over time. The rising pressure can make the optic nerve swell. The optic nerve is at the back of the eye. It carries visual information from the eye to the brain. The swelling may damage the nerve and lead to permanent loss of some or all of the eyesight. There are several symptoms of rising CSF pressure. Some symptoms may be present all the time. Some might come and go. Symptoms include:  · Severe headaches. They sometimes come with nausea and vomiting. The pain may be centered behind the eyes. · A hissing or roaring sound in the ears that keeps time with your heartbeat. · Problems with vision. You may not be able to see well at the edge of your field of view. You may also lose center vision. It may happen now and then, in one or both eyes, and last for a few seconds at a time. The word \"pseudotumor\" may sound scary. But it's not a brain tumor. The condition gets its name because the pressure inside the skull can mimic what happens when a person has a tumor. How is it treated? · If you are taking medicines that could be raising your CSF pressure, your doctor may change your medicines. · You may get medicines to help your body make less CSF. This can help lower the pressure around the brain. · Your doctor may also give you medicine for headaches.   · If sleep apnea, obesity, or anemia may be causing the CSF pressure to rise, your doctor may treat those problems. · If your vision is getting worse, you may have surgery to make a small opening on the optic nerve to reduce swelling. · Your doctor may implant small tubes inside the skull to drain the extra fluid. This helps lower the pressure around the brain. Follow-up care is a key part of your treatment and safety. Be sure to make and go to all appointments, and call your doctor if you are having problems. It's also a good idea to know your test results and keep a list of the medicines you take. Where can you learn more? Go to https://P-CommercepeLuxola.[employer]. org and sign in to your Exploretrip account. Enter [phone] in the Giphy box to learn more about \"Learning About Pseudotumor Cerebri. \"     If you do not have an account, please click on the \"Sign Up Now\" link. Current as of: December 18, 2019               Content Version: 12.5  © 0531-3711 Healthwise, Incorporated. Care instructions adapted under license by [facility] ([facility]). If you have questions about a medical condition or this instruction, always ask your healthcare professional. [place] [address] any warranty or liability for your use of this information.

## 2020-09-30 NOTE — TELEPHONE ENCOUNTER
As discussed yesterday start by weaning off of the seroquel, recommend every other day for 1-2 weeks and then stop. Once off of seroquel for one month, recommend same weaning schedule with the lexapro.

## 2020-10-12 ENCOUNTER — HOSPITAL ENCOUNTER (OUTPATIENT)
Dept: MRI IMAGING | Age: 70
Discharge: HOME | End: 2020-10-14
Payer: MEDICARE

## 2020-10-12 PROCEDURE — 70551 MRI BRAIN STEM W/O DYE: CPT

## 2020-10-21 ENCOUNTER — HOSPITAL ENCOUNTER (OUTPATIENT)
Age: 70
Discharge: HOME | End: 2020-10-21
Payer: MEDICARE

## 2020-10-21 LAB
ALBUMIN SERPL-MCNC: 4.2 G/DL (ref 3.5–5.2)
ALP BLD-CCNC: 83 U/L (ref 35–104)
ALT SERPL-CCNC: 22 U/L (ref 0–32)
ANION GAP SERPL CALCULATED.3IONS-SCNC: 6 MMOL/L (ref 7–16)
AST SERPL-CCNC: 31 U/L (ref 0–31)
BASOPHILS ABSOLUTE: 0.05 E9/L (ref 0–0.2)
BASOPHILS RELATIVE PERCENT: 3 % (ref 0–2)
BILIRUB SERPL-MCNC: 0.4 MG/DL (ref 0–1.2)
BUN BLDV-MCNC: 23 MG/DL (ref 8–23)
CALCIUM SERPL-MCNC: 9.5 MG/DL (ref 8.6–10.2)
CEA: 1.4 NG/ML (ref 0–5.2)
CHLORIDE BLD-SCNC: 106 MMOL/L (ref 98–107)
CO2: 25 MMOL/L (ref 22–29)
CREAT SERPL-MCNC: 0.8 MG/DL (ref 0.5–1)
EOSINOPHILS ABSOLUTE: 0.05 E9/L (ref 0.05–0.5)
EOSINOPHILS RELATIVE PERCENT: 3 % (ref 0–6)
GFR AFRICAN AMERICAN: >60
GFR NON-AFRICAN AMERICAN: >60 ML/MIN/1.73
GLUCOSE BLD-MCNC: 91 MG/DL (ref 74–99)
HCT VFR BLD CALC: 33 % (ref 34–48)
HEMOGLOBIN: 10.9 G/DL (ref 11.5–15.5)
IMMATURE GRANULOCYTES #: 0.01 E9/L
IMMATURE GRANULOCYTES %: 0.6 % (ref 0–5)
LYMPHOCYTES ABSOLUTE: 0.45 E9/L (ref 1.5–4)
LYMPHOCYTES RELATIVE PERCENT: 26.6 % (ref 20–42)
MCH RBC QN AUTO: 33.4 PG (ref 26–35)
MCHC RBC AUTO-ENTMCNC: 33 % (ref 32–34.5)
MCV RBC AUTO: 101.2 FL (ref 80–99.9)
MONOCYTES ABSOLUTE: 0.24 E9/L (ref 0.1–0.95)
MONOCYTES RELATIVE PERCENT: 14.2 % (ref 2–12)
NEUTROPHILS ABSOLUTE: 0.89 E9/L (ref 1.8–7.3)
NEUTROPHILS RELATIVE PERCENT: 52.6 % (ref 43–80)
PDW BLD-RTO: 15.2 FL (ref 11.5–15)
PLATELET # BLD: 154 E9/L (ref 130–450)
PMV BLD AUTO: 9.9 FL (ref 7–12)
POTASSIUM SERPL-SCNC: 4.2 MMOL/L (ref 3.5–5)
RBC # BLD: 3.26 E12/L (ref 3.5–5.5)
SODIUM BLD-SCNC: 137 MMOL/L (ref 132–146)
TOTAL PROTEIN: 8.8 G/DL (ref 6.4–8.3)
WBC # BLD: 1.7 E9/L (ref 4.5–11.5)

## 2020-10-21 PROCEDURE — 82378 CARCINOEMBRYONIC ANTIGEN: CPT

## 2020-10-21 PROCEDURE — 86300 IMMUNOASSAY TUMOR CA 15-3: CPT

## 2020-10-21 PROCEDURE — 80053 COMPREHEN METABOLIC PANEL: CPT

## 2020-10-21 PROCEDURE — 85025 COMPLETE CBC W/AUTO DIFF WBC: CPT

## 2020-10-21 PROCEDURE — 36415 COLL VENOUS BLD VENIPUNCTURE: CPT

## 2020-10-23 ENCOUNTER — HOSPITAL ENCOUNTER (OUTPATIENT)
Dept: INFUSION THERAPY | Age: 70
Discharge: HOME | End: 2020-10-23
Payer: MEDICARE

## 2020-10-23 ENCOUNTER — OFFICE VISIT (OUTPATIENT)
Dept: ONCOLOGY | Age: 70
End: 2020-10-23
Payer: MEDICARE

## 2020-10-23 VITALS
SYSTOLIC BLOOD PRESSURE: 118 MMHG | HEIGHT: 66 IN | DIASTOLIC BLOOD PRESSURE: 59 MMHG | OXYGEN SATURATION: 99 % | WEIGHT: 139.8 LBS | BODY MASS INDEX: 22.47 KG/M2 | HEART RATE: 64 BPM | TEMPERATURE: 98 F

## 2020-10-23 DIAGNOSIS — C50.612 MALIGNANT NEOPLASM OF AXILLARY TAIL OF LEFT FEMALE BREAST, UNSPECIFIED ESTROGEN RECEPTOR STATUS (HCC): Primary | ICD-10-CM

## 2020-10-23 DIAGNOSIS — C50.919 CARCINOMA OF BREAST METASTATIC TO BONE, UNSPECIFIED LATERALITY (HCC): ICD-10-CM

## 2020-10-23 DIAGNOSIS — C79.51 CARCINOMA OF BREAST METASTATIC TO BONE, UNSPECIFIED LATERALITY (HCC): ICD-10-CM

## 2020-10-23 PROCEDURE — 99212 OFFICE O/P EST SF 10 MIN: CPT

## 2020-10-23 PROCEDURE — 1036F TOBACCO NON-USER: CPT | Performed by: INTERNAL MEDICINE

## 2020-10-23 PROCEDURE — 99214 OFFICE O/P EST MOD 30 MIN: CPT | Performed by: INTERNAL MEDICINE

## 2020-10-23 PROCEDURE — 1123F ACP DISCUSS/DSCN MKR DOCD: CPT | Performed by: INTERNAL MEDICINE

## 2020-10-23 PROCEDURE — 3017F COLORECTAL CA SCREEN DOC REV: CPT | Performed by: INTERNAL MEDICINE

## 2020-10-23 PROCEDURE — G8420 CALC BMI NORM PARAMETERS: HCPCS | Performed by: INTERNAL MEDICINE

## 2020-10-23 PROCEDURE — G8427 DOCREV CUR MEDS BY ELIG CLIN: HCPCS | Performed by: INTERNAL MEDICINE

## 2020-10-23 PROCEDURE — 1090F PRES/ABSN URINE INCON ASSESS: CPT | Performed by: INTERNAL MEDICINE

## 2020-10-23 PROCEDURE — G8484 FLU IMMUNIZE NO ADMIN: HCPCS | Performed by: INTERNAL MEDICINE

## 2020-10-23 PROCEDURE — 96372 THER/PROPH/DIAG INJ SC/IM: CPT

## 2020-10-23 PROCEDURE — 6360000002 HC RX W HCPCS: Performed by: INTERNAL MEDICINE

## 2020-10-23 PROCEDURE — 4040F PNEUMOC VAC/ADMIN/RCVD: CPT | Performed by: INTERNAL MEDICINE

## 2020-10-23 PROCEDURE — G8400 PT W/DXA NO RESULTS DOC: HCPCS | Performed by: INTERNAL MEDICINE

## 2020-10-23 RX ADMIN — DENOSUMAB 120 MG: 120 INJECTION SUBCUTANEOUS at 11:07

## 2020-10-23 NOTE — PROGRESS NOTES
[address] MED ONCOLOGY  [facility] system [address]  Dept: [phone]  Attending Progress Note      Reason for Visit:   Metastatic breast cancer. PCP:  Divine Delvalle DO    History of Present Illness: The patient is a 65 y.o. lady, with a past medical history significant for hydrocephalus, who had presented with left breast changes, she had noticed a dark spot on her left breast, then she had developed induration, she thinks that she had the breast changes for months. The patient had never had a mammogram done, had a CT scan of the chest done, revealing a solid left breast mass measuring at least 6.8 x 2.9 cm in size is identified. The mass appears to extend to the skin surface. The deep margin of the mass abuts the chest wall but a fat plane appears to be preserved. Pathologically enlarged left  axillary lymph nodes are seen measuring up to 4.5 x 1.9 cm in size. CT abdomen and pelvis was done on 8/30/2019 revealing partial visualization of soft tissue mass in the left breast, nonspecific lymph nodes in the upper abdomen. Bone scan was done on 8/30/2019, revealing a Prominent focus of radiotracer uptake in the left anterior superior iliac spine, with corresponding abnormality on recent CT, suggestive of metastasis. More subtle focal radiotracer uptake in the right anterior superior iliac spine could be due to metastasis or  degenerative change.   2. Focal radiotracer uptake in the lateral aspect of the right ninth  rib, which appears to be due to a nondisplaced healing fracture. Correlation with history of recent trauma is recommended. In light of  other findings, this could represent a pathologic fracture with an  underlying metastatic lesion. 3. Osteoarthritic uptake in the shoulders, knees, wrists, and hands.     She underwent on 8/30/2019 a biopsy of 1 of the left axillary lymph nodes, she was consistent with metastatic ductal carcinoma, nuclear grade 2, ER positive greater than 95% NM +70% HER-2/elder 1+, by IHC, negative. The patient was started on systemic therapy with Femara and Ibrance on 9/13/2019. She denies any side effects from the treatment. She denies any new problems. The appearance of the left breast has significant improved, no drainage. She is accompanied by her sister. No new problems. No reported side effects from the [place] and Femara. Review of Systems;  CONSTITUTIONAL: No fever, chills. Decreased appetite and energy level. ENMT: Eyes: No diplopia; Nose: No epistaxis. Mouth: No sore throat. RESPIRATORY: No hemoptysis, shortness of breath, cough. CARDIOVASCULAR: No chest pain, palpitations. GASTROINTESTINAL: No nausea/vomiting, abdominal pain, diarrhea/constipation. GENITOURINARY: No dysuria, urinary frequency, hematuria. NEURO: No syncope, presyncope, headache. MSK: She has pain in the left hip. Remainder:  ROS NEGATIVE    Past Medical History:      Diagnosis Date    Arm fracture, left 2009    Balance problems since April, 2011    Hydrocephalus [facility])     compensated    Thyroid disease     Vertigo      Patient Active Problem List   Diagnosis    Hydrocephalus, adult ([address]    Hydrocephalus ([address]    Hyponatremia    Breast mass    Malignant neoplasm of axillary tail of left female breast ([address]    Breast cancer metastasized to bone (HCC)    Carcinoma of breast metastatic to bone ([address]    Anemia        Past Surgical History:      Procedure Laterality Date    ARM SURGERY  9/15/2009    FRACTURE SURGERY      HUMERUS FRACTURE SURGERY  2009    Broke left arm        Family History:  Family History   Problem Relation Age of Onset    Colon Cancer Mother 68    Heart Disease Father     Diabetes Father        Medications:  Reviewed and reconciled.     Social History:  Social History     Socioeconomic History    Marital status: Single     Spouse name: Not on file    Number of children: Not on file    Years of education: Not on file    Highest education level: Not on file   Occupational History    Not on file   Social Needs    Financial resource strain: Not on file    Food insecurity     Worry: Not on file     Inability: Not on file    Transportation needs     Medical: Not on file     Non-medical: Not on file   Tobacco Use    Smoking status: Never Smoker    Smokeless tobacco: Never Used   Substance and Sexual Activity    Alcohol use: Yes     Comment: drinks it occasional on social events    Drug use: No    Sexual activity: Never   Lifestyle    Physical activity     Days per week: Not on file     Minutes per session: Not on file    Stress: Not on file   Relationships    Social connections     Talks on phone: Not on file     Gets together: Not on file     Attends Worship service: Not on file     Active member of club or organization: Not on file     Attends meetings of clubs or organizations: Not on file     Relationship status: Not on file    Intimate partner violence     Fear of current or ex partner: Not on file     Emotionally abused: Not on file     Physically abused: Not on file     Forced sexual activity: Not on file   Other Topics Concern    Not on file   Social History Narrative    Not on file       Allergies:  No Known Allergies    Physical Exam:  BP (!) 118/59 (Site: Right Upper Arm, Position: Sitting, Cuff Size: Medium Adult)   Pulse 64   Temp 98 °F (36.7 °C) (Temporal)   Ht 5' 6\" (1.676 m)   Wt 139 lb 12.8 oz (63.4 kg)   SpO2 99%   BMI 22.56 kg/m²     GENERAL: Alert, oriented x 3, not in acute distress. HEENT: PERRLA; EOMI. Oropharynx clear. NECK: Supple. No palpable cervical or supraclavicular lymphadenopathy. LUNGS: Good air entry bilaterally. No wheezing, crackles or rhonchi. CARDIOVASCULAR: Regular rate. No murmurs, rubs or gallops.    BREASTS: The right breast exam is negative for any skin changes, nipple discharge, no palpable masses, no palpable right axillary adenopathy, left breast appearance had markedly improved, the nodules had resolved, the mass decreased in size, no drainage, no bleeding, decrease in the size of the left axillary lymphadenopathy  ABDOMEN: Soft. Non-tender, non-distended. Positive bowel sounds. EXTREMITIES: mild chronic edema of the LLE  NEUROLOGIC: No focal deficits. ECOG PS 1     Impression/Plan:     The patient is a 65 y.o. lady, with a past medical history significant for hydrocephalus, who had presented with left breast changes, who was diagnosed with a left breast ductal carcinoma, ER positive greater than 95% ME +70% HER-2/elder 1+, she has a very locally advanced disease, and metastatic disease to the bones. I discussed with the patient and her sister today her diagnosis, prognosis and recommendations for treatment. Will complete the staging work-up, the patient will have a brain MRI with and without contrast done, and a PET scan. For her HR pos, HER-2/elder negative metastatic breast cancer, I recommended treatment with Femara and Ibrance, the schedule and side effects of the treatment were reviewed with her. She has metastatic disease to the bones, recommended treatment with Xgeva. MRI brain done on 09/16/2019: no evidence of metastatic disease. PET/CT on 09/17/2019: The lytic soft tissue finding in the iliac crest in the left pelvis shows hypermetabolic FDG uptake, with maximum measurements in the 5.7 SUV range, strongly suggesting metastatic disease. She has uptake in the thyroid, could be physiologic. Femara/Ibrance were started on 09/13/2019. Bone scan done on 12/17/2019: Redemonstration of focus of radiotracer uptake involving posterior lateral right ninth rib suggestive of healing fracture. New radiotracer activity is seen at level of lateral right 10th rib and anterior right seventh rib which may be related to new healing fractures. Metastases is unlikely. Clinical correlation recommended.  No additional abnormal areas of next cycle after she had the blood work done prior. She is scheduled for Xgeva today 1023/2020, labs reviewed, proceed with Xgeva. A PET scan has been ordered for restaging, and is pending. 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 RTC in 4 weeks. Thank you for allowing us to participate in the care of Ms. Gissell George.     Clayton Kim MD   HEMATOLOGY/MEDICAL ONCOLOGY  [address] [place] [address] ONCOLOGY  [facility] [address] [facility] [address]  Dept: [phone]

## 2020-10-24 LAB — CA 15-3: 16 U/ML (ref 0–31)

## 2020-11-02 ENCOUNTER — OFFICE VISIT (OUTPATIENT)
Dept: FAMILY MEDICINE CLINIC | Age: 70
End: 2020-11-02
Payer: MEDICARE

## 2020-11-02 VITALS
BODY MASS INDEX: 22.98 KG/M2 | SYSTOLIC BLOOD PRESSURE: 108 MMHG | HEART RATE: 74 BPM | DIASTOLIC BLOOD PRESSURE: 60 MMHG | WEIGHT: 143 LBS | HEIGHT: 66 IN | TEMPERATURE: 98.1 F | OXYGEN SATURATION: 98 % | RESPIRATION RATE: 16 BRPM

## 2020-11-02 PROCEDURE — 99213 OFFICE O/P EST LOW 20 MIN: CPT | Performed by: FAMILY MEDICINE

## 2020-11-02 PROCEDURE — G8427 DOCREV CUR MEDS BY ELIG CLIN: HCPCS | Performed by: FAMILY MEDICINE

## 2020-11-02 PROCEDURE — 4040F PNEUMOC VAC/ADMIN/RCVD: CPT | Performed by: FAMILY MEDICINE

## 2020-11-02 PROCEDURE — G8420 CALC BMI NORM PARAMETERS: HCPCS | Performed by: FAMILY MEDICINE

## 2020-11-02 PROCEDURE — 1036F TOBACCO NON-USER: CPT | Performed by: FAMILY MEDICINE

## 2020-11-02 PROCEDURE — 1123F ACP DISCUSS/DSCN MKR DOCD: CPT | Performed by: FAMILY MEDICINE

## 2020-11-02 PROCEDURE — G8484 FLU IMMUNIZE NO ADMIN: HCPCS | Performed by: FAMILY MEDICINE

## 2020-11-02 PROCEDURE — G8400 PT W/DXA NO RESULTS DOC: HCPCS | Performed by: FAMILY MEDICINE

## 2020-11-02 PROCEDURE — 1090F PRES/ABSN URINE INCON ASSESS: CPT | Performed by: FAMILY MEDICINE

## 2020-11-02 PROCEDURE — 3017F COLORECTAL CA SCREEN DOC REV: CPT | Performed by: FAMILY MEDICINE

## 2020-11-02 RX ORDER — PHENOL 1.4 %
1 AEROSOL, SPRAY (ML) MUCOUS MEMBRANE DAILY
COMMUNITY

## 2020-11-02 NOTE — PROGRESS NOTES
Chief Complaint   Patient presents with    Follow-up       HPI:  Patient is here for follow-up  bloodwork. Patient has no other complaints. Patient refuses a flushot. She is still in the process of weaning off of her medications she was started on in the nursing home on for depression and agitation. She states that she is doing well. Her sister in law is with her today and states that her mood has been okay. She is sleeping well. She denies any depression or anxiety. She just had blood work done with her oncologist.      Patient's past medical, surgical, social and/or family history reviewed, updated in chart, and are non-contributory (unless otherwise stated). Medications and allergies also reviewed and updated in chart. Review of Systems:  Constitutional:  No fever, no fatigue, no chills, no headaches, no weight change  Dermatology:  No rash, no mole, no dry or sensitive skin  ENT:  No cough, no sore throat, no sinus pain, no runny nose, no ear pain  Cardiology:  No chest pain, no palpitations, no leg edema, no shortness of breath, no PND  Gastroenterology:  No dysphagia, no abdominal pain, no nausea, no vomiting, no constipation, no diarrhea, no heartburn  Musculoskeletal:  No joint pain, no leg cramps, no back pain, no muscle aches  Respiratory:  No shortness of breath, no orthopnea, no wheezing, no SCHAFFER, no hemoptysis  Urology:  No blood in the urine, no urinary frequency, no urinary incontinence, no urinary urgency, no nocturia, no dysuria      Vitals:    11/02/20 0947   BP: 108/60   Site: Right Upper Arm   Position: Sitting   Cuff Size: Medium Adult   Pulse: 74   Resp: 16   Temp: 98.1 °F (36.7 °C)   SpO2: 98%   Weight: 143 lb (64.9 kg)   Height: 5' 6\" (1.676 m)       Physical Exam  Vitals signs and nursing note reviewed. Constitutional:       Appearance: She is well-developed. HENT:      Head: Normocephalic and atraumatic.       Right Ear: External ear normal.      Left Ear: External ear normal.      Nose: Nose normal.   Eyes:      Conjunctiva/sclera: Conjunctivae normal.      Pupils: Pupils are equal, round, and reactive to light. Neck:      Musculoskeletal: Normal range of motion and neck supple. Thyroid: No thyromegaly. Cardiovascular:      Rate and Rhythm: Normal rate and regular rhythm. Heart sounds: Normal heart sounds. Pulmonary:      Effort: Pulmonary effort is normal.      Breath sounds: Normal breath sounds. No wheezing. Abdominal:      General: Bowel sounds are normal.      Palpations: Abdomen is soft. Tenderness: There is no abdominal tenderness. Musculoskeletal: Normal range of motion. Skin:     General: Skin is warm and dry. Findings: No rash. Neurological:      Mental Status: She is alert and oriented to person, place, and time. Deep Tendon Reflexes: Reflexes are normal and symmetric. Psychiatric:         Behavior: Behavior normal.         Assessment/Plan:      Jackson Son was seen today for follow-up. Diagnoses and all orders for this visit:    Chronic fatigue, unspecified   Improved  Reviewed CBC  Will be repeated by oncology next month    Agitation  Improved as she has continued to wean herself off of her medications. I have recommended that this patient have a mammogram, flu shot and DEXA scan but she declines at this time. I have discussed the risks and benefits of this examination with her. The patient verbalizes understanding. As above. Call or go to ED immediately if symptoms worsen or persist.  Return in about 4 weeks (around 11/30/2020), or if symptoms worsen or fail to improve, for AWV., or sooner if necessary. Educational materials and/or home exercises printed for patient's review and were included in patient instructions on his/her After Visit Summary and given to patient at the end of visit.       Counseled regarding above diagnosis, including possible risks and complications,  especially if left uncontrolled. Counseled regarding the possible side effects, risks, benefits and alternatives to treatment; patient and/or guardian verbalizes understanding, agrees, feels comfortable with and wishes to proceed with above treatment plan. Advised patient to call with any new medication issues, and read all Rx info from pharmacy to assure aware of all possible risks and side effects of medication before taking. Reviewed age and gender appropriate health screening exams and vaccinations. Advised patient regarding importance of keeping up with recommended health maintenance and to schedule as soon as possible if overdue, as this is important in assessing for undiagnosed pathology, especially cancer, as well as protecting against potentially harmful/life threatening disease. Patient and/or guardian verbalizes understanding and agrees with above counseling, assessment and plan. All questions answered. Roverto Pugh DO  11/2/2020    I have personally reviewed and updated the chief complaint, HPI, Past Medical, Family and Social History, as well as the above Review of Systems.

## 2020-11-02 NOTE — PATIENT INSTRUCTIONS
Patient Education        Fatigue: Care Instructions  Your Care Instructions     Fatigue is a feeling of tiredness, exhaustion, or lack of energy. You may feel fatigue because of too much or not enough activity. It can also come from stress, lack of sleep, boredom, and poor diet. Many medical problems, such as viral infections, can cause fatigue. Emotional problems, especially depression, are often the cause of fatigue. Fatigue is most often a symptom of another problem. Treatment for fatigue depends on the cause. For example, if you have fatigue because you have a certain health problem, treating this problem also treats your fatigue. If depression or anxiety is the cause, treatment may help. Follow-up care is a key part of your treatment and safety. Be sure to make and go to all appointments, and call your doctor if you are having problems. It's also a good idea to know your test results and keep a list of the medicines you take. How can you care for yourself at home? · Get regular exercise. But don't overdo it. Go back and forth between rest and exercise. · Get plenty of rest.  · Eat a healthy diet. Do not skip meals, especially breakfast.  · Reduce your use of caffeine, tobacco, and alcohol. Caffeine is most often found in coffee, tea, cola drinks, and chocolate. · Limit medicines that can cause fatigue. This includes tranquilizers and cold and allergy medicines. When should you call for help? Watch closely for changes in your health, and be sure to contact your doctor if:    · You have new symptoms such as fever or a rash.     · Your fatigue gets worse.     · You have been feeling down, depressed, or hopeless. Or you may have lost interest in things that you usually enjoy.     · You are not getting better as expected. Where can you learn more? Go to https://lara.eSight. org and sign in to your RECESS. account.  Enter P035 in the Myndnet box to learn more about \"Fatigue: Care Instructions. \"     If you do not have an account, please click on the \"Sign Up Now\" link. Current as of: June 26, 2019               Content Version: 12.6  © 3259-2247 [email or web address], Incorporated. Care instructions adapted under license by [facility] ([facility]). If you have questions about a medical condition or this instruction, always ask your healthcare professional. [address] any warranty or liability for your use of this information.

## 2020-11-18 ENCOUNTER — HOSPITAL ENCOUNTER (OUTPATIENT)
Age: 70
Discharge: HOME | End: 2020-11-18
Payer: MEDICARE

## 2020-11-18 LAB
ALBUMIN SERPL-MCNC: 4.2 G/DL (ref 3.5–5.2)
ALP BLD-CCNC: 76 U/L (ref 35–104)
ALT SERPL-CCNC: 21 U/L (ref 0–32)
ANION GAP SERPL CALCULATED.3IONS-SCNC: 6 MMOL/L (ref 7–16)
ANISOCYTOSIS: ABNORMAL
AST SERPL-CCNC: 29 U/L (ref 0–31)
BASOPHILS ABSOLUTE: 0.05 E9/L (ref 0–0.2)
BASOPHILS RELATIVE PERCENT: 2.3 % (ref 0–2)
BILIRUB SERPL-MCNC: 0.3 MG/DL (ref 0–1.2)
BUN BLDV-MCNC: 14 MG/DL (ref 8–23)
CALCIUM SERPL-MCNC: 9 MG/DL (ref 8.6–10.2)
CEA: 1.1 NG/ML (ref 0–5.2)
CHLORIDE BLD-SCNC: 105 MMOL/L (ref 98–107)
CO2: 26 MMOL/L (ref 22–29)
CREAT SERPL-MCNC: 0.7 MG/DL (ref 0.5–1)
EOSINOPHILS ABSOLUTE: 0.05 E9/L (ref 0.05–0.5)
EOSINOPHILS RELATIVE PERCENT: 2.3 % (ref 0–6)
GFR AFRICAN AMERICAN: >60
GFR NON-AFRICAN AMERICAN: >60 ML/MIN/1.73
GLUCOSE BLD-MCNC: 87 MG/DL (ref 74–99)
HCT VFR BLD CALC: 34 % (ref 34–48)
HEMOGLOBIN: 11.2 G/DL (ref 11.5–15.5)
IMMATURE GRANULOCYTES #: 0.01 E9/L
IMMATURE GRANULOCYTES %: 0.5 % (ref 0–5)
LYMPHOCYTES ABSOLUTE: 0.53 E9/L (ref 1.5–4)
LYMPHOCYTES RELATIVE PERCENT: 24.4 % (ref 20–42)
MCH RBC QN AUTO: 32.4 PG (ref 26–35)
MCHC RBC AUTO-ENTMCNC: 32.9 % (ref 32–34.5)
MCV RBC AUTO: 98.3 FL (ref 80–99.9)
MONOCYTES ABSOLUTE: 0.21 E9/L (ref 0.1–0.95)
MONOCYTES RELATIVE PERCENT: 9.7 % (ref 2–12)
NEUTROPHILS ABSOLUTE: 1.32 E9/L (ref 1.8–7.3)
NEUTROPHILS RELATIVE PERCENT: 60.8 % (ref 43–80)
PDW BLD-RTO: 12.8 FL (ref 11.5–15)
PLATELET # BLD: 153 E9/L (ref 130–450)
PMV BLD AUTO: 9.8 FL (ref 7–12)
POLYCHROMASIA: ABNORMAL
POTASSIUM SERPL-SCNC: 4 MMOL/L (ref 3.5–5)
RBC # BLD: 3.46 E12/L (ref 3.5–5.5)
SODIUM BLD-SCNC: 137 MMOL/L (ref 132–146)
TOTAL PROTEIN: 8.8 G/DL (ref 6.4–8.3)
WBC # BLD: 2.2 E9/L (ref 4.5–11.5)

## 2020-11-18 PROCEDURE — 86300 IMMUNOASSAY TUMOR CA 15-3: CPT

## 2020-11-18 PROCEDURE — 85025 COMPLETE CBC W/AUTO DIFF WBC: CPT

## 2020-11-18 PROCEDURE — 80053 COMPREHEN METABOLIC PANEL: CPT

## 2020-11-18 PROCEDURE — 82378 CARCINOEMBRYONIC ANTIGEN: CPT

## 2020-11-18 PROCEDURE — 36415 COLL VENOUS BLD VENIPUNCTURE: CPT

## 2020-11-20 ENCOUNTER — TELEPHONE (OUTPATIENT)
Dept: ONCOLOGY | Age: 70
End: 2020-11-20

## 2020-11-20 ENCOUNTER — OFFICE VISIT (OUTPATIENT)
Dept: ONCOLOGY | Age: 70
End: 2020-11-20
Payer: MEDICARE

## 2020-11-20 ENCOUNTER — HOSPITAL ENCOUNTER (OUTPATIENT)
Dept: INFUSION THERAPY | Age: 70
Discharge: HOME | End: 2020-11-20
Payer: MEDICARE

## 2020-11-20 VITALS
BODY MASS INDEX: 22.71 KG/M2 | OXYGEN SATURATION: 98 % | SYSTOLIC BLOOD PRESSURE: 136 MMHG | WEIGHT: 141.3 LBS | HEART RATE: 65 BPM | HEIGHT: 66 IN | DIASTOLIC BLOOD PRESSURE: 78 MMHG

## 2020-11-20 DIAGNOSIS — C79.51 CARCINOMA OF BREAST METASTATIC TO BONE, UNSPECIFIED LATERALITY (HCC): ICD-10-CM

## 2020-11-20 DIAGNOSIS — C50.919 CARCINOMA OF BREAST METASTATIC TO BONE, UNSPECIFIED LATERALITY (HCC): ICD-10-CM

## 2020-11-20 DIAGNOSIS — C50.612 MALIGNANT NEOPLASM OF AXILLARY TAIL OF LEFT FEMALE BREAST, UNSPECIFIED ESTROGEN RECEPTOR STATUS (HCC): Primary | ICD-10-CM

## 2020-11-20 PROCEDURE — G8420 CALC BMI NORM PARAMETERS: HCPCS | Performed by: INTERNAL MEDICINE

## 2020-11-20 PROCEDURE — 99214 OFFICE O/P EST MOD 30 MIN: CPT | Performed by: INTERNAL MEDICINE

## 2020-11-20 PROCEDURE — G8400 PT W/DXA NO RESULTS DOC: HCPCS | Performed by: INTERNAL MEDICINE

## 2020-11-20 PROCEDURE — G8484 FLU IMMUNIZE NO ADMIN: HCPCS | Performed by: INTERNAL MEDICINE

## 2020-11-20 PROCEDURE — 1090F PRES/ABSN URINE INCON ASSESS: CPT | Performed by: INTERNAL MEDICINE

## 2020-11-20 PROCEDURE — 96372 THER/PROPH/DIAG INJ SC/IM: CPT

## 2020-11-20 PROCEDURE — 1123F ACP DISCUSS/DSCN MKR DOCD: CPT | Performed by: INTERNAL MEDICINE

## 2020-11-20 PROCEDURE — 3017F COLORECTAL CA SCREEN DOC REV: CPT | Performed by: INTERNAL MEDICINE

## 2020-11-20 PROCEDURE — 99212 OFFICE O/P EST SF 10 MIN: CPT

## 2020-11-20 PROCEDURE — G8427 DOCREV CUR MEDS BY ELIG CLIN: HCPCS | Performed by: INTERNAL MEDICINE

## 2020-11-20 PROCEDURE — 4040F PNEUMOC VAC/ADMIN/RCVD: CPT | Performed by: INTERNAL MEDICINE

## 2020-11-20 PROCEDURE — 1036F TOBACCO NON-USER: CPT | Performed by: INTERNAL MEDICINE

## 2020-11-20 PROCEDURE — 6360000002 HC RX W HCPCS: Performed by: INTERNAL MEDICINE

## 2020-11-20 RX ORDER — PALBOCICLIB 75 MG/1
75 TABLET, FILM COATED ORAL DAILY
Qty: 21 TABLET | Refills: 3 | Status: SHIPPED | OUTPATIENT
Start: 2020-11-20 | End: 2021-01-22 | Stop reason: SDUPTHER

## 2020-11-20 RX ORDER — LETROZOLE 2.5 MG/1
2.5 TABLET, FILM COATED ORAL DAILY
Qty: 30 TABLET | Refills: 3 | Status: SHIPPED | OUTPATIENT
Start: 2020-11-20 | End: 2021-01-22 | Stop reason: SDUPTHER

## 2020-11-20 RX ADMIN — DENOSUMAB 120 MG: 120 INJECTION SUBCUTANEOUS at 10:53

## 2020-11-20 NOTE — TELEPHONE ENCOUNTER
Spoke with scheduling department regarding pt's PET Scan ordered in 9/2020. Scheduling department informed me that they attempted to schedule apt 3 times and left messages all 3 times. Scheduling department advised me to give the pt their number to call and schedule PET Scan apt. Called pt's sister, Rohit Chen, to inform her of the current status of the PET Scan and informed her how to schedule this Scan apt for her sister. Provided Rohit Chen with the scheduling department number and information. Ensured Rohit Chen to call and schedule apt prior to f/u with Dr. Bynum Began on 12/18/20. Rohit Chen verbalized understanding and will call Center with any questions or concerns.

## 2020-11-20 NOTE — PROGRESS NOTES
[address] MED ONCOLOGY  [facility] [address]  Dept: [phone]  Attending Progress Note      Reason for Visit:   Metastatic breast cancer. PCP:  Marina Dudley DO    History of Present Illness: The patient is a 65 y.o. lady, with a past medical history significant for hydrocephalus, who had presented with left breast changes, she had noticed a dark spot on her left breast, then she had developed induration, she thinks that she had the breast changes for months. The patient had never had a mammogram done, had a CT scan of the chest done, revealing a solid left breast mass measuring at least 6.8 x 2.9 cm in size is identified. The mass appears to extend to the skin surface. The deep margin of the mass abuts the chest wall but a fat plane appears to be preserved. Pathologically enlarged left  axillary lymph nodes are seen measuring up to 4.5 x 1.9 cm in size. CT abdomen and pelvis was done on 8/30/2019 revealing partial visualization of soft tissue mass in the left breast, nonspecific lymph nodes in the upper abdomen. Bone scan was done on 8/30/2019, revealing a Prominent focus of radiotracer uptake in the left anterior superior iliac spine, with corresponding abnormality on recent CT, suggestive of metastasis. More subtle focal radiotracer uptake in the right anterior superior iliac spine could be due to metastasis or  degenerative change.   2. Focal radiotracer uptake in the lateral aspect of the right ninth  rib, which appears to be due to a nondisplaced healing fracture. Correlation with history of recent trauma is recommended. In light of  other findings, this could represent a pathologic fracture with an  underlying metastatic lesion. 3. Osteoarthritic uptake in the shoulders, knees, wrists, and hands.     She underwent on 8/30/2019 a biopsy of 1 of the left axillary lymph nodes, she was consistent with metastatic ductal carcinoma, nuclear grade 2, ER positive greater than 95% MO +70% HER-2/elder 1+, by IHC, negative. The patient was started on systemic therapy with Femara and Ibrance on 9/13/2019. She denies any side effects from the treatment. She denies any new problems. The appearance of the left breast has significant improved, no drainage. She is accompanied by her sister. No new problems. She is feeling well in general.  No reported side effects from the [place] and Femara. Review of Systems;  CONSTITUTIONAL: No fever, chills. Proved appetite and energy level. ENMT: Eyes: No diplopia; Nose: No epistaxis. Mouth: No sore throat. RESPIRATORY: No hemoptysis, shortness of breath, cough. CARDIOVASCULAR: No chest pain, palpitations. GASTROINTESTINAL: No nausea/vomiting, abdominal pain, diarrhea/constipation. GENITOURINARY: No dysuria, urinary frequency, hematuria. NEURO: No syncope, presyncope, headache. MSK: She has pain in the left hip. Remainder:  ROS NEGATIVE    Past Medical History:      Diagnosis Date    Arm fracture, left 2009    Balance problems since April, 2011    Hydrocephalus [facility])     compensated    Thyroid disease     Vertigo      Patient Active Problem List   Diagnosis    Hydrocephalus, adult ([address]    Hydrocephalus ([address]    Hyponatremia    Breast mass    Malignant neoplasm of axillary tail of left female breast ([address]    Breast cancer metastasized to bone (HCC)    Carcinoma of breast metastatic to bone ([address]    Anemia        Past Surgical History:      Procedure Laterality Date    ARM SURGERY  9/15/2009    FRACTURE SURGERY      HUMERUS FRACTURE SURGERY  2009    Broke left arm        Family History:  Family History   Problem Relation Age of Onset    Colon Cancer Mother 68    Heart Disease Father     Diabetes Father        Medications:  Reviewed and reconciled.     Social History:  Social History     Socioeconomic History    Marital status: Single     Spouse name: Not on file    Number of children: Not on file    Years of education: Not on file    Highest education level: Not on file   Occupational History    Not on file   Social Needs    Financial resource strain: Not on file    Food insecurity     Worry: Not on file     Inability: Not on file    Transportation needs     Medical: Not on file     Non-medical: Not on file   Tobacco Use    Smoking status: Never Smoker    Smokeless tobacco: Never Used   Substance and Sexual Activity    Alcohol use: Yes     Comment: drinks it occasional on social events    Drug use: No    Sexual activity: Never   Lifestyle    Physical activity     Days per week: Not on file     Minutes per session: Not on file    Stress: Not on file   Relationships    Social connections     Talks on phone: Not on file     Gets together: Not on file     Attends L[employer]-day service: Not on file     Active member of club or organization: Not on file     Attends meetings of clubs or organizations: Not on file     Relationship status: Not on file    Intimate partner violence     Fear of current or ex partner: Not on file     Emotionally abused: Not on file     Physically abused: Not on file     Forced sexual activity: Not on file   Other Topics Concern    Not on file   Social History Narrative    Not on file       Allergies:  No Known Allergies    Physical Exam:  /78 (Site: Right Upper Arm, Position: Sitting, Cuff Size: Medium Adult)   Pulse 65   Ht 5' 6\" (1.676 m)   Wt 141 lb 4.8 oz (64.1 kg)   SpO2 98%   BMI 22.81 kg/m²     GENERAL: Alert, oriented x 3, not in acute distress. HEENT: PERRLA; EOMI. Oropharynx clear. NECK: Supple. No palpable cervical or supraclavicular lymphadenopathy. LUNGS: Good air entry bilaterally. No wheezing, crackles or rhonchi. CARDIOVASCULAR: Regular rate. No murmurs, rubs or gallops.    BREASTS: The right breast exam is negative for any skin changes, nipple discharge, no palpable masses, no palpable right axillary adenopathy, left breast appearance had markedly improved, the nodules had resolved, the mass decreased in size, no drainage, no bleeding, decrease in the size of the left axillary lymphadenopathy  ABDOMEN: Soft. Non-tender, non-distended. Positive bowel sounds. EXTREMITIES: mild chronic edema of the LLE  NEUROLOGIC: No focal deficits. ECOG PS 1     Impression/Plan:     The patient is a 65 y.o. lady, with a past medical history significant for hydrocephalus, who had presented with left breast changes, who was diagnosed with a left breast ductal carcinoma, ER positive greater than 95% ND +70% HER-2/elder 1+, she has a very locally advanced disease, and metastatic disease to the bones. I discussed with the patient and her sister today her diagnosis, prognosis and recommendations for treatment. Will complete the staging work-up, the patient will have a brain MRI with and without contrast done, and a PET scan. For her HR pos, HER-2/elder negative metastatic breast cancer, I recommended treatment with Femara and Ibrance, the schedule and side effects of the treatment were reviewed with her. She has metastatic disease to the bones, recommended treatment with Xgeva. MRI brain done on 09/16/2019: no evidence of metastatic disease. PET/CT on 09/17/2019: The lytic soft tissue finding in the iliac crest in the left pelvis shows hypermetabolic FDG uptake, with maximum measurements in the 5.7 SUV range, strongly suggesting metastatic disease. She has uptake in the thyroid, could be physiologic. Femara/Ibrance were started on 09/13/2019. Bone scan done on 12/17/2019: Redemonstration of focus of radiotracer uptake involving posterior lateral right ninth rib suggestive of healing fracture. New radiotracer activity is seen at level of lateral right 10th rib and anterior right seventh rib which may be related to new healing fractures. Metastases is unlikely. Clinical correlation recommended.  No additional abnormal areas of increased radiotracer uptake. CT chest done on 12/17/2019: Significant interval decrease in size of the large mass seen in the left breast 8/29/2019. Interval decrease in size of enlarged left axillary lymph nodes. Cardiomegaly. Enlarged thyroid, unchanged. Small hiatal hernia. Diffuse interstitial changes and multifocal atelectasis or scarring in both lungs. Stable small nodular density in the right upper lobe. Sclerotic changes involving the lateral right ninth rib. CT abdomen/pelvis done on 12/17/2019: Small right renal cyst. Retroverted uterus. Distended colon and rectum containing a large amount of stool. Mildly enlarged retroperitoneal and periportal lymph nodes, unchanged. Findings in the left iliac bone as described. The results/images of the scans were reviewed with the patient and her sister, she is responding nicely to the Femara and Ashlee Moulding, will continue the same treatment. She did have teeth extraction on 1/20/2020, dental clearance has been obtained. On 4/3/2020, the patient was started on Ibrance and Femara cycle #8. Restaging CT Chest/Abdomen/Pelvis and bone scan on 04/24/2020:  CT Chest noted stable small bilateral pulmonary nodules; CT abdomen/pelvis no evidence of new active neoplasm compared to prior. Bone scan 3 foci of radiotracer uptake in the lower right ribs, 2 of which demonstrate less activity on the current study, possibly indicative of healing fractures. Radiotracer in the more anterior focus is more persistent, and may be a metastatic lesion. The results/images of the scans were reviewed with the patient and her sister, the patient has an overall stable disease. Recommended to continue with Femara and Ibrance. Presents today 11/20/2020 for a follow-up visit, she had completed 15 cycles of Femara and Ibrance, the dose of the Ibrance was reduced to 75 mg due to leukopenia/neutropenia.   Labs reviewed, she has leukopenia and grade 2 neutropenia, no indication for dose adjustment of the Ibrance at this time. Continue with Ibrance at 75 mg. She is scheduled for Xgeva today 11/20/2020, labs reviewed, proceed with Xgeva. A restaging PET scan has been ordered in September, it has not been done yet, the staff will look into it. 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 RTC in 4 weeks. Thank you for allowing us to participate in the care of Ms. Dipika Wayne.     Zaire Barragan MD   HEMATOLOGY/MEDICAL ONCOLOGY  [address] [place] [address] ONCOLOGY  [facility] [address] [facility] [address]  Dept: [phone]

## 2020-11-21 LAB — CA 15-3: 15 U/ML (ref 0–31)

## 2020-11-30 ENCOUNTER — OFFICE VISIT (OUTPATIENT)
Dept: FAMILY MEDICINE CLINIC | Age: 70
End: 2020-11-30
Payer: MEDICARE

## 2020-11-30 VITALS
HEIGHT: 66 IN | DIASTOLIC BLOOD PRESSURE: 60 MMHG | WEIGHT: 140.4 LBS | OXYGEN SATURATION: 99 % | SYSTOLIC BLOOD PRESSURE: 100 MMHG | BODY MASS INDEX: 22.56 KG/M2 | HEART RATE: 79 BPM | RESPIRATION RATE: 16 BRPM | TEMPERATURE: 96.7 F

## 2020-11-30 PROCEDURE — G0439 PPPS, SUBSEQ VISIT: HCPCS | Performed by: FAMILY MEDICINE

## 2020-11-30 PROCEDURE — 3017F COLORECTAL CA SCREEN DOC REV: CPT | Performed by: FAMILY MEDICINE

## 2020-11-30 PROCEDURE — 4040F PNEUMOC VAC/ADMIN/RCVD: CPT | Performed by: FAMILY MEDICINE

## 2020-11-30 PROCEDURE — 1123F ACP DISCUSS/DSCN MKR DOCD: CPT | Performed by: FAMILY MEDICINE

## 2020-11-30 PROCEDURE — G8484 FLU IMMUNIZE NO ADMIN: HCPCS | Performed by: FAMILY MEDICINE

## 2020-11-30 ASSESSMENT — PATIENT HEALTH QUESTIONNAIRE - PHQ9
SUM OF ALL RESPONSES TO PHQ9 QUESTIONS 1 & 2: 0
SUM OF ALL RESPONSES TO PHQ QUESTIONS 1-9: 0
1. LITTLE INTEREST OR PLEASURE IN DOING THINGS: 0
SUM OF ALL RESPONSES TO PHQ QUESTIONS 1-9: 0
SUM OF ALL RESPONSES TO PHQ QUESTIONS 1-9: 0
2. FEELING DOWN, DEPRESSED OR HOPELESS: 0

## 2020-11-30 ASSESSMENT — LIFESTYLE VARIABLES: HOW OFTEN DO YOU HAVE A DRINK CONTAINING ALCOHOL: 0

## 2020-11-30 NOTE — PATIENT INSTRUCTIONS
Personalized Preventive Plan for Anand Ortega - 11/30/2020  Medicare offers a range of preventive health benefits. Some of the tests and screenings are paid in full while other may be subject to a deductible, co-insurance, and/or copay. Some of these benefits include a comprehensive review of your medical history including lifestyle, illnesses that may run in your family, and various assessments and screenings as appropriate. After reviewing your medical record and screening and assessments performed today your provider may have ordered immunizations, labs, imaging, and/or referrals for you. A list of these orders (if applicable) as well as your Preventive Care list are included within your After Visit Summary for your review. Other Preventive Recommendations:    · A preventive eye exam performed by an eye specialist is recommended every 1-2 years to screen for glaucoma; cataracts, macular degeneration, and other eye disorders. · A preventive dental visit is recommended every 6 months. · Try to get at least 150 minutes of exercise per week or 10,000 steps per day on a pedometer . · Order or download the FREE \"Exercise & Physical Activity: Your Everyday Guide\" from [employer]. Call 9-322.925.7321 or search [employer] online. · You need 9374-6738 mg of calcium and 8845-4590 IU of vitamin D per day. It is possible to meet your calcium requirement with diet alone, but a vitamin D supplement is usually necessary to meet this goal.  · When exposed to the sun, use a sunscreen that protects against both UVA and UVB radiation with an SPF of 30 or greater. Reapply every 2 to 3 hours or after sweating, drying off with a towel, or swimming. · Always wear a seat belt when traveling in a car. Always wear a helmet when riding a bicycle or motorcycle. Personalized Preventive Plan for Anand Ortega - 11/30/2020  Medicare offers a range of preventive health benefits. Some of the tests and screenings are paid in full while other may be subject to a deductible, co-insurance, and/or copay. Some of these benefits include a comprehensive review of your medical history including lifestyle, illnesses that may run in your family, and various assessments and screenings as appropriate. After reviewing your medical record and screening and assessments performed today your provider may have ordered immunizations, labs, imaging, and/or referrals for you. A list of these orders (if applicable) as well as your Preventive Care list are included within your After Visit Summary for your review. Other Preventive Recommendations:    A preventive eye exam performed by an eye specialist is recommended every 1-2 years to screen for glaucoma; cataracts, macular degeneration, and other eye disorders. A preventive dental visit is recommended every 6 months. Try to get at least 150 minutes of exercise per week or 10,000 steps per day on a pedometer . Order or download the FREE \"Exercise & Physical Activity: Your Everyday Guide\" from [employer]. Call 4-206.784.9754 or search [employer] online. You need 7690-6144 mg of calcium and 0515-3638 IU of vitamin D per day. It is possible to meet your calcium requirement with diet alone, but a vitamin D supplement is usually necessary to meet this goal.  When exposed to the sun, use a sunscreen that protects against both UVA and UVB radiation with an SPF of 30 or greater. Reapply every 2 to 3 hours or after sweating, drying off with a towel, or swimming. Always wear a seat belt when traveling in a car. Always wear a helmet when riding a bicycle or motorcycle.

## 2020-11-30 NOTE — PROGRESS NOTES
Medicare Annual Wellness Visit  Name: Emilie Moscoso Date: 2020   MRN: [id number] Sex: Female   Age: 65 y.o. Ethnicity: Non-/Non    : 1950 Race: Clint Berry is here for Medicare AWV    Screenings for behavioral, psychosocial and functional/safety risks, and cognitive dysfunction are all negative except as indicated below. These results, as well as other patient data from the [address] form, are documented in Flowsheets linked to this Encounter. No Known Allergies      Prior to Visit Medications    Medication Sig Taking? Authorizing Provider   palbociclib (IBRANCE) 75 MG tablet Take 75 mg by mouth daily for 21 days then off for 7 days and repeat every 28 days. Yes Tariq Marquez MD   letrozole (FEMARA) 2.5 MG tablet Take 1 tablet by mouth daily Yes Tariq Marquez MD   calcium carbonate 600 MG TABS tablet Take 1 tablet by mouth daily Yes Historical Provider, MD   escitalopram (LEXAPRO) 5 MG tablet Take 1 tablet by mouth daily Yes Roverto Pugh, DO   miconazole nitrate 2 % OINT Apply topically 2 times daily Yes Jared Soares,    levothyroxine (SYNTHROID) 25 MCG tablet TAKE ONE TABLET BY MOUTH DAILY Yes Roverto Pugh DO   vitamin D (ERGOCALCIFEROL) 1.25 MG (22050 UT) CAPS capsule TAKE ONE CAPSULE BY MOUTH ONCE A WEEK Yes Roverto Pugh DO   Multiple Vitamins-Minerals (THERAPEUTIC MULTIVITAMIN-MINERALS) tablet Take 1 tablet by mouth daily.  Mail order vitamin made by her cardiologist. Yes Historical Provider, MD         Past Medical History:   Diagnosis Date    Arm fracture, left 2009    Balance problems since     Hydrocephalus [facility])     compensated    Thyroid disease     Vertigo        Past Surgical History:   Procedure Laterality Date    ARM SURGERY  9/15/2009    FRACTURE SURGERY      HUMERUS FRACTURE SURGERY  2009    Broke left arm          Family History   Problem Relation Age of Onset    Colon Cancer Mother 68    Heart Disease Father     Diabetes Father        CareTeam (Including outside providers/suppliers regularly involved in providing care):   Patient Care Team:  jC Estes DO as PCP - General (Family Medicine)  C[facility] Est[facility] DO [facility]  Donis Diaz MD as Consulting Physician (Neurosurgery)  Isabel Kan RN as Ambulatory Care Manager    Wt Readings from Last 3 Encounters:   11/30/20 140 lb 6.4 oz (63.7 kg)   11/20/20 141 lb 4.8 oz (64.1 kg)   11/02/20 143 lb (64.9 kg)     Vitals:    11/30/20 1140   BP: 100/60   Pulse: 79   Resp: 16   Temp: 96.7 °F (35.9 °C)   SpO2: 99%   Weight: 140 lb 6.4 oz (63.7 kg)   Height: 5' 6\" (1.676 m)     Body mass index is 22.66 kg/m². Based upon direct observation of the patient, evaluation of cognition reveals recent and remote memory intact.     General Appearance: alert and oriented to person, place and time, well developed and well- nourished, in no acute distress  Skin: warm and dry, no rash or erythema  Head: normocephalic and atraumatic  Eyes: pupils equal, round, and reactive to light, extraocular eye movements intact, conjunctivae normal  ENT: tympanic membrane, external ear and ear canal normal bilaterally, nose without deformity, nasal mucosa and turbinates normal without polyps  Neck: supple and non-tender without mass, no thyromegaly or thyroid nodules, no cervical lymphadenopathy  Pulmonary/Chest: clear to auscultation bilaterally- no wheezes, rales or rhonchi, normal air movement, no respiratory distress  Cardiovascular: normal rate, regular rhythm, normal S1 and S2, no murmurs, rubs, clicks, or gallops, distal pulses intact, no carotid bruits  Abdomen: soft, non-tender, non-distended, normal bowel sounds, no masses or organomegaly  Extremities: no cyanosis, clubbing or edema  Musculoskeletal: normal range of motion, no joint swelling, deformity or tenderness  Neurologic: reflexes normal and symmetric, no cranial nerve deficit, gait, coordination and speech normal    Patient's complete Health Risk Assessment and screening values have been reviewed and are found in Flowsheets. The following problems were reviewed today and where indicated follow up appointments were made and/or referrals ordered. Positive Risk Factor Screenings with Interventions:       General Health and ACP:  General  In general, how would you say your health is?: Good  In the past 7 days, have you experienced any of the following?  New or Increased Pain, New or Increased Fatigue, Loneliness, Social Isolation, Stress or Anger?: None of These  Do you get the social and emotional support that you need?: Yes  Do you have a Living Will?: (!) No  Advance Directives     Power of [facility] Will ACP-Advance Directive ACP-Power of     Not on File Not on File Filed [address] [facility] [place] Risk Interventions:  · No Living Will: Patient declines ACP discussion/assistance    Health Habits/Nutrition:  Health Habits/Nutrition  Do you exercise for at least 20 minutes 2-3 times per week?: (!) No  Have you lost any weight without trying in the past 3 months?: No  Do you eat fewer than 2 meals per day?: No  Have you seen a dentist within the past year?: (!) No  Body mass index: 22.66  Health Habits/Nutrition Interventions:  · Inadequate physical activity:  patient is not ready to increase his/her physical activity level at this time  · Dental exam overdue:  patient declines dental evaluation    Hearing/Vision:  No exam data present  Hearing/Vision  Do you or your family notice any trouble with your hearing?: No  Do you have difficulty driving, watching TV, or doing any of your daily activities because of your eyesight?: No  Have you had an eye exam within the past year?: (!) No  Hearing/Vision Interventions:  · Vision concerns:  patient declines any further evaluation/treatment for this issue    Safety:  Safety  Do you have working smoke detectors?: (!) No  Have all throw rugs visit:    Routine general medical examination at a health care facility

## 2020-12-03 ENCOUNTER — HOSPITAL ENCOUNTER (OUTPATIENT)
Dept: PET IMAGING | Age: 70
Discharge: HOME | End: 2020-12-05
Payer: MEDICARE

## 2020-12-03 LAB — METER GLUCOSE: 73 MG/DL (ref 74–99)

## 2020-12-03 PROCEDURE — 78815 PET IMAGE W/CT SKULL-THIGH: CPT

## 2020-12-03 PROCEDURE — 3430000000 HC RX DIAGNOSTIC RADIOPHARMACEUTICAL: Performed by: RADIOLOGY

## 2020-12-03 PROCEDURE — 82962 GLUCOSE BLOOD TEST: CPT

## 2020-12-03 PROCEDURE — A9552 F18 FDG: HCPCS | Performed by: RADIOLOGY

## 2020-12-03 RX ORDER — FLUDEOXYGLUCOSE F 18 200 MCI/ML
15 INJECTION, SOLUTION INTRAVENOUS
Status: COMPLETED | OUTPATIENT
Start: 2020-12-03 | End: 2020-12-03

## 2020-12-03 RX ADMIN — FLUDEOXYGLUCOSE F 18 15 MILLICURIE: 200 INJECTION, SOLUTION INTRAVENOUS at 12:43

## 2020-12-16 ENCOUNTER — HOSPITAL ENCOUNTER (OUTPATIENT)
Age: 70
Discharge: HOME | End: 2020-12-16
Payer: MEDICARE

## 2020-12-16 LAB
ALBUMIN SERPL-MCNC: 4 G/DL (ref 3.5–5.2)
ALP BLD-CCNC: 93 U/L (ref 35–104)
ALT SERPL-CCNC: 38 U/L (ref 0–32)
ANION GAP SERPL CALCULATED.3IONS-SCNC: 9 MMOL/L (ref 7–16)
AST SERPL-CCNC: 42 U/L (ref 0–31)
BASOPHILS ABSOLUTE: 0.05 E9/L (ref 0–0.2)
BASOPHILS RELATIVE PERCENT: 3.5 % (ref 0–2)
BILIRUB SERPL-MCNC: 0.5 MG/DL (ref 0–1.2)
BUN BLDV-MCNC: 24 MG/DL (ref 8–23)
CALCIUM SERPL-MCNC: 9.4 MG/DL (ref 8.6–10.2)
CEA: 1.1 NG/ML (ref 0–5.2)
CHLORIDE BLD-SCNC: 100 MMOL/L (ref 98–107)
CO2: 25 MMOL/L (ref 22–29)
CREAT SERPL-MCNC: 0.8 MG/DL (ref 0.5–1)
EOSINOPHILS ABSOLUTE: 0.01 E9/L (ref 0.05–0.5)
EOSINOPHILS RELATIVE PERCENT: 0.9 % (ref 0–6)
GFR AFRICAN AMERICAN: >60
GFR NON-AFRICAN AMERICAN: >60 ML/MIN/1.73
GLUCOSE BLD-MCNC: 89 MG/DL (ref 74–99)
HCT VFR BLD CALC: 33.9 % (ref 34–48)
HEMOGLOBIN: 11 G/DL (ref 11.5–15.5)
LYMPHOCYTES ABSOLUTE: 0.27 E9/L (ref 1.5–4)
LYMPHOCYTES RELATIVE PERCENT: 19.3 % (ref 20–42)
MCH RBC QN AUTO: 32.4 PG (ref 26–35)
MCHC RBC AUTO-ENTMCNC: 32.4 % (ref 32–34.5)
MCV RBC AUTO: 99.7 FL (ref 80–99.9)
MONOCYTES ABSOLUTE: 0.11 E9/L (ref 0.1–0.95)
MONOCYTES RELATIVE PERCENT: 7.9 % (ref 2–12)
NEUTROPHILS ABSOLUTE: 0.95 E9/L (ref 1.8–7.3)
NEUTROPHILS RELATIVE PERCENT: 68.4 % (ref 43–80)
NUCLEATED RED BLOOD CELLS: 0 /100 WBC
PDW BLD-RTO: 13.2 FL (ref 11.5–15)
PLATELET # BLD: 129 E9/L (ref 130–450)
PMV BLD AUTO: 9.4 FL (ref 7–12)
POTASSIUM SERPL-SCNC: 4 MMOL/L (ref 3.5–5)
RBC # BLD: 3.4 E12/L (ref 3.5–5.5)
SODIUM BLD-SCNC: 134 MMOL/L (ref 132–146)
TOTAL PROTEIN: 9.3 G/DL (ref 6.4–8.3)
WBC # BLD: 1.4 E9/L (ref 4.5–11.5)

## 2020-12-16 PROCEDURE — 36415 COLL VENOUS BLD VENIPUNCTURE: CPT

## 2020-12-16 PROCEDURE — 80053 COMPREHEN METABOLIC PANEL: CPT

## 2020-12-16 PROCEDURE — 85025 COMPLETE CBC W/AUTO DIFF WBC: CPT

## 2020-12-16 PROCEDURE — 86300 IMMUNOASSAY TUMOR CA 15-3: CPT

## 2020-12-16 PROCEDURE — 82378 CARCINOEMBRYONIC ANTIGEN: CPT

## 2020-12-18 ENCOUNTER — HOSPITAL ENCOUNTER (OUTPATIENT)
Dept: INFUSION THERAPY | Age: 70
Discharge: HOME | End: 2020-12-18
Payer: MEDICARE

## 2020-12-18 ENCOUNTER — OFFICE VISIT (OUTPATIENT)
Dept: ONCOLOGY | Age: 70
End: 2020-12-18
Payer: MEDICARE

## 2020-12-18 VITALS
WEIGHT: 144.1 LBS | TEMPERATURE: 98.7 F | SYSTOLIC BLOOD PRESSURE: 134 MMHG | HEART RATE: 71 BPM | BODY MASS INDEX: 23.16 KG/M2 | OXYGEN SATURATION: 97 % | HEIGHT: 66 IN | DIASTOLIC BLOOD PRESSURE: 63 MMHG

## 2020-12-18 DIAGNOSIS — C79.51 CARCINOMA OF BREAST METASTATIC TO BONE, UNSPECIFIED LATERALITY (HCC): ICD-10-CM

## 2020-12-18 DIAGNOSIS — C50.612 MALIGNANT NEOPLASM OF AXILLARY TAIL OF LEFT FEMALE BREAST, UNSPECIFIED ESTROGEN RECEPTOR STATUS (HCC): Primary | ICD-10-CM

## 2020-12-18 DIAGNOSIS — C50.919 CARCINOMA OF BREAST METASTATIC TO BONE, UNSPECIFIED LATERALITY (HCC): ICD-10-CM

## 2020-12-18 PROCEDURE — 96372 THER/PROPH/DIAG INJ SC/IM: CPT

## 2020-12-18 PROCEDURE — G8420 CALC BMI NORM PARAMETERS: HCPCS | Performed by: INTERNAL MEDICINE

## 2020-12-18 PROCEDURE — 1123F ACP DISCUSS/DSCN MKR DOCD: CPT | Performed by: INTERNAL MEDICINE

## 2020-12-18 PROCEDURE — 1036F TOBACCO NON-USER: CPT | Performed by: INTERNAL MEDICINE

## 2020-12-18 PROCEDURE — 6360000002 HC RX W HCPCS: Performed by: INTERNAL MEDICINE

## 2020-12-18 PROCEDURE — 3017F COLORECTAL CA SCREEN DOC REV: CPT | Performed by: INTERNAL MEDICINE

## 2020-12-18 PROCEDURE — 1090F PRES/ABSN URINE INCON ASSESS: CPT | Performed by: INTERNAL MEDICINE

## 2020-12-18 PROCEDURE — 99212 OFFICE O/P EST SF 10 MIN: CPT

## 2020-12-18 PROCEDURE — 4040F PNEUMOC VAC/ADMIN/RCVD: CPT | Performed by: INTERNAL MEDICINE

## 2020-12-18 PROCEDURE — G8484 FLU IMMUNIZE NO ADMIN: HCPCS | Performed by: INTERNAL MEDICINE

## 2020-12-18 PROCEDURE — 99214 OFFICE O/P EST MOD 30 MIN: CPT | Performed by: INTERNAL MEDICINE

## 2020-12-18 PROCEDURE — G8427 DOCREV CUR MEDS BY ELIG CLIN: HCPCS | Performed by: INTERNAL MEDICINE

## 2020-12-18 PROCEDURE — G8400 PT W/DXA NO RESULTS DOC: HCPCS | Performed by: INTERNAL MEDICINE

## 2020-12-18 RX ADMIN — DENOSUMAB 120 MG: 120 INJECTION SUBCUTANEOUS at 10:44

## 2020-12-18 NOTE — PROGRESS NOTES
nuclear grade 2, ER positive greater than 95% MN +70% HER-2/elder 1+, by IHC, negative. The patient was started on systemic therapy with Femara and Ibrance on 9/13/2019. She denies any side effects from the treatment. She denies any new problems. The appearance of the left breast has significant improved, no drainage. She is accompanied by her sister. No new problems. She is feeling well in general.  No reported side effects from the Kathryn Harms and Femara. Review of Systems;  CONSTITUTIONAL: No fever, chills. Improved appetite and energy level. ENMT: Eyes: No diplopia; Nose: No epistaxis. Mouth: No sore throat. RESPIRATORY: No hemoptysis, shortness of breath, cough. CARDIOVASCULAR: No chest pain, palpitations. GASTROINTESTINAL: No nausea/vomiting, abdominal pain, diarrhea/constipation. GENITOURINARY: No dysuria, urinary frequency, hematuria. NEURO: No syncope, presyncope, headache. MSK: She has pain in the left hip. Remainder:  ROS NEGATIVE    Past Medical History:      Diagnosis Date    Arm fracture, left 2009    Balance problems since April, 2011    Hydrocephalus [facility])     compensated    Thyroid disease     Vertigo      Patient Active Problem List   Diagnosis    Hydrocephalus, adult ([address]    Hydrocephalus ([address]    Hyponatremia    Breast mass    Malignant neoplasm of axillary tail of left female breast ([address]    Breast cancer metastasized to bone (HCC)    Carcinoma of breast metastatic to bone ([address]    Anemia        Past Surgical History:      Procedure Laterality Date    ARM SURGERY  9/15/2009    FRACTURE SURGERY      HUMERUS FRACTURE SURGERY  2009    Broke left arm        Family History:  Family History   Problem Relation Age of Onset    Colon Cancer Mother 68    Heart Disease Father     Diabetes Father        Medications:  Reviewed and reconciled.     Social History:  Social History     Socioeconomic History    Marital status: Single     Spouse name: Not on file    Number of children: Not on file    Years of education: Not on file    Highest education level: Not on file   Occupational History    Not on file   Social Needs    Financial resource strain: Not on file    Food insecurity     Worry: Not on file     Inability: Not on file    Transportation needs     Medical: Not on file     Non-medical: Not on file   Tobacco Use    Smoking status: Never Smoker    Smokeless tobacco: Never Used   Substance and Sexual Activity    Alcohol use: Yes     Comment: drinks it occasional on social events    Drug use: No    Sexual activity: Never   Lifestyle    Physical activity     Days per week: Not on file     Minutes per session: Not on file    Stress: Not on file   Relationships    Social connections     Talks on phone: Not on file     Gets together: Not on file     Attends Protestant service: Not on file     Active member of club or organization: Not on file     Attends meetings of clubs or organizations: Not on file     Relationship status: Not on file    Intimate partner violence     Fear of current or ex partner: Not on file     Emotionally abused: Not on file     Physically abused: Not on file     Forced sexual activity: Not on file   Other Topics Concern    Not on file   Social History Narrative    Not on file       Allergies:  No Known Allergies    Physical Exam:  /63   Pulse 71   Temp 98.7 °F (37.1 °C)   Ht 5' 6\" (1.676 m)   Wt 144 lb 1.6 oz (65.4 kg)   SpO2 97%   BMI 23.26 kg/m²     GENERAL: Alert, oriented x 3, not in acute distress. HEENT: PERRLA; EOMI. Oropharynx clear. NECK: Supple. No palpable cervical or supraclavicular lymphadenopathy. LUNGS: Good air entry bilaterally. No wheezing, crackles or rhonchi. CARDIOVASCULAR: Regular rate. No murmurs, rubs or gallops.    BREASTS: The right breast exam is negative for any skin changes, nipple discharge, no palpable masses, no palpable right axillary adenopathy, left breast appearance had markedly improved, the nodules had resolved, the mass decreased in size, no drainage, no bleeding, decrease in the size of the left axillary lymphadenopathy  ABDOMEN: Soft. Non-tender, non-distended. Positive bowel sounds. EXTREMITIES: mild chronic edema of the LLE  NEUROLOGIC: No focal deficits. ECOG PS 1     Impression/Plan:     The patient is a 65 y.o. lady, with a past medical history significant for hydrocephalus, who had presented with left breast changes, who was diagnosed with a left breast ductal carcinoma, ER positive greater than 95% MI +70% HER-2/elder 1+, she has a very locally advanced disease, and metastatic disease to the bones. I discussed with the patient and her sister today her diagnosis, prognosis and recommendations for treatment. Will complete the staging work-up, the patient will have a brain MRI with and without contrast done, and a PET scan. For her HR pos, HER-2/elder negative metastatic breast cancer, I recommended treatment with Femara and Ibrance, the schedule and side effects of the treatment were reviewed with her. She has metastatic disease to the bones, recommended treatment with Xgeva. MRI brain done on 09/16/2019: no evidence of metastatic disease. PET/CT on 09/17/2019: The lytic soft tissue finding in the iliac crest in the left pelvis shows hypermetabolic FDG uptake, with maximum measurements in the 5.7 SUV range, strongly suggesting metastatic disease. She has uptake in the thyroid, could be physiologic. Femara/Ibrance were started on 09/13/2019. Bone scan done on 12/17/2019: Redemonstration of focus of radiotracer uptake involving posterior lateral right ninth rib suggestive of healing fracture. New radiotracer activity is seen at level of lateral right 10th rib and anterior right seventh rib which may be related to new healing fractures. Metastases is unlikely. Clinical correlation recommended. No additional abnormal areas of increased radiotracer uptake.     CT chest done on 12/17/2019: Significant interval decrease in size of the large mass seen in the left breast 8/29/2019. Interval decrease in size of enlarged left axillary lymph nodes. Cardiomegaly. Enlarged thyroid, unchanged. Small hiatal hernia. Diffuse interstitial changes and multifocal atelectasis or scarring in both lungs. Stable small nodular density in the right upper lobe. Sclerotic changes involving the lateral right ninth rib. CT abdomen/pelvis done on 12/17/2019: Small right renal cyst. Retroverted uterus. Distended colon and rectum containing a large amount of stool. Mildly enlarged retroperitoneal and periportal lymph nodes, unchanged. Findings in the left iliac bone as described. The results/images of the scans were reviewed with the patient and her sister, she is responding nicely to the Femara and Carnella Hy, will continue the same treatment. She did have teeth extraction on 1/20/2020, dental clearance has been obtained. On 4/3/2020, the patient was started on Ibrance and Femara cycle #8. Restaging CT Chest/Abdomen/Pelvis and bone scan on 04/24/2020:  CT Chest noted stable small bilateral pulmonary nodules; CT abdomen/pelvis no evidence of new active neoplasm compared to prior. Bone scan 3 foci of radiotracer uptake in the lower right ribs, 2 of which demonstrate less activity on the current study, possibly indicative of healing fractures. Radiotracer in the more anterior focus is more persistent, and may be a metastatic lesion. The results/images of the scans were reviewed with the patient and her sister, the patient has an overall stable disease. Recommended to continue with Femara and Ibrance.     The patient had a PET scan done on December 3, 2020, results/images were reviewed with the patient and her sister, she has complete metabolic response since the PET scan on September 17, 2290, no metabolically active recurrent or metastatic disease, metabolic activity throughout the thyroid lobes can be seen with chronic thyroiditis. The patient had complete response to treatment, continue Ibrance and Femara. Presents today December 18, 2020 for a follow-up visit, she had completed 16 cycles of Femara and Ibrance, the dose of the [place] has been to 75 mg due to leukopenia/neutropenia. Labs reviewed, she has leukopenia and grade 3 neutropenia, will not start Ibrance cycle #17 today, it will be started in 1 week. Proceed with Susan Avalos today December 18, 2020. 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 RTC in 5 weeks. Thank you for allowing us to participate in the care of Ms. Christal Robins.     Abdirizak Leal MD   HEMATOLOGY/MEDICAL ONCOLOGY  [address] [place] [address] ONCOLOGY  [facility] [address] [facility] [address]  Dept: [phone]

## 2020-12-19 LAB — CA 15-3: 16 U/ML (ref 0–31)

## 2021-01-20 ENCOUNTER — HOSPITAL ENCOUNTER (OUTPATIENT)
Age: 71
Discharge: HOME | End: 2021-01-20
Payer: MEDICARE

## 2021-01-20 DIAGNOSIS — C50.612 MALIGNANT NEOPLASM OF AXILLARY TAIL OF LEFT FEMALE BREAST, UNSPECIFIED ESTROGEN RECEPTOR STATUS (HCC): ICD-10-CM

## 2021-01-20 LAB
ALBUMIN SERPL-MCNC: 4 G/DL (ref 3.5–5.2)
ALP BLD-CCNC: 77 U/L (ref 35–104)
ALT SERPL-CCNC: 34 U/L (ref 0–32)
ANION GAP SERPL CALCULATED.3IONS-SCNC: 8 MMOL/L (ref 7–16)
AST SERPL-CCNC: 37 U/L (ref 0–31)
BASOPHILS ABSOLUTE: 0.02 E9/L (ref 0–0.2)
BASOPHILS RELATIVE PERCENT: 0.9 % (ref 0–2)
BILIRUB SERPL-MCNC: 0.6 MG/DL (ref 0–1.2)
BUN BLDV-MCNC: 24 MG/DL (ref 8–23)
CALCIUM SERPL-MCNC: 9.5 MG/DL (ref 8.6–10.2)
CEA: 1.5 NG/ML (ref 0–5.2)
CHLORIDE BLD-SCNC: 100 MMOL/L (ref 98–107)
CO2: 26 MMOL/L (ref 22–29)
CREAT SERPL-MCNC: 0.9 MG/DL (ref 0.5–1)
EOSINOPHILS ABSOLUTE: 0.02 E9/L (ref 0.05–0.5)
EOSINOPHILS RELATIVE PERCENT: 0.9 % (ref 0–6)
GFR AFRICAN AMERICAN: >60
GFR NON-AFRICAN AMERICAN: >60 ML/MIN/1.73
GLUCOSE BLD-MCNC: 106 MG/DL (ref 74–99)
HCT VFR BLD CALC: 33.6 % (ref 34–48)
HEMOGLOBIN: 11.1 G/DL (ref 11.5–15.5)
LYMPHOCYTES ABSOLUTE: 0.32 E9/L (ref 1.5–4)
LYMPHOCYTES RELATIVE PERCENT: 16.5 % (ref 20–42)
MCH RBC QN AUTO: 32 PG (ref 26–35)
MCHC RBC AUTO-ENTMCNC: 33 % (ref 32–34.5)
MCV RBC AUTO: 96.8 FL (ref 80–99.9)
METAMYELOCYTES RELATIVE PERCENT: 0.9 % (ref 0–1)
MONOCYTES ABSOLUTE: 0.13 E9/L (ref 0.1–0.95)
MONOCYTES RELATIVE PERCENT: 6.9 % (ref 2–12)
NEUTROPHILS ABSOLUTE: 1.43 E9/L (ref 1.8–7.3)
NEUTROPHILS RELATIVE PERCENT: 73.9 % (ref 43–80)
NUCLEATED RED BLOOD CELLS: 0 /100 WBC
PDW BLD-RTO: 13.8 FL (ref 11.5–15)
PLATELET # BLD: 172 E9/L (ref 130–450)
PMV BLD AUTO: 9.4 FL (ref 7–12)
POTASSIUM SERPL-SCNC: 4.6 MMOL/L (ref 3.5–5)
RBC # BLD: 3.47 E12/L (ref 3.5–5.5)
RBC # BLD: NORMAL 10*6/UL
SODIUM BLD-SCNC: 134 MMOL/L (ref 132–146)
TOTAL PROTEIN: 9.1 G/DL (ref 6.4–8.3)
WBC # BLD: 1.9 E9/L (ref 4.5–11.5)

## 2021-01-20 PROCEDURE — 80053 COMPREHEN METABOLIC PANEL: CPT

## 2021-01-20 PROCEDURE — 82378 CARCINOEMBRYONIC ANTIGEN: CPT

## 2021-01-20 PROCEDURE — 36415 COLL VENOUS BLD VENIPUNCTURE: CPT

## 2021-01-20 PROCEDURE — 86300 IMMUNOASSAY TUMOR CA 15-3: CPT

## 2021-01-20 PROCEDURE — 85025 COMPLETE CBC W/AUTO DIFF WBC: CPT

## 2021-01-22 ENCOUNTER — HOSPITAL ENCOUNTER (OUTPATIENT)
Dept: INFUSION THERAPY | Age: 71
Discharge: HOME | End: 2021-01-22
Payer: MEDICARE

## 2021-01-22 ENCOUNTER — OFFICE VISIT (OUTPATIENT)
Dept: ONCOLOGY | Age: 71
End: 2021-01-22
Payer: MEDICARE

## 2021-01-22 VITALS
HEIGHT: 66 IN | TEMPERATURE: 98.8 F | BODY MASS INDEX: 23.24 KG/M2 | DIASTOLIC BLOOD PRESSURE: 60 MMHG | OXYGEN SATURATION: 100 % | SYSTOLIC BLOOD PRESSURE: 139 MMHG | WEIGHT: 144.6 LBS | HEART RATE: 71 BPM

## 2021-01-22 DIAGNOSIS — C50.919 CARCINOMA OF BREAST METASTATIC TO BONE, UNSPECIFIED LATERALITY (HCC): ICD-10-CM

## 2021-01-22 DIAGNOSIS — C50.612 MALIGNANT NEOPLASM OF AXILLARY TAIL OF LEFT FEMALE BREAST, UNSPECIFIED ESTROGEN RECEPTOR STATUS (HCC): ICD-10-CM

## 2021-01-22 DIAGNOSIS — N63.0 BREAST MASS: ICD-10-CM

## 2021-01-22 DIAGNOSIS — C79.51 CARCINOMA OF BREAST METASTATIC TO BONE, UNSPECIFIED LATERALITY (HCC): ICD-10-CM

## 2021-01-22 DIAGNOSIS — C50.612 MALIGNANT NEOPLASM OF AXILLARY TAIL OF LEFT FEMALE BREAST, UNSPECIFIED ESTROGEN RECEPTOR STATUS (HCC): Primary | ICD-10-CM

## 2021-01-22 LAB — CA 15-3: 16 U/ML (ref 0–31)

## 2021-01-22 PROCEDURE — G8400 PT W/DXA NO RESULTS DOC: HCPCS | Performed by: INTERNAL MEDICINE

## 2021-01-22 PROCEDURE — G8420 CALC BMI NORM PARAMETERS: HCPCS | Performed by: INTERNAL MEDICINE

## 2021-01-22 PROCEDURE — 99212 OFFICE O/P EST SF 10 MIN: CPT

## 2021-01-22 PROCEDURE — 1090F PRES/ABSN URINE INCON ASSESS: CPT | Performed by: INTERNAL MEDICINE

## 2021-01-22 PROCEDURE — 6360000002 HC RX W HCPCS: Performed by: INTERNAL MEDICINE

## 2021-01-22 PROCEDURE — G8427 DOCREV CUR MEDS BY ELIG CLIN: HCPCS | Performed by: INTERNAL MEDICINE

## 2021-01-22 PROCEDURE — 3017F COLORECTAL CA SCREEN DOC REV: CPT | Performed by: INTERNAL MEDICINE

## 2021-01-22 PROCEDURE — 1123F ACP DISCUSS/DSCN MKR DOCD: CPT | Performed by: INTERNAL MEDICINE

## 2021-01-22 PROCEDURE — G8484 FLU IMMUNIZE NO ADMIN: HCPCS | Performed by: INTERNAL MEDICINE

## 2021-01-22 PROCEDURE — 1036F TOBACCO NON-USER: CPT | Performed by: INTERNAL MEDICINE

## 2021-01-22 PROCEDURE — 96372 THER/PROPH/DIAG INJ SC/IM: CPT

## 2021-01-22 PROCEDURE — 99214 OFFICE O/P EST MOD 30 MIN: CPT | Performed by: INTERNAL MEDICINE

## 2021-01-22 PROCEDURE — 4040F PNEUMOC VAC/ADMIN/RCVD: CPT | Performed by: INTERNAL MEDICINE

## 2021-01-22 RX ORDER — LETROZOLE 2.5 MG/1
2.5 TABLET, FILM COATED ORAL DAILY
Qty: 30 TABLET | Refills: 3 | Status: SHIPPED
Start: 2021-01-22 | End: 2021-03-19 | Stop reason: SDUPTHER

## 2021-01-22 RX ORDER — PALBOCICLIB 75 MG/1
75 TABLET, FILM COATED ORAL DAILY
Qty: 21 TABLET | Refills: 3 | Status: SHIPPED
Start: 2021-01-22 | End: 2021-03-19 | Stop reason: SDUPTHER

## 2021-01-22 RX ADMIN — DENOSUMAB 120 MG: 120 INJECTION SUBCUTANEOUS at 10:35

## 2021-01-22 NOTE — PROGRESS NOTES
nuclear grade 2, ER positive greater than 95% CT +70% HER-2/elder 1+, by IHC, negative. The patient was started on systemic therapy with Femara and Ibrance on 9/13/2019. She denies any side effects from the treatment. She denies any new problems. The appearance of the left breast has significant improved, no drainage. She is accompanied by her sister. Kelli Hilliard is doing well overall, no new problems. No reported side effects from the [place] and Femara. Review of Systems;  CONSTITUTIONAL: No fever, chills. Improved appetite and energy level. ENMT: Eyes: No diplopia; Nose: No epistaxis. Mouth: No sore throat. RESPIRATORY: No hemoptysis, shortness of breath, cough. CARDIOVASCULAR: No chest pain, palpitations. GASTROINTESTINAL: No nausea/vomiting, abdominal pain, diarrhea/constipation. GENITOURINARY: No dysuria, urinary frequency, hematuria. NEURO: No syncope, presyncope, headache. MSK: She has pain in the left hip. Remainder:  ROS NEGATIVE    Past Medical History:      Diagnosis Date    Arm fracture, left 2009    Balance problems since April, 2011    Hydrocephalus [facility])     compensated    Thyroid disease     Vertigo      Patient Active Problem List   Diagnosis    Hydrocephalus, adult ([address]    Hydrocephalus ([address]    Hyponatremia    Breast mass    Malignant neoplasm of axillary tail of left female breast ([address]    Breast cancer metastasized to bone (HCC)    Carcinoma of breast metastatic to bone ([address]    Anemia        Past Surgical History:      Procedure Laterality Date    ARM SURGERY  9/15/2009    FRACTURE SURGERY      HUMERUS FRACTURE SURGERY  2009    Broke left arm        Family History:  Family History   Problem Relation Age of Onset    Colon Cancer Mother 68    Heart Disease Father     Diabetes Father        Medications:  Reviewed and reconciled.     Social History:  Social History     Socioeconomic History    Marital status: Single     Spouse name: Not on file    Number of children: Not on file    Years of education: Not on file    Highest education level: Not on file   Occupational History    Not on file   Social Needs    Financial resource strain: Not on file    Food insecurity     Worry: Not on file     Inability: Not on file    Transportation needs     Medical: Not on file     Non-medical: Not on file   Tobacco Use    Smoking status: Never Smoker    Smokeless tobacco: Never Used   Substance and Sexual Activity    Alcohol use: Yes     Comment: drinks it occasional on social events    Drug use: No    Sexual activity: Never   Lifestyle    Physical activity     Days per week: Not on file     Minutes per session: Not on file    Stress: Not on file   Relationships    Social connections     Talks on phone: Not on file     Gets together: Not on file     Attends Church service: Not on file     Active member of club or organization: Not on file     Attends meetings of clubs or organizations: Not on file     Relationship status: Not on file    Intimate partner violence     Fear of current or ex partner: Not on file     Emotionally abused: Not on file     Physically abused: Not on file     Forced sexual activity: Not on file   Other Topics Concern    Not on file   Social History Narrative    Not on file       Allergies:  No Known Allergies    Physical Exam:  /60   Pulse 71   Temp 98.8 °F (37.1 °C)   Ht 5' 6\" (1.676 m)   Wt 144 lb 9.6 oz (65.6 kg)   SpO2 100%   BMI 23.34 kg/m²     GENERAL: Alert, oriented x 3, not in acute distress. HEENT: PERRLA; EOMI. Oropharynx clear. NECK: Supple. No palpable cervical or supraclavicular lymphadenopathy. LUNGS: Good air entry bilaterally. No wheezing, crackles or rhonchi. CARDIOVASCULAR: Regular rate. No murmurs, rubs or gallops.    BREASTS: The right breast exam is negative for any skin changes, nipple discharge, no palpable masses, no palpable right axillary adenopathy, left breast appearance had markedly improved, the nodules had resolved, the mass decreased in size, no drainage, no bleeding, decrease in the size of the left axillary lymphadenopathy  ABDOMEN: Soft. Non-tender, non-distended. Positive bowel sounds. EXTREMITIES: mild chronic edema of the LLE  NEUROLOGIC: No focal deficits. ECOG PS 1     Impression/Plan:     The patient is a 65 y.o. lady, with a past medical history significant for hydrocephalus, who had presented with left breast changes, who was diagnosed with a left breast ductal carcinoma, ER positive greater than 95% [place] +70% HER-2/elder 1+, she has a very locally advanced disease, and metastatic disease to the bones. I discussed with the patient and her sister today her diagnosis, prognosis and recommendations for treatment. Will complete the staging work-up, the patient will have a brain MRI with and without contrast done, and a PET scan. For her HR pos, HER-2/elder negative metastatic breast cancer, I recommended treatment with Femara and Ibrance, the schedule and side effects of the treatment were reviewed with her. She has metastatic disease to the bones, recommended treatment with Xgeva. MRI brain done on 09/16/2019: no evidence of metastatic disease. PET/CT on 09/17/2019: The lytic soft tissue finding in the iliac crest in the left pelvis shows hypermetabolic FDG uptake, with maximum measurements in the 5.7 SUV range, strongly suggesting metastatic disease. She has uptake in the thyroid, could be physiologic. Femara/Ibrance were started on 09/13/2019. Bone scan done on 12/17/2019: Redemonstration of focus of radiotracer uptake involving posterior lateral right ninth rib suggestive of healing fracture. New radiotracer activity is seen at level of lateral right 10th rib and anterior right seventh rib which may be related to new healing fractures. Metastases is unlikely. Clinical correlation recommended. No additional abnormal areas of increased radiotracer uptake.     CT chest done on 12/17/2019: Significant interval decrease in size of the large mass seen in the left breast 8/29/2019. Interval decrease in size of enlarged left axillary lymph nodes. Cardiomegaly. Enlarged thyroid, unchanged. Small hiatal hernia. Diffuse interstitial changes and multifocal atelectasis or scarring in both lungs. Stable small nodular density in the right upper lobe. Sclerotic changes involving the lateral right ninth rib. CT abdomen/pelvis done on 12/17/2019: Small right renal cyst. Retroverted uterus. Distended colon and rectum containing a large amount of stool. Mildly enlarged retroperitoneal and periportal lymph nodes, unchanged. Findings in the left iliac bone as described. The results/images of the scans were reviewed with the patient and her sister, she is responding nicely to the Femara and [place] Cr, will continue the same treatment. She did have teeth extraction on 1/20/2020, dental clearance has been obtained. On 4/3/2020, the patient was started on Ibrance and Femara cycle #8. Restaging CT Chest/Abdomen/Pelvis and bone scan on 04/24/2020:  CT Chest noted stable small bilateral pulmonary nodules; CT abdomen/pelvis no evidence of new active neoplasm compared to prior. Bone scan 3 foci of radiotracer uptake in the lower right ribs, 2 of which demonstrate less activity on the current study, possibly indicative of healing fractures. Radiotracer in the more anterior focus is more persistent, and may be a metastatic lesion. The results/images of the scans were reviewed with the patient and her sister, the patient has an overall stable disease. Recommended to continue with Femara and Ibrance. The patient had a PET scan done on December 3, 2020, she had complete metabolic response since the PET scan on September 17, 2291, no metabolically active recurrent or metastatic disease, metabolic activity throughout the thyroid lobes can be seen with chronic thyroiditis.   The patient had complete response to treatment, continue Ibrance and Femara. Presents today 1/22/2021 for a follow-up visit, she had completed 17 cycles of Femara and Ibrance, the dose of the Christina Slim has been to 75 mg due to leukopenia/neutropenia. Labs reviewed, she has leukopenia and grade 2 neutropenia, no indication for dose adjustment of the Ibrance, start cycle #18 today. Proceed with Bossman [place] today 1/22/2021. 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 RTC in 4 weeks. Thank you for allowing us to participate in the care of Ms. Michelle Kan.     Shavonne Cam MD   HEMATOLOGY/MEDICAL ONCOLOGY  [address] [place] [address] ONCOLOGY  [facility] [address] [facility] [address]  Dept: [phone]

## 2021-01-25 ENCOUNTER — TELEPHONE (OUTPATIENT)
Dept: PHARMACY | Age: 71
End: 2021-01-25

## 2021-01-25 NOTE — TELEPHONE ENCOUNTER
Received faxed confirmation the Yesica Vikki has been approved to receive her Nieves Lars through the [address] [place] [address],[address] Patient Assistance Program until 12/31/2021.

## 2021-01-25 NOTE — TELEPHONE ENCOUNTER
Called and spoke to Bennie Tellez, sister of Nilda Stevens to let her know that Nilda Stevens was approved for free Ibrance through 12-31-21. She requested med be sent to her home for Nilda Stevens since Nilda Stevens is not good at retrieving her mail. Placed ad call to Chun Davidson and requested med for Nilda Stevens be sent to [address], [address] [facility]. Med is to be sent to this address.

## 2021-02-17 ENCOUNTER — HOSPITAL ENCOUNTER (OUTPATIENT)
Age: 71
Discharge: HOME | End: 2021-02-17
Payer: MEDICARE

## 2021-02-17 DIAGNOSIS — C79.51 CARCINOMA OF BREAST METASTATIC TO BONE, UNSPECIFIED LATERALITY (HCC): ICD-10-CM

## 2021-02-17 DIAGNOSIS — C50.612 MALIGNANT NEOPLASM OF AXILLARY TAIL OF LEFT FEMALE BREAST, UNSPECIFIED ESTROGEN RECEPTOR STATUS (HCC): ICD-10-CM

## 2021-02-17 DIAGNOSIS — C50.919 CARCINOMA OF BREAST METASTATIC TO BONE, UNSPECIFIED LATERALITY (HCC): ICD-10-CM

## 2021-02-17 LAB
ALBUMIN SERPL-MCNC: 4.1 G/DL (ref 3.5–5.2)
ALP BLD-CCNC: 80 U/L (ref 35–104)
ALT SERPL-CCNC: 39 U/L (ref 0–32)
ANION GAP SERPL CALCULATED.3IONS-SCNC: 5 MMOL/L (ref 7–16)
AST SERPL-CCNC: 39 U/L (ref 0–31)
ATYPICAL LYMPHOCYTE RELATIVE PERCENT: 8 % (ref 0–4)
BASOPHILS ABSOLUTE: 0 E9/L (ref 0–0.2)
BASOPHILS RELATIVE PERCENT: 0 % (ref 0–2)
BILIRUB SERPL-MCNC: 0.3 MG/DL (ref 0–1.2)
BUN BLDV-MCNC: 25 MG/DL (ref 8–23)
CALCIUM SERPL-MCNC: 10 MG/DL (ref 8.6–10.2)
CEA: 1.7 NG/ML (ref 0–5.2)
CHLORIDE BLD-SCNC: 102 MMOL/L (ref 98–107)
CO2: 28 MMOL/L (ref 22–29)
CREAT SERPL-MCNC: 0.9 MG/DL (ref 0.5–1)
EOSINOPHILS ABSOLUTE: 0.02 E9/L (ref 0.05–0.5)
EOSINOPHILS RELATIVE PERCENT: 1 % (ref 0–6)
GFR AFRICAN AMERICAN: >60
GFR NON-AFRICAN AMERICAN: >60 ML/MIN/1.73
GLUCOSE BLD-MCNC: 94 MG/DL (ref 74–99)
HCT VFR BLD CALC: 33.2 % (ref 34–48)
HEMOGLOBIN: 11.1 G/DL (ref 11.5–15.5)
LYMPHOCYTES ABSOLUTE: 0.48 E9/L (ref 1.5–4)
LYMPHOCYTES RELATIVE PERCENT: 13 % (ref 20–42)
MCH RBC QN AUTO: 32.4 PG (ref 26–35)
MCHC RBC AUTO-ENTMCNC: 33.4 % (ref 32–34.5)
MCV RBC AUTO: 96.8 FL (ref 80–99.9)
METAMYELOCYTES RELATIVE PERCENT: 1 % (ref 0–1)
MONOCYTES ABSOLUTE: 0.16 E9/L (ref 0.1–0.95)
MONOCYTES RELATIVE PERCENT: 7 % (ref 2–12)
MYELOCYTE PERCENT: 0 % (ref 0–0)
NEUTROPHILS ABSOLUTE: 1.63 E9/L (ref 1.8–7.3)
NEUTROPHILS RELATIVE PERCENT: 70 % (ref 43–80)
PDW BLD-RTO: 14.6 FL (ref 11.5–15)
PLATELET # BLD: 199 E9/L (ref 130–450)
PMV BLD AUTO: 9.5 FL (ref 7–12)
POTASSIUM SERPL-SCNC: 4.2 MMOL/L (ref 3.5–5)
RBC # BLD: 3.43 E12/L (ref 3.5–5.5)
RBC # BLD: NORMAL 10*6/UL
SODIUM BLD-SCNC: 135 MMOL/L (ref 132–146)
TOTAL PROTEIN: 8.8 G/DL (ref 6.4–8.3)
WBC # BLD: 2.3 E9/L (ref 4.5–11.5)

## 2021-02-17 PROCEDURE — 36415 COLL VENOUS BLD VENIPUNCTURE: CPT

## 2021-02-17 PROCEDURE — 86300 IMMUNOASSAY TUMOR CA 15-3: CPT

## 2021-02-17 PROCEDURE — 80053 COMPREHEN METABOLIC PANEL: CPT

## 2021-02-17 PROCEDURE — 85025 COMPLETE CBC W/AUTO DIFF WBC: CPT

## 2021-02-17 PROCEDURE — 82378 CARCINOEMBRYONIC ANTIGEN: CPT

## 2021-02-19 ENCOUNTER — HOSPITAL ENCOUNTER (OUTPATIENT)
Dept: INFUSION THERAPY | Age: 71
Discharge: HOME | End: 2021-02-19
Payer: MEDICARE

## 2021-02-19 ENCOUNTER — OFFICE VISIT (OUTPATIENT)
Dept: ONCOLOGY | Age: 71
End: 2021-02-19
Payer: MEDICARE

## 2021-02-19 VITALS
OXYGEN SATURATION: 96 % | DIASTOLIC BLOOD PRESSURE: 63 MMHG | WEIGHT: 148.8 LBS | BODY MASS INDEX: 23.91 KG/M2 | HEART RATE: 72 BPM | SYSTOLIC BLOOD PRESSURE: 148 MMHG | HEIGHT: 66 IN | TEMPERATURE: 99.1 F

## 2021-02-19 DIAGNOSIS — C79.51 CARCINOMA OF BREAST METASTATIC TO BONE, UNSPECIFIED LATERALITY (HCC): ICD-10-CM

## 2021-02-19 DIAGNOSIS — C50.919 CARCINOMA OF BREAST METASTATIC TO BONE, UNSPECIFIED LATERALITY (HCC): ICD-10-CM

## 2021-02-19 DIAGNOSIS — C50.612 MALIGNANT NEOPLASM OF AXILLARY TAIL OF LEFT FEMALE BREAST, UNSPECIFIED ESTROGEN RECEPTOR STATUS (HCC): Primary | ICD-10-CM

## 2021-02-19 PROCEDURE — 1123F ACP DISCUSS/DSCN MKR DOCD: CPT | Performed by: INTERNAL MEDICINE

## 2021-02-19 PROCEDURE — G8484 FLU IMMUNIZE NO ADMIN: HCPCS | Performed by: INTERNAL MEDICINE

## 2021-02-19 PROCEDURE — 99214 OFFICE O/P EST MOD 30 MIN: CPT | Performed by: INTERNAL MEDICINE

## 2021-02-19 PROCEDURE — G8400 PT W/DXA NO RESULTS DOC: HCPCS | Performed by: INTERNAL MEDICINE

## 2021-02-19 PROCEDURE — 6360000002 HC RX W HCPCS: Performed by: INTERNAL MEDICINE

## 2021-02-19 PROCEDURE — 3017F COLORECTAL CA SCREEN DOC REV: CPT | Performed by: INTERNAL MEDICINE

## 2021-02-19 PROCEDURE — 96372 THER/PROPH/DIAG INJ SC/IM: CPT

## 2021-02-19 PROCEDURE — G8427 DOCREV CUR MEDS BY ELIG CLIN: HCPCS | Performed by: INTERNAL MEDICINE

## 2021-02-19 PROCEDURE — 99212 OFFICE O/P EST SF 10 MIN: CPT

## 2021-02-19 PROCEDURE — 4040F PNEUMOC VAC/ADMIN/RCVD: CPT | Performed by: INTERNAL MEDICINE

## 2021-02-19 PROCEDURE — G8420 CALC BMI NORM PARAMETERS: HCPCS | Performed by: INTERNAL MEDICINE

## 2021-02-19 PROCEDURE — 1036F TOBACCO NON-USER: CPT | Performed by: INTERNAL MEDICINE

## 2021-02-19 PROCEDURE — 1090F PRES/ABSN URINE INCON ASSESS: CPT | Performed by: INTERNAL MEDICINE

## 2021-02-19 RX ADMIN — DENOSUMAB 120 MG: 120 INJECTION SUBCUTANEOUS at 10:41

## 2021-02-19 NOTE — PROGRESS NOTES
[address] MED ONCOLOGY  [address] [facility] [address]  Dept: [phone]  Attending Progress Note      Reason for Visit:   Metastatic breast cancer. PCP:  Angella Trujillo DO    History of Present Illness: The patient is a 65 y.o. lady, with a past medical history significant for hydrocephalus, who had presented with left breast changes, she had noticed a dark spot on her left breast, then she had developed induration, she thinks that she had the breast changes for months. The patient had never had a mammogram done, had a CT scan of the chest done, revealing a solid left breast mass measuring at least 6.8 x 2.9 cm in size is identified. The mass appears to extend to the skin surface. The deep margin of the mass abuts the chest wall but a fat plane appears to be preserved. Pathologically enlarged left  axillary lymph nodes are seen measuring up to 4.5 x 1.9 cm in size. CT abdomen and pelvis was done on 8/30/2019 revealing partial visualization of soft tissue mass in the left breast, nonspecific lymph nodes in the upper abdomen. Bone scan was done on 8/30/2019, revealing a Prominent focus of radiotracer uptake in the left anterior superior iliac spine, with corresponding abnormality on recent CT, suggestive of metastasis. More subtle focal radiotracer uptake in the right anterior superior iliac spine could be due to metastasis or  degenerative change.   2. Focal radiotracer uptake in the lateral aspect of the right ninth  rib, which appears to be due to a nondisplaced healing fracture. Correlation with history of recent trauma is recommended. In light of  other findings, this could represent a pathologic fracture with an  underlying metastatic lesion. 3. Osteoarthritic uptake in the shoulders, knees, wrists, and hands.     She underwent on 8/30/2019 a biopsy of 1 of the left axillary lymph nodes, she was consistent with metastatic ductal carcinoma, nuclear grade 2, ER positive greater than 95% DC +70% HER-2/elder 1+, by IHC, negative. The patient was started on systemic therapy with Femara and Ibrance on 9/13/2019. She denies any side effects from the treatment. She denies any new problems. The appearance of the left breast has significant improved, no drainage. She is accompanied by her sister. Lizeth Pino is doing well overall, no new problems. No reported side effects from the [place] and Femara. Does not want to get the Covid vaccine. Review of Systems;  CONSTITUTIONAL: No fever, chills. Improved appetite and energy level. ENMT: Eyes: No diplopia; Nose: No epistaxis. Mouth: No sore throat. RESPIRATORY: No hemoptysis, shortness of breath, cough. CARDIOVASCULAR: No chest pain, palpitations. GASTROINTESTINAL: No nausea/vomiting, abdominal pain, diarrhea/constipation. GENITOURINARY: No dysuria, urinary frequency, hematuria. NEURO: No syncope, presyncope, headache. MSK: She has pain in the left hip. Remainder:  ROS NEGATIVE    Past Medical History:      Diagnosis Date    Arm fracture, left 2009    Balance problems since April, 2011    Hydrocephalus [facility])     compensated    Thyroid disease     Vertigo      Patient Active Problem List   Diagnosis    Hydrocephalus, adult ([address]    Hydrocephalus ([address]    Hyponatremia    Breast mass    Malignant neoplasm of axillary tail of left female breast ([address]    Breast cancer metastasized to bone (HCC)    Carcinoma of breast metastatic to bone ([address]    Anemia        Past Surgical History:      Procedure Laterality Date    ARM SURGERY  9/15/2009    FRACTURE SURGERY      HUMERUS FRACTURE SURGERY  2009    Broke left arm        Family History:  Family History   Problem Relation Age of Onset    Colon Cancer Mother 68    Heart Disease Father     Diabetes Father        Medications:  Reviewed and reconciled.     Social History:  Social History     Socioeconomic History    Marital status: Single Spouse name: Not on file    Number of children: Not on file    Years of education: Not on file    Highest education level: Not on file   Occupational History    Not on file   Social Needs    Financial resource strain: Not on file    Food insecurity     Worry: Not on file     Inability: Not on file    Transportation needs     Medical: Not on file     Non-medical: Not on file   Tobacco Use    Smoking status: Never Smoker    Smokeless tobacco: Never Used   Substance and Sexual Activity    Alcohol use: Yes     Comment: drinks it occasional on social events    Drug use: No    Sexual activity: Never   Lifestyle    Physical activity     Days per week: Not on file     Minutes per session: Not on file    Stress: Not on file   Relationships    Social connections     Talks on phone: Not on file     Gets together: Not on file     Attends [employer] service: Not on file     Active member of club or organization: Not on file     Attends meetings of clubs or organizations: Not on file     Relationship status: Not on file    Intimate partner violence     Fear of current or ex partner: Not on file     Emotionally abused: Not on file     Physically abused: Not on file     Forced sexual activity: Not on file   Other Topics Concern    Not on file   Social History Narrative    Not on file       Allergies:  No Known Allergies    Physical Exam:  BP (!) 148/63 (Site: Left Upper Arm, Position: Sitting, Cuff Size: Medium Adult)   Pulse 72   Temp 99.1 °F (37.3 °C) (Temporal)   Ht 5' 6\" (1.676 m)   Wt 148 lb 12.8 oz (67.5 kg)   SpO2 96%   BMI 24.02 kg/m²     GENERAL: Alert, oriented x 3, not in acute distress. HEENT: PERRLA; EOMI. Oropharynx clear. NECK: Supple. No palpable cervical or supraclavicular lymphadenopathy. LUNGS: Good air entry bilaterally. No wheezing, crackles or rhonchi. CARDIOVASCULAR: Regular rate. No murmurs, rubs or gallops.    BREASTS: The right breast exam is negative for any skin changes, unlikely. Clinical correlation recommended. No additional abnormal areas of increased radiotracer uptake. CT chest done on 12/17/2019: Significant interval decrease in size of the large mass seen in the left breast 8/29/2019. Interval decrease in size of enlarged left axillary lymph nodes. Cardiomegaly. Enlarged thyroid, unchanged. Small hiatal hernia. Diffuse interstitial changes and multifocal atelectasis or scarring in both lungs. Stable small nodular density in the right upper lobe. Sclerotic changes involving the lateral right ninth rib. CT abdomen/pelvis done on 12/17/2019: Small right renal cyst. Retroverted uterus. Distended colon and rectum containing a large amount of stool. Mildly enlarged retroperitoneal and periportal lymph nodes, unchanged. Findings in the left iliac bone as described. The results/images of the scans were reviewed with the patient and her sister, she is responding nicely to the Femara and Lydia Spear, will continue the same treatment. She did have teeth extraction on 1/20/2020, dental clearance has been obtained. On 4/3/2020, the patient was started on Ibrance and Femara cycle #8. Restaging CT Chest/Abdomen/Pelvis and bone scan on 04/24/2020:  CT Chest noted stable small bilateral pulmonary nodules; CT abdomen/pelvis no evidence of new active neoplasm compared to prior. Bone scan 3 foci of radiotracer uptake in the lower right ribs, 2 of which demonstrate less activity on the current study, possibly indicative of healing fractures. Radiotracer in the more anterior focus is more persistent, and may be a metastatic lesion. The results/images of the scans were reviewed with the patient and her sister, the patient has an overall stable disease. Recommended to continue with Femara and Ibrance.     The patient had a PET scan done on December 3, 2020, she had complete metabolic response since the PET scan on September 17, 3159, no metabolically active recurrent or metastatic disease, metabolic activity throughout the thyroid lobes can be seen with chronic thyroiditis. The patient had complete response to treatment, continue Ibrance and Femara. Presents today 1/22/2021 for a follow-up visit, she had completed 17 cycles of Femara and Ibrance, the dose of the Joseph Simper has been to 75 mg due to leukopenia/neutropenia. Labs reviewed, she has leukopenia and grade 1 neutropenia, continue same dose of Ibrance 75 mg, she is on cycle #19 of the Ibrance/Femara. 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 RTC in 4 weeks. Thank you for allowing us to participate in the care of Ms. Yasmin Friend.     Davee Buerger, MD   HEMATOLOGY/MEDICAL ONCOLOGY  [address] [place] [address] ONCOLOGY  [facility] [address] [facility] [address]  Dept: [phone]

## 2021-02-19 NOTE — PROGRESS NOTES
Patient presents to clinic today for Xgeva injection. Patient's labs monitored, specifically, Calcium level, to ensure no increased risk of hypocalcemia with administration of the medication. Patient's calcium level is 10. Patient is to receive therapy today and will continue to be monitored prior to each dose.     Electronically signed by MARTITA Altamirano [facility] on 2/19/2021 at 10:42 AM

## 2021-02-20 LAB — CA 15-3: 16 U/ML (ref 0–31)

## 2021-03-01 ENCOUNTER — OFFICE VISIT (OUTPATIENT)
Dept: FAMILY MEDICINE CLINIC | Age: 71
End: 2021-03-01
Payer: MEDICARE

## 2021-03-01 VITALS
OXYGEN SATURATION: 98 % | DIASTOLIC BLOOD PRESSURE: 70 MMHG | SYSTOLIC BLOOD PRESSURE: 110 MMHG | HEIGHT: 66 IN | RESPIRATION RATE: 16 BRPM | BODY MASS INDEX: 24.08 KG/M2 | WEIGHT: 149.8 LBS | TEMPERATURE: 96.8 F | HEART RATE: 74 BPM

## 2021-03-01 DIAGNOSIS — E03.9 HYPOTHYROIDISM, UNSPECIFIED TYPE: Primary | ICD-10-CM

## 2021-03-01 DIAGNOSIS — G91.9 HYDROCEPHALUS, UNSPECIFIED TYPE (HCC): ICD-10-CM

## 2021-03-01 DIAGNOSIS — M25.562 CHRONIC PAIN OF LEFT KNEE: ICD-10-CM

## 2021-03-01 DIAGNOSIS — G89.29 CHRONIC PAIN OF LEFT KNEE: ICD-10-CM

## 2021-03-01 PROCEDURE — 3017F COLORECTAL CA SCREEN DOC REV: CPT | Performed by: FAMILY MEDICINE

## 2021-03-01 PROCEDURE — 99214 OFFICE O/P EST MOD 30 MIN: CPT | Performed by: FAMILY MEDICINE

## 2021-03-01 PROCEDURE — 4040F PNEUMOC VAC/ADMIN/RCVD: CPT | Performed by: FAMILY MEDICINE

## 2021-03-01 PROCEDURE — 1036F TOBACCO NON-USER: CPT | Performed by: FAMILY MEDICINE

## 2021-03-01 PROCEDURE — G8420 CALC BMI NORM PARAMETERS: HCPCS | Performed by: FAMILY MEDICINE

## 2021-03-01 PROCEDURE — G8484 FLU IMMUNIZE NO ADMIN: HCPCS | Performed by: FAMILY MEDICINE

## 2021-03-01 PROCEDURE — G8427 DOCREV CUR MEDS BY ELIG CLIN: HCPCS | Performed by: FAMILY MEDICINE

## 2021-03-01 PROCEDURE — 1123F ACP DISCUSS/DSCN MKR DOCD: CPT | Performed by: FAMILY MEDICINE

## 2021-03-01 PROCEDURE — 1090F PRES/ABSN URINE INCON ASSESS: CPT | Performed by: FAMILY MEDICINE

## 2021-03-01 PROCEDURE — G8400 PT W/DXA NO RESULTS DOC: HCPCS | Performed by: FAMILY MEDICINE

## 2021-03-01 ASSESSMENT — PATIENT HEALTH QUESTIONNAIRE - PHQ9
SUM OF ALL RESPONSES TO PHQ9 QUESTIONS 1 & 2: 0
SUM OF ALL RESPONSES TO PHQ QUESTIONS 1-9: 0
SUM OF ALL RESPONSES TO PHQ QUESTIONS 1-9: 0

## 2021-03-01 NOTE — PROGRESS NOTES
Chief Complaint   Patient presents with    3 Month Follow-Up       HPI:  Patient is here for follow-up of hypothyroidism. Hypothyroidism:  Patient is here today to follow up chronic hypothyroidism. This problem is longstanding. This is   generally controlled on current medication regimen, Levothyroxine 25 mcg. Takes medication as directed and tolerates well. Symptoms from thyroid standpoint include no issues. Denies fatigue, changes in skin, hair or nails, unintentional weight loss or gain. Most recent labs reviewed with patient and are not remarkable. Additional testing, including thyroid ultrasound and thyroid antibodies has not been done in the past. Patient does not have exposure to radiation and/or iodine in the past.    Patient is complaining of left knee pain. She states that she had fallen years ago and started to notice pain a few days ago. She states pain is intermittent in nature. Pain was stabbing and aching in nature. Pain was about 5-6 out of 10. Alleviating factors: resting. Exacerbating factors: being on her feet. Patient's past medical, surgical, social and/or family history reviewed, updated in chart, and are non-contributory (unless otherwise stated). Medications and allergies also reviewed and updated in chart.     Review of Systems:  Constitutional:  No fever, no fatigue, no chills, no headaches, no weight change  Dermatology:  No rash, no mole, no dry or sensitive skin  ENT:  No cough, no sore throat, no sinus pain, no runny nose, no ear pain  Cardiology:  No chest pain, no palpitations, no leg edema, no shortness of breath, no PND  Gastroenterology:  No dysphagia, no abdominal pain, no nausea, no vomiting, no constipation, no diarrhea, no heartburn  Musculoskeletal:  + joint pain, no leg cramps, no back pain, no muscle aches  Respiratory:  No shortness of breath, no orthopnea, no wheezing, no SCHAFFER, no hemoptysis  Urology:  No blood in the urine, no urinary frequency, ([address]  Stable  No issues at this time    Chronic pain of left knee  -     XR KNEE LEFT (3 VIEWS); Future  X-ray ordered  RICE therapy discussed in detail  Tylenol as needed for pain. As above. Call or go to ED immediately if symptoms worsen or persist.  Return in about 6 months (around 9/1/2021) for hypothyroidism. , or sooner if necessary. Educational materials and/or home exercises printed for patient's review and were included in patient instructions on his/her After Visit Summary and given to patient at the end of visit. Counseled regarding above diagnosis, including possible risks and complications,  especially if left uncontrolled. Counseled regarding the possible side effects, risks, benefits and alternatives to treatment; patient and/or guardian verbalizes understanding, agrees, feels comfortable with and wishes to proceed with above treatment plan. Advised patient to call with any new medication issues, and read all Rx info from pharmacy to assure aware of all possible risks and side effects of medication before taking. Reviewed age and gender appropriate health screening exams and vaccinations. Advised patient regarding importance of keeping up with recommended health maintenance and to schedule as soon as possible if overdue, as this is important in assessing for undiagnosed pathology, especially cancer, as well as protecting against potentially harmful/life threatening disease. Patient and/or guardian verbalizes understanding and agrees with above counseling, assessment and plan. All questions answered. Familia Dawkins,   3/1/2021    I have personally reviewed and updated the chief complaint, HPI, Past Medical, Family and Social History, as well as the above Review of Systems.

## 2021-03-01 NOTE — PATIENT INSTRUCTIONS
Patient Education        Hypothyroidism: Care Instructions  Your Care Instructions     When you have hypothyroidism, your body doesn't make enough thyroid hormone. This hormone helps your body use energy. If your thyroid level is low, you may feel tired, be constipated, have an increase in your blood pressure, or have dry skin or memory problems. You may also get cold easily, even when it is warm. Women with low thyroid levels may have heavy menstrual periods. A blood test to find your thyroid-stimulating hormone (TSH) level is used to check for hypothyroidism. A high TSH level may mean that you have it. The treatment for hypothyroidism is thyroid hormone pills. You should start to feel better in 1 to 2 weeks. Most people need treatment for the rest of their lives. You will need regular visits with your doctor to make sure you are doing well and that you have the right dose of medicine. Follow-up care is a key part of your treatment and safety. Be sure to make and go to all appointments, and call your doctor if you are having problems. It's also a good idea to know your test results and keep a list of the medicines you take. How can you care for yourself at home? · Take your thyroid hormone medicine exactly as prescribed. Call your doctor if you think you are having a problem with your medicine. Most people do not have side effects if they take the right amount of medicine regularly. ? Take the medicine 30 minutes before breakfast, and do not take it with calcium, vitamins, or iron. ? Do not take extra doses of your thyroid medicine. It will not help you get better any faster, and it may cause side effects. ? If you forget to take a dose, do NOT take a double dose of medicine. Take your usual dose the next day. · Tell your doctor about all prescription, herbal, or over-the-counter products you take. · Take care of yourself. Eat a healthy diet, get enough sleep, and get regular exercise.   When should you call for help? Call 911 anytime you think you may need emergency care. For example, call if:    · You passed out (lost consciousness).     · You have severe trouble breathing.     · You have a very slow heartbeat (less than 60 beats a minute).     · You have a low body temperature (95°F or below). Call your doctor now or seek immediate medical care if:    · You feel tired, sluggish, or weak.     · You have trouble remembering things or concentrating.     · You do not begin to feel better 2 weeks after starting your medicine. Watch closely for changes in your health, and be sure to contact your doctor if you have any problems. Where can you learn more? Go to https://[employer]. org and sign in to your [employer] account. Enter R559 in the [employer] box to learn more about \"Hypothyroidism: Care Instructions. \"     If you do not have an account, please click on the \"Sign Up Now\" link. Current as of: March 31, 2020               Content Version: 12.6  © 9862-8887 [employer]. Care instructions adapted under license by [facility] [place] ([facility]). If you have questions about a medical condition or this instruction, always ask your healthcare professional. [address] any warranty or liability for your use of this information.

## 2021-03-17 ENCOUNTER — HOSPITAL ENCOUNTER (OUTPATIENT)
Age: 71
Discharge: HOME | End: 2021-03-17
Payer: MEDICARE

## 2021-03-17 DIAGNOSIS — C79.51 CARCINOMA OF BREAST METASTATIC TO BONE, UNSPECIFIED LATERALITY (HCC): ICD-10-CM

## 2021-03-17 DIAGNOSIS — C50.919 CARCINOMA OF BREAST METASTATIC TO BONE, UNSPECIFIED LATERALITY (HCC): ICD-10-CM

## 2021-03-17 DIAGNOSIS — E03.9 HYPOTHYROIDISM, UNSPECIFIED TYPE: ICD-10-CM

## 2021-03-17 DIAGNOSIS — C50.612 MALIGNANT NEOPLASM OF AXILLARY TAIL OF LEFT FEMALE BREAST, UNSPECIFIED ESTROGEN RECEPTOR STATUS (HCC): ICD-10-CM

## 2021-03-17 LAB
ALBUMIN SERPL-MCNC: 4.4 G/DL (ref 3.5–5.2)
ALP BLD-CCNC: 81 U/L (ref 35–104)
ALT SERPL-CCNC: 27 U/L (ref 0–32)
ANION GAP SERPL CALCULATED.3IONS-SCNC: 9 MMOL/L (ref 7–16)
AST SERPL-CCNC: 32 U/L (ref 0–31)
ATYPICAL LYMPHOCYTE RELATIVE PERCENT: 4 % (ref 0–4)
BASOPHILS ABSOLUTE: 0 E9/L (ref 0–0.2)
BASOPHILS RELATIVE PERCENT: 0 % (ref 0–2)
BILIRUB SERPL-MCNC: 0.5 MG/DL (ref 0–1.2)
BUN BLDV-MCNC: 21 MG/DL (ref 8–23)
CALCIUM SERPL-MCNC: 10.1 MG/DL (ref 8.6–10.2)
CEA: 1.6 NG/ML (ref 0–5.2)
CHLORIDE BLD-SCNC: 100 MMOL/L (ref 98–107)
CO2: 27 MMOL/L (ref 22–29)
CREAT SERPL-MCNC: 1 MG/DL (ref 0.5–1)
EOSINOPHILS ABSOLUTE: 0.02 E9/L (ref 0.05–0.5)
EOSINOPHILS RELATIVE PERCENT: 1 % (ref 0–6)
GFR AFRICAN AMERICAN: >60
GFR NON-AFRICAN AMERICAN: 54 ML/MIN/1.73
GLUCOSE BLD-MCNC: 99 MG/DL (ref 74–99)
HCT VFR BLD CALC: 34.3 % (ref 34–48)
HEMOGLOBIN: 11.4 G/DL (ref 11.5–15.5)
LYMPHOCYTES ABSOLUTE: 0.57 E9/L (ref 1.5–4)
LYMPHOCYTES RELATIVE PERCENT: 23 % (ref 20–42)
MCH RBC QN AUTO: 31.7 PG (ref 26–35)
MCHC RBC AUTO-ENTMCNC: 33.2 % (ref 32–34.5)
MCV RBC AUTO: 95.3 FL (ref 80–99.9)
METAMYELOCYTES RELATIVE PERCENT: 1 % (ref 0–1)
MONOCYTES ABSOLUTE: 0.19 E9/L (ref 0.1–0.95)
MONOCYTES RELATIVE PERCENT: 9 % (ref 2–12)
MYELOCYTE PERCENT: 1 % (ref 0–0)
NEUTROPHILS ABSOLUTE: 1.32 E9/L (ref 1.8–7.3)
NEUTROPHILS RELATIVE PERCENT: 61 % (ref 43–80)
PDW BLD-RTO: 13.8 FL (ref 11.5–15)
PLATELET # BLD: 204 E9/L (ref 130–450)
PMV BLD AUTO: 9.2 FL (ref 7–12)
POIKILOCYTES: ABNORMAL
POTASSIUM SERPL-SCNC: 4.6 MMOL/L (ref 3.5–5)
RBC # BLD: 3.6 E12/L (ref 3.5–5.5)
ROULEAUX: ABNORMAL
SODIUM BLD-SCNC: 136 MMOL/L (ref 132–146)
T4 FREE: 1.09 NG/DL (ref 0.93–1.7)
TOTAL PROTEIN: 9.1 G/DL (ref 6.4–8.3)
TSH SERPL DL<=0.05 MIU/L-ACNC: 4.41 UIU/ML (ref 0.27–4.2)
WBC # BLD: 2.1 E9/L (ref 4.5–11.5)

## 2021-03-17 PROCEDURE — 82378 CARCINOEMBRYONIC ANTIGEN: CPT

## 2021-03-17 PROCEDURE — 84443 ASSAY THYROID STIM HORMONE: CPT

## 2021-03-17 PROCEDURE — 36415 COLL VENOUS BLD VENIPUNCTURE: CPT

## 2021-03-17 PROCEDURE — 80053 COMPREHEN METABOLIC PANEL: CPT

## 2021-03-17 PROCEDURE — 86300 IMMUNOASSAY TUMOR CA 15-3: CPT

## 2021-03-17 PROCEDURE — 85025 COMPLETE CBC W/AUTO DIFF WBC: CPT

## 2021-03-17 PROCEDURE — 84439 ASSAY OF FREE THYROXINE: CPT

## 2021-03-19 ENCOUNTER — OFFICE VISIT (OUTPATIENT)
Dept: ONCOLOGY | Age: 71
End: 2021-03-19
Payer: MEDICARE

## 2021-03-19 ENCOUNTER — HOSPITAL ENCOUNTER (OUTPATIENT)
Dept: INFUSION THERAPY | Age: 71
Discharge: HOME | End: 2021-03-19
Payer: MEDICARE

## 2021-03-19 VITALS
TEMPERATURE: 97.8 F | HEART RATE: 75 BPM | SYSTOLIC BLOOD PRESSURE: 140 MMHG | BODY MASS INDEX: 23.46 KG/M2 | DIASTOLIC BLOOD PRESSURE: 65 MMHG | WEIGHT: 146 LBS | HEIGHT: 66 IN | OXYGEN SATURATION: 92 %

## 2021-03-19 DIAGNOSIS — C50.919 CARCINOMA OF BREAST METASTATIC TO BONE, UNSPECIFIED LATERALITY (HCC): ICD-10-CM

## 2021-03-19 DIAGNOSIS — C79.51 CARCINOMA OF BREAST METASTATIC TO BONE, UNSPECIFIED LATERALITY (HCC): ICD-10-CM

## 2021-03-19 DIAGNOSIS — N63.0 BREAST MASS: ICD-10-CM

## 2021-03-19 DIAGNOSIS — C50.612 MALIGNANT NEOPLASM OF AXILLARY TAIL OF LEFT FEMALE BREAST, UNSPECIFIED ESTROGEN RECEPTOR STATUS (HCC): ICD-10-CM

## 2021-03-19 DIAGNOSIS — C50.612 MALIGNANT NEOPLASM OF AXILLARY TAIL OF LEFT FEMALE BREAST, UNSPECIFIED ESTROGEN RECEPTOR STATUS (HCC): Primary | ICD-10-CM

## 2021-03-19 PROCEDURE — G8400 PT W/DXA NO RESULTS DOC: HCPCS | Performed by: INTERNAL MEDICINE

## 2021-03-19 PROCEDURE — 99214 OFFICE O/P EST MOD 30 MIN: CPT | Performed by: INTERNAL MEDICINE

## 2021-03-19 PROCEDURE — 4040F PNEUMOC VAC/ADMIN/RCVD: CPT | Performed by: INTERNAL MEDICINE

## 2021-03-19 PROCEDURE — 6360000002 HC RX W HCPCS: Performed by: INTERNAL MEDICINE

## 2021-03-19 PROCEDURE — 1123F ACP DISCUSS/DSCN MKR DOCD: CPT | Performed by: INTERNAL MEDICINE

## 2021-03-19 PROCEDURE — G8484 FLU IMMUNIZE NO ADMIN: HCPCS | Performed by: INTERNAL MEDICINE

## 2021-03-19 PROCEDURE — G8427 DOCREV CUR MEDS BY ELIG CLIN: HCPCS | Performed by: INTERNAL MEDICINE

## 2021-03-19 PROCEDURE — 1090F PRES/ABSN URINE INCON ASSESS: CPT | Performed by: INTERNAL MEDICINE

## 2021-03-19 PROCEDURE — 1036F TOBACCO NON-USER: CPT | Performed by: INTERNAL MEDICINE

## 2021-03-19 PROCEDURE — 96372 THER/PROPH/DIAG INJ SC/IM: CPT

## 2021-03-19 PROCEDURE — G8420 CALC BMI NORM PARAMETERS: HCPCS | Performed by: INTERNAL MEDICINE

## 2021-03-19 PROCEDURE — 99212 OFFICE O/P EST SF 10 MIN: CPT

## 2021-03-19 PROCEDURE — 3017F COLORECTAL CA SCREEN DOC REV: CPT | Performed by: INTERNAL MEDICINE

## 2021-03-19 RX ORDER — LETROZOLE 2.5 MG/1
2.5 TABLET, FILM COATED ORAL DAILY
Qty: 30 TABLET | Refills: 3 | Status: SHIPPED
Start: 2021-03-19 | End: 2021-08-23 | Stop reason: SDUPTHER

## 2021-03-19 RX ORDER — PALBOCICLIB 75 MG/1
75 TABLET, FILM COATED ORAL DAILY
Qty: 21 TABLET | Refills: 3 | Status: SHIPPED
Start: 2021-03-19 | End: 2021-08-23 | Stop reason: SDUPTHER

## 2021-03-19 RX ADMIN — DENOSUMAB 120 MG: 120 INJECTION SUBCUTANEOUS at 11:33

## 2021-03-19 NOTE — PROGRESS NOTES
[address] MED ONCOLOGY  [facility] [address]  Dept: [phone]  Attending Progress Note      Reason for Visit:   Metastatic breast cancer. PCP:  Eliot Velez DO    History of Present Illness: The patient is a 65 y.o. lady, with a past medical history significant for hydrocephalus, who had presented with left breast changes, she had noticed a dark spot on her left breast, then she had developed induration, she thinks that she had the breast changes for months. The patient had never had a mammogram done, had a CT scan of the chest done, revealing a solid left breast mass measuring at least 6.8 x 2.9 cm in size is identified. The mass appears to extend to the skin surface. The deep margin of the mass abuts the chest wall but a fat plane appears to be preserved. Pathologically enlarged left  axillary lymph nodes are seen measuring up to 4.5 x 1.9 cm in size. CT abdomen and pelvis was done on 8/30/2019 revealing partial visualization of soft tissue mass in the left breast, nonspecific lymph nodes in the upper abdomen. Bone scan was done on 8/30/2019, revealing a Prominent focus of radiotracer uptake in the left anterior superior iliac spine, with corresponding abnormality on recent CT, suggestive of metastasis. More subtle focal radiotracer uptake in the right anterior superior iliac spine could be due to metastasis or  degenerative change.   2. Focal radiotracer uptake in the lateral aspect of the right ninth  rib, which appears to be due to a nondisplaced healing fracture. Correlation with history of recent trauma is recommended. In light of  other findings, this could represent a pathologic fracture with an  underlying metastatic lesion. 3. Osteoarthritic uptake in the shoulders, knees, wrists, and hands.     She underwent on 8/30/2019 a biopsy of 1 of the left axillary lymph nodes, she was consistent with metastatic ductal carcinoma, nuclear grade 2, ER positive greater than 95% [place] +70% HER-2/elder 1+, by IHC, negative. The patient was started on systemic therapy with Femara and Ibrance on 9/13/2019. She denies any side effects from the treatment. She denies any new problems. The appearance of the left breast has significant improved, no drainage. Eric Lan returns for a follow-up visit. She is accompanied by her sister. Eric Lan is doing well overall, no new problems. No reported side effects from the [place] and Femara. She is compliant with the medications. Review of Systems;  CONSTITUTIONAL: No fever, chills. Improved appetite and energy level. ENMT: Eyes: No diplopia; Nose: No epistaxis. Mouth: No sore throat. RESPIRATORY: No hemoptysis, shortness of breath, cough. CARDIOVASCULAR: No chest pain, palpitations. GASTROINTESTINAL: No nausea/vomiting, abdominal pain, diarrhea/constipation. GENITOURINARY: No dysuria, urinary frequency, hematuria. NEURO: No syncope, presyncope, headache. MSK: She has pain in the left hip. Remainder:  ROS NEGATIVE    Past Medical History:      Diagnosis Date    Arm fracture, left 2009    Balance problems since April, 2011    Hydrocephalus [facility])     compensated    Thyroid disease     Vertigo      Patient Active Problem List   Diagnosis    Hydrocephalus, adult ([address]    Hydrocephalus ([address]    Hyponatremia    Breast mass    Malignant neoplasm of axillary tail of left female breast ([address]    Breast cancer metastasized to bone (HCC)    Carcinoma of breast metastatic to bone ([address]    Anemia        Past Surgical History:      Procedure Laterality Date    ARM SURGERY  9/15/2009    FRACTURE SURGERY      HUMERUS FRACTURE SURGERY  2009    Broke left arm        Family History:  Family History   Problem Relation Age of Onset    Colon Cancer Mother 68    Heart Disease Father     Diabetes Father        Medications:  Reviewed and reconciled.     Social History:  Social History     Socioeconomic History    Marital status: Single     Spouse name: Not on file    Number of children: Not on file    Years of education: Not on file    Highest education level: Not on file   Occupational History    Not on file   Social Needs    Financial resource strain: Not on file    Food insecurity     Worry: Not on file     Inability: Not on file    Transportation needs     Medical: Not on file     Non-medical: Not on file   Tobacco Use    Smoking status: Never Smoker    Smokeless tobacco: Never Used   Substance and Sexual Activity    Alcohol use: Yes     Comment: drinks it occasional on social events    Drug use: No    Sexual activity: Never   Lifestyle    Physical activity     Days per week: Not on file     Minutes per session: Not on file    Stress: Not on file   Relationships    Social connections     Talks on phone: Not on file     Gets together: Not on file     Attends Alevism service: Not on file     Active member of club or organization: Not on file     Attends meetings of clubs or organizations: Not on file     Relationship status: Not on file    Intimate partner violence     Fear of current or ex partner: Not on file     Emotionally abused: Not on file     Physically abused: Not on file     Forced sexual activity: Not on file   Other Topics Concern    Not on file   Social History Narrative    Not on file       Allergies:  No Known Allergies    Physical Exam:  BP (!) 140/65   Pulse 75   Temp 97.8 °F (36.6 °C) (Temporal)   Ht 5' 6\" (1.676 m)   Wt 146 lb (66.2 kg)   SpO2 92%   BMI 23.57 kg/m²     GENERAL: Alert, oriented x 3, not in acute distress. HEENT: PERRLA; EOMI. Oropharynx clear. NECK: Supple. No palpable cervical or supraclavicular lymphadenopathy. LUNGS: Good air entry bilaterally. No wheezing, crackles or rhonchi. CARDIOVASCULAR: Regular rate. No murmurs, rubs or gallops.    BREASTS: The right breast exam is negative for any skin changes, nipple discharge, no palpable masses, no palpable right axillary adenopathy, left breast appearance had markedly improved, the nodules had resolved, the mass decreased in size, no drainage, no bleeding, decrease in the size of the left axillary lymphadenopathy  ABDOMEN: Soft. Non-tender, non-distended. Positive bowel sounds. EXTREMITIES: mild chronic edema of the LLE  NEUROLOGIC: No focal deficits. ECOG PS 1     Impression/Plan:     The patient is a 65 y.o. lady, with a past medical history significant for hydrocephalus, who had presented with left breast changes, who was diagnosed with a left breast ductal carcinoma, ER positive greater than 95% [place] +70% HER-2/elder 1+, she has a very locally advanced disease, and metastatic disease to the bones. I discussed with the patient and her sister today her diagnosis, prognosis and recommendations for treatment. Will complete the staging work-up, the patient will have a brain MRI with and without contrast done, and a PET scan. For her HR pos, HER-2/elder negative metastatic breast cancer, I recommended treatment with Femara and Ibrance, the schedule and side effects of the treatment were reviewed with her. She has metastatic disease to the bones, recommended treatment with Xgeva. MRI brain done on 09/16/2019: no evidence of metastatic disease. PET/CT on 09/17/2019: The lytic soft tissue finding in the iliac crest in the left pelvis shows hypermetabolic FDG uptake, with maximum measurements in the 5.7 SUV range, strongly suggesting metastatic disease. She has uptake in the thyroid, could be physiologic. Femara/Ibrance were started on 09/13/2019. Bone scan done on 12/17/2019: Redemonstration of focus of radiotracer uptake involving posterior lateral right ninth rib suggestive of healing fracture. New radiotracer activity is seen at level of lateral right 10th rib and anterior right seventh rib which may be related to new healing fractures. Metastases is unlikely. Clinical correlation recommended.  No additional abnormal areas of increased radiotracer uptake. CT chest done on 12/17/2019: Significant interval decrease in size of the large mass seen in the left breast 8/29/2019. Interval decrease in size of enlarged left axillary lymph nodes. Cardiomegaly. Enlarged thyroid, unchanged. Small hiatal hernia. Diffuse interstitial changes and multifocal atelectasis or scarring in both lungs. Stable small nodular density in the right upper lobe. Sclerotic changes involving the lateral right ninth rib. CT abdomen/pelvis done on 12/17/2019: Small right renal cyst. Retroverted uterus. Distended colon and rectum containing a large amount of stool. Mildly enlarged retroperitoneal and periportal lymph nodes, unchanged. Findings in the left iliac bone as described. The results/images of the scans were reviewed with the patient and her sister, she is responding nicely to the Femara and [place] , will continue the same treatment. She did have teeth extraction on 1/20/2020, dental clearance has been obtained. On 4/3/2020, the patient was started on Ibrance and Femara cycle #8. Restaging CT Chest/Abdomen/Pelvis and bone scan on 04/24/2020:  CT Chest noted stable small bilateral pulmonary nodules; CT abdomen/pelvis no evidence of new active neoplasm compared to prior. Bone scan 3 foci of radiotracer uptake in the lower right ribs, 2 of which demonstrate less activity on the current study, possibly indicative of healing fractures. Radiotracer in the more anterior focus is more persistent, and may be a metastatic lesion. The results/images of the scans were reviewed with the patient and her sister, the patient has an overall stable disease. Recommended to continue with Femara and Ibrance.     The patient had a PET scan done on December 3, 2020, she had complete metabolic response since the PET scan on September 17, 9544, no metabolically active recurrent or metastatic disease, metabolic activity throughout the thyroid lobes can be seen with chronic thyroiditis. The patient had complete response to treatment, continue Ibrance and Femara. She had completed 19 cycles of Femara and Ibrance, the dose of the Rella Coats has been to 75 mg due to leukopenia/neutropenia. Labs reviewed, she has leukopenia and grade 2 neutropenia, continue same dose of Ibrance 75 mg, she is on cycle #20 of the Ibrance/Femara. 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 RTC in 4 weeks. Thank you for allowing us to participate in the care of Ms. Kvng Crawford.     Ger Clinton MD   HEMATOLOGY/MEDICAL ONCOLOGY  [address] [place] [address] ONCOLOGY  [facility] [address] [facility] [address]  Dept: [phone]

## 2021-03-20 LAB — CA 15-3: 18 U/ML (ref 0–31)

## 2021-04-21 ENCOUNTER — HOSPITAL ENCOUNTER (OUTPATIENT)
Age: 71
Discharge: HOME | End: 2021-04-21
Payer: MEDICARE

## 2021-04-21 DIAGNOSIS — C79.51 CARCINOMA OF BREAST METASTATIC TO BONE, UNSPECIFIED LATERALITY (HCC): ICD-10-CM

## 2021-04-21 DIAGNOSIS — C50.919 CARCINOMA OF BREAST METASTATIC TO BONE, UNSPECIFIED LATERALITY (HCC): ICD-10-CM

## 2021-04-21 DIAGNOSIS — C50.612 MALIGNANT NEOPLASM OF AXILLARY TAIL OF LEFT FEMALE BREAST, UNSPECIFIED ESTROGEN RECEPTOR STATUS (HCC): ICD-10-CM

## 2021-04-21 LAB
ALBUMIN SERPL-MCNC: 4.2 G/DL (ref 3.5–5.2)
ALP BLD-CCNC: 71 U/L (ref 35–104)
ALT SERPL-CCNC: 26 U/L (ref 0–32)
ANION GAP SERPL CALCULATED.3IONS-SCNC: 4 MMOL/L (ref 7–16)
AST SERPL-CCNC: 31 U/L (ref 0–31)
BASOPHILS ABSOLUTE: 0.04 E9/L (ref 0–0.2)
BASOPHILS RELATIVE PERCENT: 2 % (ref 0–2)
BILIRUB SERPL-MCNC: 0.4 MG/DL (ref 0–1.2)
BUN BLDV-MCNC: 27 MG/DL (ref 8–23)
CALCIUM SERPL-MCNC: 9.3 MG/DL (ref 8.6–10.2)
CEA: 1.6 NG/ML (ref 0–5.2)
CHLORIDE BLD-SCNC: 102 MMOL/L (ref 98–107)
CO2: 28 MMOL/L (ref 22–29)
CREAT SERPL-MCNC: 0.8 MG/DL (ref 0.5–1)
EOSINOPHILS ABSOLUTE: 0.02 E9/L (ref 0.05–0.5)
EOSINOPHILS RELATIVE PERCENT: 1 % (ref 0–6)
GFR AFRICAN AMERICAN: >60
GFR NON-AFRICAN AMERICAN: >60 ML/MIN/1.73
GLUCOSE BLD-MCNC: 100 MG/DL (ref 74–99)
HCT VFR BLD CALC: 31.4 % (ref 34–48)
HEMOGLOBIN: 10.5 G/DL (ref 11.5–15.5)
LYMPHOCYTES ABSOLUTE: 0.53 E9/L (ref 1.5–4)
LYMPHOCYTES RELATIVE PERCENT: 28 % (ref 20–42)
MCH RBC QN AUTO: 32.2 PG (ref 26–35)
MCHC RBC AUTO-ENTMCNC: 33.4 % (ref 32–34.5)
MCV RBC AUTO: 96.3 FL (ref 80–99.9)
MONOCYTES ABSOLUTE: 0.08 E9/L (ref 0.1–0.95)
MONOCYTES RELATIVE PERCENT: 4 % (ref 2–12)
NEUTROPHILS ABSOLUTE: 1.24 E9/L (ref 1.8–7.3)
NEUTROPHILS RELATIVE PERCENT: 65 % (ref 43–80)
PDW BLD-RTO: 13.2 FL (ref 11.5–15)
PLATELET # BLD: 232 E9/L (ref 130–450)
PMV BLD AUTO: 8.9 FL (ref 7–12)
POTASSIUM SERPL-SCNC: 4.4 MMOL/L (ref 3.5–5)
RBC # BLD: 3.26 E12/L (ref 3.5–5.5)
RBC # BLD: NORMAL 10*6/UL
SODIUM BLD-SCNC: 134 MMOL/L (ref 132–146)
TOTAL PROTEIN: 8.5 G/DL (ref 6.4–8.3)
WBC # BLD: 1.9 E9/L (ref 4.5–11.5)

## 2021-04-21 PROCEDURE — 86300 IMMUNOASSAY TUMOR CA 15-3: CPT

## 2021-04-21 PROCEDURE — 36415 COLL VENOUS BLD VENIPUNCTURE: CPT

## 2021-04-21 PROCEDURE — 82378 CARCINOEMBRYONIC ANTIGEN: CPT

## 2021-04-21 PROCEDURE — 80053 COMPREHEN METABOLIC PANEL: CPT

## 2021-04-21 PROCEDURE — 85025 COMPLETE CBC W/AUTO DIFF WBC: CPT

## 2021-04-23 ENCOUNTER — OFFICE VISIT (OUTPATIENT)
Dept: ONCOLOGY | Age: 71
End: 2021-04-23
Payer: MEDICARE

## 2021-04-23 ENCOUNTER — HOSPITAL ENCOUNTER (OUTPATIENT)
Dept: INFUSION THERAPY | Age: 71
Discharge: HOME | End: 2021-04-23
Payer: MEDICARE

## 2021-04-23 VITALS
SYSTOLIC BLOOD PRESSURE: 131 MMHG | DIASTOLIC BLOOD PRESSURE: 59 MMHG | OXYGEN SATURATION: 99 % | BODY MASS INDEX: 24.43 KG/M2 | WEIGHT: 152 LBS | HEART RATE: 75 BPM | HEIGHT: 66 IN

## 2021-04-23 DIAGNOSIS — C50.919 CARCINOMA OF BREAST METASTATIC TO BONE, UNSPECIFIED LATERALITY (HCC): ICD-10-CM

## 2021-04-23 DIAGNOSIS — C79.51 CARCINOMA OF BREAST METASTATIC TO BONE, UNSPECIFIED LATERALITY (HCC): ICD-10-CM

## 2021-04-23 DIAGNOSIS — C50.612 MALIGNANT NEOPLASM OF AXILLARY TAIL OF LEFT FEMALE BREAST, UNSPECIFIED ESTROGEN RECEPTOR STATUS (HCC): Primary | ICD-10-CM

## 2021-04-23 LAB — CA 15-3: 17 U/ML (ref 0–31)

## 2021-04-23 PROCEDURE — 96372 THER/PROPH/DIAG INJ SC/IM: CPT

## 2021-04-23 PROCEDURE — 1090F PRES/ABSN URINE INCON ASSESS: CPT | Performed by: INTERNAL MEDICINE

## 2021-04-23 PROCEDURE — 99213 OFFICE O/P EST LOW 20 MIN: CPT

## 2021-04-23 PROCEDURE — 4040F PNEUMOC VAC/ADMIN/RCVD: CPT | Performed by: INTERNAL MEDICINE

## 2021-04-23 PROCEDURE — G8427 DOCREV CUR MEDS BY ELIG CLIN: HCPCS | Performed by: INTERNAL MEDICINE

## 2021-04-23 PROCEDURE — 99214 OFFICE O/P EST MOD 30 MIN: CPT | Performed by: INTERNAL MEDICINE

## 2021-04-23 PROCEDURE — 3017F COLORECTAL CA SCREEN DOC REV: CPT | Performed by: INTERNAL MEDICINE

## 2021-04-23 PROCEDURE — G8420 CALC BMI NORM PARAMETERS: HCPCS | Performed by: INTERNAL MEDICINE

## 2021-04-23 PROCEDURE — 1036F TOBACCO NON-USER: CPT | Performed by: INTERNAL MEDICINE

## 2021-04-23 PROCEDURE — 6360000002 HC RX W HCPCS: Performed by: INTERNAL MEDICINE

## 2021-04-23 PROCEDURE — G8400 PT W/DXA NO RESULTS DOC: HCPCS | Performed by: INTERNAL MEDICINE

## 2021-04-23 PROCEDURE — 1123F ACP DISCUSS/DSCN MKR DOCD: CPT | Performed by: INTERNAL MEDICINE

## 2021-04-23 RX ADMIN — DENOSUMAB 120 MG: 120 INJECTION SUBCUTANEOUS at 10:25

## 2021-04-23 NOTE — PROGRESS NOTES
[address] MED ONCOLOGY  [address] [place] [id number]  Dept: [phone]  Attending Progress Note      Reason for Visit:   Metastatic breast cancer. PCP:  Laura Landeros DO    History of Present Illness: The patient is a 66 y.o. lady, with a past medical history significant for hydrocephalus, who had presented with left breast changes, she had noticed a dark spot on her left breast, then she had developed induration, she thinks that she had the breast changes for months. The patient had never had a mammogram done, had a CT scan of the chest done, revealing a solid left breast mass measuring at least 6.8 x 2.9 cm in size is identified. The mass appears to extend to the skin surface. The deep margin of the mass abuts the chest wall but a fat plane appears to be preserved. Pathologically enlarged left  axillary lymph nodes are seen measuring up to 4.5 x 1.9 cm in size. CT abdomen and pelvis was done on 8/30/2019 revealing partial visualization of soft tissue mass in the left breast, nonspecific lymph nodes in the upper abdomen. Bone scan was done on 8/30/2019, revealing a Prominent focus of radiotracer uptake in the left anterior superior iliac spine, with corresponding abnormality on recent CT, suggestive of metastasis. More subtle focal radiotracer uptake in the right anterior superior iliac spine could be due to metastasis or  degenerative change.   2. Focal radiotracer uptake in the lateral aspect of the right ninth  rib, which appears to be due to a nondisplaced healing fracture. Correlation with history of recent trauma is recommended. In light of  other findings, this could represent a pathologic fracture with an  underlying metastatic lesion. 3. Osteoarthritic uptake in the shoulders, knees, wrists, and hands.     She underwent on 8/30/2019 a biopsy of 1 of the left axillary lymph nodes, she was consistent with metastatic ductal carcinoma, nuclear grade 2, ER positive greater than 95% [place] +70% HER-2/elder 1+, by IHC, negative. The patient was started on systemic therapy with Femara and Ibrance on 9/13/2019. She denies any side effects from the treatment. She denies any new problems. The appearance of the left breast has significant improved, no drainage. Alvenia Apley returns for a follow-up visit. She is accompanied by her sister. Alvenia Apley is doing well overall, no new problems. No reported side effects from the [place] and Femara. She is compliant with the medications. Review of Systems;  CONSTITUTIONAL: No fever, chills. Improved appetite and energy level. ENMT: Eyes: No diplopia; Nose: No epistaxis. Mouth: No sore throat. RESPIRATORY: No hemoptysis, shortness of breath, cough. CARDIOVASCULAR: No chest pain, palpitations. GASTROINTESTINAL: No nausea/vomiting, abdominal pain, diarrhea/constipation. GENITOURINARY: No dysuria, urinary frequency, hematuria. NEURO: No syncope, presyncope, headache. MSK: She has pain in the left hip. Remainder:  ROS NEGATIVE    Past Medical History:      Diagnosis Date    Arm fracture, left 2009    Balance problems since April, 2011    Hydrocephalus [facility])     compensated    Thyroid disease     Vertigo      Patient Active Problem List   Diagnosis    Hydrocephalus, adult ([address]    Hydrocephalus ([address]    Hyponatremia    Breast mass    Malignant neoplasm of axillary tail of left female breast ([address]    Breast cancer metastasized to bone (HCC)    Carcinoma of breast metastatic to bone ([address]    Anemia        Past Surgical History:      Procedure Laterality Date    ARM SURGERY  9/15/2009    FRACTURE SURGERY      HUMERUS FRACTURE SURGERY  2009    Broke left arm        Family History:  Family History   Problem Relation Age of Onset    Colon Cancer Mother 68    Heart Disease Father     Diabetes Father        Medications:  Reviewed and reconciled.     Social History:  Social History     Socioeconomic left breast appearance had markedly improved, the nodules had resolved, the mass decreased in size, no drainage, no bleeding, decrease in the size of the left axillary lymphadenopathy  ABDOMEN: Soft. Non-tender, non-distended. Positive bowel sounds. EXTREMITIES: mild chronic edema of the LLE  NEUROLOGIC: No focal deficits. ECOG PS 1     Impression/Plan:     The patient is a 66 y.o. lady, with a past medical history significant for hydrocephalus, who had presented with left breast changes, who was diagnosed with a left breast ductal carcinoma, ER positive greater than 95% [place] +70% HER-2/elder 1+, she has a very locally advanced disease, and metastatic disease to the bones. I discussed with the patient and her sister today her diagnosis, prognosis and recommendations for treatment. Will complete the staging work-up, the patient will have a brain MRI with and without contrast done, and a PET scan. For her HR pos, HER-2/elder negative metastatic breast cancer, I recommended treatment with Femara and Ibrance, the schedule and side effects of the treatment were reviewed with her. She has metastatic disease to the bones, recommended treatment with Xgeva. MRI brain done on 09/16/2019: no evidence of metastatic disease. PET/CT on 09/17/2019: The lytic soft tissue finding in the iliac crest in the left pelvis shows hypermetabolic FDG uptake, with maximum measurements in the 5.7 SUV range, strongly suggesting metastatic disease. She has uptake in the thyroid, could be physiologic. Femara/Ibrance were started on 09/13/2019. Bone scan done on 12/17/2019: Redemonstration of focus of radiotracer uptake involving posterior lateral right ninth rib suggestive of healing fracture. New radiotracer activity is seen at level of lateral right 10th rib and anterior right seventh rib which may be related to new healing fractures. Metastases is unlikely. Clinical correlation recommended.  No additional abnormal areas of increased radiotracer uptake. CT chest done on 12/17/2019: Significant interval decrease in size of the large mass seen in the left breast 8/29/2019. Interval decrease in size of enlarged left axillary lymph nodes. Cardiomegaly. Enlarged thyroid, unchanged. Small hiatal hernia. Diffuse interstitial changes and multifocal atelectasis or scarring in both lungs. Stable small nodular density in the right upper lobe. Sclerotic changes involving the lateral right ninth rib. CT abdomen/pelvis done on 12/17/2019: Small right renal cyst. Retroverted uterus. Distended colon and rectum containing a large amount of stool. Mildly enlarged retroperitoneal and periportal lymph nodes, unchanged. Findings in the left iliac bone as described. The results/images of the scans were reviewed with the patient and her sister, she is responding nicely to the Femara and [facility] [place], will continue the same treatment. She did have teeth extraction on 1/20/2020, dental clearance has been obtained. On 4/3/2020, the patient was started on Ibrance and Femara cycle #8. Restaging CT Chest/Abdomen/Pelvis and bone scan on 04/24/2020:  CT Chest noted stable small bilateral pulmonary nodules; CT abdomen/pelvis no evidence of new active neoplasm compared to prior. Bone scan 3 foci of radiotracer uptake in the lower right ribs, 2 of which demonstrate less activity on the current study, possibly indicative of healing fractures. Radiotracer in the more anterior focus is more persistent, and may be a metastatic lesion. The results/images of the scans were reviewed with the patient and her sister, the patient has an overall stable disease. Recommended to continue with Femara and Ibrance.     The patient had a PET scan done on December 3, 2020, she had complete metabolic response since the PET scan on September 17, 1303, no metabolically active recurrent or metastatic disease, metabolic activity throughout the thyroid lobes can be seen with chronic thyroiditis. The patient had complete response to treatment, continue Ibrance and Femara. The dose of the Filleah Calderonin has been to 75 mg due to leukopenia/neutropenia. Labs reviewed, she has leukopenia and grade 2 neutropenia, continue same dose of Ibrance 75 mg, no indication for dose adjustment of the Ibrance, she is on cycle #21 of the Ibrance/Femara. The patient is not able to have the restaging scans and follow-up visit until June, CT scan of the chest, abdomen, pelvis and bone scan were ordered. Proceed with Alhaji De La Garza today 4/23/2021. 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 RTC in 8 weeks. Thank you for allowing us to participate in the care of Ms. Fani Roldan.     Jaiden Garvey MD   HEMATOLOGY/MEDICAL ONCOLOGY  [address] [place] [address] ONCOLOGY  [address] [address]  [address] [facility] [address]  Dept: [phone]

## 2021-06-01 ENCOUNTER — HOSPITAL ENCOUNTER (OUTPATIENT)
Dept: CT IMAGING | Age: 71
Discharge: HOME | End: 2021-06-03
Payer: MEDICARE

## 2021-06-01 DIAGNOSIS — C50.612 MALIGNANT NEOPLASM OF AXILLARY TAIL OF LEFT FEMALE BREAST, UNSPECIFIED ESTROGEN RECEPTOR STATUS (HCC): ICD-10-CM

## 2021-06-01 PROCEDURE — 2580000003 HC RX 258: Performed by: RADIOLOGY

## 2021-06-01 PROCEDURE — 74177 CT ABD & PELVIS W/CONTRAST: CPT

## 2021-06-01 PROCEDURE — 71260 CT THORAX DX C+: CPT

## 2021-06-01 PROCEDURE — 6360000004 HC RX CONTRAST MEDICATION: Performed by: RADIOLOGY

## 2021-06-01 RX ORDER — SODIUM CHLORIDE 0.9 % (FLUSH) 0.9 %
10 SYRINGE (ML) INJECTION PRN
Status: COMPLETED | OUTPATIENT
Start: 2021-06-01 | End: 2021-06-01

## 2021-06-01 RX ADMIN — SODIUM CHLORIDE, PRESERVATIVE FREE 10 ML: 5 INJECTION INTRAVENOUS at 12:04

## 2021-06-01 RX ADMIN — IOHEXOL 50 ML: 240 INJECTION, SOLUTION INTRATHECAL; INTRAVASCULAR; INTRAVENOUS; ORAL at 12:04

## 2021-06-01 RX ADMIN — IOPAMIDOL 100 ML: 755 INJECTION, SOLUTION INTRAVENOUS at 12:06

## 2021-06-11 ENCOUNTER — HOSPITAL ENCOUNTER (OUTPATIENT)
Dept: NUCLEAR MEDICINE | Age: 71
Discharge: HOME | End: 2021-06-13
Payer: MEDICARE

## 2021-06-11 DIAGNOSIS — C50.612 MALIGNANT NEOPLASM OF AXILLARY TAIL OF LEFT FEMALE BREAST, UNSPECIFIED ESTROGEN RECEPTOR STATUS (HCC): ICD-10-CM

## 2021-06-11 PROCEDURE — 78306 BONE IMAGING WHOLE BODY: CPT | Performed by: RADIOLOGY

## 2021-06-11 PROCEDURE — 78306 BONE IMAGING WHOLE BODY: CPT

## 2021-06-11 PROCEDURE — A9503 TC99M MEDRONATE: HCPCS | Performed by: RADIOLOGY

## 2021-06-11 PROCEDURE — 3430000000 HC RX DIAGNOSTIC RADIOPHARMACEUTICAL: Performed by: RADIOLOGY

## 2021-06-11 PROCEDURE — G1010 CDSM STANSON: HCPCS | Performed by: RADIOLOGY

## 2021-06-11 RX ORDER — TC 99M MEDRONATE 20 MG/10ML
26.5 INJECTION, POWDER, LYOPHILIZED, FOR SOLUTION INTRAVENOUS
Status: COMPLETED | OUTPATIENT
Start: 2021-06-11 | End: 2021-06-11

## 2021-06-11 RX ADMIN — TC 99M MEDRONATE 26.5 MILLICURIE: 20 INJECTION, POWDER, LYOPHILIZED, FOR SOLUTION INTRAVENOUS at 09:35

## 2021-06-16 ENCOUNTER — HOSPITAL ENCOUNTER (OUTPATIENT)
Age: 71
Discharge: HOME | End: 2021-06-16
Payer: MEDICARE

## 2021-06-16 DIAGNOSIS — C50.612 MALIGNANT NEOPLASM OF AXILLARY TAIL OF LEFT FEMALE BREAST, UNSPECIFIED ESTROGEN RECEPTOR STATUS (HCC): ICD-10-CM

## 2021-06-16 LAB
ALBUMIN SERPL-MCNC: 4.2 G/DL (ref 3.5–5.2)
ALP BLD-CCNC: 61 U/L (ref 35–104)
ALT SERPL-CCNC: 18 U/L (ref 0–32)
ANION GAP SERPL CALCULATED.3IONS-SCNC: 6 MMOL/L (ref 7–16)
AST SERPL-CCNC: 24 U/L (ref 0–31)
BASOPHILS ABSOLUTE: 0.04 E9/L (ref 0–0.2)
BASOPHILS RELATIVE PERCENT: 2 % (ref 0–2)
BILIRUB SERPL-MCNC: 0.3 MG/DL (ref 0–1.2)
BUN BLDV-MCNC: 17 MG/DL (ref 6–23)
CALCIUM SERPL-MCNC: 10 MG/DL (ref 8.6–10.2)
CEA: 1.6 NG/ML (ref 0–5.2)
CHLORIDE BLD-SCNC: 105 MMOL/L (ref 98–107)
CO2: 26 MMOL/L (ref 22–29)
CREAT SERPL-MCNC: 0.9 MG/DL (ref 0.5–1)
EOSINOPHILS ABSOLUTE: 0.04 E9/L (ref 0.05–0.5)
EOSINOPHILS RELATIVE PERCENT: 2 % (ref 0–6)
GFR AFRICAN AMERICAN: >60
GFR NON-AFRICAN AMERICAN: >60 ML/MIN/1.73
GLUCOSE BLD-MCNC: 88 MG/DL (ref 74–99)
HCT VFR BLD CALC: 30.7 % (ref 34–48)
HEMOGLOBIN: 10.3 G/DL (ref 11.5–15.5)
LYMPHOCYTES ABSOLUTE: 0.36 E9/L (ref 1.5–4)
LYMPHOCYTES RELATIVE PERCENT: 20 % (ref 20–42)
MCH RBC QN AUTO: 32.3 PG (ref 26–35)
MCHC RBC AUTO-ENTMCNC: 33.6 % (ref 32–34.5)
MCV RBC AUTO: 96.2 FL (ref 80–99.9)
MONOCYTES ABSOLUTE: 0.13 E9/L (ref 0.1–0.95)
MONOCYTES RELATIVE PERCENT: 7 % (ref 2–12)
NEUTROPHILS ABSOLUTE: 1.24 E9/L (ref 1.8–7.3)
NEUTROPHILS RELATIVE PERCENT: 69 % (ref 43–80)
PDW BLD-RTO: 13.9 FL (ref 11.5–15)
PLATELET # BLD: 215 E9/L (ref 130–450)
PMV BLD AUTO: 9.4 FL (ref 7–12)
POTASSIUM SERPL-SCNC: 4.3 MMOL/L (ref 3.5–5)
RBC # BLD: 3.19 E12/L (ref 3.5–5.5)
RBC # BLD: NORMAL 10*6/UL
SODIUM BLD-SCNC: 137 MMOL/L (ref 132–146)
TOTAL PROTEIN: 8.2 G/DL (ref 6.4–8.3)
WBC # BLD: 1.8 E9/L (ref 4.5–11.5)

## 2021-06-16 PROCEDURE — 82378 CARCINOEMBRYONIC ANTIGEN: CPT

## 2021-06-16 PROCEDURE — 86300 IMMUNOASSAY TUMOR CA 15-3: CPT

## 2021-06-16 PROCEDURE — 85025 COMPLETE CBC W/AUTO DIFF WBC: CPT

## 2021-06-16 PROCEDURE — 36415 COLL VENOUS BLD VENIPUNCTURE: CPT

## 2021-06-16 PROCEDURE — 80053 COMPREHEN METABOLIC PANEL: CPT

## 2021-06-18 ENCOUNTER — HOSPITAL ENCOUNTER (OUTPATIENT)
Dept: INFUSION THERAPY | Age: 71
Discharge: HOME | End: 2021-06-18
Payer: MEDICARE

## 2021-06-18 ENCOUNTER — OFFICE VISIT (OUTPATIENT)
Dept: ONCOLOGY | Age: 71
End: 2021-06-18
Payer: MEDICARE

## 2021-06-18 VITALS
BODY MASS INDEX: 23.95 KG/M2 | HEART RATE: 71 BPM | RESPIRATION RATE: 16 BRPM | SYSTOLIC BLOOD PRESSURE: 145 MMHG | TEMPERATURE: 97 F | DIASTOLIC BLOOD PRESSURE: 60 MMHG | WEIGHT: 149 LBS | OXYGEN SATURATION: 98 % | HEIGHT: 66 IN

## 2021-06-18 DIAGNOSIS — C50.612 MALIGNANT NEOPLASM OF AXILLARY TAIL OF LEFT FEMALE BREAST, UNSPECIFIED ESTROGEN RECEPTOR STATUS (HCC): Primary | ICD-10-CM

## 2021-06-18 DIAGNOSIS — C50.919 CARCINOMA OF BREAST METASTATIC TO BONE, UNSPECIFIED LATERALITY (HCC): ICD-10-CM

## 2021-06-18 DIAGNOSIS — C79.51 CARCINOMA OF BREAST METASTATIC TO BONE, UNSPECIFIED LATERALITY (HCC): ICD-10-CM

## 2021-06-18 LAB — CA 15-3: 17 U/ML (ref 0–31)

## 2021-06-18 PROCEDURE — 6360000002 HC RX W HCPCS: Performed by: INTERNAL MEDICINE

## 2021-06-18 PROCEDURE — 4040F PNEUMOC VAC/ADMIN/RCVD: CPT | Performed by: INTERNAL MEDICINE

## 2021-06-18 PROCEDURE — 3017F COLORECTAL CA SCREEN DOC REV: CPT | Performed by: INTERNAL MEDICINE

## 2021-06-18 PROCEDURE — 1036F TOBACCO NON-USER: CPT | Performed by: INTERNAL MEDICINE

## 2021-06-18 PROCEDURE — G8400 PT W/DXA NO RESULTS DOC: HCPCS | Performed by: INTERNAL MEDICINE

## 2021-06-18 PROCEDURE — 99214 OFFICE O/P EST MOD 30 MIN: CPT | Performed by: INTERNAL MEDICINE

## 2021-06-18 PROCEDURE — 1123F ACP DISCUSS/DSCN MKR DOCD: CPT | Performed by: INTERNAL MEDICINE

## 2021-06-18 PROCEDURE — 99212 OFFICE O/P EST SF 10 MIN: CPT

## 2021-06-18 PROCEDURE — G8420 CALC BMI NORM PARAMETERS: HCPCS | Performed by: INTERNAL MEDICINE

## 2021-06-18 PROCEDURE — G8427 DOCREV CUR MEDS BY ELIG CLIN: HCPCS | Performed by: INTERNAL MEDICINE

## 2021-06-18 PROCEDURE — 96372 THER/PROPH/DIAG INJ SC/IM: CPT

## 2021-06-18 PROCEDURE — 1090F PRES/ABSN URINE INCON ASSESS: CPT | Performed by: INTERNAL MEDICINE

## 2021-06-18 RX ADMIN — DENOSUMAB 120 MG: 120 INJECTION SUBCUTANEOUS at 10:13

## 2021-06-18 NOTE — PROGRESS NOTES
[address] [facility]  [address] [place] [id number]  Dept: [phone]  Attending Progress Note      Reason for Visit:   Metastatic breast cancer. PCP:  Swati Lima DO    History of Present Illness: The patient is a 66 y.o. lady, with a past medical history significant for hydrocephalus, who had presented with left breast changes, she had noticed a dark spot on her left breast, then she had developed induration, she thinks that she had the breast changes for months. The patient had never had a mammogram done, had a CT scan of the chest done, revealing a solid left breast mass measuring at least 6.8 x 2.9 cm in size is identified. The mass appears to extend to the skin surface. The deep margin of the mass abuts the chest wall but a fat plane appears to be preserved. Pathologically enlarged left  axillary lymph nodes are seen measuring up to 4.5 x 1.9 cm in size. CT abdomen and pelvis was done on 8/30/2019 revealing partial visualization of soft tissue mass in the left breast, nonspecific lymph nodes in the upper abdomen. Bone scan was done on 8/30/2019, revealing a Prominent focus of radiotracer uptake in the left anterior superior iliac spine, with corresponding abnormality on recent CT, suggestive of metastasis. More subtle focal radiotracer uptake in the right anterior superior iliac spine could be due to metastasis or  degenerative change.   2. Focal radiotracer uptake in the lateral aspect of the right ninth  rib, which appears to be due to a nondisplaced healing fracture. Correlation with history of recent trauma is recommended. In light of  other findings, this could represent a pathologic fracture with an  underlying metastatic lesion. 3. Osteoarthritic uptake in the shoulders, knees, wrists, and hands.     She underwent on 8/30/2019 a biopsy of 1 of the left axillary lymph nodes, she was consistent with metastatic ductal carcinoma, nuclear grade 2, ER positive greater than 95% [place] +70% HER-2/elder 1+, by IHC, negative. The patient was started on systemic therapy with Femara and Ibrance on 9/13/2019. She denies any side effects from the treatment. She denies any new problems. The appearance of the left breast has significant improved, no drainage. Teresita Alston returns for a follow-up visit. She is accompanied by her sister. Teresita Alston is doing well overall, no new problems. No reported side effects from the [place] and Femara. She is compliant with the medications. She had restaging scans done. Review of Systems;  CONSTITUTIONAL: No fever, chills. Improved appetite and energy level. ENMT: Eyes: No diplopia; Nose: No epistaxis. Mouth: No sore throat. RESPIRATORY: No hemoptysis, shortness of breath, cough. CARDIOVASCULAR: No chest pain, palpitations. GASTROINTESTINAL: No nausea/vomiting, abdominal pain, diarrhea/constipation. GENITOURINARY: No dysuria, urinary frequency, hematuria. NEURO: No syncope, presyncope, headache. MSK: She has pain in the left hip. Remainder:  ROS NEGATIVE    Past Medical History:      Diagnosis Date    Arm fracture, left 2009    Balance problems since April, 2011    Hydrocephalus [facility])     compensated    Thyroid disease     Vertigo      Patient Active Problem List   Diagnosis    Hydrocephalus, adult ([address]    Hydrocephalus ([address]    Hyponatremia    Breast mass    Malignant neoplasm of axillary tail of left female breast ([address]    Breast cancer metastasized to bone (HCC)    Carcinoma of breast metastatic to bone ([address]    Anemia        Past Surgical History:      Procedure Laterality Date    ARM SURGERY  9/15/2009    FRACTURE SURGERY      HUMERUS FRACTURE SURGERY  2009    Broke left arm        Family History:  Family History   Problem Relation Age of Onset    Colon Cancer Mother 68    Heart Disease Father     Diabetes Father        Medications:  Reviewed and reconciled.     Social History:  Social History     Socioeconomic History    Marital status: Single     Spouse name: Not on file    Number of children: Not on file    Years of education: Not on file    Highest education level: Not on file   Occupational History    Not on file   Tobacco Use    Smoking status: Never Smoker    Smokeless tobacco: Never Used   Vaping Use    Vaping Use: Never used   Substance and Sexual Activity    Alcohol use: Yes     Comment: drinks it occasional on social events    Drug use: No    Sexual activity: Never   Other Topics Concern    Not on file   Social History Narrative    Not on file     Social Determinants of Health     Financial Resource Strain:     Difficulty of Paying Living Expenses:    Food Insecurity:     Worried About Running Out of Food in the Last Year:     [address] in the Last Year:    Transportation Needs:     Lack of Transportation (Medical):  Lack of Transportation (Non-Medical):    Physical Activity:     Days of Exercise per Week:     Minutes of Exercise per Session:    Stress:     Feeling of Stress :    Social Connections:     Frequency of Communication with Friends and Family:     Frequency of Social Gatherings with Friends and Family:     Attends [employer] Services:     Active Member of Clubs or Organizations:     Attends Club or Organization Meetings:     Marital Status:    Intimate Partner Violence:     Fear of Current or Ex-Partner:     Emotionally Abused:     Physically Abused:     Sexually Abused: Allergies:  No Known Allergies    Physical Exam:  BP (!) 145/60   Pulse 71   Temp 97 °F (36.1 °C)   Resp 16   Ht 5' 6\" (1.676 m)   Wt 149 lb (67.6 kg)   SpO2 98%   BMI 24.05 kg/m²     GENERAL: Alert, oriented x 3, not in acute distress. HEENT: PERRLA; EOMI. Oropharynx clear. NECK: Supple. No palpable cervical or supraclavicular lymphadenopathy. LUNGS: Good air entry bilaterally. No wheezing, crackles or rhonchi.    CARDIOVASCULAR: Regular rate. No murmurs, rubs or gallops. BREASTS: The right breast exam is negative for any skin changes, nipple discharge, no palpable masses, no palpable right axillary adenopathy, left breast appearance had markedly improved, the nodules had resolved, the mass decreased in size, no drainage, no bleeding, decrease in the size of the left axillary lymphadenopathy  ABDOMEN: Soft. Non-tender, non-distended. Positive bowel sounds. EXTREMITIES: mild chronic edema of the LLE  NEUROLOGIC: No focal deficits. ECOG PS 1     Impression/Plan:     The patient is a 66 y.o. lady, with a past medical history significant for hydrocephalus, who had presented with left breast changes, who was diagnosed with a left breast ductal carcinoma, ER positive greater than 95% [place] +70% HER-2/elder 1+, she has a very locally advanced disease, and metastatic disease to the bones. I discussed with the patient and her sister today her diagnosis, prognosis and recommendations for treatment. Will complete the staging work-up, the patient will have a brain MRI with and without contrast done, and a PET scan. For her HR pos, HER-2/elder negative metastatic breast cancer, I recommended treatment with Femara and Ibrance, the schedule and side effects of the treatment were reviewed with her. She has metastatic disease to the bones, recommended treatment with Xgeva. MRI brain done on 09/16/2019: no evidence of metastatic disease. PET/CT on 09/17/2019: The lytic soft tissue finding in the iliac crest in the left pelvis shows hypermetabolic FDG uptake, with maximum measurements in the 5.7 SUV range, strongly suggesting metastatic disease. She has uptake in the thyroid, could be physiologic. Femara/Ibrance were started on 09/13/2019. Bone scan done on 12/17/2019: Redemonstration of focus of radiotracer uptake involving posterior lateral right ninth rib suggestive of healing fracture.  New radiotracer activity is seen at level of lateral right 10th rib and anterior right seventh rib which may be related to new healing fractures. Metastases is unlikely. Clinical correlation recommended. No additional abnormal areas of increased radiotracer uptake. CT chest done on 12/17/2019: Significant interval decrease in size of the large mass seen in the left breast 8/29/2019. Interval decrease in size of enlarged left axillary lymph nodes. Cardiomegaly. Enlarged thyroid, unchanged. Small hiatal hernia. Diffuse interstitial changes and multifocal atelectasis or scarring in both lungs. Stable small nodular density in the right upper lobe. Sclerotic changes involving the lateral right ninth rib. CT abdomen/pelvis done on 12/17/2019: Small right renal cyst. Retroverted uterus. Distended colon and rectum containing a large amount of stool. Mildly enlarged retroperitoneal and periportal lymph nodes, unchanged. Findings in the left iliac bone as described. The results/images of the scans were reviewed with the patient and her sister, she is responding nicely to the Femara and Ernesto Rule, will continue the same treatment. She did have teeth extraction on 1/20/2020, dental clearance has been obtained. On 4/3/2020, the patient was started on Ibrance and Femara cycle #8. Restaging CT Chest/Abdomen/Pelvis and bone scan on 04/24/2020:  CT Chest noted stable small bilateral pulmonary nodules; CT abdomen/pelvis no evidence of new active neoplasm compared to prior. Bone scan 3 foci of radiotracer uptake in the lower right ribs, 2 of which demonstrate less activity on the current study, possibly indicative of healing fractures. Radiotracer in the more anterior focus is more persistent, and may be a metastatic lesion. The results/images of the scans were reviewed with the patient and her sister, the patient has an overall stable disease. Recommended to continue with Femara and Ibrance.     The patient had a PET scan done on December 3, 2020, she had complete metabolic response since the PET scan on September 17, 7805, no metabolically active recurrent or metastatic disease, metabolic activity throughout the thyroid lobes can be seen with chronic thyroiditis. The patient had complete response to treatment, continue Ibrance and Femara. The dose of the [place] has been to 75 mg due to leukopenia/neutropenia. The patient had restaging CT scans of the chest, abdomen pelvis done on 6/1/2021, bone scan on 6/11/2021, revealing an overall stable disease. Recommended continue Ibrance and Femara. Labs reviewed today 6/18/2021, she has leukopenia and grade 2 neutropenia, continue same dose of Ibrance 75 mg, no indication for dose adjustment of the Ibrance, she is on cycle #23 of the Ibrance/Femara. Proceed with Renato Soliz today 6/18/2021. 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 RTC in 4 weeks. Thank you for allowing us to participate in the care of Ms. Arjun Everett.     Dominick Garcia MD   HEMATOLOGY/MEDICAL ONCOLOGY  [address] [place] [address] ONCOLOGY  [address]  [facility] [id number]  Dept: [phone]

## 2021-07-14 ENCOUNTER — HOSPITAL ENCOUNTER (OUTPATIENT)
Age: 71
Discharge: HOME | End: 2021-07-14
Payer: MEDICARE

## 2021-07-14 DIAGNOSIS — C50.919 CARCINOMA OF BREAST METASTATIC TO BONE, UNSPECIFIED LATERALITY (HCC): ICD-10-CM

## 2021-07-14 DIAGNOSIS — C79.51 CARCINOMA OF BREAST METASTATIC TO BONE, UNSPECIFIED LATERALITY (HCC): ICD-10-CM

## 2021-07-14 DIAGNOSIS — C50.612 MALIGNANT NEOPLASM OF AXILLARY TAIL OF LEFT FEMALE BREAST, UNSPECIFIED ESTROGEN RECEPTOR STATUS (HCC): ICD-10-CM

## 2021-07-14 LAB
ALBUMIN SERPL-MCNC: 4.2 G/DL (ref 3.5–5.2)
ALP BLD-CCNC: 66 U/L (ref 35–104)
ALT SERPL-CCNC: 14 U/L (ref 0–32)
ANION GAP SERPL CALCULATED.3IONS-SCNC: 7 MMOL/L (ref 7–16)
AST SERPL-CCNC: 22 U/L (ref 0–31)
ATYPICAL LYMPHOCYTE RELATIVE PERCENT: 1 % (ref 0–4)
BASOPHILS ABSOLUTE: 0.07 E9/L (ref 0–0.2)
BASOPHILS RELATIVE PERCENT: 4 % (ref 0–2)
BILIRUB SERPL-MCNC: 0.4 MG/DL (ref 0–1.2)
BUN BLDV-MCNC: 26 MG/DL (ref 6–23)
CALCIUM SERPL-MCNC: 9.5 MG/DL (ref 8.6–10.2)
CEA: 1.5 NG/ML (ref 0–5.2)
CHLORIDE BLD-SCNC: 103 MMOL/L (ref 98–107)
CO2: 26 MMOL/L (ref 22–29)
CREAT SERPL-MCNC: 1.1 MG/DL (ref 0.5–1)
EOSINOPHILS ABSOLUTE: 0.05 E9/L (ref 0.05–0.5)
EOSINOPHILS RELATIVE PERCENT: 3 % (ref 0–6)
GFR AFRICAN AMERICAN: 59
GFR NON-AFRICAN AMERICAN: 49 ML/MIN/1.73
GLUCOSE BLD-MCNC: 91 MG/DL (ref 74–99)
HCT VFR BLD CALC: 31.1 % (ref 34–48)
HEMOGLOBIN: 10.6 G/DL (ref 11.5–15.5)
LYMPHOCYTES ABSOLUTE: 0.29 E9/L (ref 1.5–4)
LYMPHOCYTES RELATIVE PERCENT: 15 % (ref 20–42)
MCH RBC QN AUTO: 33.2 PG (ref 26–35)
MCHC RBC AUTO-ENTMCNC: 34.1 % (ref 32–34.5)
MCV RBC AUTO: 97.5 FL (ref 80–99.9)
MONOCYTES ABSOLUTE: 0.05 E9/L (ref 0.1–0.95)
MONOCYTES RELATIVE PERCENT: 3 % (ref 2–12)
NEUTROPHILS ABSOLUTE: 1.33 E9/L (ref 1.8–7.3)
NEUTROPHILS RELATIVE PERCENT: 74 % (ref 43–80)
PDW BLD-RTO: 14 FL (ref 11.5–15)
PLATELET # BLD: 137 E9/L (ref 130–450)
PMV BLD AUTO: 9.7 FL (ref 7–12)
POTASSIUM SERPL-SCNC: 4.5 MMOL/L (ref 3.5–5)
RBC # BLD: 3.19 E12/L (ref 3.5–5.5)
RBC # BLD: NORMAL 10*6/UL
SODIUM BLD-SCNC: 136 MMOL/L (ref 132–146)
TOTAL PROTEIN: 8.5 G/DL (ref 6.4–8.3)
WBC # BLD: 1.8 E9/L (ref 4.5–11.5)

## 2021-07-14 PROCEDURE — 80053 COMPREHEN METABOLIC PANEL: CPT

## 2021-07-14 PROCEDURE — 86300 IMMUNOASSAY TUMOR CA 15-3: CPT

## 2021-07-14 PROCEDURE — 85025 COMPLETE CBC W/AUTO DIFF WBC: CPT

## 2021-07-14 PROCEDURE — 82378 CARCINOEMBRYONIC ANTIGEN: CPT

## 2021-07-14 PROCEDURE — 36415 COLL VENOUS BLD VENIPUNCTURE: CPT

## 2021-07-16 ENCOUNTER — OFFICE VISIT (OUTPATIENT)
Dept: ONCOLOGY | Age: 71
End: 2021-07-16
Payer: MEDICARE

## 2021-07-16 ENCOUNTER — HOSPITAL ENCOUNTER (OUTPATIENT)
Dept: INFUSION THERAPY | Age: 71
Discharge: HOME | End: 2021-07-16
Payer: MEDICARE

## 2021-07-16 VITALS
SYSTOLIC BLOOD PRESSURE: 139 MMHG | DIASTOLIC BLOOD PRESSURE: 64 MMHG | HEART RATE: 69 BPM | WEIGHT: 151.2 LBS | BODY MASS INDEX: 24.3 KG/M2 | HEIGHT: 66 IN | TEMPERATURE: 97.7 F | OXYGEN SATURATION: 99 %

## 2021-07-16 DIAGNOSIS — C50.612 MALIGNANT NEOPLASM OF AXILLARY TAIL OF LEFT FEMALE BREAST, UNSPECIFIED ESTROGEN RECEPTOR STATUS (HCC): Primary | ICD-10-CM

## 2021-07-16 DIAGNOSIS — C50.919 CARCINOMA OF BREAST METASTATIC TO BONE, UNSPECIFIED LATERALITY (HCC): ICD-10-CM

## 2021-07-16 DIAGNOSIS — C79.51 CARCINOMA OF BREAST METASTATIC TO BONE, UNSPECIFIED LATERALITY (HCC): ICD-10-CM

## 2021-07-16 LAB — CA 15-3: 18 U/ML (ref 0–31)

## 2021-07-16 PROCEDURE — G8400 PT W/DXA NO RESULTS DOC: HCPCS | Performed by: INTERNAL MEDICINE

## 2021-07-16 PROCEDURE — 1090F PRES/ABSN URINE INCON ASSESS: CPT | Performed by: INTERNAL MEDICINE

## 2021-07-16 PROCEDURE — 6360000002 HC RX W HCPCS: Performed by: INTERNAL MEDICINE

## 2021-07-16 PROCEDURE — 99214 OFFICE O/P EST MOD 30 MIN: CPT | Performed by: INTERNAL MEDICINE

## 2021-07-16 PROCEDURE — 4040F PNEUMOC VAC/ADMIN/RCVD: CPT | Performed by: INTERNAL MEDICINE

## 2021-07-16 PROCEDURE — G8427 DOCREV CUR MEDS BY ELIG CLIN: HCPCS | Performed by: INTERNAL MEDICINE

## 2021-07-16 PROCEDURE — 99212 OFFICE O/P EST SF 10 MIN: CPT

## 2021-07-16 PROCEDURE — 3017F COLORECTAL CA SCREEN DOC REV: CPT | Performed by: INTERNAL MEDICINE

## 2021-07-16 PROCEDURE — 1123F ACP DISCUSS/DSCN MKR DOCD: CPT | Performed by: INTERNAL MEDICINE

## 2021-07-16 PROCEDURE — 1036F TOBACCO NON-USER: CPT | Performed by: INTERNAL MEDICINE

## 2021-07-16 PROCEDURE — G8420 CALC BMI NORM PARAMETERS: HCPCS | Performed by: INTERNAL MEDICINE

## 2021-07-16 PROCEDURE — 96372 THER/PROPH/DIAG INJ SC/IM: CPT

## 2021-07-16 RX ADMIN — DENOSUMAB 120 MG: 120 INJECTION SUBCUTANEOUS at 10:03

## 2021-07-16 NOTE — PROGRESS NOTES
[address] [facility] ONCOLOG[facility]  [facility] [id number]  Dept: [phone]  Attending Progress Note      Reason for Visit:   Metastatic breast cancer. PCP:  Nataly Ford DO    History of Present Illness: The patient is a 66 y.o. lady, with a past medical history significant for hydrocephalus, who had presented with left breast changes, she had noticed a dark spot on her left breast, then she had developed induration, she thinks that she had the breast changes for months. The patient had never had a mammogram done, had a CT scan of the chest done, revealing a solid left breast mass measuring at least 6.8 x 2.9 cm in size is identified. The mass appears to extend to the skin surface. The deep margin of the mass abuts the chest wall but a fat plane appears to be preserved. Pathologically enlarged left  axillary lymph nodes are seen measuring up to 4.5 x 1.9 cm in size. CT abdomen and pelvis was done on 8/30/2019 revealing partial visualization of soft tissue mass in the left breast, nonspecific lymph nodes in the upper abdomen. Bone scan was done on 8/30/2019, revealing a Prominent focus of radiotracer uptake in the left anterior superior iliac spine, with corresponding abnormality on recent CT, suggestive of metastasis. More subtle focal radiotracer uptake in the right anterior superior iliac spine could be due to metastasis or  degenerative change.   2. Focal radiotracer uptake in the lateral aspect of the right ninth  rib, which appears to be due to a nondisplaced healing fracture. Correlation with history of recent trauma is recommended. In light of  other findings, this could represent a pathologic fracture with an  underlying metastatic lesion. 3. Osteoarthritic uptake in the shoulders, knees, wrists, and hands.     She underwent on 8/30/2019 a biopsy of 1 of the left axillary lymph nodes, she was consistent with metastatic ductal carcinoma, nuclear grade 2, ER positive greater than 95% [place] +70% HER-2/elder 1+, by IHC, negative. The patient was started on systemic therapy with Femara and Ibrance on 9/13/2019. She denies any side effects from the treatment. She denies any new problems. The appearance of the left breast has significant improved, no drainage. Melburn Felty returns for a follow-up visit. She is accompanied by her sister. Melburn Felty is doing well overall, no new problems. No reported side effects from the [place] and Femara. She is compliant with the medications. She had pain in the left hip which had resolved. She does not drink a lot of fluids. Review of Systems;  CONSTITUTIONAL: No fever, chills. Improved appetite and energy level. ENMT: Eyes: No diplopia; Nose: No epistaxis. Mouth: No sore throat. RESPIRATORY: No hemoptysis, shortness of breath, cough. CARDIOVASCULAR: No chest pain, palpitations. GASTROINTESTINAL: No nausea/vomiting, abdominal pain, diarrhea/constipation. GENITOURINARY: No dysuria, urinary frequency, hematuria. NEURO: No syncope, presyncope, headache. MSK: She has pain in the left hip.   Remainder:  ROS NEGATIVE    Past Medical History:      Diagnosis Date    Arm fracture, left 2009    Balance problems since April, 2011    Hydrocephalus [facility])     compensated    Thyroid disease     Vertigo      Patient Active Problem List   Diagnosis    Hydrocephalus, adult ([address]    Hydrocephalus ([address]    Hyponatremia    Breast mass    Malignant neoplasm of axillary tail of left female breast ([address]    Breast cancer metastasized to bone (HCC)    Carcinoma of breast metastatic to bone ([address]    Anemia        Past Surgical History:      Procedure Laterality Date    ARM SURGERY  9/15/2009    FRACTURE SURGERY      HUMERUS FRACTURE SURGERY  2009    Broke left arm        Family History:  Family History   Problem Relation Age of Onset    Colon Cancer Mother 68    Heart Disease Father     Diabetes Father Medications:  Reviewed and reconciled. Social History:  Social History     Socioeconomic History    Marital status: Single     Spouse name: Not on file    Number of children: Not on file    Years of education: Not on file    Highest education level: Not on file   Occupational History    Not on file   Tobacco Use    Smoking status: Never Smoker    Smokeless tobacco: Never Used   Vaping Use    Vaping Use: Never used   Substance and Sexual Activity    Alcohol use: Yes     Comment: drinks it occasional on social events    Drug use: No    Sexual activity: Never   Other Topics Concern    Not on file   Social History Narrative    Not on file     Social Determinants of Health     Financial Resource Strain:     Difficulty of Paying Living Expenses:    Food Insecurity:     Worried About Running Out of Food in the Last Year:     [address] in the Last Year:    Transportation Needs:     Lack of Transportation (Medical):  Lack of Transportation (Non-Medical):    Physical Activity:     Days of Exercise per Week:     Minutes of Exercise per Session:    Stress:     Feeling of Stress :    Social Connections:     Frequency of Communication with Friends and Family:     Frequency of Social Gatherings with Friends and Family:     Attends [employer] Services:     Active Member of Clubs or Organizations:     Attends Club or Organization Meetings:     Marital Status:    Intimate Partner Violence:     Fear of Current or Ex-Partner:     Emotionally Abused:     Physically Abused:     Sexually Abused: Allergies:  No Known Allergies    Physical Exam:  /64   Pulse 69   Temp 97.7 °F (36.5 °C)   Ht 5' 6\" (1.676 m)   Wt 151 lb 3.2 oz (68.6 kg)   SpO2 99%   BMI 24.40 kg/m²     GENERAL: Alert, oriented x 3, not in acute distress. HEENT: PERRLA; EOMI. Oropharynx clear. NECK: Supple. No palpable cervical or supraclavicular lymphadenopathy. LUNGS: Good air entry bilaterally.  No wheezing, crackles or rhonchi. CARDIOVASCULAR: Regular rate. No murmurs, rubs or gallops. BREASTS: The right breast exam is negative for any skin changes, nipple discharge, no palpable masses, no palpable right axillary adenopathy, left breast appearance had markedly improved, the nodules had resolved, the mass decreased in size, no drainage, no bleeding, decrease in the size of the left axillary lymphadenopathy  ABDOMEN: Soft. Non-tender, non-distended. Positive bowel sounds. EXTREMITIES: mild chronic edema of the LLE  NEUROLOGIC: No focal deficits. ECOG PS 1     Impression/Plan:     The patient is a 66 y.o. lady, with a past medical history significant for hydrocephalus, who had presented with left breast changes, who was diagnosed with a left breast ductal carcinoma, ER positive greater than 95% [place] +70% HER-2/elder 1+, she has a very locally advanced disease, and metastatic disease to the bones. I discussed with the patient and her sister today her diagnosis, prognosis and recommendations for treatment. Will complete the staging work-up, the patient will have a brain MRI with and without contrast done, and a PET scan. For her HR pos, HER-2/elder negative metastatic breast cancer, I recommended treatment with Femara and Ibrance, the schedule and side effects of the treatment were reviewed with her. She has metastatic disease to the bones, recommended treatment with Xgeva. MRI brain done on 09/16/2019: no evidence of metastatic disease. PET/CT on 09/17/2019: The lytic soft tissue finding in the iliac crest in the left pelvis shows hypermetabolic FDG uptake, with maximum measurements in the 5.7 SUV range, strongly suggesting metastatic disease. She has uptake in the thyroid, could be physiologic. Femara/Ibrance were started on 09/13/2019. Bone scan done on 12/17/2019: Redemonstration of focus of radiotracer uptake involving posterior lateral right ninth rib suggestive of healing fracture.  New radiotracer activity is seen at level of lateral right 10th rib and anterior right seventh rib which may be related to new healing fractures. Metastases is unlikely. Clinical correlation recommended. No additional abnormal areas of increased radiotracer uptake. CT chest done on 12/17/2019: Significant interval decrease in size of the large mass seen in the left breast 8/29/2019. Interval decrease in size of enlarged left axillary lymph nodes. Cardiomegaly. Enlarged thyroid, unchanged. Small hiatal hernia. Diffuse interstitial changes and multifocal atelectasis or scarring in both lungs. Stable small nodular density in the right upper lobe. Sclerotic changes involving the lateral right ninth rib. CT abdomen/pelvis done on 12/17/2019: Small right renal cyst. Retroverted uterus. Distended colon and rectum containing a large amount of stool. Mildly enlarged retroperitoneal and periportal lymph nodes, unchanged. Findings in the left iliac bone as described. The results/images of the scans were reviewed with the patient and her sister, she is responding nicely to the Femara and Shan Anupam, will continue the same treatment. She did have teeth extraction on 1/20/2020, dental clearance has been obtained. On 4/3/2020, the patient was started on Ibrance and Femara cycle #8. Restaging CT Chest/Abdomen/Pelvis and bone scan on 04/24/2020:  CT Chest noted stable small bilateral pulmonary nodules; CT abdomen/pelvis no evidence of new active neoplasm compared to prior. Bone scan 3 foci of radiotracer uptake in the lower right ribs, 2 of which demonstrate less activity on the current study, possibly indicative of healing fractures. Radiotracer in the more anterior focus is more persistent, and may be a metastatic lesion. The results/images of the scans were reviewed with the patient and her sister, the patient has an overall stable disease. Recommended to continue with Femara and Ibrance.     The patient had a PET scan done on December 3, 2020, she had complete metabolic response since the PET scan on September 17, 6519, no metabolically active recurrent or metastatic disease, metabolic activity throughout the thyroid lobes can be seen with chronic thyroiditis. The patient had complete response to treatment, continue Ibrance and Femara. The dose of the Shan Anupam has been to 75 mg due to leukopenia/neutropenia. The patient had restaging CT scans of the chest, abdomen pelvis done on 6/1/2021, bone scan on 6/11/2021, revealing an overall stable disease. Recommended to continue Ibrance and Femara. Labs reviewed today 6/18/2021, she has leukopenia and grade 2 neutropenia, continue same dose of Ibrance 75 mg, no indication for dose adjustment of the Ibrance, she is on cycle #24 of the Ibrance/Femara. Creatinine is 1.2, the patient will increase her oral fluids intake, will monitor her creatinine. Proceed with [place]barre today 7/16/2021. 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 RTC in 4 weeks. Thank you for allowing us to participate in the care of Ms. Ebony Park.     Derrell Latham MD   HEMATOLOGY/MEDICAL ONCOLOGY  [address] [place] [address] ONCOLOGY  [facility] [address]  [place] [id number]  Dept: [phone]

## 2021-08-03 DIAGNOSIS — E55.9 VITAMIN D DEFICIENCY: ICD-10-CM

## 2021-08-03 DIAGNOSIS — E03.9 HYPOTHYROIDISM, UNSPECIFIED TYPE: ICD-10-CM

## 2021-08-03 RX ORDER — ERGOCALCIFEROL 1.25 MG/1
CAPSULE ORAL
Qty: 12 CAPSULE | Refills: 0 | Status: SHIPPED
Start: 2021-08-03 | End: 2021-10-07

## 2021-08-03 RX ORDER — LEVOTHYROXINE SODIUM 0.03 MG/1
TABLET ORAL
Qty: 90 TABLET | Refills: 0 | Status: SHIPPED
Start: 2021-08-03 | End: 2021-12-06

## 2021-08-11 ENCOUNTER — HOSPITAL ENCOUNTER (OUTPATIENT)
Age: 71
Discharge: HOME | End: 2021-08-11
Payer: MEDICARE

## 2021-08-11 DIAGNOSIS — C50.919 CARCINOMA OF BREAST METASTATIC TO BONE, UNSPECIFIED LATERALITY (HCC): ICD-10-CM

## 2021-08-11 DIAGNOSIS — C79.51 CARCINOMA OF BREAST METASTATIC TO BONE, UNSPECIFIED LATERALITY (HCC): ICD-10-CM

## 2021-08-11 DIAGNOSIS — C50.612 MALIGNANT NEOPLASM OF AXILLARY TAIL OF LEFT FEMALE BREAST, UNSPECIFIED ESTROGEN RECEPTOR STATUS (HCC): ICD-10-CM

## 2021-08-11 LAB
ALBUMIN SERPL-MCNC: 4.5 G/DL (ref 3.5–5.2)
ALP BLD-CCNC: 74 U/L (ref 35–104)
ALT SERPL-CCNC: 20 U/L (ref 0–32)
ANION GAP SERPL CALCULATED.3IONS-SCNC: 9 MMOL/L (ref 7–16)
ANISOCYTOSIS: ABNORMAL
AST SERPL-CCNC: 30 U/L (ref 0–31)
BASOPHILS ABSOLUTE: 0.02 E9/L (ref 0–0.2)
BASOPHILS RELATIVE PERCENT: 0.9 % (ref 0–2)
BILIRUB SERPL-MCNC: 0.6 MG/DL (ref 0–1.2)
BUN BLDV-MCNC: 26 MG/DL (ref 6–23)
CALCIUM SERPL-MCNC: 10 MG/DL (ref 8.6–10.2)
CEA: 1.7 NG/ML (ref 0–5.2)
CHLORIDE BLD-SCNC: 102 MMOL/L (ref 98–107)
CO2: 25 MMOL/L (ref 22–29)
CREAT SERPL-MCNC: 1 MG/DL (ref 0.5–1)
EOSINOPHILS ABSOLUTE: 0 E9/L (ref 0.05–0.5)
EOSINOPHILS RELATIVE PERCENT: 0 % (ref 0–6)
GFR AFRICAN AMERICAN: >60
GFR NON-AFRICAN AMERICAN: 54 ML/MIN/1.73
GLUCOSE BLD-MCNC: 91 MG/DL (ref 74–99)
HCT VFR BLD CALC: 32.8 % (ref 34–48)
HEMOGLOBIN: 11.1 G/DL (ref 11.5–15.5)
LYMPHOCYTES ABSOLUTE: 0.31 E9/L (ref 1.5–4)
LYMPHOCYTES RELATIVE PERCENT: 17.1 % (ref 20–42)
MCH RBC QN AUTO: 33.3 PG (ref 26–35)
MCHC RBC AUTO-ENTMCNC: 33.8 % (ref 32–34.5)
MCV RBC AUTO: 98.5 FL (ref 80–99.9)
MONOCYTES ABSOLUTE: 0 E9/L (ref 0.1–0.95)
MONOCYTES RELATIVE PERCENT: 0 % (ref 2–12)
NEUTROPHILS ABSOLUTE: 1.48 E9/L (ref 1.8–7.3)
NEUTROPHILS RELATIVE PERCENT: 82 % (ref 43–80)
NUCLEATED RED BLOOD CELLS: 0 /100 WBC
PDW BLD-RTO: 13.4 FL (ref 11.5–15)
PLATELET # BLD: 156 E9/L (ref 130–450)
PMV BLD AUTO: 9.8 FL (ref 7–12)
POIKILOCYTES: ABNORMAL
POTASSIUM SERPL-SCNC: 4.4 MMOL/L (ref 3.5–5)
RBC # BLD: 3.33 E12/L (ref 3.5–5.5)
SODIUM BLD-SCNC: 136 MMOL/L (ref 132–146)
TEAR DROP CELLS: ABNORMAL
TOTAL PROTEIN: 9.2 G/DL (ref 6.4–8.3)
WBC # BLD: 1.8 E9/L (ref 4.5–11.5)

## 2021-08-11 PROCEDURE — 36415 COLL VENOUS BLD VENIPUNCTURE: CPT

## 2021-08-11 PROCEDURE — 82378 CARCINOEMBRYONIC ANTIGEN: CPT

## 2021-08-11 PROCEDURE — 86300 IMMUNOASSAY TUMOR CA 15-3: CPT

## 2021-08-11 PROCEDURE — 80053 COMPREHEN METABOLIC PANEL: CPT

## 2021-08-11 PROCEDURE — 85025 COMPLETE CBC W/AUTO DIFF WBC: CPT

## 2021-08-13 ENCOUNTER — HOSPITAL ENCOUNTER (OUTPATIENT)
Dept: INFUSION THERAPY | Age: 71
End: 2021-08-13
Payer: MEDICARE

## 2021-08-13 LAB — CA 15-3: 18 U/ML (ref 0–31)

## 2021-08-20 ENCOUNTER — HOSPITAL ENCOUNTER (OUTPATIENT)
Dept: INFUSION THERAPY | Age: 71
Discharge: HOME | End: 2021-08-20
Payer: MEDICARE

## 2021-08-20 ENCOUNTER — OFFICE VISIT (OUTPATIENT)
Dept: ONCOLOGY | Age: 71
End: 2021-08-20
Payer: MEDICARE

## 2021-08-20 VITALS
OXYGEN SATURATION: 96 % | SYSTOLIC BLOOD PRESSURE: 139 MMHG | DIASTOLIC BLOOD PRESSURE: 65 MMHG | WEIGHT: 153.5 LBS | HEIGHT: 66 IN | TEMPERATURE: 98.4 F | HEART RATE: 71 BPM | BODY MASS INDEX: 24.67 KG/M2

## 2021-08-20 DIAGNOSIS — C50.612 MALIGNANT NEOPLASM OF AXILLARY TAIL OF LEFT FEMALE BREAST, UNSPECIFIED ESTROGEN RECEPTOR STATUS (HCC): Primary | ICD-10-CM

## 2021-08-20 DIAGNOSIS — C79.51 CARCINOMA OF BREAST METASTATIC TO BONE, UNSPECIFIED LATERALITY (HCC): ICD-10-CM

## 2021-08-20 DIAGNOSIS — C50.919 CARCINOMA OF BREAST METASTATIC TO BONE, UNSPECIFIED LATERALITY (HCC): ICD-10-CM

## 2021-08-20 PROCEDURE — 99214 OFFICE O/P EST MOD 30 MIN: CPT | Performed by: INTERNAL MEDICINE

## 2021-08-20 PROCEDURE — 99212 OFFICE O/P EST SF 10 MIN: CPT

## 2021-08-20 PROCEDURE — G8427 DOCREV CUR MEDS BY ELIG CLIN: HCPCS | Performed by: INTERNAL MEDICINE

## 2021-08-20 PROCEDURE — G8400 PT W/DXA NO RESULTS DOC: HCPCS | Performed by: INTERNAL MEDICINE

## 2021-08-20 PROCEDURE — 4040F PNEUMOC VAC/ADMIN/RCVD: CPT | Performed by: INTERNAL MEDICINE

## 2021-08-20 PROCEDURE — G8420 CALC BMI NORM PARAMETERS: HCPCS | Performed by: INTERNAL MEDICINE

## 2021-08-20 PROCEDURE — 1036F TOBACCO NON-USER: CPT | Performed by: INTERNAL MEDICINE

## 2021-08-20 PROCEDURE — 1123F ACP DISCUSS/DSCN MKR DOCD: CPT | Performed by: INTERNAL MEDICINE

## 2021-08-20 PROCEDURE — 6360000002 HC RX W HCPCS: Performed by: INTERNAL MEDICINE

## 2021-08-20 PROCEDURE — 3017F COLORECTAL CA SCREEN DOC REV: CPT | Performed by: INTERNAL MEDICINE

## 2021-08-20 PROCEDURE — 96372 THER/PROPH/DIAG INJ SC/IM: CPT

## 2021-08-20 PROCEDURE — 1090F PRES/ABSN URINE INCON ASSESS: CPT | Performed by: INTERNAL MEDICINE

## 2021-08-20 RX ADMIN — DENOSUMAB 120 MG: 120 INJECTION SUBCUTANEOUS at 10:56

## 2021-08-20 NOTE — PROGRESS NOTES
[address] MED ONCOLOGY  [facility] [address]  Dept: [phone]  Attending Progress Note      Reason for Visit:   Metastatic breast cancer. PCP:  Roverto Pugh DO    History of Present Illness: The patient is a 66 y.o. lady, with a past medical history significant for hydrocephalus, who had presented with left breast changes, she had noticed a dark spot on her left breast, then she had developed induration, she thinks that she had the breast changes for months. The patient had never had a mammogram done, had a CT scan of the chest done, revealing a solid left breast mass measuring at least 6.8 x 2.9 cm in size is identified. The mass appears to extend to the skin surface. The deep margin of the mass abuts the chest wall but a fat plane appears to be preserved. Pathologically enlarged left  axillary lymph nodes are seen measuring up to 4.5 x 1.9 cm in size. CT abdomen and pelvis was done on 8/30/2019 revealing partial visualization of soft tissue mass in the left breast, nonspecific lymph nodes in the upper abdomen. Bone scan was done on 8/30/2019, revealing a Prominent focus of radiotracer uptake in the left anterior superior iliac spine, with corresponding abnormality on recent CT, suggestive of metastasis. More subtle focal radiotracer uptake in the right anterior superior iliac spine could be due to metastasis or  degenerative change.   2. Focal radiotracer uptake in the lateral aspect of the right ninth  rib, which appears to be due to a nondisplaced healing fracture. Correlation with history of recent trauma is recommended. In light of  other findings, this could represent a pathologic fracture with an  underlying metastatic lesion. 3. Osteoarthritic uptake in the shoulders, knees, wrists, and hands.     She underwent on 8/30/2019 a biopsy of 1 of the left axillary lymph nodes, she was consistent with metastatic ductal carcinoma, nuclear grade 2, ER positive greater than 95% [place] +70% HER-2/elder 1+, by IHC, negative. The patient was started on systemic therapy with Femara and Ibrance on 9/13/2019. She denies any side effects from the treatment. She denies any new problems. The appearance of the left breast has significant improved, no drainage. Angela Raman returns for a follow-up visit. She is accompanied by her sister. Angela Raman is doing well overall, no new problems. No reported side effects from the [place] and Femara. No left breast pain or discharge. She is compliant with the medications. Review of Systems;  CONSTITUTIONAL: No fever, chills. Improved appetite and energy level. ENMT: Eyes: No diplopia; Nose: No epistaxis. Mouth: No sore throat. RESPIRATORY: No hemoptysis, shortness of breath, cough. CARDIOVASCULAR: No chest pain, palpitations. GASTROINTESTINAL: No nausea/vomiting, abdominal pain, diarrhea/constipation. GENITOURINARY: No dysuria, urinary frequency, hematuria. NEURO: No syncope, presyncope, headache. MSK: She has pain in the left hip. Remainder:  ROS NEGATIVE    Past Medical History:      Diagnosis Date    Arm fracture, left 2009    Balance problems since April, 2011    Hydrocephalus [facility])     compensated    Thyroid disease     Vertigo      Patient Active Problem List   Diagnosis    Hydrocephalus, adult ([address]    Hydrocephalus ([address]    Hyponatremia    Breast mass    Malignant neoplasm of axillary tail of left female breast ([address]    Breast cancer metastasized to bone (HCC)    Carcinoma of breast metastatic to bone ([address]    Anemia        Past Surgical History:      Procedure Laterality Date    ARM SURGERY  9/15/2009    FRACTURE SURGERY      HUMERUS FRACTURE SURGERY  2009    Broke left arm        Family History:  Family History   Problem Relation Age of Onset    Colon Cancer Mother 68    Heart Disease Father     Diabetes Father        Medications:  Reviewed and reconciled.     Social History:  Social History     Socioeconomic History    Marital status: Single     Spouse name: Not on file    Number of children: Not on file    Years of education: Not on file    Highest education level: Not on file   Occupational History    Not on file   Tobacco Use    Smoking status: Never Smoker    Smokeless tobacco: Never Used   Vaping Use    Vaping Use: Never used   Substance and Sexual Activity    Alcohol use: Yes     Comment: drinks it occasional on social events    Drug use: No    Sexual activity: Never   Other Topics Concern    Not on file   Social History Narrative    Not on file     Social Determinants of Health     Financial Resource Strain:     Difficulty of Paying Living Expenses:    Food Insecurity:     Worried About Running Out of Food in the Last Year:     [address] in the Last Year:    Transportation Needs:     Lack of Transportation (Medical):  Lack of Transportation (Non-Medical):    Physical Activity:     Days of Exercise per Week:     Minutes of Exercise per Session:    Stress:     Feeling of Stress :    Social Connections:     Frequency of Communication with Friends and Family:     Frequency of Social Gatherings with Friends and Family:     Attends [employer] Services:     Active Member of Clubs or Organizations:     Attends Club or Organization Meetings:     Marital Status:    Intimate Partner Violence:     Fear of Current or Ex-Partner:     Emotionally Abused:     Physically Abused:     Sexually Abused: Allergies:  No Known Allergies    Physical Exam:  /65   Pulse 71   Temp 98.4 °F (36.9 °C)   Ht 5' 6\" (1.676 m)   Wt 153 lb 8 oz (69.6 kg)   SpO2 96%   BMI 24.78 kg/m²     GENERAL: Alert, oriented x 3, not in acute distress. HEENT: PERRLA; EOMI. Oropharynx clear. NECK: Supple. No palpable cervical or supraclavicular lymphadenopathy. LUNGS: Good air entry bilaterally. No wheezing, crackles or rhonchi. CARDIOVASCULAR: Regular rate. No murmurs, rubs or gallops. BREASTS: The right breast exam is negative for any skin changes, nipple discharge, no palpable masses, no palpable right axillary adenopathy, left breast appearance had markedly improved, the nodules had resolved, the mass decreased in size, no drainage, no bleeding, decrease in the size of the left axillary lymphadenopathy  ABDOMEN: Soft. Non-tender, non-distended. Positive bowel sounds. EXTREMITIES: mild chronic edema of the LLE  NEUROLOGIC: No focal deficits. ECOG PS 1     Impression/Plan:     The patient is a 66 y.o. lady, with a past medical history significant for hydrocephalus, who had presented with left breast changes, who was diagnosed with a left breast ductal carcinoma, ER positive greater than 95% [place] +70% HER-2/elder 1+, she has a very locally advanced disease, and metastatic disease to the bones. I discussed with the patient and her sister today her diagnosis, prognosis and recommendations for treatment. Will complete the staging work-up, the patient will have a brain MRI with and without contrast done, and a PET scan. For her HR pos, HER-2/elder negative metastatic breast cancer, I recommended treatment with Femara and Ibrance, the schedule and side effects of the treatment were reviewed with her. She has metastatic disease to the bones, recommended treatment with Xgeva. MRI brain done on 09/16/2019: no evidence of metastatic disease. PET/CT on 09/17/2019: The lytic soft tissue finding in the iliac crest in the left pelvis shows hypermetabolic FDG uptake, with maximum measurements in the 5.7 SUV range, strongly suggesting metastatic disease. She has uptake in the thyroid, could be physiologic. Femara/Ibrance were started on 09/13/2019. Bone scan done on 12/17/2019: Redemonstration of focus of radiotracer uptake involving posterior lateral right ninth rib suggestive of healing fracture.  New radiotracer activity is seen at level of lateral right 10th rib and anterior right seventh rib which may be related to new healing fractures. Metastases is unlikely. Clinical correlation recommended. No additional abnormal areas of increased radiotracer uptake. CT chest done on 12/17/2019: Significant interval decrease in size of the large mass seen in the left breast 8/29/2019. Interval decrease in size of enlarged left axillary lymph nodes. Cardiomegaly. Enlarged thyroid, unchanged. Small hiatal hernia. Diffuse interstitial changes and multifocal atelectasis or scarring in both lungs. Stable small nodular density in the right upper lobe. Sclerotic changes involving the lateral right ninth rib. CT abdomen/pelvis done on 12/17/2019: Small right renal cyst. Retroverted uterus. Distended colon and rectum containing a large amount of stool. Mildly enlarged retroperitoneal and periportal lymph nodes, unchanged. Findings in the left iliac bone as described. The results/images of the scans were reviewed with the patient and her sister, she is responding nicely to the Femara and Merdis Farrow, will continue the same treatment. She did have teeth extraction on 1/20/2020, dental clearance has been obtained. On 4/3/2020, the patient was started on Ibrance and Femara cycle #8. Restaging CT Chest/Abdomen/Pelvis and bone scan on 04/24/2020:  CT Chest noted stable small bilateral pulmonary nodules; CT abdomen/pelvis no evidence of new active neoplasm compared to prior. Bone scan 3 foci of radiotracer uptake in the lower right ribs, 2 of which demonstrate less activity on the current study, possibly indicative of healing fractures. Radiotracer in the more anterior focus is more persistent, and may be a metastatic lesion. The results/images of the scans were reviewed with the patient and her sister, the patient has an overall stable disease. Recommended to continue with Femara and Ibrance.     The patient had a PET scan done on December 3, 2020, she had complete metabolic

## 2021-08-23 DIAGNOSIS — C50.612 MALIGNANT NEOPLASM OF AXILLARY TAIL OF LEFT FEMALE BREAST, UNSPECIFIED ESTROGEN RECEPTOR STATUS (HCC): ICD-10-CM

## 2021-08-23 DIAGNOSIS — C79.51 CARCINOMA OF BREAST METASTATIC TO BONE, UNSPECIFIED LATERALITY (HCC): ICD-10-CM

## 2021-08-23 DIAGNOSIS — C50.919 CARCINOMA OF BREAST METASTATIC TO BONE, UNSPECIFIED LATERALITY (HCC): ICD-10-CM

## 2021-08-23 DIAGNOSIS — N63.0 BREAST MASS: ICD-10-CM

## 2021-08-23 RX ORDER — PALBOCICLIB 75 MG/1
75 TABLET, FILM COATED ORAL DAILY
Qty: 21 TABLET | Refills: 3 | Status: SHIPPED | OUTPATIENT
Start: 2021-08-23 | End: 2021-09-07 | Stop reason: SDUPTHER

## 2021-08-23 RX ORDER — LETROZOLE 2.5 MG/1
2.5 TABLET, FILM COATED ORAL DAILY
Qty: 30 TABLET | Refills: 3 | Status: SHIPPED | OUTPATIENT
Start: 2021-08-23 | End: 2021-12-15 | Stop reason: SDUPTHER

## 2021-09-02 ENCOUNTER — TELEPHONE (OUTPATIENT)
Dept: INFUSION THERAPY | Age: 71
End: 2021-09-02

## 2021-09-02 NOTE — TELEPHONE ENCOUNTER
Received update from [employer] that patient has high co-pay $2,890. Confirmed with Nomi Kinsey that the patient is still enrolled in free drug program for Ibrance.   Requested Keith Brown from [facility] to please cancel prescription sent in on 08/23/21

## 2021-09-07 ENCOUNTER — TELEPHONE (OUTPATIENT)
Dept: PHARMACY | Age: 71
End: 2021-09-07

## 2021-09-07 DIAGNOSIS — C50.612 MALIGNANT NEOPLASM OF AXILLARY TAIL OF LEFT FEMALE BREAST, UNSPECIFIED ESTROGEN RECEPTOR STATUS (HCC): ICD-10-CM

## 2021-09-07 DIAGNOSIS — C50.919 CARCINOMA OF BREAST METASTATIC TO BONE, UNSPECIFIED LATERALITY (HCC): ICD-10-CM

## 2021-09-07 DIAGNOSIS — C79.51 CARCINOMA OF BREAST METASTATIC TO BONE, UNSPECIFIED LATERALITY (HCC): ICD-10-CM

## 2021-09-07 RX ORDER — PALBOCICLIB 75 MG/1
75 TABLET, FILM COATED ORAL DAILY
Qty: 21 TABLET | Refills: 3
Start: 2021-09-07

## 2021-09-07 NOTE — TELEPHONE ENCOUNTER
Spoke with Jay Adames at [employer] [facility], patient is set up for 2 day UPS shipment for Ibrance 75 mg delivery on 9/9/2021. Called Justo Valenzuela, sister, to notify shipment and to contact me if in any issue or no delivery by 9/9/2021 to prevent delay in treatment. She had the opportunity to ask questions with all questions being answered to her satisfaction. I told Justo Valenzuela to call if there are any questions. She expresses understanding and acceptance of instructions.      Electronically signed by Nahomy Leone, [address] [place] [address] on 9/7/2021 at 9:07 AM

## 2021-09-17 ENCOUNTER — OFFICE VISIT (OUTPATIENT)
Dept: ONCOLOGY | Age: 71
End: 2021-09-17
Payer: MEDICARE

## 2021-09-17 ENCOUNTER — HOSPITAL ENCOUNTER (OUTPATIENT)
Dept: INFUSION THERAPY | Age: 71
Discharge: HOME | End: 2021-09-17
Payer: MEDICARE

## 2021-09-17 VITALS
SYSTOLIC BLOOD PRESSURE: 136 MMHG | BODY MASS INDEX: 23.78 KG/M2 | DIASTOLIC BLOOD PRESSURE: 63 MMHG | WEIGHT: 148 LBS | OXYGEN SATURATION: 97 % | HEIGHT: 66 IN | TEMPERATURE: 98.1 F | HEART RATE: 71 BPM

## 2021-09-17 DIAGNOSIS — C50.612 MALIGNANT NEOPLASM OF AXILLARY TAIL OF LEFT FEMALE BREAST, UNSPECIFIED ESTROGEN RECEPTOR STATUS (HCC): Primary | ICD-10-CM

## 2021-09-17 DIAGNOSIS — C50.919 CARCINOMA OF BREAST METASTATIC TO BONE, UNSPECIFIED LATERALITY (HCC): ICD-10-CM

## 2021-09-17 DIAGNOSIS — C50.612 MALIGNANT NEOPLASM OF AXILLARY TAIL OF LEFT FEMALE BREAST, UNSPECIFIED ESTROGEN RECEPTOR STATUS (HCC): ICD-10-CM

## 2021-09-17 DIAGNOSIS — C79.51 CARCINOMA OF BREAST METASTATIC TO BONE, UNSPECIFIED LATERALITY (HCC): ICD-10-CM

## 2021-09-17 LAB
ALBUMIN SERPL-MCNC: 4.2 G/DL (ref 3.5–5.2)
ALP BLD-CCNC: 52 U/L (ref 35–104)
ALT SERPL-CCNC: 19 U/L (ref 0–32)
ANION GAP SERPL CALCULATED.3IONS-SCNC: 7 MMOL/L (ref 7–16)
ANISOCYTOSIS: ABNORMAL
AST SERPL-CCNC: 28 U/L (ref 0–31)
BASOPHILS ABSOLUTE: 0.07 E9/L (ref 0–0.2)
BASOPHILS RELATIVE PERCENT: 4.3 % (ref 0–2)
BILIRUB SERPL-MCNC: 0.4 MG/DL (ref 0–1.2)
BUN BLDV-MCNC: 20 MG/DL (ref 6–23)
CALCIUM SERPL-MCNC: 9 MG/DL (ref 8.6–10.2)
CEA: 1.5 NG/ML (ref 0–5.2)
CHLORIDE BLD-SCNC: 105 MMOL/L (ref 98–107)
CO2: 25 MMOL/L (ref 22–29)
CREAT SERPL-MCNC: 0.9 MG/DL (ref 0.5–1)
EOSINOPHILS ABSOLUTE: 0.03 E9/L (ref 0.05–0.5)
EOSINOPHILS RELATIVE PERCENT: 1.7 % (ref 0–6)
GFR AFRICAN AMERICAN: >60
GFR NON-AFRICAN AMERICAN: >60 ML/MIN/1.73
GLUCOSE BLD-MCNC: 100 MG/DL (ref 74–99)
HCT VFR BLD CALC: 32.4 % (ref 34–48)
HEMOGLOBIN: 11.1 G/DL (ref 11.5–15.5)
LYMPHOCYTES ABSOLUTE: 0.43 E9/L (ref 1.5–4)
LYMPHOCYTES RELATIVE PERCENT: 25.2 % (ref 20–42)
MCH RBC QN AUTO: 32.6 PG (ref 26–35)
MCHC RBC AUTO-ENTMCNC: 34.3 % (ref 32–34.5)
MCV RBC AUTO: 95 FL (ref 80–99.9)
MONOCYTES ABSOLUTE: 0.32 E9/L (ref 0.1–0.95)
MONOCYTES RELATIVE PERCENT: 19.1 % (ref 2–12)
NEUTROPHILS ABSOLUTE: 0.85 E9/L (ref 1.8–7.3)
NEUTROPHILS RELATIVE PERCENT: 49.6 % (ref 43–80)
OVALOCYTES: ABNORMAL
PDW BLD-RTO: 13.1 FL (ref 11.5–15)
PLATELET # BLD: 134 E9/L (ref 130–450)
PMV BLD AUTO: 9.6 FL (ref 7–12)
POIKILOCYTES: ABNORMAL
POTASSIUM SERPL-SCNC: 4.4 MMOL/L (ref 3.5–5)
RBC # BLD: 3.41 E12/L (ref 3.5–5.5)
SODIUM BLD-SCNC: 137 MMOL/L (ref 132–146)
TOTAL PROTEIN: 8.4 G/DL (ref 6.4–8.3)
WBC # BLD: 1.7 E9/L (ref 4.5–11.5)

## 2021-09-17 PROCEDURE — 1036F TOBACCO NON-USER: CPT | Performed by: INTERNAL MEDICINE

## 2021-09-17 PROCEDURE — 6360000002 HC RX W HCPCS: Performed by: INTERNAL MEDICINE

## 2021-09-17 PROCEDURE — 99214 OFFICE O/P EST MOD 30 MIN: CPT | Performed by: INTERNAL MEDICINE

## 2021-09-17 PROCEDURE — 80053 COMPREHEN METABOLIC PANEL: CPT

## 2021-09-17 PROCEDURE — 82378 CARCINOEMBRYONIC ANTIGEN: CPT

## 2021-09-17 PROCEDURE — 36415 COLL VENOUS BLD VENIPUNCTURE: CPT

## 2021-09-17 PROCEDURE — 96372 THER/PROPH/DIAG INJ SC/IM: CPT

## 2021-09-17 PROCEDURE — 1123F ACP DISCUSS/DSCN MKR DOCD: CPT | Performed by: INTERNAL MEDICINE

## 2021-09-17 PROCEDURE — 85025 COMPLETE CBC W/AUTO DIFF WBC: CPT

## 2021-09-17 PROCEDURE — 4040F PNEUMOC VAC/ADMIN/RCVD: CPT | Performed by: INTERNAL MEDICINE

## 2021-09-17 PROCEDURE — 86300 IMMUNOASSAY TUMOR CA 15-3: CPT

## 2021-09-17 PROCEDURE — G8400 PT W/DXA NO RESULTS DOC: HCPCS | Performed by: INTERNAL MEDICINE

## 2021-09-17 PROCEDURE — 99213 OFFICE O/P EST LOW 20 MIN: CPT

## 2021-09-17 PROCEDURE — G8427 DOCREV CUR MEDS BY ELIG CLIN: HCPCS | Performed by: INTERNAL MEDICINE

## 2021-09-17 PROCEDURE — 3017F COLORECTAL CA SCREEN DOC REV: CPT | Performed by: INTERNAL MEDICINE

## 2021-09-17 PROCEDURE — 1090F PRES/ABSN URINE INCON ASSESS: CPT | Performed by: INTERNAL MEDICINE

## 2021-09-17 PROCEDURE — G8420 CALC BMI NORM PARAMETERS: HCPCS | Performed by: INTERNAL MEDICINE

## 2021-09-17 RX ADMIN — DENOSUMAB 120 MG: 120 INJECTION SUBCUTANEOUS at 10:46

## 2021-09-17 NOTE — PROGRESS NOTES
[address] MED ONCOLOGY  [facility] [address]  Dept: [phone]  Attending Progress Note      Reason for Visit:   Metastatic breast cancer. PCP:  Donald Spears DO    History of Present Illness: The patient is a 66 y.o. lady, with a past medical history significant for hydrocephalus, who had presented with left breast changes, she had noticed a dark spot on her left breast, then she had developed induration, she thinks that she had the breast changes for months. The patient had never had a mammogram done, had a CT scan of the chest done, revealing a solid left breast mass measuring at least 6.8 x 2.9 cm in size is identified. The mass appears to extend to the skin surface. The deep margin of the mass abuts the chest wall but a fat plane appears to be preserved. Pathologically enlarged left  axillary lymph nodes are seen measuring up to 4.5 x 1.9 cm in size. CT abdomen and pelvis was done on 8/30/2019 revealing partial visualization of soft tissue mass in the left breast, nonspecific lymph nodes in the upper abdomen. Bone scan was done on 8/30/2019, revealing a Prominent focus of radiotracer uptake in the left anterior superior iliac spine, with corresponding abnormality on recent CT, suggestive of metastasis. More subtle focal radiotracer uptake in the right anterior superior iliac spine could be due to metastasis or  degenerative change.   2. Focal radiotracer uptake in the lateral aspect of the right ninth  rib, which appears to be due to a nondisplaced healing fracture. Correlation with history of recent trauma is recommended. In light of  other findings, this could represent a pathologic fracture with an  underlying metastatic lesion. 3. Osteoarthritic uptake in the shoulders, knees, wrists, and hands.     She underwent on 8/30/2019 a biopsy of 1 of the left axillary lymph nodes, she was consistent with metastatic ductal carcinoma, nuclear grade 2, ER positive greater than 95% [place] +70% HER-2/elder 1+, by IHC, negative. The patient was started on systemic therapy with Femara and Ibrance on 9/13/2019. She denies any side effects from the treatment. She denies any new problems. The appearance of the left breast has significant improved, no drainage. Jackelin Girard returns for a follow-up visit. She is accompanied by her sister. Jackelin Girard is doing well overall, no new problems. No reported side effects from the [place] and Femara. No left breast pain or discharge. She is compliant with the medications. Review of Systems;  CONSTITUTIONAL: No fever, chills. Improved appetite and energy level. ENMT: Eyes: No diplopia; Nose: No epistaxis. Mouth: No sore throat. RESPIRATORY: No hemoptysis, shortness of breath, cough. CARDIOVASCULAR: No chest pain, palpitations. GASTROINTESTINAL: No nausea/vomiting, abdominal pain, diarrhea/constipation. GENITOURINARY: No dysuria, urinary frequency, hematuria. NEURO: No syncope, presyncope, headache. MSK: She has pain in the left hip. Remainder:  ROS NEGATIVE    Past Medical History:      Diagnosis Date    Arm fracture, left 2009    Balance problems since April, 2011    Hydrocephalus [facility])     compensated    Thyroid disease     Vertigo      Patient Active Problem List   Diagnosis    Hydrocephalus, adult ([address]    Hydrocephalus ([address]    Hyponatremia    Breast mass    Malignant neoplasm of axillary tail of left female breast ([address]    Breast cancer metastasized to bone (HCC)    Carcinoma of breast metastatic to bone ([address]    Anemia        Past Surgical History:      Procedure Laterality Date    ARM SURGERY  9/15/2009    FRACTURE SURGERY      HUMERUS FRACTURE SURGERY  2009    Broke left arm        Family History:  Family History   Problem Relation Age of Onset    Colon Cancer Mother 68    Heart Disease Father     Diabetes Father        Medications:  Reviewed and reconciled.     Social History:  Social History     Socioeconomic History    Marital status: Single     Spouse name: Not on file    Number of children: Not on file    Years of education: Not on file    Highest education level: Not on file   Occupational History    Not on file   Tobacco Use    Smoking status: Never Smoker    Smokeless tobacco: Never Used   Vaping Use    Vaping Use: Never used   Substance and Sexual Activity    Alcohol use: Yes     Comment: drinks it occasional on social events    Drug use: No    Sexual activity: Never   Other Topics Concern    Not on file   Social History Narrative    Not on file     Social Determinants of Health     Financial Resource Strain:     Difficulty of Paying Living Expenses:    Food Insecurity:     Worried About Running Out of Food in the Last Year:     [address] in the Last Year:    Transportation Needs:     Lack of Transportation (Medical):  Lack of Transportation (Non-Medical):    Physical Activity:     Days of Exercise per Week:     Minutes of Exercise per Session:    Stress:     Feeling of Stress :    Social Connections:     Frequency of Communication with Friends and Family:     Frequency of Social Gatherings with Friends and Family:     Attends Worship Services:     Active Member of Clubs or Organizations:     Attends Club or Organization Meetings:     Marital Status:    Intimate Partner Violence:     Fear of Current or Ex-Partner:     Emotionally Abused:     Physically Abused:     Sexually Abused: Allergies:  No Known Allergies    Physical Exam:  /63   Pulse 71   Temp 98.1 °F (36.7 °C)   Ht 5' 6\" (1.676 m)   Wt 148 lb (67.1 kg)   SpO2 97%   BMI 23.89 kg/m²     GENERAL: Alert, oriented x 3, not in acute distress. HEENT: PERRLA; EOMI. Oropharynx clear. NECK: Supple. No palpable cervical or supraclavicular lymphadenopathy. LUNGS: Good air entry bilaterally. No wheezing, crackles or rhonchi. CARDIOVASCULAR: Regular rate.  No murmurs, rubs or gallops. BREASTS: The right breast exam is negative for any skin changes, nipple discharge, no palpable masses, no palpable right axillary adenopathy, left breast appearance had markedly improved, the nodules had resolved, the mass decreased in size, no drainage, no bleeding, decrease in the size of the left axillary lymphadenopathy  ABDOMEN: Soft. Non-tender, non-distended. Positive bowel sounds. EXTREMITIES: mild chronic edema of the LLE  NEUROLOGIC: No focal deficits. ECOG PS 1     Impression/Plan:     The patient is a 66 y.o. lady, with a past medical history significant for hydrocephalus, who had presented with left breast changes, who was diagnosed with a left breast ductal carcinoma, ER positive greater than 95% [place] +70% HER-2/elder 1+, she has a very locally advanced disease, and metastatic disease to the bones. I discussed with the patient and her sister today her diagnosis, prognosis and recommendations for treatment. Will complete the staging work-up, the patient will have a brain MRI with and without contrast done, and a PET scan. For her HR pos, HER-2/elder negative metastatic breast cancer, I recommended treatment with Femara and Ibrance, the schedule and side effects of the treatment were reviewed with her. She has metastatic disease to the bones, recommended treatment with Xgeva. MRI brain done on 09/16/2019: no evidence of metastatic disease. PET/CT on 09/17/2019: The lytic soft tissue finding in the iliac crest in the left pelvis shows hypermetabolic FDG uptake, with maximum measurements in the 5.7 SUV range, strongly suggesting metastatic disease. She has uptake in the thyroid, could be physiologic. Femara/Ibrance were started on 09/13/2019. Bone scan done on 12/17/2019: Redemonstration of focus of radiotracer uptake involving posterior lateral right ninth rib suggestive of healing fracture.  New radiotracer activity is seen at level of lateral right 10th rib and anterior right seventh rib which may be related to new healing fractures. Metastases is unlikely. Clinical correlation recommended. No additional abnormal areas of increased radiotracer uptake. CT chest done on 12/17/2019: Significant interval decrease in size of the large mass seen in the left breast 8/29/2019. Interval decrease in size of enlarged left axillary lymph nodes. Cardiomegaly. Enlarged thyroid, unchanged. Small hiatal hernia. Diffuse interstitial changes and multifocal atelectasis or scarring in both lungs. Stable small nodular density in the right upper lobe. Sclerotic changes involving the lateral right ninth rib. CT abdomen/pelvis done on 12/17/2019: Small right renal cyst. Retroverted uterus. Distended colon and rectum containing a large amount of stool. Mildly enlarged retroperitoneal and periportal lymph nodes, unchanged. Findings in the left iliac bone as described. The results/images of the scans were reviewed with the patient and her sister, she is responding nicely to the Femara and [facility], will continue the same treatment. She did have teeth extraction on 1/20/2020, dental clearance has been obtained. On 4/3/2020, the patient was started on Ibrance and Femara cycle #8. Restaging CT Chest/Abdomen/Pelvis and bone scan on 04/24/2020:  CT Chest noted stable small bilateral pulmonary nodules; CT abdomen/pelvis no evidence of new active neoplasm compared to prior. Bone scan 3 foci of radiotracer uptake in the lower right ribs, 2 of which demonstrate less activity on the current study, possibly indicative of healing fractures. Radiotracer in the more anterior focus is more persistent, and may be a metastatic lesion. The results/images of the scans were reviewed with the patient and her sister, the patient has an overall stable disease. Recommended to continue with Femara and Ibrance.     The patient had a PET scan done on December 3, 2020, she had complete metabolic response

## 2021-09-19 LAB — CA 15-3: 17 U/ML (ref 0–31)

## 2021-10-04 ENCOUNTER — HOSPITAL ENCOUNTER (OUTPATIENT)
Dept: MRI IMAGING | Age: 71
Discharge: HOME | End: 2021-10-06
Payer: MEDICARE

## 2021-10-04 DIAGNOSIS — G91.9 HYDROCEPHALUS, UNSPECIFIED TYPE (HCC): ICD-10-CM

## 2021-10-04 PROCEDURE — 70551 MRI BRAIN STEM W/O DYE: CPT

## 2021-10-07 DIAGNOSIS — E55.9 VITAMIN D DEFICIENCY: ICD-10-CM

## 2021-10-07 RX ORDER — ERGOCALCIFEROL 1.25 MG/1
CAPSULE ORAL
Qty: 12 CAPSULE | Refills: 0 | Status: SHIPPED
Start: 2021-10-07 | End: 2022-01-05

## 2021-10-19 ENCOUNTER — HOSPITAL ENCOUNTER (OUTPATIENT)
Age: 71
Discharge: HOME | End: 2021-10-19
Payer: MEDICARE

## 2021-10-19 DIAGNOSIS — C50.612 MALIGNANT NEOPLASM OF AXILLARY TAIL OF LEFT FEMALE BREAST, UNSPECIFIED ESTROGEN RECEPTOR STATUS (HCC): ICD-10-CM

## 2021-10-19 DIAGNOSIS — C50.919 CARCINOMA OF BREAST METASTATIC TO BONE, UNSPECIFIED LATERALITY (HCC): ICD-10-CM

## 2021-10-19 DIAGNOSIS — C79.51 CARCINOMA OF BREAST METASTATIC TO BONE, UNSPECIFIED LATERALITY (HCC): ICD-10-CM

## 2021-10-19 LAB
ALBUMIN SERPL-MCNC: 4.2 G/DL (ref 3.5–5.2)
ALP BLD-CCNC: 66 U/L (ref 35–104)
ALT SERPL-CCNC: 17 U/L (ref 0–32)
ANION GAP SERPL CALCULATED.3IONS-SCNC: 11 MMOL/L (ref 7–16)
AST SERPL-CCNC: 22 U/L (ref 0–31)
BASOPHILS ABSOLUTE: 0.03 E9/L (ref 0–0.2)
BASOPHILS RELATIVE PERCENT: 1.3 % (ref 0–2)
BILIRUB SERPL-MCNC: 0.3 MG/DL (ref 0–1.2)
BUN BLDV-MCNC: 35 MG/DL (ref 6–23)
CALCIUM SERPL-MCNC: 9.9 MG/DL (ref 8.6–10.2)
CEA: 1.4 NG/ML (ref 0–5.2)
CHLORIDE BLD-SCNC: 102 MMOL/L (ref 98–107)
CO2: 23 MMOL/L (ref 22–29)
CREAT SERPL-MCNC: 1.5 MG/DL (ref 0.5–1)
EOSINOPHILS ABSOLUTE: 0.04 E9/L (ref 0.05–0.5)
EOSINOPHILS RELATIVE PERCENT: 1.8 % (ref 0–6)
GFR AFRICAN AMERICAN: 41
GFR NON-AFRICAN AMERICAN: 34 ML/MIN/1.73
GLUCOSE BLD-MCNC: 95 MG/DL (ref 74–99)
HCT VFR BLD CALC: 32.6 % (ref 34–48)
HEMOGLOBIN: 10.9 G/DL (ref 11.5–15.5)
IMMATURE GRANULOCYTES #: 0.01 E9/L
IMMATURE GRANULOCYTES %: 0.4 % (ref 0–5)
LYMPHOCYTES ABSOLUTE: 0.44 E9/L (ref 1.5–4)
LYMPHOCYTES RELATIVE PERCENT: 19.6 % (ref 20–42)
MCH RBC QN AUTO: 32.7 PG (ref 26–35)
MCHC RBC AUTO-ENTMCNC: 33.4 % (ref 32–34.5)
MCV RBC AUTO: 97.9 FL (ref 80–99.9)
MONOCYTES ABSOLUTE: 0.12 E9/L (ref 0.1–0.95)
MONOCYTES RELATIVE PERCENT: 5.3 % (ref 2–12)
NEUTROPHILS ABSOLUTE: 1.61 E9/L (ref 1.8–7.3)
NEUTROPHILS RELATIVE PERCENT: 71.6 % (ref 43–80)
OVALOCYTES: ABNORMAL
PDW BLD-RTO: 13.3 FL (ref 11.5–15)
PLATELET # BLD: 239 E9/L (ref 130–450)
PMV BLD AUTO: 9.7 FL (ref 7–12)
POIKILOCYTES: ABNORMAL
POTASSIUM SERPL-SCNC: 4.4 MMOL/L (ref 3.5–5)
RBC # BLD: 3.33 E12/L (ref 3.5–5.5)
SODIUM BLD-SCNC: 136 MMOL/L (ref 132–146)
TOTAL PROTEIN: 8.6 G/DL (ref 6.4–8.3)
WBC # BLD: 2.3 E9/L (ref 4.5–11.5)

## 2021-10-19 PROCEDURE — 85025 COMPLETE CBC W/AUTO DIFF WBC: CPT

## 2021-10-19 PROCEDURE — 36415 COLL VENOUS BLD VENIPUNCTURE: CPT

## 2021-10-19 PROCEDURE — 80053 COMPREHEN METABOLIC PANEL: CPT

## 2021-10-19 PROCEDURE — 82378 CARCINOEMBRYONIC ANTIGEN: CPT

## 2021-10-19 PROCEDURE — 86300 IMMUNOASSAY TUMOR CA 15-3: CPT

## 2021-10-20 ENCOUNTER — OFFICE VISIT (OUTPATIENT)
Dept: ONCOLOGY | Age: 71
End: 2021-10-20
Payer: MEDICARE

## 2021-10-20 ENCOUNTER — HOSPITAL ENCOUNTER (OUTPATIENT)
Dept: INFUSION THERAPY | Age: 71
Discharge: HOME | End: 2021-10-20
Payer: MEDICARE

## 2021-10-20 VITALS
OXYGEN SATURATION: 100 % | BODY MASS INDEX: 24.11 KG/M2 | HEART RATE: 79 BPM | DIASTOLIC BLOOD PRESSURE: 52 MMHG | TEMPERATURE: 97.9 F | RESPIRATION RATE: 12 BRPM | SYSTOLIC BLOOD PRESSURE: 123 MMHG | WEIGHT: 149.4 LBS

## 2021-10-20 DIAGNOSIS — C50.612 MALIGNANT NEOPLASM OF AXILLARY TAIL OF LEFT FEMALE BREAST, UNSPECIFIED ESTROGEN RECEPTOR STATUS (HCC): Primary | ICD-10-CM

## 2021-10-20 DIAGNOSIS — C50.912 CARCINOMA OF LEFT BREAST METASTATIC TO BONE (HCC): ICD-10-CM

## 2021-10-20 DIAGNOSIS — C50.919 CARCINOMA OF BREAST METASTATIC TO BONE, UNSPECIFIED LATERALITY (HCC): ICD-10-CM

## 2021-10-20 DIAGNOSIS — C79.51 CARCINOMA OF BREAST METASTATIC TO BONE, UNSPECIFIED LATERALITY (HCC): ICD-10-CM

## 2021-10-20 DIAGNOSIS — M25.562 LEFT KNEE PAIN, UNSPECIFIED CHRONICITY: ICD-10-CM

## 2021-10-20 DIAGNOSIS — C79.51 CARCINOMA OF LEFT BREAST METASTATIC TO BONE (HCC): ICD-10-CM

## 2021-10-20 PROCEDURE — 96361 HYDRATE IV INFUSION ADD-ON: CPT

## 2021-10-20 PROCEDURE — G8420 CALC BMI NORM PARAMETERS: HCPCS | Performed by: INTERNAL MEDICINE

## 2021-10-20 PROCEDURE — 2580000003 HC RX 258: Performed by: NURSE PRACTITIONER

## 2021-10-20 PROCEDURE — 3017F COLORECTAL CA SCREEN DOC REV: CPT | Performed by: INTERNAL MEDICINE

## 2021-10-20 PROCEDURE — 6360000002 HC RX W HCPCS: Performed by: NURSE PRACTITIONER

## 2021-10-20 PROCEDURE — G8484 FLU IMMUNIZE NO ADMIN: HCPCS | Performed by: INTERNAL MEDICINE

## 2021-10-20 PROCEDURE — 99214 OFFICE O/P EST MOD 30 MIN: CPT | Performed by: INTERNAL MEDICINE

## 2021-10-20 PROCEDURE — 1036F TOBACCO NON-USER: CPT | Performed by: INTERNAL MEDICINE

## 2021-10-20 PROCEDURE — 96360 HYDRATION IV INFUSION INIT: CPT

## 2021-10-20 PROCEDURE — 1090F PRES/ABSN URINE INCON ASSESS: CPT | Performed by: INTERNAL MEDICINE

## 2021-10-20 PROCEDURE — 2580000003 HC RX 258: Performed by: INTERNAL MEDICINE

## 2021-10-20 PROCEDURE — G8427 DOCREV CUR MEDS BY ELIG CLIN: HCPCS | Performed by: INTERNAL MEDICINE

## 2021-10-20 PROCEDURE — 4040F PNEUMOC VAC/ADMIN/RCVD: CPT | Performed by: INTERNAL MEDICINE

## 2021-10-20 PROCEDURE — 96372 THER/PROPH/DIAG INJ SC/IM: CPT

## 2021-10-20 PROCEDURE — G8400 PT W/DXA NO RESULTS DOC: HCPCS | Performed by: INTERNAL MEDICINE

## 2021-10-20 PROCEDURE — 1123F ACP DISCUSS/DSCN MKR DOCD: CPT | Performed by: INTERNAL MEDICINE

## 2021-10-20 RX ORDER — SODIUM CHLORIDE 9 MG/ML
25 INJECTION, SOLUTION INTRAVENOUS PRN
Status: DISCONTINUED | OUTPATIENT
Start: 2021-10-20 | End: 2021-10-21 | Stop reason: HOSPADM

## 2021-10-20 RX ORDER — SODIUM CHLORIDE 9 MG/ML
25 INJECTION, SOLUTION INTRAVENOUS PRN
Status: CANCELLED | OUTPATIENT
Start: 2021-10-20

## 2021-10-20 RX ORDER — SODIUM CHLORIDE 0.9 % (FLUSH) 0.9 %
5-40 SYRINGE (ML) INJECTION PRN
Status: DISCONTINUED | OUTPATIENT
Start: 2021-10-20 | End: 2021-10-21 | Stop reason: HOSPADM

## 2021-10-20 RX ORDER — HEPARIN SODIUM (PORCINE) LOCK FLUSH IV SOLN 100 UNIT/ML 100 UNIT/ML
500 SOLUTION INTRAVENOUS PRN
Status: CANCELLED | OUTPATIENT
Start: 2021-10-20

## 2021-10-20 RX ORDER — SODIUM CHLORIDE 0.9 % (FLUSH) 0.9 %
5-40 SYRINGE (ML) INJECTION PRN
Status: CANCELLED | OUTPATIENT
Start: 2021-10-20

## 2021-10-20 RX ORDER — 0.9 % SODIUM CHLORIDE 0.9 %
1000 INTRAVENOUS SOLUTION INTRAVENOUS ONCE
Status: CANCELLED | OUTPATIENT
Start: 2021-10-20 | End: 2021-10-20

## 2021-10-20 RX ORDER — SODIUM CHLORIDE 9 MG/ML
25 INJECTION, SOLUTION INTRAVENOUS PRN
OUTPATIENT
Start: 2021-10-20

## 2021-10-20 RX ORDER — HEPARIN SODIUM (PORCINE) LOCK FLUSH IV SOLN 100 UNIT/ML 100 UNIT/ML
500 SOLUTION INTRAVENOUS PRN
OUTPATIENT
Start: 2021-10-20

## 2021-10-20 RX ORDER — 0.9 % SODIUM CHLORIDE 0.9 %
1000 INTRAVENOUS SOLUTION INTRAVENOUS ONCE
Status: COMPLETED | OUTPATIENT
Start: 2021-10-20 | End: 2021-10-20

## 2021-10-20 RX ADMIN — SODIUM CHLORIDE 1000 ML: 9 INJECTION, SOLUTION INTRAVENOUS at 10:46

## 2021-10-20 RX ADMIN — DENOSUMAB 120 MG: 120 INJECTION SUBCUTANEOUS at 10:52

## 2021-10-20 RX ADMIN — SODIUM CHLORIDE, PRESERVATIVE FREE 10 ML: 5 INJECTION INTRAVENOUS at 10:45

## 2021-10-20 NOTE — PROGRESS NOTES
carcinoma, nuclear grade 2, ER positive greater than 95% WA +70% HER-2/elder 1+, by IHC, negative. The patient was started on systemic therapy with Femara and Ibrance on 9/13/2019. She denies any side effects from the treatment. She denies any new problems. The appearance of the left breast has significant improved, no drainage. Stacy Selby returns for a follow-up visit. She is accompanied by her sister. Stacy Selby is doing well overall, no new problems. No reported side effects from the [place] and Femara. No left breast pain or discharge. She is compliant with the medications. Has left knee pain. Review of Systems;  CONSTITUTIONAL: No fever, chills. Fair appetite and energy level. ENMT: Eyes: No diplopia; Nose: No epistaxis. Mouth: No sore throat. RESPIRATORY: No hemoptysis, shortness of breath, cough. CARDIOVASCULAR: No chest pain, palpitations. GASTROINTESTINAL: No nausea/vomiting, abdominal pain, diarrhea/constipation. GENITOURINARY: No dysuria, urinary frequency, hematuria. NEURO: No syncope, presyncope, headache. MSK: She has pain in the left hip.   Remainder:  ROS NEGATIVE    Past Medical History:      Diagnosis Date    Arm fracture, left 2009    Balance problems since April, 2011    Hydrocephalus [facility])     compensated    Thyroid disease     Vertigo      Patient Active Problem List   Diagnosis    Hydrocephalus, adult ([address]    Hydrocephalus ([address]    Hyponatremia    Breast mass    Malignant neoplasm of axillary tail of left female breast ([address]    Breast cancer metastasized to bone (HCC)    Carcinoma of breast metastatic to bone ([address]    Anemia        Past Surgical History:      Procedure Laterality Date    ARM SURGERY  9/15/2009    FRACTURE SURGERY      HUMERUS FRACTURE SURGERY  2009    Broke left arm        Family History:  Family History   Problem Relation Age of Onset    Colon Cancer Mother 68    Heart Disease Father     Diabetes Father        Medications:  Reviewed and reconciled. Social History:  Social History     Socioeconomic History    Marital status: Single     Spouse name: Not on file    Number of children: Not on file    Years of education: Not on file    Highest education level: Not on file   Occupational History    Not on file   Tobacco Use    Smoking status: Never Smoker    Smokeless tobacco: Never Used   Vaping Use    Vaping Use: Never used   Substance and Sexual Activity    Alcohol use: Yes     Comment: drinks it occasional on social events    Drug use: No    Sexual activity: Never   Other Topics Concern    Not on file   Social History Narrative    Not on file     Social Determinants of Health     Financial Resource Strain:     Difficulty of Paying Living Expenses:    Food Insecurity:     Worried About Running Out of Food in the Last Year:     [address] in the Last Year:    Transportation Needs:     Lack of Transportation (Medical):  Lack of Transportation (Non-Medical):    Physical Activity:     Days of Exercise per Week:     Minutes of Exercise per Session:    Stress:     Feeling of Stress :    Social Connections:     Frequency of Communication with Friends and Family:     Frequency of Social Gatherings with Friends and Family:     Attends Mosque Services:     Active Member of Clubs or Organizations:     Attends Club or Organization Meetings:     Marital Status:    Intimate Partner Violence:     Fear of Current or Ex-Partner:     Emotionally Abused:     Physically Abused:     Sexually Abused: Allergies:  No Known Allergies    Physical Exam:  BP (!) 123/52   Pulse 79   Temp 97.9 °F (36.6 °C)   Resp 12   Wt 149 lb 6.4 oz (67.8 kg)   SpO2 100%   BMI 24.11 kg/m²     GENERAL: Alert, oriented x 3, not in acute distress. HEENT: PERRLA; EOMI. Oropharynx clear. NECK: Supple. No palpable cervical or supraclavicular lymphadenopathy. LUNGS: Good air entry bilaterally. No wheezing, crackles or rhonchi. CARDIOVASCULAR: Regular rate. No murmurs, rubs or gallops. BREASTS: The right breast exam is negative for any skin changes, nipple discharge, no palpable masses, no palpable right axillary adenopathy, left breast appearance had markedly improved, the nodules had resolved, the mass decreased in size, no drainage, no bleeding, decrease in the size of the left axillary lymphadenopathy  ABDOMEN: Soft. Non-tender, non-distended. Positive bowel sounds. EXTREMITIES: mild chronic edema of the LLE  NEUROLOGIC: No focal deficits. ECOG PS 1     Impression/Plan:     The patient is a 66 y.o. lady, with a past medical history significant for hydrocephalus, who had presented with left breast changes, who was diagnosed with a left breast ductal carcinoma, ER positive greater than 95% UT +70% HER-2/elder 1+, she has a very locally advanced disease, and metastatic disease to the bones. I discussed with the patient and her sister today her diagnosis, prognosis and recommendations for treatment. Will complete the staging work-up, the patient will have a brain MRI with and without contrast done, and a PET scan. For her HR pos, HER-2/elder negative metastatic breast cancer, I recommended treatment with Femara and Ibrance, the schedule and side effects of the treatment were reviewed with her. She has metastatic disease to the bones, recommended treatment with Xgeva. MRI brain done on 09/16/2019: no evidence of metastatic disease. PET/CT on 09/17/2019: The lytic soft tissue finding in the iliac crest in the left pelvis shows hypermetabolic FDG uptake, with maximum measurements in the 5.7 SUV range, strongly suggesting metastatic disease. She has uptake in the thyroid, could be physiologic. Femara/Ibrance were started on 09/13/2019. Bone scan done on 12/17/2019: Redemonstration of focus of radiotracer uptake involving posterior lateral right ninth rib suggestive of healing fracture.  New radiotracer activity is seen at level of lateral right 10th rib and anterior right seventh rib which may be related to new healing fractures. Metastases is unlikely. Clinical correlation recommended. No additional abnormal areas of increased radiotracer uptake. CT chest done on 12/17/2019: Significant interval decrease in size of the large mass seen in the left breast 8/29/2019. Interval decrease in size of enlarged left axillary lymph nodes. Cardiomegaly. Enlarged thyroid, unchanged. Small hiatal hernia. Diffuse interstitial changes and multifocal atelectasis or scarring in both lungs. Stable small nodular density in the right upper lobe. Sclerotic changes involving the lateral right ninth rib. CT abdomen/pelvis done on 12/17/2019: Small right renal cyst. Retroverted uterus. Distended colon and rectum containing a large amount of stool. Mildly enlarged retroperitoneal and periportal lymph nodes, unchanged. Findings in the left iliac bone as described. The results/images of the scans were reviewed with the patient and her sister, she is responding nicely to the Femara and [place], will continue the same treatment. She did have teeth extraction on 1/20/2020, dental clearance has been obtained. On 4/3/2020, the patient was started on Ibrance and Femara cycle #8. Restaging CT Chest/Abdomen/Pelvis and bone scan on 04/24/2020:  CT Chest noted stable small bilateral pulmonary nodules; CT abdomen/pelvis no evidence of new active neoplasm compared to prior. Bone scan 3 foci of radiotracer uptake in the lower right ribs, 2 of which demonstrate less activity on the current study, possibly indicative of healing fractures. Radiotracer in the more anterior focus is more persistent, and may be a metastatic lesion. The results/images of the scans were reviewed with the patient and her sister, the patient has an overall stable disease. Recommended to continue with Femara and Ibrance.     The patient had a PET scan done on December 3, 2020, she had complete metabolic response since the PET scan on September 17, 9034, no metabolically active recurrent or metastatic disease, metabolic activity throughout the thyroid lobes can be seen with chronic thyroiditis. The patient had complete response to treatment, continue Ibrance and Femara. The dose of the Georganna Pedro has been to 75 mg due to leukopenia/neutropenia. The patient had restaging CT scans of the chest, abdomen pelvis done on 6/1/2021, bone scan on 6/11/2021, revealing an overall stable disease. Recommended to continue Ibrance and Femara. Labs reviewed today [phone] 2021, she has leukopenia and grade 1 neutropenia, continue same dose of Ibrance 75 mg, no indication for dose adjustment of the Ibrance. Creatinine is 1.5, she will increase her oral fluid intake, she will receive 1 L of intravenous hydration today. Restaging CT scans of the chest, abdomen pelvis and a bone scan were ordered to be done prior to her next visit. We will have them done without IV contrast due to SUAD. Ordered left knee x-ray. Proceed with Delmas Kussmaul today 10/20/2021. 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 RTC in 4 weeks. Thank you for allowing us to participate in the care of Ms. Jada Nobles.     Leonardo Treadwell MD   HEMATOLOGY/MEDICAL ONCOLOGY  [facility]  [address] [place], [place] [address] [address]  Dept: [address] [place] [address] Rd.: [phone]

## 2021-10-22 LAB — CA 15-3: 18 U/ML (ref 0–31)

## 2021-10-26 ENCOUNTER — HOSPITAL ENCOUNTER (OUTPATIENT)
Dept: CT IMAGING | Age: 71
Discharge: HOME | End: 2021-10-28
Payer: MEDICARE

## 2021-10-26 ENCOUNTER — HOSPITAL ENCOUNTER (OUTPATIENT)
Dept: NUCLEAR MEDICINE | Age: 71
Discharge: HOME | End: 2021-10-26
Payer: MEDICARE

## 2021-10-26 DIAGNOSIS — C50.612 MALIGNANT NEOPLASM OF AXILLARY TAIL OF LEFT FEMALE BREAST, UNSPECIFIED ESTROGEN RECEPTOR STATUS (HCC): ICD-10-CM

## 2021-10-26 DIAGNOSIS — C79.51 CARCINOMA OF BREAST METASTATIC TO BONE, UNSPECIFIED LATERALITY (HCC): ICD-10-CM

## 2021-10-26 DIAGNOSIS — C50.919 CARCINOMA OF BREAST METASTATIC TO BONE, UNSPECIFIED LATERALITY (HCC): ICD-10-CM

## 2021-10-26 PROCEDURE — 78306 BONE IMAGING WHOLE BODY: CPT

## 2021-10-26 PROCEDURE — 6360000004 HC RX CONTRAST MEDICATION: Performed by: RADIOLOGY

## 2021-10-26 PROCEDURE — 71250 CT THORAX DX C-: CPT

## 2021-10-26 PROCEDURE — A9503 TC99M MEDRONATE: HCPCS | Performed by: RADIOLOGY

## 2021-10-26 PROCEDURE — 3430000000 HC RX DIAGNOSTIC RADIOPHARMACEUTICAL: Performed by: RADIOLOGY

## 2021-10-26 PROCEDURE — 74176 CT ABD & PELVIS W/O CONTRAST: CPT

## 2021-10-26 RX ORDER — TC 99M MEDRONATE 20 MG/10ML
25 INJECTION, POWDER, LYOPHILIZED, FOR SOLUTION INTRAVENOUS
Status: COMPLETED | OUTPATIENT
Start: 2021-10-26 | End: 2021-10-26

## 2021-10-26 RX ADMIN — TC 99M MEDRONATE 25 MILLICURIE: 20 INJECTION, POWDER, LYOPHILIZED, FOR SOLUTION INTRAVENOUS at 09:06

## 2021-10-26 RX ADMIN — IOHEXOL 50 ML: 240 INJECTION, SOLUTION INTRATHECAL; INTRAVASCULAR; INTRAVENOUS; ORAL at 10:07

## 2021-11-16 ENCOUNTER — HOSPITAL ENCOUNTER (OUTPATIENT)
Age: 71
Discharge: HOME | End: 2021-11-16
Payer: MEDICARE

## 2021-11-16 DIAGNOSIS — C79.51 CARCINOMA OF BREAST METASTATIC TO BONE, UNSPECIFIED LATERALITY (HCC): ICD-10-CM

## 2021-11-16 DIAGNOSIS — C50.919 CARCINOMA OF BREAST METASTATIC TO BONE, UNSPECIFIED LATERALITY (HCC): ICD-10-CM

## 2021-11-16 DIAGNOSIS — C50.612 MALIGNANT NEOPLASM OF AXILLARY TAIL OF LEFT FEMALE BREAST, UNSPECIFIED ESTROGEN RECEPTOR STATUS (HCC): ICD-10-CM

## 2021-11-16 LAB
ALBUMIN SERPL-MCNC: 4.5 G/DL (ref 3.5–5.2)
ALP BLD-CCNC: 69 U/L (ref 35–104)
ALT SERPL-CCNC: 14 U/L (ref 0–32)
ANION GAP SERPL CALCULATED.3IONS-SCNC: 12 MMOL/L (ref 7–16)
AST SERPL-CCNC: 23 U/L (ref 0–31)
ATYPICAL LYMPHOCYTE RELATIVE PERCENT: 4.3 % (ref 0–4)
BASOPHILS ABSOLUTE: 0.02 E9/L (ref 0–0.2)
BASOPHILS RELATIVE PERCENT: 0.9 % (ref 0–2)
BILIRUB SERPL-MCNC: 0.6 MG/DL (ref 0–1.2)
BUN BLDV-MCNC: 19 MG/DL (ref 6–23)
CALCIUM SERPL-MCNC: 10.1 MG/DL (ref 8.6–10.2)
CHLORIDE BLD-SCNC: 101 MMOL/L (ref 98–107)
CO2: 23 MMOL/L (ref 22–29)
CREAT SERPL-MCNC: 1 MG/DL (ref 0.5–1)
EOSINOPHILS ABSOLUTE: 0.02 E9/L (ref 0.05–0.5)
EOSINOPHILS RELATIVE PERCENT: 0.9 % (ref 0–6)
GFR AFRICAN AMERICAN: >60
GFR NON-AFRICAN AMERICAN: 54 ML/MIN/1.73
GLUCOSE BLD-MCNC: 94 MG/DL (ref 74–99)
HCT VFR BLD CALC: 34.7 % (ref 34–48)
HEMOGLOBIN: 11.7 G/DL (ref 11.5–15.5)
LYMPHOCYTES ABSOLUTE: 0.57 E9/L (ref 1.5–4)
LYMPHOCYTES RELATIVE PERCENT: 21.7 % (ref 20–42)
MCH RBC QN AUTO: 32.7 PG (ref 26–35)
MCHC RBC AUTO-ENTMCNC: 33.7 % (ref 32–34.5)
MCV RBC AUTO: 96.9 FL (ref 80–99.9)
MONOCYTES ABSOLUTE: 0.11 E9/L (ref 0.1–0.95)
MONOCYTES RELATIVE PERCENT: 5.2 % (ref 2–12)
NEUTROPHILS ABSOLUTE: 1.47 E9/L (ref 1.8–7.3)
NEUTROPHILS RELATIVE PERCENT: 67 % (ref 43–80)
NUCLEATED RED BLOOD CELLS: 0 /100 WBC
OVALOCYTES: ABNORMAL
PDW BLD-RTO: 13.4 FL (ref 11.5–15)
PLATELET # BLD: 225 E9/L (ref 130–450)
PMV BLD AUTO: 9.2 FL (ref 7–12)
POIKILOCYTES: ABNORMAL
POLYCHROMASIA: ABNORMAL
POTASSIUM SERPL-SCNC: 4.8 MMOL/L (ref 3.5–5)
RBC # BLD: 3.58 E12/L (ref 3.5–5.5)
SODIUM BLD-SCNC: 136 MMOL/L (ref 132–146)
TOTAL PROTEIN: 9.1 G/DL (ref 6.4–8.3)
WBC # BLD: 2.2 E9/L (ref 4.5–11.5)

## 2021-11-16 PROCEDURE — 80053 COMPREHEN METABOLIC PANEL: CPT

## 2021-11-16 PROCEDURE — 36415 COLL VENOUS BLD VENIPUNCTURE: CPT

## 2021-11-16 PROCEDURE — 85025 COMPLETE CBC W/AUTO DIFF WBC: CPT

## 2021-11-17 ENCOUNTER — HOSPITAL ENCOUNTER (OUTPATIENT)
Dept: INFUSION THERAPY | Age: 71
Discharge: HOME | End: 2021-11-17
Payer: MEDICARE

## 2021-11-17 ENCOUNTER — OFFICE VISIT (OUTPATIENT)
Dept: ONCOLOGY | Age: 71
End: 2021-11-17
Payer: MEDICARE

## 2021-11-17 VITALS
WEIGHT: 146.8 LBS | OXYGEN SATURATION: 100 % | BODY MASS INDEX: 24.46 KG/M2 | TEMPERATURE: 97.8 F | HEART RATE: 80 BPM | HEIGHT: 65 IN | SYSTOLIC BLOOD PRESSURE: 128 MMHG | RESPIRATION RATE: 16 BRPM | DIASTOLIC BLOOD PRESSURE: 64 MMHG

## 2021-11-17 DIAGNOSIS — C50.612 MALIGNANT NEOPLASM OF AXILLARY TAIL OF LEFT FEMALE BREAST, UNSPECIFIED ESTROGEN RECEPTOR STATUS (HCC): Primary | ICD-10-CM

## 2021-11-17 DIAGNOSIS — C79.51 CARCINOMA OF BREAST METASTATIC TO BONE, UNSPECIFIED LATERALITY (HCC): ICD-10-CM

## 2021-11-17 DIAGNOSIS — C50.919 CARCINOMA OF BREAST METASTATIC TO BONE, UNSPECIFIED LATERALITY (HCC): ICD-10-CM

## 2021-11-17 PROCEDURE — G8427 DOCREV CUR MEDS BY ELIG CLIN: HCPCS | Performed by: INTERNAL MEDICINE

## 2021-11-17 PROCEDURE — G8484 FLU IMMUNIZE NO ADMIN: HCPCS | Performed by: INTERNAL MEDICINE

## 2021-11-17 PROCEDURE — 96372 THER/PROPH/DIAG INJ SC/IM: CPT

## 2021-11-17 PROCEDURE — 6360000002 HC RX W HCPCS: Performed by: NURSE PRACTITIONER

## 2021-11-17 PROCEDURE — 99214 OFFICE O/P EST MOD 30 MIN: CPT | Performed by: INTERNAL MEDICINE

## 2021-11-17 PROCEDURE — 99212 OFFICE O/P EST SF 10 MIN: CPT

## 2021-11-17 PROCEDURE — 1123F ACP DISCUSS/DSCN MKR DOCD: CPT | Performed by: INTERNAL MEDICINE

## 2021-11-17 PROCEDURE — 1090F PRES/ABSN URINE INCON ASSESS: CPT | Performed by: INTERNAL MEDICINE

## 2021-11-17 PROCEDURE — 4040F PNEUMOC VAC/ADMIN/RCVD: CPT | Performed by: INTERNAL MEDICINE

## 2021-11-17 PROCEDURE — G8400 PT W/DXA NO RESULTS DOC: HCPCS | Performed by: INTERNAL MEDICINE

## 2021-11-17 PROCEDURE — 1036F TOBACCO NON-USER: CPT | Performed by: INTERNAL MEDICINE

## 2021-11-17 PROCEDURE — G8420 CALC BMI NORM PARAMETERS: HCPCS | Performed by: INTERNAL MEDICINE

## 2021-11-17 PROCEDURE — 3017F COLORECTAL CA SCREEN DOC REV: CPT | Performed by: INTERNAL MEDICINE

## 2021-11-17 RX ADMIN — DENOSUMAB 120 MG: 120 INJECTION SUBCUTANEOUS at 10:21

## 2021-11-17 NOTE — PROGRESS NOTES
[facility] MEDICAL ONCOLOGY  [address] [place], [place] [address]deird[facility]areed [facility] [address]  Dept: [phone]  Loc: [phone]  Attending Progress Note      Reason for Visit:   Metastatic breast cancer. PCP:  Tonia Carvajal DO    History of Present Illness: The patient is a 66 y.o. lady, with a past medical history significant for hydrocephalus, who had presented with left breast changes, she had noticed a dark spot on her left breast, then she had developed induration, she thinks that she had the breast changes for months. The patient had never had a mammogram done, had a CT scan of the chest done, revealing a solid left breast mass measuring at least 6.8 x 2.9 cm in size is identified. The mass appears to extend to the skin surface. The deep margin of the mass abuts the chest wall but a fat plane appears to be preserved. Pathologically enlarged left  axillary lymph nodes are seen measuring up to 4.5 x 1.9 cm in size. CT abdomen and pelvis was done on 8/30/2019 revealing partial visualization of soft tissue mass in the left breast, nonspecific lymph nodes in the upper abdomen. Bone scan was done on 8/30/2019, revealing a Prominent focus of radiotracer uptake in the left anterior superior iliac spine, with corresponding abnormality on recent CT, suggestive of metastasis. More subtle focal radiotracer uptake in the right anterior superior iliac spine could be due to metastasis or  degenerative change.   2. Focal radiotracer uptake in the lateral aspect of the right ninth  rib, which appears to be due to a nondisplaced healing fracture. Correlation with history of recent trauma is recommended. In light of  other findings, this could represent a pathologic fracture with an  underlying metastatic lesion. 3. Osteoarthritic uptake in the shoulders, knees, wrists, and hands.     She underwent on 8/30/2019 a biopsy of 1 of the left axillary lymph nodes, she was consistent with metastatic ductal carcinoma, nuclear grade 2, ER positive greater than 95% MA +70% HER-2/elder 1+, by IHC, negative. The patient was started on systemic therapy with Femara and Ibrance on 9/13/2019. She denies any side effects from the treatment. She denies any new problems. The appearance of the left breast has significant improved, no drainage. Jarad Giron returns for a follow-up visit. She is accompanied by her sister. Jarad Giron is doing well overall, no new problems. No reported side effects from the [facility] and Femara. No left breast pain or discharge. She is keeping herself well-hydrated. Review of Systems;  CONSTITUTIONAL: No fever, chills. Fair appetite and energy level. ENMT: Eyes: No diplopia; Nose: No epistaxis. Mouth: No sore throat. RESPIRATORY: No hemoptysis, shortness of breath, cough. CARDIOVASCULAR: No chest pain, palpitations. GASTROINTESTINAL: No nausea/vomiting, abdominal pain, diarrhea/constipation. GENITOURINARY: No dysuria, urinary frequency, hematuria. NEURO: No syncope, presyncope, headache. MSK: She has pain in the left hip. Remainder:  ROS NEGATIVE    Past Medical History:      Diagnosis Date    Arm fracture, left 2009    Balance problems since April, 2011    Hydrocephalus [facility])     compensated    Thyroid disease     Vertigo      Patient Active Problem List   Diagnosis    Hydrocephalus, adult ([address]    Hydrocephalus ([address]    Hyponatremia    Breast mass    Malignant neoplasm of axillary tail of left female breast ([address]    Breast cancer metastasized to bone (HCC)    Carcinoma of breast metastatic to bone ([address]    Anemia        Past Surgical History:      Procedure Laterality Date    ARM SURGERY  9/15/2009    FRACTURE SURGERY      HUMERUS FRACTURE SURGERY  2009    Broke left arm        Family History:  Family History   Problem Relation Age of Onset    Colon Cancer Mother 68    Heart Disease Father     Diabetes Father        Medications:  Reviewed and reconciled.     Social History:  Social History     Socioeconomic History    Marital status: Single     Spouse name: Not on file    Number of children: Not on file    Years of education: Not on file    Highest education level: Not on file   Occupational History    Not on file   Tobacco Use    Smoking status: Never Smoker    Smokeless tobacco: Never Used   Vaping Use    Vaping Use: Never used   Substance and Sexual Activity    Alcohol use: Yes     Comment: drinks it occasional on social events    Drug use: No    Sexual activity: Never   Other Topics Concern    Not on file   Social History Narrative    Not on file     Social Determinants of Health     Financial Resource Strain:     Difficulty of Paying Living Expenses: Not on file   Food Insecurity:     Worried About Running Out of Food in the Last Year: Not on file    Sara of Food in the Last Year: Not on file   Transportation Needs:     Lack of Transportation (Medical): Not on file    Lack of Transportation (Non-Medical):  Not on file   Physical Activity:     Days of Exercise per Week: Not on file    Minutes of Exercise per Session: Not on file   Stress:     Feeling of Stress : Not on file   Social Connections:     Frequency of Communication with Friends and Family: Not on file    Frequency of Social Gatherings with Friends and Family: Not on file    Attends Episcopal Services: Not on file    Active Member of [address] [place] [address] [employer] or Organizations: Not on file    Attends Club or Organization Meetings: Not on file    Marital Status: Not on file   Intimate Partner Violence:     Fear of Current or Ex-Partner: Not on file    Emotionally Abused: Not on file    Physically Abused: Not on file    Sexually Abused: Not on file   Housing Stability:     Unable to Pay for Housing in the Last Year: Not on file    Number of [place] in the Last Year: Not on file    Unstable Housing in the Last Year: Not on file       Allergies:  No Known Allergies    Physical Exam:  /64 (Site: Right Upper Arm, Position: Sitting)   Pulse 80   Temp 97.8 °F (36.6 °C) (Oral)   Resp 16   Ht 5' 5\" (1.651 m)   Wt 146 lb 12.8 oz (66.6 kg)   SpO2 100%   BMI 24.43 kg/m²     GENERAL: Alert, oriented x 3, not in acute distress. HEENT: PERRLA; EOMI. Oropharynx clear. NECK: Supple. No palpable cervical or supraclavicular lymphadenopathy. LUNGS: Good air entry bilaterally. No wheezing, crackles or rhonchi. CARDIOVASCULAR: Regular rate. No murmurs, rubs or gallops. BREASTS: The right breast exam is negative for any skin changes, nipple discharge, no palpable masses, no palpable right axillary adenopathy, left breast appearance had markedly improved, the nodules had resolved, the mass decreased in size, no drainage, no bleeding, decrease in the size of the left axillary lymphadenopathy  ABDOMEN: Soft. Non-tender, non-distended. Positive bowel sounds. EXTREMITIES: mild chronic edema of the LLE  NEUROLOGIC: No focal deficits. ECOG PS 1     Impression/Plan:     The patient is a 66 y.o. lady, with a past medical history significant for hydrocephalus, who had presented with left breast changes, who was diagnosed with a left breast ductal carcinoma, ER positive greater than 95% HI +70% HER-2/elder 1+, she has a very locally advanced disease, and metastatic disease to the bones. I discussed with the patient and her sister today her diagnosis, prognosis and recommendations for treatment. Will complete the staging work-up, the patient will have a brain MRI with and without contrast done, and a PET scan. For her HR pos, HER-2/elder negative metastatic breast cancer, I recommended treatment with Femara and Ibrance, the schedule and side effects of the treatment were reviewed with her. She has metastatic disease to the bones, recommended treatment with Xgeva. MRI brain done on 09/16/2019: no evidence of metastatic disease. PET/CT on 09/17/2019:  The lytic soft tissue finding in the iliac crest in the left pelvis shows hypermetabolic FDG uptake, with maximum measurements in the 5.7 SUV range, strongly suggesting metastatic disease. She has uptake in the thyroid, could be physiologic. Femara/Ibrance were started on 09/13/2019. Bone scan done on 12/17/2019: Redemonstration of focus of radiotracer uptake involving posterior lateral right ninth rib suggestive of healing fracture. New radiotracer activity is seen at level of lateral right 10th rib and anterior right seventh rib which may be related to new healing fractures. Metastases is unlikely. Clinical correlation recommended. No additional abnormal areas of increased radiotracer uptake. CT chest done on 12/17/2019: Significant interval decrease in size of the large mass seen in the left breast 8/29/2019. Interval decrease in size of enlarged left axillary lymph nodes. Cardiomegaly. Enlarged thyroid, unchanged. Small hiatal hernia. Diffuse interstitial changes and multifocal atelectasis or scarring in both lungs. Stable small nodular density in the right upper lobe. Sclerotic changes involving the lateral right ninth rib. CT abdomen/pelvis done on 12/17/2019: Small right renal cyst. Retroverted uterus. Distended colon and rectum containing a large amount of stool. Mildly enlarged retroperitoneal and periportal lymph nodes, unchanged. Findings in the left iliac bone as described. The results/images of the scans were reviewed with the patient and her sister, she is responding nicely to the Femara and [place], will continue the same treatment. She did have teeth extraction on 1/20/2020, dental clearance has been obtained. On 4/3/2020, the patient was started on Ibrance and Femara cycle #8. Restaging CT Chest/Abdomen/Pelvis and bone scan on 04/24/2020:  CT Chest noted stable small bilateral pulmonary nodules; CT abdomen/pelvis no evidence of new active neoplasm compared to prior.   Bone scan 3 foci of radiotracer uptake in the lower right ribs, 2 of which demonstrate less activity on the current study, possibly indicative of healing fractures. Radiotracer in the more anterior focus is more persistent, and may be a metastatic lesion. The results/images of the scans were reviewed with the patient and her sister, the patient has an overall stable disease. Recommended to continue with Femara and Ibrance. The patient had a PET scan done on December 3, 2020, she had complete metabolic response since the PET scan on September 17, 9021, no metabolically active recurrent or metastatic disease, metabolic activity throughout the thyroid lobes can be seen with chronic thyroiditis. The patient had complete response to treatment, continue Ibrance and Femara. The dose of the Bebe Olszewski has been to 75 mg due to leukopenia/neutropenia. The patient had restaging CT scans of the chest, abdomen pelvis done on 6/1/2021, bone scan on 6/11/2021, revealing an overall stable disease. Recommended to continue Ibrance and Femara. Patient had restaging scans done on 10/26/2021, revealing an overall stable disease, couple of upper lobe pulmonary nodules and rib sclerosis are redemonstrated, stable. No new areas of concern. Labs reviewed today 11/17/2021, she has leukopenia and grade 2 neutropenia, continue same dose of Ibrance 75 mg, no indication for dose adjustment of the Ibrance. Creatinine had normalized. Restaging CT scans of the chest, abdomen pelvis and a bone scan were ordered to be done prior to her next visit. We will have them done without IV contrast due to SUAD. Ordered left knee x-ray. Proceed with Blease Emerado today 11/17/2021. 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 RTC in 4 weeks. Thank you for allowing us to participate in the care of Ms. Boby Christopher.     Reymundo Foster MD   HEMATOLOGY/MEDICAL ONCOLOGY  [facility] [address] [place] [address] MEDICAL ONCOLOGY  [address] [address]  Dept: [address] [facility] [address] [phone]

## 2021-11-17 NOTE — DISCHARGE INSTR - COC
Continuity of Care Form    Patient Name: Camille Aggarwal   :  1950  MRN:  [id number]    Admit date:  2021  Discharge date:  ***    Code Status Order: Prior   Advance Directives:      Admitting Physician:  No admitting provider for patient encounter. PCP: Sreedhar Salazar DO    Discharging Nurse: [facility] Unit/Room#: No information available for this encounter. Discharging Unit Phone Number: ***    Emergency Contact:   Extended Emergency Contact Information  Primary Emergency Contact: Sharon [address] Phone: [phone]  Mobile Phone: [phone]  Relation: Brother/Sister   needed? No    Past Surgical History:  Past Surgical History:   Procedure Laterality Date    ARM SURGERY  9/15/2009    FRACTURE SURGERY      HUMERUS FRACTURE SURGERY      Broke left arm        Immunization History:   Immunization History   Administered Date(s) Administered    Influenza 10/21/2013    Influenza Vaccine, unspecified formulation 10/21/2013       Active Problems:  Patient Active Problem List   Diagnosis Code    Hydrocephalus, adult ([facility] [address] G91.9    Hydrocephalus ([facility] [address] G91.9    Hyponatremia E87.1    Breast mass N63.0    Malignant neoplasm of axillary tail of left female breast ([address] C50.612    Breast cancer metastasized to bone (H[facility]) C50.919, C79.51    Carcinoma of breast metastatic to bone ([address] C50.919, C79.51    Anemia D64.9       Isolation/Infection:   Isolation            No Isolation          Patient Infection Status       None to display            Nurse Assessment:  Last Vital Signs: There were no vitals taken for this visit.     Last documented pain score (0-10 scale):    Last Weight:   Wt Readings from Last 1 Encounters:   21 146 lb 12.8 oz (66.6 kg)     Mental Status:  {IP PT MENTAL STATUS:10306}    IV Access:  [facility] IV ACCESS:225971018}    Nursing Mobility/ADLs:  Walking   {[facility] ZFAL:335481078}  Transfer  {[facility] SHADY QJTU:644209634}  Bathing {CHP DME HOKF:043035587}  Dressing  {CHP DME LEPX:159884784}  Toileting  {CHP DME LRKD:863973750}  Feeding  {CHP DME GXDN:988922249}  Med Admin  {CHP DME PPDF:691469528}  Med Delivery   { KANDI MED Delivery:387662745}    Wound Care Documentation and Therapy:        Elimination:  Continence: Bowel: {YES / CW:04823}  Bladder: {YES / ZL:09347}  Urinary Catheter: {Urinary Catheter:653847010}   Colostomy/Ileostomy/Ileal Conduit: {YES / JV:29458}       Date of Last BM: ***  No intake or output data in the 24 hours ending 21 1027  No intake/output data recorded.     Safety Concerns:     [address] Safety Concerns:189360835}    Impairments/Disabilities:      [address] Impairments/Disabilities:697436798}    Nutrition Therapy:  Current Nutrition Therapy:   [address] Diet List:335894445}    Routes of Feeding: {P DME Other Feedings:447847129}  Liquids: {Slp liquid thickness:99941}  Daily Fluid Restriction: {CHP DME Yes amt example:036597977}  Last Modified Barium Swallow with Video (Video Swallowing Test): {Done Not Done VKEQ:639905624}    Treatments at the Time of Hospital Discharge:   Respiratory Treatments: ***  Oxygen Therapy:  {Therapy; copd oxygen:49996}  Ventilator:    { CC Vent YBAO:184378254}    Rehab Therapies: {THERAPEUTIC INTERVENTION:6475496410}  Weight Bearing Status/Restrictions: [address] Weight Bearin}  Other Medical Equipment (for information only, NOT a DME order):  {EQUIPMENT:672735458}  Other Treatments: ***    Patient's personal belongings (please select all that are sent with patient):  {P DME Belongings:379491652}    RN SIGNATURE:  {Esignature:185978084}    CASE MANAGEMENT/SOCIAL WORK SECTION    Inpatient Status Date: ***    Readmission Risk Assessment Score:  Readmission Risk              Risk of Unplanned Readmission:  0           Discharging to Facility/ Agency   Name:   Address:  Phone:  Fax:    Dialysis Facility (if applicable)   Name:  Address:  Dialysis Schedule:  Phone:  Fax:    / signature: {Esignature:310431315}    PHYSICIAN SECTION    Prognosis: {Prognosis:5173572113}    Condition at Discharge: Darnell Perez Patient Condition:998431700}    Rehab Potential (if transferring to Rehab): {Prognosis:5440352051}    Recommended Labs or Other Treatments After Discharge: ***    Physician Certification: I certify the above information and transfer of Marry Arora  is necessary for the continuing treatment of the diagnosis listed and that she requires {Admit to Appropriate Level of Care:87868} for {GREATER/LESS:600061485} 30 days.      Update Admission H&P: {CHP DME Changes in AYVZT:422532587}    PHYSICIAN SIGNATURE:  {Esignature:936528997}

## 2021-12-03 DIAGNOSIS — E03.9 HYPOTHYROIDISM, UNSPECIFIED TYPE: ICD-10-CM

## 2021-12-06 RX ORDER — LEVOTHYROXINE SODIUM 0.03 MG/1
TABLET ORAL
Qty: 90 TABLET | Refills: 0 | Status: SHIPPED
Start: 2021-12-06 | End: 2022-03-08

## 2021-12-08 DIAGNOSIS — C50.612 MALIGNANT NEOPLASM OF AXILLARY TAIL OF LEFT FEMALE BREAST, UNSPECIFIED ESTROGEN RECEPTOR STATUS (HCC): Primary | ICD-10-CM

## 2021-12-14 ENCOUNTER — HOSPITAL ENCOUNTER (OUTPATIENT)
Age: 71
Discharge: HOME | End: 2021-12-14
Payer: MEDICARE

## 2021-12-14 DIAGNOSIS — C50.612 MALIGNANT NEOPLASM OF AXILLARY TAIL OF LEFT FEMALE BREAST, UNSPECIFIED ESTROGEN RECEPTOR STATUS (HCC): ICD-10-CM

## 2021-12-14 LAB
ALBUMIN SERPL-MCNC: 4.3 G/DL (ref 3.5–5.2)
ALP BLD-CCNC: 78 U/L (ref 35–104)
ALT SERPL-CCNC: 16 U/L (ref 0–32)
ANION GAP SERPL CALCULATED.3IONS-SCNC: 9 MMOL/L (ref 7–16)
AST SERPL-CCNC: 26 U/L (ref 0–31)
BASOPHILS ABSOLUTE: 0.06 E9/L (ref 0–0.2)
BASOPHILS RELATIVE PERCENT: 2.7 % (ref 0–2)
BILIRUB SERPL-MCNC: 0.3 MG/DL (ref 0–1.2)
BUN BLDV-MCNC: 23 MG/DL (ref 6–23)
CALCIUM SERPL-MCNC: 9.7 MG/DL (ref 8.6–10.2)
CEA: 1.5 NG/ML (ref 0–5.2)
CHLORIDE BLD-SCNC: 99 MMOL/L (ref 98–107)
CO2: 25 MMOL/L (ref 22–29)
CREAT SERPL-MCNC: 1 MG/DL (ref 0.5–1)
EOSINOPHILS ABSOLUTE: 0.02 E9/L (ref 0.05–0.5)
EOSINOPHILS RELATIVE PERCENT: 0.9 % (ref 0–6)
GFR AFRICAN AMERICAN: >60
GFR NON-AFRICAN AMERICAN: 54 ML/MIN/1.73
GLUCOSE BLD-MCNC: 94 MG/DL (ref 74–99)
HCT VFR BLD CALC: 32.6 % (ref 34–48)
HEMOGLOBIN: 11.1 G/DL (ref 11.5–15.5)
LYMPHOCYTES ABSOLUTE: 0.44 E9/L (ref 1.5–4)
LYMPHOCYTES RELATIVE PERCENT: 19.8 % (ref 20–42)
MCH RBC QN AUTO: 33.5 PG (ref 26–35)
MCHC RBC AUTO-ENTMCNC: 34 % (ref 32–34.5)
MCV RBC AUTO: 98.5 FL (ref 80–99.9)
MONOCYTES ABSOLUTE: 0.15 E9/L (ref 0.1–0.95)
MONOCYTES RELATIVE PERCENT: 7.2 % (ref 2–12)
NEUTROPHILS ABSOLUTE: 1.52 E9/L (ref 1.8–7.3)
NEUTROPHILS RELATIVE PERCENT: 69.4 % (ref 43–80)
NUCLEATED RED BLOOD CELLS: 0 /100 WBC
PDW BLD-RTO: 13.8 FL (ref 11.5–15)
PLATELET # BLD: 158 E9/L (ref 130–450)
PMV BLD AUTO: 9.7 FL (ref 7–12)
POTASSIUM SERPL-SCNC: 4.6 MMOL/L (ref 3.5–5)
RBC # BLD: 3.31 E12/L (ref 3.5–5.5)
RBC # BLD: NORMAL 10*6/UL
SODIUM BLD-SCNC: 133 MMOL/L (ref 132–146)
TOTAL PROTEIN: 8.7 G/DL (ref 6.4–8.3)
WBC # BLD: 2.2 E9/L (ref 4.5–11.5)

## 2021-12-14 PROCEDURE — 36415 COLL VENOUS BLD VENIPUNCTURE: CPT

## 2021-12-14 PROCEDURE — 85025 COMPLETE CBC W/AUTO DIFF WBC: CPT

## 2021-12-14 PROCEDURE — 82378 CARCINOEMBRYONIC ANTIGEN: CPT

## 2021-12-14 PROCEDURE — 80053 COMPREHEN METABOLIC PANEL: CPT

## 2021-12-14 PROCEDURE — 86300 IMMUNOASSAY TUMOR CA 15-3: CPT

## 2021-12-15 ENCOUNTER — HOSPITAL ENCOUNTER (OUTPATIENT)
Dept: INFUSION THERAPY | Age: 71
Discharge: HOME | End: 2021-12-15
Payer: MEDICARE

## 2021-12-15 ENCOUNTER — OFFICE VISIT (OUTPATIENT)
Dept: ONCOLOGY | Age: 71
End: 2021-12-15
Payer: MEDICARE

## 2021-12-15 VITALS
BODY MASS INDEX: 24.32 KG/M2 | RESPIRATION RATE: 18 BRPM | HEART RATE: 73 BPM | WEIGHT: 146 LBS | HEIGHT: 65 IN | DIASTOLIC BLOOD PRESSURE: 68 MMHG | SYSTOLIC BLOOD PRESSURE: 124 MMHG | TEMPERATURE: 98.4 F | OXYGEN SATURATION: 100 %

## 2021-12-15 DIAGNOSIS — C79.51 CARCINOMA OF BREAST METASTATIC TO BONE, UNSPECIFIED LATERALITY (HCC): ICD-10-CM

## 2021-12-15 DIAGNOSIS — C50.919 CARCINOMA OF BREAST METASTATIC TO BONE, UNSPECIFIED LATERALITY (HCC): ICD-10-CM

## 2021-12-15 DIAGNOSIS — N63.0 BREAST MASS: ICD-10-CM

## 2021-12-15 DIAGNOSIS — C50.612 MALIGNANT NEOPLASM OF AXILLARY TAIL OF LEFT FEMALE BREAST, UNSPECIFIED ESTROGEN RECEPTOR STATUS (HCC): Primary | ICD-10-CM

## 2021-12-15 DIAGNOSIS — C50.612 MALIGNANT NEOPLASM OF AXILLARY TAIL OF LEFT FEMALE BREAST, UNSPECIFIED ESTROGEN RECEPTOR STATUS (HCC): ICD-10-CM

## 2021-12-15 PROCEDURE — 1036F TOBACCO NON-USER: CPT | Performed by: INTERNAL MEDICINE

## 2021-12-15 PROCEDURE — 99212 OFFICE O/P EST SF 10 MIN: CPT

## 2021-12-15 PROCEDURE — 3017F COLORECTAL CA SCREEN DOC REV: CPT | Performed by: INTERNAL MEDICINE

## 2021-12-15 PROCEDURE — 99212 OFFICE O/P EST SF 10 MIN: CPT | Performed by: INTERNAL MEDICINE

## 2021-12-15 PROCEDURE — 6360000002 HC RX W HCPCS: Performed by: NURSE PRACTITIONER

## 2021-12-15 PROCEDURE — G8484 FLU IMMUNIZE NO ADMIN: HCPCS | Performed by: INTERNAL MEDICINE

## 2021-12-15 PROCEDURE — G8427 DOCREV CUR MEDS BY ELIG CLIN: HCPCS | Performed by: INTERNAL MEDICINE

## 2021-12-15 PROCEDURE — G8400 PT W/DXA NO RESULTS DOC: HCPCS | Performed by: INTERNAL MEDICINE

## 2021-12-15 PROCEDURE — 99214 OFFICE O/P EST MOD 30 MIN: CPT | Performed by: INTERNAL MEDICINE

## 2021-12-15 PROCEDURE — 1123F ACP DISCUSS/DSCN MKR DOCD: CPT | Performed by: INTERNAL MEDICINE

## 2021-12-15 PROCEDURE — G8420 CALC BMI NORM PARAMETERS: HCPCS | Performed by: INTERNAL MEDICINE

## 2021-12-15 PROCEDURE — 1090F PRES/ABSN URINE INCON ASSESS: CPT | Performed by: INTERNAL MEDICINE

## 2021-12-15 PROCEDURE — 4040F PNEUMOC VAC/ADMIN/RCVD: CPT | Performed by: INTERNAL MEDICINE

## 2021-12-15 PROCEDURE — 96372 THER/PROPH/DIAG INJ SC/IM: CPT

## 2021-12-15 RX ORDER — LETROZOLE 2.5 MG/1
2.5 TABLET, FILM COATED ORAL DAILY
Qty: 90 TABLET | Refills: 3 | Status: SHIPPED | OUTPATIENT
Start: 2021-12-15

## 2021-12-15 RX ADMIN — DENOSUMAB 120 MG: 120 INJECTION SUBCUTANEOUS at 11:32

## 2021-12-15 NOTE — PROGRESS NOTES
carcinoma, nuclear grade 2, ER positive greater than 95% WI +70% HER-2/elder 1+, by IHC, negative. The patient was started on systemic therapy with Femara and Ibrance on 9/13/2019. She denies any side effects from the treatment. She denies any new problems. The appearance of the left breast has significant improved, no drainage. Leah Bacon returns for a follow-up visit. She is accompanied by her sister. Leah Bacon is doing well overall, no new problems. No reported side effects from the [place] and Femara. No left breast pain or discharge. She is keeping herself well-hydrated. She continues to follow with neurosurgery, no interventions were recommended at this time. Review of Systems;  CONSTITUTIONAL: No fever, chills. Fair appetite and energy level. ENMT: Eyes: No diplopia; Nose: No epistaxis. Mouth: No sore throat. RESPIRATORY: No hemoptysis, shortness of breath, cough. CARDIOVASCULAR: No chest pain, palpitations. GASTROINTESTINAL: No nausea/vomiting, abdominal pain, diarrhea/constipation. GENITOURINARY: No dysuria, urinary frequency, hematuria. NEURO: No syncope, presyncope, headache. MSK: She has pain in the left hip.   Remainder:  ROS NEGATIVE    Past Medical History:      Diagnosis Date    Arm fracture, left 2009    Balance problems since April, 2011    Hydrocephalus [facility])     compensated    Thyroid disease     Vertigo      Patient Active Problem List   Diagnosis    Hydrocephalus, adult ([address]    Hydrocephalus ([address]    Hyponatremia    Breast mass    Malignant neoplasm of axillary tail of left female breast ([address]    Breast cancer metastasized to bone ([address]    Carcinoma of breast metastatic to bone ([address]    Anemia        Past Surgical History:      Procedure Laterality Date    ARM SURGERY  9/15/2009    FRACTURE SURGERY      HUMERUS FRACTURE SURGERY  2009    Broke left arm        Family History:  Family History   Problem Relation Age of Onset    Colon Cancer Mother 68    Heart Disease Father     Diabetes Father        Medications:  Reviewed and reconciled. Social History:  Social History     Socioeconomic History    Marital status: Single     Spouse name: Not on file    Number of children: Not on file    Years of education: Not on file    Highest education level: Not on file   Occupational History    Not on file   Tobacco Use    Smoking status: Never Smoker    Smokeless tobacco: Never Used   Vaping Use    Vaping Use: Never used   Substance and Sexual Activity    Alcohol use: Yes     Comment: drinks it occasional on social events    Drug use: No    Sexual activity: Never   Other Topics Concern    Not on file   Social History Narrative    Not on file     Social Determinants of Health     Financial Resource Strain:     Difficulty of Paying Living Expenses: Not on file   Food Insecurity:     Worried About Running Out of Food in the Last Year: Not on file    Sara of Food in the Last Year: Not on file   Transportation Needs:     Lack of Transportation (Medical): Not on file    Lack of Transportation (Non-Medical):  Not on file   Physical Activity:     Days of Exercise per Week: Not on file    Minutes of Exercise per Session: Not on file   Stress:     Feeling of Stress : Not on file   Social Connections:     Frequency of Communication with Friends and Family: Not on file    Frequency of Social Gatherings with Friends and Family: Not on file    Attends [employer]ism Services: Not on file    Active Member of Clubs or Organizations: Not on file    Attends Club or Organization Meetings: Not on file    Marital Status: Not on file   Intimate Partner Violence:     Fear of Current or Ex-Partner: Not on file    Emotionally Abused: Not on file    Physically Abused: Not on file    Sexually Abused: Not on file   Housing Stability:     Unable to Pay for Housing in the Last Year: Not on file    Number of [place] in the Last Year: Not on file    Unstable Housing in the Last Year: Not on file       Allergies:  No Known Allergies    Physical Exam:  /68 (Site: Right Upper Arm, Position: Sitting, Cuff Size: Medium Adult)   Pulse 73   Temp 98.4 °F (36.9 °C) (Infrared)   Resp 18   Ht 5' 5\" (1.651 m)   Wt 146 lb (66.2 kg)   SpO2 100%   BMI 24.30 kg/m²     GENERAL: Alert, oriented x 3, not in acute distress. HEENT: PERRLA; EOMI. Oropharynx clear. NECK: Supple. No palpable cervical or supraclavicular lymphadenopathy. LUNGS: Good air entry bilaterally. No wheezing, crackles or rhonchi. CARDIOVASCULAR: Regular rate. No murmurs, rubs or gallops. BREASTS: The right breast exam is negative for any skin changes, nipple discharge, no palpable masses, no palpable right axillary adenopathy, left breast appearance had markedly improved, the nodules had resolved, the mass decreased in size, no drainage, no bleeding, decrease in the size of the left axillary lymphadenopathy  ABDOMEN: Soft. Non-tender, non-distended. Positive bowel sounds. EXTREMITIES: mild chronic edema of the LLE  NEUROLOGIC: No focal deficits. ECOG PS 1     Impression/Plan:     The patient is a 66 y.o. lady, with a past medical history significant for hydrocephalus, who had presented with left breast changes, who was diagnosed with a left breast ductal carcinoma, ER positive greater than 95% MI +70% HER-2/elder 1+, she has a very locally advanced disease, and metastatic disease to the bones. I discussed with the patient and her sister today her diagnosis, prognosis and recommendations for treatment. Will complete the staging work-up, the patient will have a brain MRI with and without contrast done, and a PET scan. For her HR pos, HER-2/elder negative metastatic breast cancer, I recommended treatment with Femara and Ibrance, the schedule and side effects of the treatment were reviewed with her. She has metastatic disease to the bones, recommended treatment with Xgeva.     MRI brain done on 09/16/2019: no evidence of metastatic disease. PET/CT on 09/17/2019: The lytic soft tissue finding in the iliac crest in the left pelvis shows hypermetabolic FDG uptake, with maximum measurements in the 5.7 SUV range, strongly suggesting metastatic disease. She has uptake in the thyroid, could be physiologic. Femara/Ibrance were started on 09/13/2019. Bone scan done on 12/17/2019: Redemonstration of focus of radiotracer uptake involving posterior lateral right ninth rib suggestive of healing fracture. New radiotracer activity is seen at level of lateral right 10th rib and anterior right seventh rib which may be related to new healing fractures. Metastases is unlikely. Clinical correlation recommended. No additional abnormal areas of increased radiotracer uptake. CT chest done on 12/17/2019: Significant interval decrease in size of the large mass seen in the left breast 8/29/2019. Interval decrease in size of enlarged left axillary lymph nodes. Cardiomegaly. Enlarged thyroid, unchanged. Small hiatal hernia. Diffuse interstitial changes and multifocal atelectasis or scarring in both lungs. Stable small nodular density in the right upper lobe. Sclerotic changes involving the lateral right ninth rib. CT abdomen/pelvis done on 12/17/2019: Small right renal cyst. Retroverted uterus. Distended colon and rectum containing a large amount of stool. Mildly enlarged retroperitoneal and periportal lymph nodes, unchanged. Findings in the left iliac bone as described. The results/images of the scans were reviewed with the patient and her sister, [facility], will continue the same treatment. She did have teeth extraction on 1/20/2020, dental clearance has been obtained. On 4/3/2020, the patient was started on Ibrance and Femara cycle #8.     Restaging CT Chest/Abdomen/Pelvis and bone scan on 04/24/2020:  CT Chest noted stable small bilateral pulmonary nodules; CT abdomen/pelvis no evidence of new active neoplasm compared to prior. Bone scan 3 foci of radiotracer uptake in the lower right ribs, 2 of which demonstrate less activity on the current study, possibly indicative of healing fractures. Radiotracer in the more anterior focus is more persistent, and may be a metastatic lesion. The results/images of the scans were reviewed with the patient and her sister, the patient has an overall stable disease. Recommended to continue with Femara and Ibrance. The patient had a PET scan done on December 3, 2020, she had complete metabolic response since the PET scan on September 17, 1126, no metabolically active recurrent or metastatic disease, metabolic activity throughout the thyroid lobes can be seen with chronic thyroiditis. The patient had complete response to treatment, continue Ibrance and Femara. The dose of the Kiera Don has been to 75 mg due to leukopenia/neutropenia. The patient had restaging CT scans of the chest, abdomen pelvis done on 6/1/2021, bone scan on 6/11/2021, revealing an overall stable disease. Recommended to continue Ibrance and Femara. Patient had restaging scans done on 10/26/2021, revealing an overall stable disease, couple of upper lobe pulmonary nodules and rib sclerosis are redemonstrated, stable. No new areas of concern. Labs reviewed today , she has leukopenia and grade 1 neutropenia, continue same dose of Ibrance 75 mg, no indication for dose adjustment of the Ibrance. Proceed with Carmina Kruegerus today 12/15/2021. 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 RTC in 4 weeks. Thank you for allowing us to participate in the care of Ms. Deangelo Santiago.     Livier Sy MD   HEMATOLOGY/MEDICAL [facility] [address] [place] [address] MEDICAL ONCOLOGY  [address] [place] [id number]  Dept: [phone]  Loc: [phone]

## 2021-12-18 LAB — CA 15-3: 19 U/ML (ref 0–31)

## 2022-01-05 DIAGNOSIS — E55.9 VITAMIN D DEFICIENCY: ICD-10-CM

## 2022-01-05 RX ORDER — ERGOCALCIFEROL 1.25 MG/1
CAPSULE ORAL
Qty: 12 CAPSULE | Refills: 0 | Status: SHIPPED
Start: 2022-01-05 | End: 2022-07-25 | Stop reason: SDUPTHER

## 2022-01-10 ENCOUNTER — HOSPITAL ENCOUNTER (OUTPATIENT)
Age: 72
Discharge: HOME | End: 2022-01-10
Payer: MEDICARE

## 2022-01-10 DIAGNOSIS — C50.612 MALIGNANT NEOPLASM OF AXILLARY TAIL OF LEFT FEMALE BREAST, UNSPECIFIED ESTROGEN RECEPTOR STATUS (HCC): ICD-10-CM

## 2022-01-10 DIAGNOSIS — C50.919 CARCINOMA OF BREAST METASTATIC TO BONE, UNSPECIFIED LATERALITY (HCC): ICD-10-CM

## 2022-01-10 DIAGNOSIS — C79.51 CARCINOMA OF BREAST METASTATIC TO BONE, UNSPECIFIED LATERALITY (HCC): ICD-10-CM

## 2022-01-10 LAB
ALBUMIN SERPL-MCNC: 4.3 G/DL (ref 3.5–5.2)
ALP BLD-CCNC: 78 U/L (ref 35–104)
ALT SERPL-CCNC: 19 U/L (ref 0–32)
ANION GAP SERPL CALCULATED.3IONS-SCNC: 7 MMOL/L (ref 7–16)
ANISOCYTOSIS: ABNORMAL
AST SERPL-CCNC: 27 U/L (ref 0–31)
ATYPICAL LYMPHOCYTE RELATIVE PERCENT: 2 % (ref 0–4)
BASOPHILS ABSOLUTE: 0.02 E9/L (ref 0–0.2)
BASOPHILS RELATIVE PERCENT: 1 % (ref 0–2)
BILIRUB SERPL-MCNC: 0.3 MG/DL (ref 0–1.2)
BUN BLDV-MCNC: 25 MG/DL (ref 6–23)
CALCIUM SERPL-MCNC: 9.7 MG/DL (ref 8.6–10.2)
CEA: 1.7 NG/ML (ref 0–5.2)
CHLORIDE BLD-SCNC: 102 MMOL/L (ref 98–107)
CO2: 27 MMOL/L (ref 22–29)
CREAT SERPL-MCNC: 0.9 MG/DL (ref 0.5–1)
EOSINOPHILS ABSOLUTE: 0.06 E9/L (ref 0.05–0.5)
EOSINOPHILS RELATIVE PERCENT: 4 % (ref 0–6)
GFR AFRICAN AMERICAN: >60
GFR NON-AFRICAN AMERICAN: >60 ML/MIN/1.73
GLUCOSE BLD-MCNC: 82 MG/DL (ref 74–99)
HCT VFR BLD CALC: 31.9 % (ref 34–48)
HEMOGLOBIN: 10.9 G/DL (ref 11.5–15.5)
LYMPHOCYTES ABSOLUTE: 0.32 E9/L (ref 1.5–4)
LYMPHOCYTES RELATIVE PERCENT: 18 % (ref 20–42)
MCH RBC QN AUTO: 33.9 PG (ref 26–35)
MCHC RBC AUTO-ENTMCNC: 34.2 % (ref 32–34.5)
MCV RBC AUTO: 99.1 FL (ref 80–99.9)
MONOCYTES ABSOLUTE: 0.21 E9/L (ref 0.1–0.95)
MONOCYTES RELATIVE PERCENT: 13 % (ref 2–12)
NEUTROPHILS ABSOLUTE: 0.99 E9/L (ref 1.8–7.3)
NEUTROPHILS RELATIVE PERCENT: 62 % (ref 43–80)
PDW BLD-RTO: 13.6 FL (ref 11.5–15)
PLATELET # BLD: 138 E9/L (ref 130–450)
PMV BLD AUTO: 9.5 FL (ref 7–12)
POTASSIUM SERPL-SCNC: 4.5 MMOL/L (ref 3.5–5)
RBC # BLD: 3.22 E12/L (ref 3.5–5.5)
SODIUM BLD-SCNC: 136 MMOL/L (ref 132–146)
TOTAL PROTEIN: 8.6 G/DL (ref 6.4–8.3)
WBC # BLD: 1.6 E9/L (ref 4.5–11.5)

## 2022-01-10 PROCEDURE — 85025 COMPLETE CBC W/AUTO DIFF WBC: CPT

## 2022-01-10 PROCEDURE — 86300 IMMUNOASSAY TUMOR CA 15-3: CPT

## 2022-01-10 PROCEDURE — 80053 COMPREHEN METABOLIC PANEL: CPT

## 2022-01-10 PROCEDURE — 82378 CARCINOEMBRYONIC ANTIGEN: CPT

## 2022-01-10 PROCEDURE — 36415 COLL VENOUS BLD VENIPUNCTURE: CPT

## 2022-01-12 ENCOUNTER — HOSPITAL ENCOUNTER (OUTPATIENT)
Dept: INFUSION THERAPY | Age: 72
Discharge: HOME | End: 2022-01-12
Payer: MEDICARE

## 2022-01-12 ENCOUNTER — OFFICE VISIT (OUTPATIENT)
Dept: ONCOLOGY | Age: 72
End: 2022-01-12
Payer: MEDICARE

## 2022-01-12 VITALS
TEMPERATURE: 97.6 F | RESPIRATION RATE: 18 BRPM | HEART RATE: 71 BPM | SYSTOLIC BLOOD PRESSURE: 136 MMHG | DIASTOLIC BLOOD PRESSURE: 72 MMHG | HEIGHT: 66 IN | OXYGEN SATURATION: 99 % | WEIGHT: 150 LBS | BODY MASS INDEX: 24.11 KG/M2

## 2022-01-12 DIAGNOSIS — C50.612 MALIGNANT NEOPLASM OF AXILLARY TAIL OF LEFT FEMALE BREAST, UNSPECIFIED ESTROGEN RECEPTOR STATUS (HCC): Primary | ICD-10-CM

## 2022-01-12 DIAGNOSIS — C79.51 CARCINOMA OF BREAST METASTATIC TO BONE, UNSPECIFIED LATERALITY (HCC): ICD-10-CM

## 2022-01-12 DIAGNOSIS — C50.919 CARCINOMA OF BREAST METASTATIC TO BONE, UNSPECIFIED LATERALITY (HCC): ICD-10-CM

## 2022-01-12 PROCEDURE — G8427 DOCREV CUR MEDS BY ELIG CLIN: HCPCS | Performed by: INTERNAL MEDICINE

## 2022-01-12 PROCEDURE — 96372 THER/PROPH/DIAG INJ SC/IM: CPT

## 2022-01-12 PROCEDURE — 3017F COLORECTAL CA SCREEN DOC REV: CPT | Performed by: INTERNAL MEDICINE

## 2022-01-12 PROCEDURE — 99214 OFFICE O/P EST MOD 30 MIN: CPT | Performed by: INTERNAL MEDICINE

## 2022-01-12 PROCEDURE — G8400 PT W/DXA NO RESULTS DOC: HCPCS | Performed by: INTERNAL MEDICINE

## 2022-01-12 PROCEDURE — 1090F PRES/ABSN URINE INCON ASSESS: CPT | Performed by: INTERNAL MEDICINE

## 2022-01-12 PROCEDURE — 4040F PNEUMOC VAC/ADMIN/RCVD: CPT | Performed by: INTERNAL MEDICINE

## 2022-01-12 PROCEDURE — 6360000002 HC RX W HCPCS: Performed by: NURSE PRACTITIONER

## 2022-01-12 PROCEDURE — 1036F TOBACCO NON-USER: CPT | Performed by: INTERNAL MEDICINE

## 2022-01-12 PROCEDURE — G8420 CALC BMI NORM PARAMETERS: HCPCS | Performed by: INTERNAL MEDICINE

## 2022-01-12 PROCEDURE — 1123F ACP DISCUSS/DSCN MKR DOCD: CPT | Performed by: INTERNAL MEDICINE

## 2022-01-12 PROCEDURE — 99212 OFFICE O/P EST SF 10 MIN: CPT

## 2022-01-12 PROCEDURE — G8484 FLU IMMUNIZE NO ADMIN: HCPCS | Performed by: INTERNAL MEDICINE

## 2022-01-12 RX ADMIN — DENOSUMAB 120 MG: 120 INJECTION SUBCUTANEOUS at 12:10

## 2022-01-12 NOTE — PROGRESS NOTES
[address] [facility] MEDICAL ONCOLOGY  [address] [place], [place] [address] [address]  Dept: [phone]  Loc: [phone]  Attending Progress Note      Reason for Visit:   Metastatic breast cancer. PCP:  Finn Fiore DO    History of Present Illness: The patient is a 66 y.o. lady, with a past medical history significant for hydrocephalus, who had presented with left breast changes, she had noticed a dark spot on her left breast, then she had developed induration, she thinks that she had the breast changes for months. The patient had never had a mammogram done, had a CT scan of the chest done, revealing a solid left breast mass measuring at least 6.8 x 2.9 cm in size is identified. The mass appears to extend to the skin surface. The deep margin of the mass abuts the chest wall but a fat plane appears to be preserved. Pathologically enlarged left  axillary lymph nodes are seen measuring up to 4.5 x 1.9 cm in size. CT abdomen and pelvis was done on 8/30/2019 revealing partial visualization of soft tissue mass in the left breast, nonspecific lymph nodes in the upper abdomen. Bone scan was done on 8/30/2019, revealing a Prominent focus of radiotracer uptake in the left anterior superior iliac spine, with corresponding abnormality on recent CT, suggestive of metastasis. More subtle focal radiotracer uptake in the right anterior superior iliac spine could be due to metastasis or  degenerative change.   2. Focal radiotracer uptake in the lateral aspect of the right ninth  rib, which appears to be due to a nondisplaced healing fracture. Correlation with history of recent trauma is recommended. In light of  other findings, this could represent a pathologic fracture with an  underlying metastatic lesion. 3. Osteoarthritic uptake in the shoulders, knees, wrists, and hands.     She underwent on 8/30/2019 a biopsy of 1 of the left axillary lymph nodes, she was consistent with metastatic ductal carcinoma, nuclear grade 2, ER positive greater than 95% DE +70% HER-2/elder 1+, by IHC, negative. The patient was started on systemic therapy with Femara and Ibrance on 9/13/2019. She denies any side effects from the treatment. She denies any new problems. The appearance of the left breast has significant improved, no drainage. Charito Jones returns for a follow-up visit. She is accompanied by her sister. Charito Jones is doing well overall. No reported side effects from the [place] and Femara. No left breast pain or discharge. She is keeping herself well-hydrated. She continues to follow with neurosurgery, no interventions were recommended at this time. No new problems. Review of Systems;  CONSTITUTIONAL: No fever, chills. Fair appetite and energy level. ENMT: Eyes: No diplopia; Nose: No epistaxis. Mouth: No sore throat. RESPIRATORY: No hemoptysis, shortness of breath, cough. CARDIOVASCULAR: No chest pain, palpitations. GASTROINTESTINAL: No nausea/vomiting, abdominal pain, diarrhea/constipation. GENITOURINARY: No dysuria, urinary frequency, hematuria. NEURO: No syncope, presyncope, headache. MSK: She has pain in the left hip.   Remainder:  ROS NEGATIVE    Past Medical History:      Diagnosis Date    Arm fracture, left 2009    Balance problems since April, 2011    Hydrocephalus [facility])     compensated    Thyroid disease     Vertigo      Patient Active Problem List   Diagnosis    Hydrocephalus, adult ([address]    Hydrocephalus ([address]    Hyponatremia    Breast mass    Malignant neoplasm of axillary tail of left female breast ([address]    Breast cancer metastasized to bone ([address]    Carcinoma of breast metastatic to bone ([address]    Anemia        Past Surgical History:      Procedure Laterality Date    ARM SURGERY  9/15/2009    FRACTURE SURGERY      HUMERUS FRACTURE SURGERY  2009    Broke left arm        Family History:  Family History   Problem Relation Age of Onset    Colon Cancer Mother 68    Heart Disease Father     Diabetes Father        Medications:  Reviewed and reconciled. Social History:  Social History     Socioeconomic History    Marital status: Single     Spouse name: Not on file    Number of children: Not on file    Years of education: Not on file    Highest education level: Not on file   Occupational History    Not on file   Tobacco Use    Smoking status: Never Smoker    Smokeless tobacco: Never Used   Vaping Use    Vaping Use: Never used   Substance and Sexual Activity    Alcohol use: Yes     Comment: drinks it occasional on social events    Drug use: No    Sexual activity: Never   Other Topics Concern    Not on file   Social History Narrative    Not on file     Social Determinants of Health     Financial Resource Strain:     Difficulty of Paying Living Expenses: Not on file   Food Insecurity:     Worried About Running Out of Food in the Last Year: Not on file    Sara of Food in the Last Year: Not on file   Transportation Needs:     Lack of Transportation (Medical): Not on file    Lack of Transportation (Non-Medical):  Not on file   Physical Activity:     Days of Exercise per Week: Not on file    Minutes of Exercise per Session: Not on file   Stress:     Feeling of Stress : Not on file   Social Connections:     Frequency of Communication with Friends and Family: Not on file    Frequency of Social Gatherings with Friends and Family: Not on file    Attends Restoration Services: Not on file    Active Member of Clubs or Organizations: Not on file    Attends Club or Organization Meetings: Not on file    Marital Status: Not on file   Intimate Partner Violence:     Fear of Current or Ex-Partner: Not on file    Emotionally Abused: Not on file    Physically Abused: Not on file    Sexually Abused: Not on file   Housing Stability:     Unable to Pay for Housing in the Last Year: Not on file    Number of [place] in the Last Year: Not on file    Unstable Housing in the Last Year: Not on file       Allergies:  No Known Allergies    Physical Exam:  /72   Pulse 71   Temp 97.6 °F (36.4 °C)   Resp 18   Ht 5' 6\" (1.676 m)   Wt 150 lb (68 kg)   SpO2 99% Comment: room air  BMI 24.21 kg/m²     GENERAL: Alert, oriented x 3, not in acute distress. HEENT: PERRLA; EOMI. Oropharynx clear. NECK: Supple. No palpable cervical or supraclavicular lymphadenopathy. LUNGS: Good air entry bilaterally. No wheezing, crackles or rhonchi. CARDIOVASCULAR: Regular rate. No murmurs, rubs or gallops. BREASTS: The right breast exam is negative for any skin changes, nipple discharge, no palpable masses, no palpable right axillary adenopathy, left breast appearance had markedly improved, the nodules had resolved, the mass decreased in size, no drainage, no bleeding, decrease in the size of the left axillary lymphadenopathy  ABDOMEN: Soft. Non-tender, non-distended. Positive bowel sounds. EXTREMITIES: mild chronic edema of the LLE  NEUROLOGIC: No focal deficits. ECOG PS 1     Impression/Plan:     The patient is a 66 y.o. lady, with a past medical history significant for hydrocephalus, who had presented with left breast changes, who was diagnosed with a left breast ductal carcinoma, ER positive greater than 95% MD +70% HER-2/elder 1+, she has a very locally advanced disease, and metastatic disease to the bones. I discussed with the patient and her sister today her diagnosis, prognosis and recommendations for treatment. Will complete the staging work-up, the patient will have a brain MRI with and without contrast done, and a PET scan. For her HR pos, HER-2/elder negative metastatic breast cancer, I recommended treatment with Femara and Ibrance, the schedule and side effects of the treatment were reviewed with her. She has metastatic disease to the bones, recommended treatment with Xgeva. MRI brain done on 09/16/2019: no evidence of metastatic disease. PET/CT on 09/17/2019: The lytic soft tissue finding in the iliac crest in the left pelvis shows hypermetabolic FDG uptake, with maximum measurements in the 5.7 SUV range, strongly suggesting metastatic disease. She has uptake in the thyroid, could be physiologic. Femara/Ibrance were started on 09/13/2019. Bone scan done on 12/17/2019: Redemonstration of focus of radiotracer uptake involving posterior lateral right ninth rib suggestive of healing fracture. New radiotracer activity is seen at level of lateral right 10th rib and anterior right seventh rib which may be related to new healing fractures. Metastases is unlikely. Clinical correlation recommended. No additional abnormal areas of increased radiotracer uptake. CT chest done on 12/17/2019: Significant interval decrease in size of the large mass seen in the left breast 8/29/2019. Interval decrease in size of enlarged left axillary lymph nodes. Cardiomegaly. Enlarged thyroid, unchanged. Small hiatal hernia. Diffuse interstitial changes and multifocal atelectasis or scarring in both lungs. Stable small nodular density in the right upper lobe. Sclerotic changes involving the lateral right ninth rib. CT abdomen/pelvis done on 12/17/2019: Small right renal cyst. Retroverted uterus. Distended colon and rectum containing a large amount of stool. Mildly enlarged retroperitoneal and periportal lymph nodes, unchanged. Findings in the left iliac bone as described. The results/images of the scans were reviewed with the patient and her sister, she is responding nicely to the Femara and Saratha Dinning, will continue the same treatment. She did have teeth extraction on 1/20/2020, dental clearance has been obtained. On 4/3/2020, the patient was started on Ibrance and Femara cycle #8.     Restaging CT Chest/Abdomen/Pelvis and bone scan on 04/24/2020:  CT Chest noted stable small bilateral pulmonary nodules; CT abdomen/pelvis no evidence of new active neoplasm compared to prior. Bone scan 3 foci of radiotracer uptake in the lower right ribs, 2 of which demonstrate less activity on the current study, possibly indicative of healing fractures. Radiotracer in the more anterior focus is more persistent, and may be a metastatic lesion. The results/images of the scans were reviewed with the patient and her sister, the patient has an overall stable disease. Recommended to continue with Femara and Ibrance. The patient had a PET scan done on December 3, 2020, she had complete metabolic response since the PET scan on September 17, 8240, no metabolically active recurrent or metastatic disease, metabolic activity throughout the thyroid lobes can be seen with chronic thyroiditis. The patient had complete response to treatment, continue Ibrance and Femara. The dose of the Chema Rodrigez has been to 75 mg due to leukopenia/neutropenia. The patient had restaging CT scans of the chest, abdomen pelvis done on 6/1/2021, bone scan on 6/11/2021, revealing an overall stable disease. Recommended to continue Ibrance and Femara. Patient had restaging scans done on 10/26/2021, revealing an overall stable disease, couple of upper lobe pulmonary nodules and rib sclerosis are redemonstrated, stable. No new areas of concern. Labs reviewed today , she has leukopenia and neutropenia secondary to Ibrance, no fever or infections, continue same dose of Ibrance 75 mg. Proceed with Steve Phillips today 1/12/2022. 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 RTC in 4 weeks. Thank you for allowing us to participate in the care of Ms. Marisela Fletcher.     Marisela Rodrigez MD   HEMATOLOGY/MEDICAL [address] [place] [address] MEDICAL ONCOLOGY  [address] [place] [address]  [place] [address]  Dept: [phone]: [phone]

## 2022-01-13 ENCOUNTER — TELEPHONE (OUTPATIENT)
Dept: PHARMACY | Age: 72
End: 2022-01-13

## 2022-01-13 ENCOUNTER — TELEPHONE (OUTPATIENT)
Dept: ONCOLOGY | Age: 72
End: 2022-01-13

## 2022-01-13 NOTE — TELEPHONE ENCOUNTER
PT LEFT 2 MESSAGES REQUESTING DR SHIVA GRADY TO CALL HER. PT SAID WE ARE IDIOTS WHO LISTENED TO HER SISTER AND HER SISTER ISN'T IN CHARGE OF HER AS SHE IS THE ONE WHO PAYS HER OWN BILLS. SHE SAID THE PILLS SHE IS TAKING ARE POISON AND WANTS DR TO CALL HER AND LET HER KNOW HOW LONG SHE HAS TO TAKE THEM. SHE ALSO REQUESTS THAT OUR OFFICE DOESN'T \"LISTEN\" TO HER SISTER.

## 2022-01-13 NOTE — TELEPHONE ENCOUNTER
Placed a call to Mateo Sanders, sister, to let her know that I called to check status of re-enrollment application for Ibrance and was told that they are still processing it and that I will give her a call as soon as I find out status. Rep stated hopefully I will hear by next week.

## 2022-01-14 ENCOUNTER — TELEPHONE (OUTPATIENT)
Dept: PHARMACY | Age: 72
End: 2022-01-14

## 2022-01-14 NOTE — TELEPHONE ENCOUNTER
Received a faxed confirmation today from [address] [employer] [facility] [facility] letting me know that Jin Foreman has been approved to receive her Chema Rodrigez free of charge until 12- at which time we will have to reapply.

## 2022-01-17 LAB — CA 15-3: 18 U/ML (ref 0–31)

## 2022-02-08 ENCOUNTER — HOSPITAL ENCOUNTER (OUTPATIENT)
Age: 72
Discharge: HOME | End: 2022-02-08
Payer: MEDICARE

## 2022-02-08 DIAGNOSIS — C50.612 MALIGNANT NEOPLASM OF AXILLARY TAIL OF LEFT FEMALE BREAST, UNSPECIFIED ESTROGEN RECEPTOR STATUS (HCC): ICD-10-CM

## 2022-02-08 DIAGNOSIS — C50.612 MALIGNANT NEOPLASM OF AXILLARY TAIL OF LEFT FEMALE BREAST, UNSPECIFIED ESTROGEN RECEPTOR STATUS (HCC): Primary | ICD-10-CM

## 2022-02-08 DIAGNOSIS — C50.919 CARCINOMA OF BREAST METASTATIC TO BONE, UNSPECIFIED LATERALITY (HCC): ICD-10-CM

## 2022-02-08 DIAGNOSIS — C79.51 CARCINOMA OF BREAST METASTATIC TO BONE, UNSPECIFIED LATERALITY (HCC): ICD-10-CM

## 2022-02-08 LAB
ALBUMIN SERPL-MCNC: 4.5 G/DL (ref 3.5–5.2)
ALP BLD-CCNC: 94 U/L (ref 35–104)
ALT SERPL-CCNC: 18 U/L (ref 0–32)
ANION GAP SERPL CALCULATED.3IONS-SCNC: 7 MMOL/L (ref 7–16)
AST SERPL-CCNC: 27 U/L (ref 0–31)
ATYPICAL LYMPHOCYTE RELATIVE PERCENT: 2.7 % (ref 0–4)
BASOPHILS ABSOLUTE: 0.04 E9/L (ref 0–0.2)
BASOPHILS RELATIVE PERCENT: 2.6 % (ref 0–2)
BILIRUB SERPL-MCNC: 0.4 MG/DL (ref 0–1.2)
BUN BLDV-MCNC: 29 MG/DL (ref 6–23)
CALCIUM SERPL-MCNC: 9.6 MG/DL (ref 8.6–10.2)
CEA: 1.2 NG/ML (ref 0–5.2)
CHLORIDE BLD-SCNC: 97 MMOL/L (ref 98–107)
CO2: 26 MMOL/L (ref 22–29)
CREAT SERPL-MCNC: 1 MG/DL (ref 0.5–1)
EOSINOPHILS ABSOLUTE: 0.03 E9/L (ref 0.05–0.5)
EOSINOPHILS RELATIVE PERCENT: 1.8 % (ref 0–6)
GFR AFRICAN AMERICAN: >60
GFR NON-AFRICAN AMERICAN: 54 ML/MIN/1.73
GLUCOSE BLD-MCNC: 96 MG/DL (ref 74–99)
HCT VFR BLD CALC: 31.6 % (ref 34–48)
HEMOGLOBIN: 10.6 G/DL (ref 11.5–15.5)
LYMPHOCYTES ABSOLUTE: 0.43 E9/L (ref 1.5–4)
LYMPHOCYTES RELATIVE PERCENT: 23.9 % (ref 20–42)
MCH RBC QN AUTO: 32.8 PG (ref 26–35)
MCHC RBC AUTO-ENTMCNC: 33.5 % (ref 32–34.5)
MCV RBC AUTO: 97.8 FL (ref 80–99.9)
MONOCYTES ABSOLUTE: 0.16 E9/L (ref 0.1–0.95)
MONOCYTES RELATIVE PERCENT: 9.7 % (ref 2–12)
NEUTROPHILS ABSOLUTE: 0.94 E9/L (ref 1.8–7.3)
NEUTROPHILS RELATIVE PERCENT: 59.3 % (ref 43–80)
NUCLEATED RED BLOOD CELLS: 0 /100 WBC
PDW BLD-RTO: 13.5 FL (ref 11.5–15)
PLATELET # BLD: 119 E9/L (ref 130–450)
PMV BLD AUTO: 9.8 FL (ref 7–12)
POTASSIUM SERPL-SCNC: 4.8 MMOL/L (ref 3.5–5)
RBC # BLD: 3.23 E12/L (ref 3.5–5.5)
RBC # BLD: NORMAL 10*6/UL
SODIUM BLD-SCNC: 130 MMOL/L (ref 132–146)
TOTAL PROTEIN: 8.9 G/DL (ref 6.4–8.3)
WBC # BLD: 1.6 E9/L (ref 4.5–11.5)

## 2022-02-08 PROCEDURE — 82378 CARCINOEMBRYONIC ANTIGEN: CPT

## 2022-02-08 PROCEDURE — 85025 COMPLETE CBC W/AUTO DIFF WBC: CPT

## 2022-02-08 PROCEDURE — 80053 COMPREHEN METABOLIC PANEL: CPT

## 2022-02-08 PROCEDURE — 36415 COLL VENOUS BLD VENIPUNCTURE: CPT

## 2022-02-09 ENCOUNTER — HOSPITAL ENCOUNTER (OUTPATIENT)
Dept: INFUSION THERAPY | Age: 72
Discharge: HOME | End: 2022-02-09
Payer: MEDICARE

## 2022-02-09 ENCOUNTER — OFFICE VISIT (OUTPATIENT)
Dept: ONCOLOGY | Age: 72
End: 2022-02-09
Payer: MEDICARE

## 2022-02-09 VITALS
BODY MASS INDEX: 24.59 KG/M2 | OXYGEN SATURATION: 95 % | WEIGHT: 153 LBS | HEIGHT: 66 IN | HEART RATE: 73 BPM | SYSTOLIC BLOOD PRESSURE: 123 MMHG | TEMPERATURE: 98.8 F | DIASTOLIC BLOOD PRESSURE: 60 MMHG

## 2022-02-09 DIAGNOSIS — C50.919 CARCINOMA OF BREAST METASTATIC TO BONE, UNSPECIFIED LATERALITY (HCC): ICD-10-CM

## 2022-02-09 DIAGNOSIS — C50.612 MALIGNANT NEOPLASM OF AXILLARY TAIL OF LEFT FEMALE BREAST, UNSPECIFIED ESTROGEN RECEPTOR STATUS (HCC): Primary | ICD-10-CM

## 2022-02-09 DIAGNOSIS — C79.51 CARCINOMA OF BREAST METASTATIC TO BONE, UNSPECIFIED LATERALITY (HCC): ICD-10-CM

## 2022-02-09 PROCEDURE — 4040F PNEUMOC VAC/ADMIN/RCVD: CPT | Performed by: INTERNAL MEDICINE

## 2022-02-09 PROCEDURE — G8427 DOCREV CUR MEDS BY ELIG CLIN: HCPCS | Performed by: INTERNAL MEDICINE

## 2022-02-09 PROCEDURE — 3017F COLORECTAL CA SCREEN DOC REV: CPT | Performed by: INTERNAL MEDICINE

## 2022-02-09 PROCEDURE — 6360000002 HC RX W HCPCS: Performed by: NURSE PRACTITIONER

## 2022-02-09 PROCEDURE — 99214 OFFICE O/P EST MOD 30 MIN: CPT | Performed by: INTERNAL MEDICINE

## 2022-02-09 PROCEDURE — G8400 PT W/DXA NO RESULTS DOC: HCPCS | Performed by: INTERNAL MEDICINE

## 2022-02-09 PROCEDURE — G8484 FLU IMMUNIZE NO ADMIN: HCPCS | Performed by: INTERNAL MEDICINE

## 2022-02-09 PROCEDURE — 1123F ACP DISCUSS/DSCN MKR DOCD: CPT | Performed by: INTERNAL MEDICINE

## 2022-02-09 PROCEDURE — 1090F PRES/ABSN URINE INCON ASSESS: CPT | Performed by: INTERNAL MEDICINE

## 2022-02-09 PROCEDURE — 99212 OFFICE O/P EST SF 10 MIN: CPT

## 2022-02-09 PROCEDURE — 96372 THER/PROPH/DIAG INJ SC/IM: CPT

## 2022-02-09 PROCEDURE — G8420 CALC BMI NORM PARAMETERS: HCPCS | Performed by: INTERNAL MEDICINE

## 2022-02-09 PROCEDURE — 1036F TOBACCO NON-USER: CPT | Performed by: INTERNAL MEDICINE

## 2022-02-09 RX ADMIN — DENOSUMAB 120 MG: 120 INJECTION SUBCUTANEOUS at 12:38

## 2022-02-09 NOTE — PROGRESS NOTES
[address] [facility] MEDICAL ONCOLOGY  [address] [place] [id number]  Dept: [phone]  Loc: [phone]  Attending Progress Note      Reason for Visit:   Metastatic breast cancer. PCP:  Michelle Hinson DO    History of Present Illness: The patient is a 66 y.o. lady, with a past medical history significant for hydrocephalus, who had presented with left breast changes, she had noticed a dark spot on her left breast, then she had developed induration, she thinks that she had the breast changes for months. The patient had never had a mammogram done, had a CT scan of the chest done, revealing a solid left breast mass measuring at least 6.8 x 2.9 cm in size is identified. The mass appears to extend to the skin surface. The deep margin of the mass abuts the chest wall but a fat plane appears to be preserved. Pathologically enlarged left  axillary lymph nodes are seen measuring up to 4.5 x 1.9 cm in size. CT abdomen and pelvis was done on 8/30/2019 revealing partial visualization of soft tissue mass in the left breast, nonspecific lymph nodes in the upper abdomen. Bone scan was done on 8/30/2019, revealing a Prominent focus of radiotracer uptake in the left anterior superior iliac spine, with corresponding abnormality on recent CT, suggestive of metastasis. More subtle focal radiotracer uptake in the right anterior superior iliac spine could be due to metastasis or  degenerative change.   2. Focal radiotracer uptake in the lateral aspect of the right ninth  rib, which appears to be due to a nondisplaced healing fracture. Correlation with history of recent trauma is recommended. In light of  other findings, this could represent a pathologic fracture with an  underlying metastatic lesion. 3. Osteoarthritic uptake in the shoulders, knees, wrists, and hands.     She underwent on 8/30/2019 a biopsy of 1 of the left axillary lymph nodes, she was consistent with metastatic ductal carcinoma, nuclear grade 2, ER positive greater than 95% WI +70% HER-2/elder 1+, by IHC, negative. The patient was started on systemic therapy with Femara and Ibrance on 9/13/2019. She denies any side effects from the treatment. She denies any new problems. The appearance of the left breast has significant improved, no drainage. Ca Calhoun returns for a follow-up visit. She is accompanied by her sister. Ca Calhoun is doing well overall. No reported side effects from the [place] and Femara. No left breast pain or discharge. She is keeping herself well-hydrated. She continues to follow with neurosurgery, no interventions were recommended at this time. No new problems. Review of Systems;  CONSTITUTIONAL: No fever, chills. Good appetite and energy level. ENMT: Eyes: No diplopia; Nose: No epistaxis. Mouth: No sore throat. RESPIRATORY: No hemoptysis, shortness of breath, cough. CARDIOVASCULAR: No chest pain, palpitations. GASTROINTESTINAL: No nausea/vomiting, abdominal pain, diarrhea/constipation. GENITOURINARY: No dysuria, urinary frequency, hematuria. NEURO: No syncope, presyncope, headache. MSK: left knee pain resolved.   Remainder:  ROS NEGATIVE    Past Medical History:      Diagnosis Date    Arm fracture, left 2009    Balance problems since April, 2011    Hydrocephalus [facility])     compensated    Thyroid disease     Vertigo      Patient Active Problem List   Diagnosis    Hydrocephalus, adult ([address]    Hydrocephalus ([address]    Hyponatremia    Breast mass    Malignant neoplasm of axillary tail of left female breast ([address]    Breast cancer metastasized to bone ([address]    Carcinoma of breast metastatic to bone ([address]    Anemia        Past Surgical History:      Procedure Laterality Date    ARM SURGERY  9/15/2009    FRACTURE SURGERY      HUMERUS FRACTURE SURGERY  2009    Broke left arm        Family History:  Family History   Problem Relation Age of Onset    Colon Cancer Mother 68    Heart Disease Father     Diabetes Father        Medications:  Reviewed and reconciled. Social History:  Social History     Socioeconomic History    Marital status: Single     Spouse name: Not on file    Number of children: Not on file    Years of education: Not on file    Highest education level: Not on file   Occupational History    Not on file   Tobacco Use    Smoking status: Never Smoker    Smokeless tobacco: Never Used   Vaping Use    Vaping Use: Never used   Substance and Sexual Activity    Alcohol use: Yes     Comment: drinks it occasional on social events    Drug use: No    Sexual activity: Never   Other Topics Concern    Not on file   Social History Narrative    Not on file     Social Determinants of Health     Financial Resource Strain:     Difficulty of Paying Living Expenses: Not on file   Food Insecurity:     Worried About Running Out of Food in the Last Year: Not on file    Sara of Food in the Last Year: Not on file   Transportation Needs:     Lack of Transportation (Medical): Not on file    Lack of Transportation (Non-Medical):  Not on file   Physical Activity:     Days of Exercise per Week: Not on file    Minutes of Exercise per Session: Not on file   Stress:     Feeling of Stress : Not on file   Social Connections:     Frequency of Communication with Friends and Family: Not on file    Frequency of Social Gatherings with Friends and Family: Not on file    Attends Christian Services: Not on file    Active Member of Clubs or Organizations: Not on file    Attends Club or Organization Meetings: Not on file    Marital Status: Not on file   Intimate Partner Violence:     Fear of Current or Ex-Partner: Not on file    Emotionally Abused: Not on file    Physically Abused: Not on file    Sexually Abused: Not on file   Housing Stability:     Unable to Pay for Housing in the Last Year: Not on file    Number of [place] in the Last Year: Not on file  Unstable Housing in the Last Year: Not on file       Allergies:  No Known Allergies    Physical Exam:  /60   Pulse 73   Temp 98.8 °F (37.1 °C)   Ht 5' 6\" (1.676 m)   Wt 153 lb (69.4 kg)   SpO2 95%   BMI 24.69 kg/m²     GENERAL: Alert, oriented x 3, not in acute distress. HEENT: PERRLA; EOMI. Oropharynx clear. NECK: Supple. No palpable cervical or supraclavicular lymphadenopathy. LUNGS: Good air entry bilaterally. No wheezing, crackles or rhonchi. CARDIOVASCULAR: Regular rate. No murmurs, rubs or gallops. BREASTS: The right breast exam is negative for any skin changes, nipple discharge, no palpable masses, no palpable right axillary adenopathy, left breast appearance had markedly improved, the nodules had resolved, the mass decreased in size, no drainage, no bleeding, decrease in the size of the left axillary lymphadenopathy  ABDOMEN: Soft. Non-tender, non-distended. Positive bowel sounds. EXTREMITIES: mild chronic edema of the LLE  NEUROLOGIC: No focal deficits. ECOG PS 1     Impression/Plan:     The patient is a 66 y.o. lady, with a past medical history significant for hydrocephalus, who had presented with left breast changes, who was diagnosed with a left breast ductal carcinoma, ER positive greater than 95% VT +70% HER-2/elder 1+, she has a very locally advanced disease, and metastatic disease to the bones. I discussed with the patient and her sister today her diagnosis, prognosis and recommendations for treatment. Will complete the staging work-up, the patient will have a brain MRI with and without contrast done, and a PET scan. For her HR pos, HER-2/elder negative metastatic breast cancer, I recommended treatment with Femara and Ibrance, the schedule and side effects of the treatment were reviewed with her. She has metastatic disease to the bones, recommended treatment with Xgeva. MRI brain done on 09/16/2019: no evidence of metastatic disease. PET/CT on 09/17/2019:  The lytic foci of radiotracer uptake in the lower right ribs, 2 of which demonstrate less activity on the current study, possibly indicative of healing fractures. Radiotracer in the more anterior focus is more persistent, and may be a metastatic lesion. The results/images of the scans were reviewed with the patient and her sister, the patient has an overall stable disease. Recommended to continue with Femara and Ibrance. The patient had a PET scan done on December 3, 2020, she had complete metabolic response since the PET scan on September 17, 0109, no metabolically active recurrent or metastatic disease, metabolic activity throughout the thyroid lobes can be seen with chronic thyroiditis. The patient had complete response to treatment, continue Ibrance and Femara. The dose of the Liya [place] has been to 75 mg due to leukopenia/neutropenia. The patient had restaging CT scans of the chest, abdomen pelvis done on 6/1/2021, bone scan on 6/11/2021, revealing an overall stable disease. Recommended to continue Ibrance and Femara. Patient had restaging scans done on 10/26/2021, revealing an overall stable disease, couple of upper lobe pulmonary nodules and rib sclerosis are redemonstrated, stable. No new areas of concern. Labs reviewed today , she has leukopenia and neutropenia, thrombocytopenia  secondary to Ibrance, no fever or infections, continue same dose of Ibrance 75 mg. Proceed with Dessie Babinski today 2/9/2022. We will order restaging scans when she returns for her next visit. 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 RTC in 4 weeks. Thank you for allowing us to participate in the care of Ms. Justice Carmona.     Kristan Cotton MD   HEMATOLOGY/MEDICAL ONCOLOGY  [phone] MEDICAL ON[facility]OGY  [address] [place] [address]  Dept: [phone]: [phone]

## 2022-03-08 ENCOUNTER — HOSPITAL ENCOUNTER (OUTPATIENT)
Age: 72
Discharge: HOME | End: 2022-03-08
Payer: MEDICARE

## 2022-03-08 DIAGNOSIS — E03.9 HYPOTHYROIDISM, UNSPECIFIED TYPE: ICD-10-CM

## 2022-03-08 DIAGNOSIS — C50.612 MALIGNANT NEOPLASM OF AXILLARY TAIL OF LEFT FEMALE BREAST, UNSPECIFIED ESTROGEN RECEPTOR STATUS (HCC): ICD-10-CM

## 2022-03-08 LAB
ALBUMIN SERPL-MCNC: 4.4 G/DL (ref 3.5–5.2)
ALP BLD-CCNC: 77 U/L (ref 35–104)
ALT SERPL-CCNC: 14 U/L (ref 0–32)
ANION GAP SERPL CALCULATED.3IONS-SCNC: 8 MMOL/L (ref 7–16)
ANISOCYTOSIS: ABNORMAL
AST SERPL-CCNC: 23 U/L (ref 0–31)
BASOPHILS ABSOLUTE: 0 E9/L (ref 0–0.2)
BASOPHILS RELATIVE PERCENT: 0 % (ref 0–2)
BILIRUB SERPL-MCNC: 0.4 MG/DL (ref 0–1.2)
BUN BLDV-MCNC: 19 MG/DL (ref 6–23)
CALCIUM SERPL-MCNC: 9.4 MG/DL (ref 8.6–10.2)
CEA: 1.3 NG/ML (ref 0–5.2)
CHLORIDE BLD-SCNC: 103 MMOL/L (ref 98–107)
CO2: 24 MMOL/L (ref 22–29)
CREAT SERPL-MCNC: 1 MG/DL (ref 0.5–1)
EOSINOPHILS ABSOLUTE: 0.01 E9/L (ref 0.05–0.5)
EOSINOPHILS RELATIVE PERCENT: 0.9 % (ref 0–6)
GFR AFRICAN AMERICAN: >60
GFR NON-AFRICAN AMERICAN: 54 ML/MIN/1.73
GLUCOSE BLD-MCNC: 89 MG/DL (ref 74–99)
HCT VFR BLD CALC: 31.3 % (ref 34–48)
HEMOGLOBIN: 10.4 G/DL (ref 11.5–15.5)
LYMPHOCYTES ABSOLUTE: 0.3 E9/L (ref 1.5–4)
LYMPHOCYTES RELATIVE PERCENT: 20.2 % (ref 20–42)
MCH RBC QN AUTO: 32.5 PG (ref 26–35)
MCHC RBC AUTO-ENTMCNC: 33.2 % (ref 32–34.5)
MCV RBC AUTO: 97.8 FL (ref 80–99.9)
MONOCYTES ABSOLUTE: 0.09 E9/L (ref 0.1–0.95)
MONOCYTES RELATIVE PERCENT: 6.1 % (ref 2–12)
NEUTROPHILS ABSOLUTE: 1.1 E9/L (ref 1.8–7.3)
NEUTROPHILS RELATIVE PERCENT: 72.8 % (ref 43–80)
NUCLEATED RED BLOOD CELLS: 0 /100 WBC
OVALOCYTES: ABNORMAL
PDW BLD-RTO: 13.6 FL (ref 11.5–15)
PLATELET # BLD: 119 E9/L (ref 130–450)
PMV BLD AUTO: 9.8 FL (ref 7–12)
POIKILOCYTES: ABNORMAL
POLYCHROMASIA: ABNORMAL
POTASSIUM SERPL-SCNC: 4.3 MMOL/L (ref 3.5–5)
RBC # BLD: 3.2 E12/L (ref 3.5–5.5)
SODIUM BLD-SCNC: 135 MMOL/L (ref 132–146)
TOTAL PROTEIN: 8.5 G/DL (ref 6.4–8.3)
WBC # BLD: 1.5 E9/L (ref 4.5–11.5)

## 2022-03-08 PROCEDURE — 85025 COMPLETE CBC W/AUTO DIFF WBC: CPT

## 2022-03-08 PROCEDURE — 36415 COLL VENOUS BLD VENIPUNCTURE: CPT

## 2022-03-08 PROCEDURE — 82378 CARCINOEMBRYONIC ANTIGEN: CPT

## 2022-03-08 PROCEDURE — 80053 COMPREHEN METABOLIC PANEL: CPT

## 2022-03-08 RX ORDER — LEVOTHYROXINE SODIUM 0.03 MG/1
TABLET ORAL
Qty: 30 TABLET | Refills: 0 | Status: SHIPPED
Start: 2022-03-08 | End: 2022-04-07

## 2022-03-09 ENCOUNTER — OFFICE VISIT (OUTPATIENT)
Dept: ONCOLOGY | Age: 72
End: 2022-03-09
Payer: MEDICARE

## 2022-03-09 ENCOUNTER — HOSPITAL ENCOUNTER (OUTPATIENT)
Dept: INFUSION THERAPY | Age: 72
Discharge: HOME | End: 2022-03-09
Payer: MEDICARE

## 2022-03-09 VITALS
WEIGHT: 154 LBS | BODY MASS INDEX: 24.75 KG/M2 | TEMPERATURE: 97.8 F | SYSTOLIC BLOOD PRESSURE: 144 MMHG | OXYGEN SATURATION: 96 % | HEART RATE: 69 BPM | HEIGHT: 66 IN | DIASTOLIC BLOOD PRESSURE: 79 MMHG

## 2022-03-09 DIAGNOSIS — C50.919 CARCINOMA OF BREAST METASTATIC TO BONE, UNSPECIFIED LATERALITY (HCC): ICD-10-CM

## 2022-03-09 DIAGNOSIS — C79.51 CARCINOMA OF BREAST METASTATIC TO BONE, UNSPECIFIED LATERALITY (HCC): ICD-10-CM

## 2022-03-09 DIAGNOSIS — C50.612 MALIGNANT NEOPLASM OF AXILLARY TAIL OF LEFT FEMALE BREAST, UNSPECIFIED ESTROGEN RECEPTOR STATUS (HCC): Primary | ICD-10-CM

## 2022-03-09 DIAGNOSIS — D69.6 THROMBOCYTOPENIA, UNSPECIFIED (HCC): ICD-10-CM

## 2022-03-09 PROCEDURE — G8420 CALC BMI NORM PARAMETERS: HCPCS | Performed by: INTERNAL MEDICINE

## 2022-03-09 PROCEDURE — G8400 PT W/DXA NO RESULTS DOC: HCPCS | Performed by: INTERNAL MEDICINE

## 2022-03-09 PROCEDURE — 3017F COLORECTAL CA SCREEN DOC REV: CPT | Performed by: INTERNAL MEDICINE

## 2022-03-09 PROCEDURE — 99214 OFFICE O/P EST MOD 30 MIN: CPT | Performed by: INTERNAL MEDICINE

## 2022-03-09 PROCEDURE — 6360000002 HC RX W HCPCS: Performed by: NURSE PRACTITIONER

## 2022-03-09 PROCEDURE — G8484 FLU IMMUNIZE NO ADMIN: HCPCS | Performed by: INTERNAL MEDICINE

## 2022-03-09 PROCEDURE — 1090F PRES/ABSN URINE INCON ASSESS: CPT | Performed by: INTERNAL MEDICINE

## 2022-03-09 PROCEDURE — 96372 THER/PROPH/DIAG INJ SC/IM: CPT

## 2022-03-09 PROCEDURE — 99213 OFFICE O/P EST LOW 20 MIN: CPT

## 2022-03-09 PROCEDURE — 1123F ACP DISCUSS/DSCN MKR DOCD: CPT | Performed by: INTERNAL MEDICINE

## 2022-03-09 PROCEDURE — 4040F PNEUMOC VAC/ADMIN/RCVD: CPT | Performed by: INTERNAL MEDICINE

## 2022-03-09 PROCEDURE — 1036F TOBACCO NON-USER: CPT | Performed by: INTERNAL MEDICINE

## 2022-03-09 PROCEDURE — G8427 DOCREV CUR MEDS BY ELIG CLIN: HCPCS | Performed by: INTERNAL MEDICINE

## 2022-03-09 RX ADMIN — DENOSUMAB 120 MG: 120 INJECTION SUBCUTANEOUS at 12:04

## 2022-03-09 NOTE — PROGRESS NOTES
[id number] [facility] MEDICAL ONCOLOGY  [address] [place] [id number]  Dept: [phone]  Loc: [phone]  Attending Progress Note      Reason for Visit:   Metastatic breast cancer. PCP:  Nichol Carranza DO    History of Present Illness: The patient is a 66 y.o. lady, with a past medical history significant for hydrocephalus, who had presented with left breast changes, she had noticed a dark spot on her left breast, then she had developed induration, she thinks that she had the breast changes for months. The patient had never had a mammogram done, had a CT scan of the chest done, revealing a solid left breast mass measuring at least 6.8 x 2.9 cm in size is identified. The mass appears to extend to the skin surface. The deep margin of the mass abuts the chest wall but a fat plane appears to be preserved. Pathologically enlarged left  axillary lymph nodes are seen measuring up to 4.5 x 1.9 cm in size. CT abdomen and pelvis was done on 8/30/2019 revealing partial visualization of soft tissue mass in the left breast, nonspecific lymph nodes in the upper abdomen. Bone scan was done on 8/30/2019, revealing a Prominent focus of radiotracer uptake in the left anterior superior iliac spine, with corresponding abnormality on recent CT, suggestive of metastasis. More subtle focal radiotracer uptake in the right anterior superior iliac spine could be due to metastasis or  degenerative change.   2. Focal radiotracer uptake in the lateral aspect of the right ninth  rib, which appears to be due to a nondisplaced healing fracture. Correlation with history of recent trauma is recommended. In light of  other findings, this could represent a pathologic fracture with an  underlying metastatic lesion. 3. Osteoarthritic uptake in the shoulders, knees, wrists, and hands.     She underwent on 8/30/2019 a biopsy of 1 of the left axillary lymph nodes, she was consistent with metastatic ductal carcinoma, nuclear grade 2, ER positive greater than 95% LA +70% HER-2/elder 1+, by IHC, negative. The patient was started on systemic therapy with Femara and Ibrance on 9/13/2019. She denies any side effects from the treatment. She denies any new problems. The appearance of the left breast has significant improved, no drainage. Mari Schuster returns for a follow-up visit. Mari Schuster is doing well overall. No reported side effects from the [place] and Femara. No left breast pain or discharge. She is keeping herself well-hydrated. She continues to follow with neurosurgery, no interventions were recommended at this time. No new problems. She is accompanied by her sister. Review of Systems;  CONSTITUTIONAL: No fever, chills. Good appetite and energy level. ENMT: Eyes: No diplopia; Nose: No epistaxis. Mouth: No sore throat. RESPIRATORY: No hemoptysis, shortness of breath, cough. CARDIOVASCULAR: No chest pain, palpitations. GASTROINTESTINAL: No nausea/vomiting, abdominal pain, diarrhea/constipation. GENITOURINARY: No dysuria, urinary frequency, hematuria. NEURO: No syncope, presyncope, headache. MSK: left knee pain resolved.   Remainder:  ROS NEGATIVE    Past Medical History:      Diagnosis Date    Arm fracture, left 2009    Balance problems since April, 2011    Hydrocephalus [facility])     compensated    Thyroid disease     Vertigo      Patient Active Problem List   Diagnosis    Hydrocephalus, adult ([address]    Hydrocephalus ([address]    Hyponatremia    Breast mass    Malignant neoplasm of axillary tail of left female breast ([address]    Breast cancer metastasized to bone ([address]    Carcinoma of breast metastatic to bone ([address]    Anemia        Past Surgical History:      Procedure Laterality Date    ARM SURGERY  9/15/2009    FRACTURE SURGERY      HUMERUS FRACTURE SURGERY  2009    Broke left arm        Family History:  Family History   Problem Relation Age of Onset    Colon Cancer Mother 68    Heart Disease Father     Diabetes Father        Medications:  Reviewed and reconciled. Social History:  Social History     Socioeconomic History    Marital status: Single     Spouse name: Not on file    Number of children: Not on file    Years of education: Not on file    Highest education level: Not on file   Occupational History    Not on file   Tobacco Use    Smoking status: Never Smoker    Smokeless tobacco: Never Used   Vaping Use    Vaping Use: Never used   Substance and Sexual Activity    Alcohol use: Yes     Comment: drinks it occasional on social events    Drug use: No    Sexual activity: Never   Other Topics Concern    Not on file   Social History Narrative    Not on file     Social Determinants of Health     Financial Resource Strain:     Difficulty of Paying Living Expenses: Not on file   Food Insecurity:     Worried About Running Out of Food in the Last Year: Not on file    Sara of Food in the Last Year: Not on file   Transportation Needs:     Lack of Transportation (Medical): Not on file    Lack of Transportation (Non-Medical):  Not on file   Physical Activity:     Days of Exercise per Week: Not on file    Minutes of Exercise per Session: Not on file   Stress:     Feeling of Stress : Not on file   Social Connections:     Frequency of Communication with Friends and Family: Not on file    Frequency of Social Gatherings with Friends and Family: Not on file    Attends Latter day Services: Not on file    Active Member of Clubs or Organizations: Not on file    Attends Club or Organization Meetings: Not on file    Marital Status: Not on file   Intimate Partner Violence:     Fear of Current or Ex-Partner: Not on file    Emotionally Abused: Not on file    Physically Abused: Not on file    Sexually Abused: Not on file   Housing Stability:     Unable to Pay for Housing in the Last Year: Not on file    Number of [place] in the Last Year: Not on file  Unstable Housing in the Last Year: Not on file       Allergies:  No Known Allergies    Physical Exam:  BP (!) 144/79   Pulse 69   Temp 97.8 °F (36.6 °C)   Ht 5' 6\" (1.676 m)   Wt 154 lb (69.9 kg)   SpO2 96%   BMI 24.86 kg/m²     GENERAL: Alert, oriented x 3, not in acute distress. HEENT: PERRLA; EOMI. Oropharynx clear. NECK: Supple. No palpable cervical or supraclavicular lymphadenopathy. LUNGS: Good air entry bilaterally. No wheezing, crackles or rhonchi. CARDIOVASCULAR: Regular rate. No murmurs, rubs or gallops. BREASTS: The right breast exam is negative for any skin changes, nipple discharge, no palpable masses, no palpable right axillary adenopathy, left breast appearance had markedly improved, the nodules had resolved, the mass decreased in size, no drainage, no bleeding, decrease in the size of the left axillary lymphadenopathy  ABDOMEN: Soft. Non-tender, non-distended. Positive bowel sounds. EXTREMITIES: mild chronic edema of the LLE  NEUROLOGIC: No focal deficits. ECOG PS 1     Impression/Plan:     The patient is a 66 y.o. lady, with a past medical history significant for hydrocephalus, who had presented with left breast changes, who was diagnosed with a left breast ductal carcinoma, ER positive greater than 95% TN +70% HER-2/elder 1+, she has a very locally advanced disease, and metastatic disease to the bones. I discussed with the patient and her sister today her diagnosis, prognosis and recommendations for treatment. Will complete the staging work-up, the patient will have a brain MRI with and without contrast done, and a PET scan. For her HR pos, HER-2/elder negative metastatic breast cancer, I recommended treatment with Femara and Ibrance, the schedule and side effects of the treatment were reviewed with her. She has metastatic disease to the bones, recommended treatment with Xgeva. MRI brain done on 09/16/2019: no evidence of metastatic disease. PET/CT on 09/17/2019:  The lytic soft tissue finding in the iliac crest in the left pelvis shows hypermetabolic FDG uptake, with maximum measurements in the 5.7 SUV range, strongly suggesting metastatic disease. She has uptake in the thyroid, could be physiologic. Femara/Ibrance were started on 09/13/2019. Bone scan done on 12/17/2019: Redemonstration of focus of radiotracer uptake involving posterior lateral right ninth rib suggestive of healing fracture. New radiotracer activity is seen at level of lateral right 10th rib and anterior right seventh rib which may be related to new healing fractures. Metastases is unlikely. Clinical correlation recommended. No additional abnormal areas of increased radiotracer uptake. CT chest done on 12/17/2019: Significant interval decrease in size of the large mass seen in the left breast 8/29/2019. Interval decrease in size of enlarged left axillary lymph nodes. Cardiomegaly. Enlarged thyroid, unchanged. Small hiatal hernia. Diffuse interstitial changes and multifocal atelectasis or scarring in both lungs. Stable small nodular density in the right upper lobe. Sclerotic changes involving the lateral right ninth rib. CT abdomen/pelvis done on 12/17/2019: Small right renal cyst. Retroverted uterus. Distended colon and rectum containing a large amount of stool. Mildly enlarged retroperitoneal and periportal lymph nodes, unchanged. Findings in the left iliac bone as described. The results/images of the scans were reviewed with the patient and her sister, she is responding nicely to the Femara and Chema Rodrigez, will continue the same treatment. She did have teeth extraction on 1/20/2020, dental clearance has been obtained. On 4/3/2020, the patient was started on Ibrance and Femara cycle #8. Restaging CT Chest/Abdomen/Pelvis and bone scan on 04/24/2020:  CT Chest noted stable small bilateral pulmonary nodules; CT abdomen/pelvis no evidence of new active neoplasm compared to prior.   Bone scan 3 foci of radiotracer uptake in the lower right ribs, 2 of which demonstrate less activity on the current study, possibly indicative of healing fractures. Radiotracer in the more anterior focus is more persistent, and may be a metastatic lesion. The results/images of the scans were reviewed with the patient and her sister, the patient has an overall stable disease. Recommended to continue with Femara and Ibrance. The patient had a PET scan done on December 3, 2020, she had complete metabolic response since the PET scan on September 17, 1513, no metabolically active recurrent or metastatic disease, metabolic activity throughout the thyroid lobes can be seen with chronic thyroiditis. The patient had complete response to treatment, continue Ibrance and Femara. The dose of the Janey Hinojosa has been to 75 mg due to leukopenia/neutropenia. The patient had restaging CT scans of the chest, abdomen pelvis done on 6/1/2021, bone scan on 6/11/2021, revealing an overall stable disease. Recommended to continue Ibrance and Femara. Patient had restaging scans done on 10/26/2021, revealing an overall stable disease, couple of upper lobe pulmonary nodules and rib sclerosis are redemonstrated, stable. No new areas of concern. Labs reviewed today , she has leukopenia and grade 2 neutropenia, and thrombocytopenia  secondary to Ibrance, no fever or infections, continue same dose of Ibrance 75 mg. We will continue to monitor her CBCD. Proceed with Leila Trevino today 3/9/2022    Restaging CT scans of the chest, abdomen, pelvis and a bone scan were ordered, await results. 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 RTC in 4 weeks. Thank you for allowing us to participate in the care of Ms. Hansa Quintanilla.     Odessa Brown MD   HEMATOLOGY/MEDICAL [address] [facility] [address]  Dept: [phone]: [phone]

## 2022-03-31 ENCOUNTER — TELEPHONE (OUTPATIENT)
Dept: ONCOLOGY | Age: 72
End: 2022-03-31

## 2022-03-31 NOTE — TELEPHONE ENCOUNTER
Spoke with Son Aleman, Cary's sister. She said that she was not able to get Cary's scans done as she has been dealing with her own health issues at this time. She stated that she did tell Dr Nathaniel Erickson that she may not be able to have them done in time for her upcoming appointment on 04/06/22 but would try and get to them at some point soon.

## 2022-04-05 ENCOUNTER — HOSPITAL ENCOUNTER (OUTPATIENT)
Age: 72
Discharge: HOME | End: 2022-04-05
Payer: MEDICARE

## 2022-04-05 DIAGNOSIS — C50.612 MALIGNANT NEOPLASM OF AXILLARY TAIL OF LEFT FEMALE BREAST, UNSPECIFIED ESTROGEN RECEPTOR STATUS (HCC): ICD-10-CM

## 2022-04-05 LAB
ALBUMIN SERPL-MCNC: 4.3 G/DL (ref 3.5–5.2)
ALP BLD-CCNC: 75 U/L (ref 35–104)
ALT SERPL-CCNC: 16 U/L (ref 0–32)
ANION GAP SERPL CALCULATED.3IONS-SCNC: 8 MMOL/L (ref 7–16)
AST SERPL-CCNC: 22 U/L (ref 0–31)
BASOPHILS ABSOLUTE: 0.02 E9/L (ref 0–0.2)
BASOPHILS RELATIVE PERCENT: 1 % (ref 0–2)
BILIRUB SERPL-MCNC: 0.4 MG/DL (ref 0–1.2)
BUN BLDV-MCNC: 20 MG/DL (ref 6–23)
CALCIUM SERPL-MCNC: 9.3 MG/DL (ref 8.6–10.2)
CEA: 1.2 NG/ML (ref 0–5.2)
CHLORIDE BLD-SCNC: 102 MMOL/L (ref 98–107)
CO2: 25 MMOL/L (ref 22–29)
CREAT SERPL-MCNC: 0.8 MG/DL (ref 0.5–1)
EOSINOPHILS ABSOLUTE: 0 E9/L (ref 0.05–0.5)
EOSINOPHILS RELATIVE PERCENT: 0 % (ref 0–6)
GFR AFRICAN AMERICAN: >60
GFR NON-AFRICAN AMERICAN: >60 ML/MIN/1.73
GLUCOSE BLD-MCNC: 100 MG/DL (ref 74–99)
HCT VFR BLD CALC: 29.9 % (ref 34–48)
HEMOGLOBIN: 10 G/DL (ref 11.5–15.5)
LYMPHOCYTES ABSOLUTE: 0.39 E9/L (ref 1.5–4)
LYMPHOCYTES RELATIVE PERCENT: 26 % (ref 20–42)
MCH RBC QN AUTO: 32.9 PG (ref 26–35)
MCHC RBC AUTO-ENTMCNC: 33.4 % (ref 32–34.5)
MCV RBC AUTO: 98.4 FL (ref 80–99.9)
MONOCYTES ABSOLUTE: 0.03 E9/L (ref 0.1–0.95)
MONOCYTES RELATIVE PERCENT: 2 % (ref 2–12)
NEUTROPHILS ABSOLUTE: 1.07 E9/L (ref 1.8–7.3)
NEUTROPHILS RELATIVE PERCENT: 71 % (ref 43–80)
PDW BLD-RTO: 14.1 FL (ref 11.5–15)
PLATELET # BLD: 119 E9/L (ref 130–450)
PMV BLD AUTO: 9.7 FL (ref 7–12)
POTASSIUM SERPL-SCNC: 3.9 MMOL/L (ref 3.5–5)
RBC # BLD: 3.04 E12/L (ref 3.5–5.5)
RBC # BLD: NORMAL 10*6/UL
SODIUM BLD-SCNC: 135 MMOL/L (ref 132–146)
TOTAL PROTEIN: 8.7 G/DL (ref 6.4–8.3)
WBC # BLD: 1.5 E9/L (ref 4.5–11.5)

## 2022-04-05 PROCEDURE — 86300 IMMUNOASSAY TUMOR CA 15-3: CPT

## 2022-04-05 PROCEDURE — 80053 COMPREHEN METABOLIC PANEL: CPT

## 2022-04-05 PROCEDURE — 85025 COMPLETE CBC W/AUTO DIFF WBC: CPT

## 2022-04-05 PROCEDURE — 82378 CARCINOEMBRYONIC ANTIGEN: CPT

## 2022-04-05 PROCEDURE — 36415 COLL VENOUS BLD VENIPUNCTURE: CPT

## 2022-04-06 ENCOUNTER — OFFICE VISIT (OUTPATIENT)
Dept: ONCOLOGY | Age: 72
End: 2022-04-06
Payer: MEDICARE

## 2022-04-06 ENCOUNTER — HOSPITAL ENCOUNTER (OUTPATIENT)
Dept: INFUSION THERAPY | Age: 72
Discharge: HOME | End: 2022-04-06
Payer: MEDICARE

## 2022-04-06 VITALS
RESPIRATION RATE: 18 BRPM | SYSTOLIC BLOOD PRESSURE: 126 MMHG | DIASTOLIC BLOOD PRESSURE: 60 MMHG | HEART RATE: 72 BPM | BODY MASS INDEX: 24.91 KG/M2 | WEIGHT: 155 LBS | HEIGHT: 66 IN | TEMPERATURE: 98.4 F | OXYGEN SATURATION: 100 %

## 2022-04-06 DIAGNOSIS — C50.919 CARCINOMA OF BREAST METASTATIC TO BONE, UNSPECIFIED LATERALITY (HCC): ICD-10-CM

## 2022-04-06 DIAGNOSIS — C50.612 MALIGNANT NEOPLASM OF AXILLARY TAIL OF LEFT FEMALE BREAST, UNSPECIFIED ESTROGEN RECEPTOR STATUS (HCC): Primary | ICD-10-CM

## 2022-04-06 DIAGNOSIS — C79.51 CARCINOMA OF BREAST METASTATIC TO BONE, UNSPECIFIED LATERALITY (HCC): ICD-10-CM

## 2022-04-06 PROCEDURE — 96372 THER/PROPH/DIAG INJ SC/IM: CPT

## 2022-04-06 PROCEDURE — 1036F TOBACCO NON-USER: CPT | Performed by: INTERNAL MEDICINE

## 2022-04-06 PROCEDURE — 3017F COLORECTAL CA SCREEN DOC REV: CPT | Performed by: INTERNAL MEDICINE

## 2022-04-06 PROCEDURE — 4040F PNEUMOC VAC/ADMIN/RCVD: CPT | Performed by: INTERNAL MEDICINE

## 2022-04-06 PROCEDURE — 1123F ACP DISCUSS/DSCN MKR DOCD: CPT | Performed by: INTERNAL MEDICINE

## 2022-04-06 PROCEDURE — 6360000002 HC RX W HCPCS: Performed by: NURSE PRACTITIONER

## 2022-04-06 PROCEDURE — 1090F PRES/ABSN URINE INCON ASSESS: CPT | Performed by: INTERNAL MEDICINE

## 2022-04-06 PROCEDURE — G8400 PT W/DXA NO RESULTS DOC: HCPCS | Performed by: INTERNAL MEDICINE

## 2022-04-06 PROCEDURE — G8417 CALC BMI ABV UP PARAM F/U: HCPCS | Performed by: INTERNAL MEDICINE

## 2022-04-06 PROCEDURE — 99214 OFFICE O/P EST MOD 30 MIN: CPT | Performed by: INTERNAL MEDICINE

## 2022-04-06 PROCEDURE — 99212 OFFICE O/P EST SF 10 MIN: CPT

## 2022-04-06 PROCEDURE — G8427 DOCREV CUR MEDS BY ELIG CLIN: HCPCS | Performed by: INTERNAL MEDICINE

## 2022-04-06 RX ADMIN — DENOSUMAB 120 MG: 120 INJECTION SUBCUTANEOUS at 11:53

## 2022-04-06 NOTE — PROGRESS NOTES
Patient/caregiven griseldavfanta paper copies of the orders for bone scan and CT scans along with the phone number to call to schedule.

## 2022-04-06 NOTE — PROGRESS NOTES
[address] [facility] MEDICAL ONCOLOGY  21 [facility]  [place] [address]  Dept: [phone]  Loc: [phone]  Attending Progress Note      Reason for Visit:   Metastatic breast cancer. PCP:  Ivone Pemberton DO    History of Present Illness: The patient is a 66 y.o. lady, with a past medical history significant for hydrocephalus, who had presented with left breast changes, she had noticed a dark spot on her left breast, then she had developed induration, she thinks that she had the breast changes for months. The patient had never had a mammogram done, had a CT scan of the chest done, revealing a solid left breast mass measuring at least 6.8 x 2.9 cm in size is identified. The mass appears to extend to the skin surface. The deep margin of the mass abuts the chest wall but a fat plane appears to be preserved. Pathologically enlarged left  axillary lymph nodes are seen measuring up to 4.5 x 1.9 cm in size. CT abdomen and pelvis was done on 8/30/2019 revealing partial visualization of soft tissue mass in the left breast, nonspecific lymph nodes in the upper abdomen. Bone scan was done on 8/30/2019, revealing a Prominent focus of radiotracer uptake in the left anterior superior iliac spine, with corresponding abnormality on recent CT, suggestive of metastasis. More subtle focal radiotracer uptake in the right anterior superior iliac spine could be due to metastasis or  degenerative change.   2. Focal radiotracer uptake in the lateral aspect of the right ninth  rib, which appears to be due to a nondisplaced healing fracture. Correlation with history of recent trauma is recommended. In light of  other findings, this could represent a pathologic fracture with an  underlying metastatic lesion. 3. Osteoarthritic uptake in the shoulders, knees, wrists, and hands.     She underwent on 8/30/2019 a biopsy of 1 of the left axillary lymph nodes, she was consistent with metastatic ductal carcinoma, nuclear grade 2, ER positive greater than 95% [place] +70% HER-2/elder 1+, by IHC, negative. The patient was started on systemic therapy with Femara and Ibrance on 9/13/2019. She denies any side effects from the treatment. She denies any new problems. The appearance of the left breast has significant improved, no drainage. Melburn Felty returns for a follow-up visit. Melburn Felty is doing well overall. No reported side effects from the [place] and Femara. No left breast pain or discharge. She is keeping herself well-hydrated. She continues to follow with neurosurgery, no interventions were recommended at this time. The patient returns for a follow-up visit, she denies any new problems. She is accompanied by her sister. Review of Systems;  CONSTITUTIONAL: No fever, chills. Good appetite and energy level. ENMT: Eyes: No diplopia; Nose: No epistaxis. Mouth: No sore throat. RESPIRATORY: No hemoptysis, shortness of breath, cough. CARDIOVASCULAR: No chest pain, palpitations. GASTROINTESTINAL: No nausea/vomiting, abdominal pain, diarrhea/constipation. GENITOURINARY: No dysuria, urinary frequency, hematuria. NEURO: No syncope, presyncope, headache. MSK: left knee pain resolved.   Remainder:  ROS NEGATIVE    Past Medical History:      Diagnosis Date    Arm fracture, left 2009    Balance problems since April, 2011    Hydrocephalus [facility])     compensated    Thyroid disease     Vertigo      Patient Active Problem List   Diagnosis    Hydrocephalus, adult ([address]    Hydrocephalus ([address]    Hyponatremia    Breast mass    Malignant neoplasm of axillary tail of left female breast ([address]    Breast cancer metastasized to bone ([address]    Carcinoma of breast metastatic to bone ([address]    Anemia    Thrombocytopenia, unspecified        Past Surgical History:      Procedure Laterality Date    ARM SURGERY  9/15/2009    FRACTURE SURGERY      HUMERUS FRACTURE SURGERY  2009 Zayda left arm        Family History:  Family History   Problem Relation Age of Onset    Colon Cancer Mother 68    Heart Disease Father     Diabetes Father        Medications:  Reviewed and reconciled. Social History:  Social History     Socioeconomic History    Marital status: Single     Spouse name: Not on file    Number of children: Not on file    Years of education: Not on file    Highest education level: Not on file   Occupational History    Not on file   Tobacco Use    Smoking status: Never Smoker    Smokeless tobacco: Never Used   Vaping Use    Vaping Use: Never used   Substance and Sexual Activity    Alcohol use: Yes     Comment: drinks it occasional on social events    Drug use: No    Sexual activity: Never   Other Topics Concern    Not on file   Social History Narrative    Not on file     Social Determinants of Health     Financial Resource Strain:     Difficulty of Paying Living Expenses: Not on file   Food Insecurity:     Worried About Running Out of Food in the Last Year: Not on file    Sara of Food in the Last Year: Not on file   Transportation Needs:     Lack of Transportation (Medical): Not on file    Lack of Transportation (Non-Medical):  Not on file   Physical Activity:     Days of Exercise per Week: Not on file    Minutes of Exercise per Session: Not on file   Stress:     Feeling of Stress : Not on file   Social Connections:     Frequency of Communication with Friends and Family: Not on file    Frequency of Social Gatherings with Friends and Family: Not on file    Attends Mosque Services: Not on file    Active Member of Clubs or Organizations: Not on file    Attends Club or Organization Meetings: Not on file    Marital Status: Not on file   Intimate Partner Violence:     Fear of Current or Ex-Partner: Not on file    Emotionally Abused: Not on file    Physically Abused: Not on file    Sexually Abused: Not on file   Housing Stability:     Unable to Pay for Housing in the Last Year: Not on file    Number of Places Lived in the Last Year: Not on file    Unstable Housing in the Last Year: Not on file       Allergies:  No Known Allergies    Physical Exam:  /60 (Site: Right Upper Arm, Position: Sitting)   Pulse 72   Temp 98.4 °F (36.9 °C) (Infrared)   Resp 18   Ht 5' 6\" (1.676 m)   Wt 155 lb (70.3 kg)   SpO2 100%   BMI 25.02 kg/m²     GENERAL: Alert, oriented x 3, not in acute distress. HEENT: PERRLA; EOMI. Oropharynx clear. NECK: Supple. No palpable cervical or supraclavicular lymphadenopathy. LUNGS: Good air entry bilaterally. No wheezing, crackles or rhonchi. CARDIOVASCULAR: Regular rate. No murmurs, rubs or gallops. BREASTS: The right breast exam is negative for any skin changes, nipple discharge, no palpable masses, no palpable right axillary adenopathy, left breast appearance had markedly improved, the nodules had resolved, the mass decreased in size, no drainage, no bleeding, decrease in the size of the left axillary lymphadenopathy  ABDOMEN: Soft. Non-tender, non-distended. Positive bowel sounds. EXTREMITIES: mild chronic edema of the LLE  NEUROLOGIC: No focal deficits. ECOG PS 1     Impression/Plan:     The patient is a 66 y.o. lady, with a past medical history significant for hydrocephalus, who had presented with left breast changes, who was diagnosed with a left breast ductal carcinoma, ER positive greater than 95% [place] +70% HER-2/elder 1+, she has a very locally advanced disease, and metastatic disease to the bones. I discussed with the patient and her sister today her diagnosis, prognosis and recommendations for treatment. Will complete the staging work-up, the patient will have a brain MRI with and without contrast done, and a PET scan. For her HR pos, HER-2/elder negative metastatic breast cancer, I recommended treatment with Femara and Ibrance, the schedule and side effects of the treatment were reviewed with her.   She has metastatic disease to the bones, recommended treatment with Xgeva. MRI brain done on 09/16/2019: no evidence of metastatic disease. PET/CT on 09/17/2019: The lytic soft tissue finding in the iliac crest in the left pelvis shows hypermetabolic FDG uptake, with maximum measurements in the 5.7 SUV range, strongly suggesting metastatic disease. She has uptake in the thyroid, could be physiologic. Femara/Ibrance were started on 09/13/2019. Bone scan done on 12/17/2019: Redemonstration of focus of radiotracer uptake involving posterior lateral right ninth rib suggestive of healing fracture. New radiotracer activity is seen at level of lateral right 10th rib and anterior right seventh rib which may be related to new healing fractures. Metastases is unlikely. Clinical correlation recommended. No additional abnormal areas of increased radiotracer uptake. CT chest done on 12/17/2019: Significant interval decrease in size of the large mass seen in the left breast 8/29/2019. Interval decrease in size of enlarged left axillary lymph nodes. Cardiomegaly. Enlarged thyroid, unchanged. Small hiatal hernia. Diffuse interstitial changes and multifocal atelectasis or scarring in both lungs. Stable small nodular density in the right upper lobe. Sclerotic changes involving the lateral right ninth rib. CT abdomen/pelvis done on 12/17/2019: Small right renal cyst. Retroverted uterus. Distended colon and rectum containing a large amount of stool. Mildly enlarged retroperitoneal and periportal lymph nodes, unchanged. Findings in the left iliac bone as described. The results/images of the scans were reviewed with the patient and her sister, she is responding nicely to the Femara and [place], will continue the same treatment. She did have teeth extraction on 1/20/2020, dental clearance has been obtained. On 4/3/2020, the patient was started on Ibrance and Femara cycle #8.     Restaging CT Chest/Abdomen/Pelvis and bone scan on 04/24/2020:  CT Chest noted stable small bilateral pulmonary nodules; CT abdomen/pelvis no evidence of new active neoplasm compared to prior. Bone scan 3 foci of radiotracer uptake in the lower right ribs, 2 of which demonstrate less activity on the current study, possibly indicative of healing fractures. Radiotracer in the more anterior focus is more persistent, and may be a metastatic lesion. The results/images of the scans were reviewed with the patient and her sister, the patient has an overall stable disease. Recommended to continue with Femara and Ibrance. The patient had a PET scan done on December 3, 2020, she had complete metabolic response since the PET scan on September 17, 0343, no metabolically active recurrent or metastatic disease, metabolic activity throughout the thyroid lobes can be seen with chronic thyroiditis. The patient had complete response to treatment, continue Ibrance and Femara. The dose of the Mayra Zamorano has been to 75 mg due to leukopenia/neutropenia. The patient had restaging CT scans of the chest, abdomen pelvis done on 6/1/2021, bone scan on 6/11/2021, revealing an overall stable disease. Recommended to continue Ibrance and Femara. Patient had restaging scans done on 10/26/2021, revealing an overall stable disease, couple of upper lobe pulmonary nodules and rib sclerosis are redemonstrated, stable. No new areas of concern. Labs reviewed today , she has an cytopenia, and grade 2 neutropenia, secondary to Ibrance, no fever or infections, continue same dose of Ibrance 75 mg. No need for dose adjustment we will continue to monitor her CBCD. Proceed with Lizzy Dubois today 4/6/2020    Restaging CT scans of the chest, abdomen, pelvis and a bone scan have been ordered and are pending. 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 RTC in 4 weeks.     Thank you for allowing us to participate in the care of Ms. Rob DennisSuri Plasencia MD   HEMATOLOGY/MEDICAL [address] [facility] [address] [place] [address] MEDICAL ONCOLOGY  [address] [place] [address]  Dept: [phone] Tien Chris: [phone]

## 2022-04-06 NOTE — PROGRESS NOTES
Patient provided with discharge instructions. All questions answered. Patient understands follow up plan of care.

## 2022-04-07 DIAGNOSIS — E03.9 HYPOTHYROIDISM, UNSPECIFIED TYPE: ICD-10-CM

## 2022-04-07 RX ORDER — LEVOTHYROXINE SODIUM 0.03 MG/1
TABLET ORAL
Qty: 30 TABLET | Refills: 0 | Status: SHIPPED
Start: 2022-04-07 | End: 2022-05-04

## 2022-04-08 LAB — CA 15-3: 15 U/ML (ref 0–31)

## 2022-04-08 NOTE — TELEPHONE ENCOUNTER
Called pt sister Senia Sarabia and put her number in she makes all appts and updated her phone numbers   She is coming in for med refills and awv

## 2022-04-21 ENCOUNTER — OFFICE VISIT (OUTPATIENT)
Dept: FAMILY MEDICINE CLINIC | Age: 72
End: 2022-04-21
Payer: MEDICARE

## 2022-04-21 VITALS
RESPIRATION RATE: 18 BRPM | HEART RATE: 76 BPM | HEIGHT: 66 IN | RESPIRATION RATE: 18 BRPM | BODY MASS INDEX: 25.13 KG/M2 | BODY MASS INDEX: 25.1 KG/M2 | HEIGHT: 66 IN | WEIGHT: 156.4 LBS | OXYGEN SATURATION: 100 % | WEIGHT: 156.2 LBS | TEMPERATURE: 97.2 F | DIASTOLIC BLOOD PRESSURE: 70 MMHG | DIASTOLIC BLOOD PRESSURE: 70 MMHG | SYSTOLIC BLOOD PRESSURE: 122 MMHG | OXYGEN SATURATION: 100 % | HEART RATE: 76 BPM | TEMPERATURE: 97.2 F | SYSTOLIC BLOOD PRESSURE: 122 MMHG

## 2022-04-21 DIAGNOSIS — E55.9 VITAMIN D DEFICIENCY: ICD-10-CM

## 2022-04-21 DIAGNOSIS — Z00.00 MEDICARE ANNUAL WELLNESS VISIT, SUBSEQUENT: Primary | ICD-10-CM

## 2022-04-21 DIAGNOSIS — C79.51 CARCINOMA OF BREAST METASTATIC TO BONE, UNSPECIFIED LATERALITY (HCC): ICD-10-CM

## 2022-04-21 DIAGNOSIS — C50.919 CARCINOMA OF BREAST METASTATIC TO BONE, UNSPECIFIED LATERALITY (HCC): ICD-10-CM

## 2022-04-21 DIAGNOSIS — E03.9 HYPOTHYROIDISM, UNSPECIFIED TYPE: Primary | ICD-10-CM

## 2022-04-21 DIAGNOSIS — G91.9 HYDROCEPHALUS, UNSPECIFIED TYPE (HCC): ICD-10-CM

## 2022-04-21 PROCEDURE — G0439 PPPS, SUBSEQ VISIT: HCPCS | Performed by: FAMILY MEDICINE

## 2022-04-21 PROCEDURE — 99214 OFFICE O/P EST MOD 30 MIN: CPT | Performed by: FAMILY MEDICINE

## 2022-04-21 PROCEDURE — G8427 DOCREV CUR MEDS BY ELIG CLIN: HCPCS | Performed by: FAMILY MEDICINE

## 2022-04-21 PROCEDURE — 1090F PRES/ABSN URINE INCON ASSESS: CPT | Performed by: FAMILY MEDICINE

## 2022-04-21 PROCEDURE — 1123F ACP DISCUSS/DSCN MKR DOCD: CPT | Performed by: FAMILY MEDICINE

## 2022-04-21 PROCEDURE — 4040F PNEUMOC VAC/ADMIN/RCVD: CPT | Performed by: FAMILY MEDICINE

## 2022-04-21 PROCEDURE — G8400 PT W/DXA NO RESULTS DOC: HCPCS | Performed by: FAMILY MEDICINE

## 2022-04-21 PROCEDURE — 1036F TOBACCO NON-USER: CPT | Performed by: FAMILY MEDICINE

## 2022-04-21 PROCEDURE — G8417 CALC BMI ABV UP PARAM F/U: HCPCS | Performed by: FAMILY MEDICINE

## 2022-04-21 PROCEDURE — 3017F COLORECTAL CA SCREEN DOC REV: CPT | Performed by: FAMILY MEDICINE

## 2022-04-21 SDOH — HEALTH STABILITY: PHYSICAL HEALTH: ON AVERAGE, HOW MANY DAYS PER WEEK DO YOU ENGAGE IN MODERATE TO STRENUOUS EXERCISE (LIKE A BRISK WALK)?: 0 DAYS

## 2022-04-21 SDOH — HEALTH STABILITY: PHYSICAL HEALTH: ON AVERAGE, HOW MANY MINUTES DO YOU ENGAGE IN EXERCISE AT THIS LEVEL?: 0 MIN

## 2022-04-21 SDOH — ECONOMIC STABILITY: FOOD INSECURITY: WITHIN THE PAST 12 MONTHS, YOU WORRIED THAT YOUR FOOD WOULD RUN OUT BEFORE YOU GOT MONEY TO BUY MORE.: NEVER TRUE

## 2022-04-21 SDOH — ECONOMIC STABILITY: FOOD INSECURITY: WITHIN THE PAST 12 MONTHS, THE FOOD YOU BOUGHT JUST DIDN'T LAST AND YOU DIDN'T HAVE MONEY TO GET MORE.: NEVER TRUE

## 2022-04-21 ASSESSMENT — LIFESTYLE VARIABLES
HOW OFTEN DO YOU HAVE A DRINK CONTAINING ALCOHOL: NEVER
HOW MANY STANDARD DRINKS CONTAINING ALCOHOL DO YOU HAVE ON A TYPICAL DAY: 1 OR 2
HOW MANY STANDARD DRINKS CONTAINING ALCOHOL DO YOU HAVE ON A TYPICAL DAY: 1
HOW OFTEN DO YOU HAVE A DRINK CONTAINING ALCOHOL: 1
HOW OFTEN DO YOU HAVE SIX OR MORE DRINKS ON ONE OCCASION: 1

## 2022-04-21 ASSESSMENT — PATIENT HEALTH QUESTIONNAIRE - PHQ9
SUM OF ALL RESPONSES TO PHQ QUESTIONS 1-9: 0
SUM OF ALL RESPONSES TO PHQ QUESTIONS 1-9: 0
SUM OF ALL RESPONSES TO PHQ9 QUESTIONS 1 & 2: 0
SUM OF ALL RESPONSES TO PHQ QUESTIONS 1-9: 0
1. LITTLE INTEREST OR PLEASURE IN DOING THINGS: 0
SUM OF ALL RESPONSES TO PHQ QUESTIONS 1-9: 0
2. FEELING DOWN, DEPRESSED OR HOPELESS: 0

## 2022-04-21 ASSESSMENT — SOCIAL DETERMINANTS OF HEALTH (SDOH): HOW HARD IS IT FOR YOU TO PAY FOR THE VERY BASICS LIKE FOOD, HOUSING, MEDICAL CARE, AND HEATING?: NOT HARD AT ALL

## 2022-04-21 NOTE — PATIENT INSTRUCTIONS
Patient Education        Hypothyroidism: Care Instructions  Your Care Instructions     When you have hypothyroidism, your body doesn't make enough thyroid hormone. This hormone helps your body use energy. If your thyroid level is low, you may feel tired, be constipated, have an increase in your blood pressure, or have dry skin or memory problems. You may also get cold easily, even when it iswarm. Women with low thyroid levels may have heavy menstrual periods. A blood test to find your thyroid-stimulating hormone (TSH) level is used tocheck for hypothyroidism. A high TSH level may mean that you have it. The treatment for hypothyroidism is thyroid hormone pills. You should start to feel better in 1 to 2 weeks. Most people need treatment for the rest of their lives. You will need regular visits with your doctor to make sure you are doingwell and that you have the right dose of medicine. Follow-up care is a key part of your treatment and safety. Be sure to make and go to all appointments, and call your doctor if you are having problems. It's also a good idea to know your test results and keep alist of the medicines you take. How can you care for yourself at home?  Take your thyroid hormone medicine exactly as prescribed. Call your doctor if you think you are having a problem with your medicine. Most people do not have side effects if they take the right amount of medicine regularly. ? Take the medicine 30 minutes before breakfast, and do not take it with calcium, vitamins, or iron. ? Do not take extra doses of your thyroid medicine. It will not help you get better any faster, and it may cause side effects. ? If you forget to take a dose, do NOT take a double dose of medicine. Take your usual dose the next day.  Tell your doctor about all prescription, herbal, or over-the-counter products you take.  Take care of yourself. Eat a healthy diet, get enough sleep, and get regular exercise.   When should you call for help? Call 911 anytime you think you may need emergency care. For example, call if:     You passed out (lost consciousness).      You have severe trouble breathing.      You have a very slow heartbeat (less than 60 beats a minute).      You have a low body temperature (95°F or below). Call your doctor now or seek immediate medical care if:     You feel tired, sluggish, or weak.      You have trouble remembering things or concentrating.      You do not begin to feel better 2 weeks after starting your medicine. Watch closely for changes in your health, and be sure to contact your doctor ifyou have any problems. Where can you learn more? Go to https://[employer]. org and sign in to your [employer] account. Enter B150 in the Arzeda box to learn more about \"Hypothyroidism: Care Instructions. \"     If you do not have an account, please click on the \"Sign Up Now\" link. Current as of: July 28, 2021               Content Version: 13.2  © 2006-2022 [employer]wise, Inc[employer]. Care instructions adapted under license by [facility] ([facility]). If you have questions about a medical condition or this instruction, always ask your healthcare professional. [place] [address] any warranty or liability for your use of this information.

## 2022-04-21 NOTE — PROGRESS NOTES
Hypothyroidism:  Patient is here today to follow up chronic hypothyroidism. This is   generally controlled on current medication regimen. Takes medication as directed and tolerates well. Symptoms from thyroid standpoint include no symptoms. Most recent labs reviewed with patient and are not remarkable. Thyroid function in particular is  controlled. Patient's past medical, surgical, social and/or family history reviewed, updated in chart, and are non-contributory (unless otherwise stated). Medications and allergies also reviewed and updated in chart. Review of Systems:  Constitutional:  No fever, no fatigue, no chills, no headaches, no weight change  Dermatology:  No rash, no mole, no dry or sensitive skin  ENT:  No cough, no sore throat, no sinus pain, no runny nose, no ear pain  Cardiology:  No chest pain, no palpitations, no leg edema, no shortness of breath, no PND  Gastroenterology:  No dysphagia, no abdominal pain, no nausea, no vomiting, no constipation, no diarrhea, no heartburn  Musculoskeletal:  No joint pain, no leg cramps, no back pain, no muscle aches  Respiratory:  No shortness of breath, no orthopnea, no wheezing, no SCHAFFER, no hemoptysis  Urology:  No blood in the urine, no urinary frequency, no urinary incontinence, no urinary urgency, no nocturia, no dysuria      Vitals:    04/21/22 1351   BP: 122/70   Pulse: 76   Resp: 18   Temp: 97.2 °F (36.2 °C)   SpO2: 100%   Weight: 156 lb 3.2 oz (70.9 kg)   Height: 5' 6\" (1.676 m)       Physical Exam  Vitals and nursing note reviewed. Constitutional:       Appearance: She is well-developed. HENT:      Head: Normocephalic and atraumatic. Right Ear: External ear normal.      Left Ear: External ear normal.      Nose: Nose normal.   Eyes:      Conjunctiva/sclera: Conjunctivae normal.      Pupils: Pupils are equal, round, and reactive to light. Neck:      Thyroid: No thyromegaly.    Cardiovascular:      Rate and Rhythm: Normal rate and regular rhythm. Heart sounds: Normal heart sounds. Pulmonary:      Effort: Pulmonary effort is normal.      Breath sounds: Normal breath sounds. No wheezing. Abdominal:      General: Bowel sounds are normal.      Palpations: Abdomen is soft. Tenderness: There is no abdominal tenderness. Musculoskeletal:         General: Normal range of motion. Cervical back: Normal range of motion and neck supple. Skin:     General: Skin is warm and dry. Findings: No rash. Neurological:      Mental Status: She is alert and oriented to person, place, and time. Deep Tendon Reflexes: Reflexes are normal and symmetric. Psychiatric:         Behavior: Behavior normal.         Assessment/Plan:      Shelli Byrne was seen today for hypothyroidism. Diagnoses and all orders for this visit:    Hypothyroidism, unspecified type  -     TSH; Future  -     T4, Free; Future  Labs ordered  Continue current dose of synthroid until labs reviewed. Vitamin D deficiency  -     Vitamin D 25 Hydroxy; Future  Labs ordered. Carcinoma of breast metastatic to bone, unspecified laterality [facility])  Follows with oncology  Currently undergoing treatment. Hydrocephalus, unspecified type ([facility]  Stable at this time  Not currently having problems. As above. Call or go to ED immediately if symptoms worsen or persist.  Return in about 6 months (around 10/21/2022) for hypothyroidism. , or sooner if necessary. Educational materials and/or home exercises printed for patient's review and were included in patient instructions on his/her After Visit Summary and given to patient at the end of visit. Counseled regarding above diagnosis, including possible risks and complications,  especially if left uncontrolled.     Counseled regarding the possible side effects, risks, benefits and alternatives to treatment; patient and/or guardian verbalizes understanding, agrees, feels comfortable with and wishes to proceed with above treatment plan. Advised patient to call with any new medication issues, and read all Rx info from pharmacy to assure aware of all possible risks and side effects of medication before taking. Reviewed age and gender appropriate health screening exams and vaccinations. Advised patient regarding importance of keeping up with recommended health maintenance and to schedule as soon as possible if overdue, as this is important in assessing for undiagnosed pathology, especially cancer, as well as protecting against potentially harmful/life threatening disease. Patient and/or guardian verbalizes understanding and agrees with above counseling, assessment and plan. All questions answered. Roverto Pugh DO  4/21/2022    I have personally reviewed and updated the chief complaint, HPI, Past Medical, Family and Social History, as well as the above Review of Systems.

## 2022-04-21 NOTE — PROGRESS NOTES
Medicare Annual Wellness Visit    Emily Truner is here for Medicare AWV    Assessment & Plan   Medicare annual wellness visit, subsequent      Recommendations for Preventive Services Due: see orders and patient instructions/AVS.  Recommended screening schedule for the next 5-10 years is provided to the patient in written form: see Patient Instructions/AVS.     Return for Medicare Annual Wellness Visit in 1 year. Subjective       Patient's complete Health Risk Assessment and screening values have been reviewed and are found in Flowsheets. The following problems were reviewed today and where indicated follow up appointments were made and/or referrals ordered.     Positive Risk Factor Screenings with Interventions:               General Health and ACP:  General  In general, how would you say your health is?: Good  In the past 7 days, have you experienced any of the following: New or Increased Pain, New or Increased Fatigue, Loneliness, Social Isolation, Stress or Anger?: No  Do you get the social and emotional support that you need?: Yes  Do you have a Living Will?: (!) No    Advance Directives     Power of  Living Will ACP-Advance Directive ACP-Power of     Not on File Not on File Not on File Not on File      General Health Risk Interventions:  · No Living Will: Patient declines ACP discussion/assistance    Health Habits/Nutrition:     Physical Activity: Inactive    Days of Exercise per Week: 0 days    Minutes of Exercise per Session: 0 min     Have you lost any weight without trying in the past 3 months?: No  Body mass index: (!) 25.24  Have you seen the dentist within the past year?: Yes    Health Habits/Nutrition Interventions:  · Inadequate physical activity:  educational materials provided to promote increased physical activity    Hearing/Vision:  Do you or your family notice any trouble with your hearing that hasn't been managed with hearing aids?: No  Do you have difficulty driving, watching TV, or doing any of your daily activities because of your eyesight?: No  Have you had an eye exam within the past year?: (!) No  No exam data present    Hearing/Vision Interventions:  · Vision concerns:  patient encouraged to make appointment with his/her eye specialist    Safety:  Do you have working smoke detectors?: (!) No  Do you have any tripping hazards - loose or unsecured carpets or rugs?: (!) Yes  Do you have any tripping hazards - clutter in doorways, halls, or stairs?: (!) Yes  Do you have either shower bars, grab bars, non-slip mats or non-slip surfaces in your shower or bathtub?: (!) No  Do all of your stairways have a railing or banister?: Yes  Do you always fasten your seatbelt when you are in a car?: Yes    Safety Interventions:  · Home safety tips provided           Objective   Vitals:    04/21/22 1354   BP: 122/70   Pulse: 76   Resp: 18   Temp: 97.2 °F (36.2 °C)   SpO2: 100%   Weight: 156 lb 6.4 oz (70.9 kg)   Height: 5' 6\" (1.676 m)      Body mass index is 25.24 kg/m². No Known Allergies  Prior to Visit Medications    Medication Sig Taking? Authorizing Provider   levothyroxine (SYNTHROID) 25 MCG tablet TAKE 1 TABLET BY MOUTH EVERY DAY Yes Marina Dudley, DO   vitamin D (ERGOCALCIFEROL) 1.25 MG (61029 UT) CAPS capsule TAKE ONE CAPSULE BY MOUTH ONCE A WEEK Yes Tawny Ortiz, DO   letrozole (FEMARA) 2.5 MG tablet Take 1 tablet by mouth daily Yes Baldev Willoughby MD   palbociclib (IBRANCE) 75 MG tablet Take 75 mg by mouth daily for 21 days then off for 7 days and repeat every 28 days. Yes Baldev Willoughby MD   calcium carbonate 600 MG TABS tablet Take 1 tablet by mouth daily Yes Historical Provider, MD   Multiple Vitamins-Minerals (THERAPEUTIC MULTIVITAMIN-MINERALS) tablet Take 1 tablet by mouth daily.  Mail order vitamin made by her cardiologist. Yes Historical Provider, MD Michel (Including outside providers/suppliers regularly involved in providing care):   Patient Care Team:  Guanakito Stapleton DO as PCP - General (Family Medicine)  Guanakito Stapleton DO as PCP - [facility] Empaneled Provider  Twyla Rodgers MD as Consulting Physician (Neurosurgery)  Perico Call MD as Medical Oncologist (Hematology and Oncology)    Reviewed and updated this visit:  Tobacco  Allergies  Meds  Problems  Med Hx  Surg Hx  Soc Hx  Fam Hx

## 2022-04-21 NOTE — PATIENT INSTRUCTIONS
Personalized Preventive Plan for Love Campbell - 4/21/2022  Medicare offers a range of preventive health benefits. Some of the tests and screenings are paid in full while other may be subject to a deductible, co-insurance, and/or copay. Some of these benefits include a comprehensive review of your medical history including lifestyle, illnesses that may run in your family, and various assessments and screenings as appropriate. After reviewing your medical record and screening and assessments performed today your provider may have ordered immunizations, labs, imaging, and/or referrals for you. A list of these orders (if applicable) as well as your Preventive Care list are included within your After Visit Summary for your review. Other Preventive Recommendations:    · A preventive eye exam performed by an eye specialist is recommended every 1-2 years to screen for glaucoma; cataracts, macular degeneration, and other eye disorders. · A preventive dental visit is recommended every 6 months. · Try to get at least 150 minutes of exercise per week or 10,000 steps per day on a pedometer . · Order or download the FREE \"Exercise & Physical Activity: Your Everyday Guide\" from [employer]. Call 7-433.257.8081 or search [employer] online. · You need 4189-5034 mg of calcium and 0017-0033 IU of vitamin D per day. It is possible to meet your calcium requirement with diet alone, but a vitamin D supplement is usually necessary to meet this goal.  · When exposed to the sun, use a sunscreen that protects against both UVA and UVB radiation with an SPF of 30 or greater. Reapply every 2 to 3 hours or after sweating, drying off with a towel, or swimming. · Always wear a seat belt when traveling in a car. Always wear a helmet when riding a bicycle or motorcycle.

## 2022-05-02 ENCOUNTER — HOSPITAL ENCOUNTER (OUTPATIENT)
Dept: NUCLEAR MEDICINE | Age: 72
Discharge: HOME | End: 2022-05-02
Payer: MEDICARE

## 2022-05-02 ENCOUNTER — HOSPITAL ENCOUNTER (OUTPATIENT)
Dept: CT IMAGING | Age: 72
Discharge: HOME | End: 2022-05-04
Payer: MEDICARE

## 2022-05-02 ENCOUNTER — HOSPITAL ENCOUNTER (OUTPATIENT)
Age: 72
Discharge: HOME | End: 2022-05-02
Payer: MEDICARE

## 2022-05-02 DIAGNOSIS — C50.612 MALIGNANT NEOPLASM OF AXILLARY TAIL OF LEFT FEMALE BREAST, UNSPECIFIED ESTROGEN RECEPTOR STATUS (HCC): ICD-10-CM

## 2022-05-02 DIAGNOSIS — E03.9 HYPOTHYROIDISM, UNSPECIFIED TYPE: ICD-10-CM

## 2022-05-02 DIAGNOSIS — C79.51 CARCINOMA OF BREAST METASTATIC TO BONE, UNSPECIFIED LATERALITY (HCC): ICD-10-CM

## 2022-05-02 DIAGNOSIS — E55.9 VITAMIN D DEFICIENCY: ICD-10-CM

## 2022-05-02 DIAGNOSIS — C50.919 CARCINOMA OF BREAST METASTATIC TO BONE, UNSPECIFIED LATERALITY (HCC): ICD-10-CM

## 2022-05-02 LAB
ALBUMIN SERPL-MCNC: 4.4 G/DL (ref 3.5–5.2)
ALP BLD-CCNC: 69 U/L (ref 35–104)
ALT SERPL-CCNC: 16 U/L (ref 0–32)
ANION GAP SERPL CALCULATED.3IONS-SCNC: 7 MMOL/L (ref 7–16)
AST SERPL-CCNC: 26 U/L (ref 0–31)
BASOPHILS ABSOLUTE: 0 E9/L (ref 0–0.2)
BASOPHILS RELATIVE PERCENT: 0 % (ref 0–2)
BILIRUB SERPL-MCNC: 0.5 MG/DL (ref 0–1.2)
BUN BLDV-MCNC: 17 MG/DL (ref 6–23)
CALCIUM SERPL-MCNC: 9.3 MG/DL (ref 8.6–10.2)
CEA: 1 NG/ML (ref 0–5.2)
CHLORIDE BLD-SCNC: 103 MMOL/L (ref 98–107)
CO2: 28 MMOL/L (ref 22–29)
CREAT SERPL-MCNC: 1 MG/DL (ref 0.5–1)
EOSINOPHILS ABSOLUTE: 0.04 E9/L (ref 0.05–0.5)
EOSINOPHILS RELATIVE PERCENT: 2.6 % (ref 0–6)
GFR AFRICAN AMERICAN: >60
GFR NON-AFRICAN AMERICAN: 54 ML/MIN/1.73
GLUCOSE BLD-MCNC: 99 MG/DL (ref 74–99)
HCT VFR BLD CALC: 33.2 % (ref 34–48)
HEMOGLOBIN: 10.8 G/DL (ref 11.5–15.5)
LYMPHOCYTES ABSOLUTE: 0.41 E9/L (ref 1.5–4)
LYMPHOCYTES RELATIVE PERCENT: 24.4 % (ref 20–42)
MCH RBC QN AUTO: 32.3 PG (ref 26–35)
MCHC RBC AUTO-ENTMCNC: 32.5 % (ref 32–34.5)
MCV RBC AUTO: 99.4 FL (ref 80–99.9)
METAMYELOCYTES RELATIVE PERCENT: 0.9 % (ref 0–1)
MONOCYTES ABSOLUTE: 0.07 E9/L (ref 0.1–0.95)
MONOCYTES RELATIVE PERCENT: 4.3 % (ref 2–12)
NEUTROPHILS ABSOLUTE: 1.17 E9/L (ref 1.8–7.3)
NEUTROPHILS RELATIVE PERCENT: 67.8 % (ref 43–80)
NUCLEATED RED BLOOD CELLS: 0 /100 WBC
OVALOCYTES: ABNORMAL
PDW BLD-RTO: 14.3 FL (ref 11.5–15)
PLATELET # BLD: 160 E9/L (ref 130–450)
PMV BLD AUTO: 10.1 FL (ref 7–12)
POIKILOCYTES: ABNORMAL
POLYCHROMASIA: ABNORMAL
POTASSIUM SERPL-SCNC: 4.6 MMOL/L (ref 3.5–5)
RBC # BLD: 3.34 E12/L (ref 3.5–5.5)
SODIUM BLD-SCNC: 138 MMOL/L (ref 132–146)
TEAR DROP CELLS: ABNORMAL
TOTAL PROTEIN: 8.7 G/DL (ref 6.4–8.3)
VITAMIN D 25-HYDROXY: 57 NG/ML (ref 30–100)
WBC # BLD: 1.7 E9/L (ref 4.5–11.5)

## 2022-05-02 PROCEDURE — 3430000000 HC RX DIAGNOSTIC RADIOPHARMACEUTICAL: Performed by: RADIOLOGY

## 2022-05-02 PROCEDURE — A9503 TC99M MEDRONATE: HCPCS | Performed by: RADIOLOGY

## 2022-05-02 PROCEDURE — 74176 CT ABD & PELVIS W/O CONTRAST: CPT

## 2022-05-02 PROCEDURE — 82306 VITAMIN D 25 HYDROXY: CPT

## 2022-05-02 PROCEDURE — 85025 COMPLETE CBC W/AUTO DIFF WBC: CPT

## 2022-05-02 PROCEDURE — 86300 IMMUNOASSAY TUMOR CA 15-3: CPT

## 2022-05-02 PROCEDURE — 80053 COMPREHEN METABOLIC PANEL: CPT

## 2022-05-02 PROCEDURE — 78306 BONE IMAGING WHOLE BODY: CPT

## 2022-05-02 PROCEDURE — 82378 CARCINOEMBRYONIC ANTIGEN: CPT

## 2022-05-02 PROCEDURE — 71250 CT THORAX DX C-: CPT

## 2022-05-02 PROCEDURE — 36415 COLL VENOUS BLD VENIPUNCTURE: CPT

## 2022-05-02 RX ORDER — TC 99M MEDRONATE 20 MG/10ML
25 INJECTION, POWDER, LYOPHILIZED, FOR SOLUTION INTRAVENOUS
Status: COMPLETED | OUTPATIENT
Start: 2022-05-02 | End: 2022-05-02

## 2022-05-02 RX ADMIN — TC 99M MEDRONATE 25 MILLICURIE: 20 INJECTION, POWDER, LYOPHILIZED, FOR SOLUTION INTRAVENOUS at 09:29

## 2022-05-03 ENCOUNTER — APPOINTMENT (OUTPATIENT)
Dept: CT IMAGING | Age: 72
End: 2022-05-03
Payer: MEDICARE

## 2022-05-03 LAB
T4 FREE: 1.23 NG/DL (ref 0.93–1.7)
TSH SERPL DL<=0.05 MIU/L-ACNC: 2.81 UIU/ML (ref 0.27–4.2)

## 2022-05-03 PROCEDURE — 84443 ASSAY THYROID STIM HORMONE: CPT

## 2022-05-03 PROCEDURE — 84439 ASSAY OF FREE THYROXINE: CPT

## 2022-05-03 PROCEDURE — 36415 COLL VENOUS BLD VENIPUNCTURE: CPT

## 2022-05-04 ENCOUNTER — TELEPHONE (OUTPATIENT)
Dept: ONCOLOGY | Age: 72
End: 2022-05-04

## 2022-05-04 ENCOUNTER — HOSPITAL ENCOUNTER (OUTPATIENT)
Dept: INFUSION THERAPY | Age: 72
Discharge: HOME | End: 2022-05-04
Payer: MEDICARE

## 2022-05-04 ENCOUNTER — OFFICE VISIT (OUTPATIENT)
Dept: ONCOLOGY | Age: 72
End: 2022-05-04
Payer: MEDICARE

## 2022-05-04 VITALS
DIASTOLIC BLOOD PRESSURE: 70 MMHG | BODY MASS INDEX: 24.56 KG/M2 | HEART RATE: 82 BPM | SYSTOLIC BLOOD PRESSURE: 108 MMHG | HEIGHT: 66 IN | OXYGEN SATURATION: 100 % | WEIGHT: 152.8 LBS | TEMPERATURE: 98.3 F

## 2022-05-04 DIAGNOSIS — C50.919 CARCINOMA OF BREAST METASTATIC TO BONE, UNSPECIFIED LATERALITY (HCC): ICD-10-CM

## 2022-05-04 DIAGNOSIS — C50.612 MALIGNANT NEOPLASM OF AXILLARY TAIL OF LEFT FEMALE BREAST, UNSPECIFIED ESTROGEN RECEPTOR STATUS (HCC): Primary | ICD-10-CM

## 2022-05-04 DIAGNOSIS — E03.9 HYPOTHYROIDISM, UNSPECIFIED TYPE: ICD-10-CM

## 2022-05-04 DIAGNOSIS — C79.51 CARCINOMA OF BREAST METASTATIC TO BONE, UNSPECIFIED LATERALITY (HCC): ICD-10-CM

## 2022-05-04 PROCEDURE — 3017F COLORECTAL CA SCREEN DOC REV: CPT | Performed by: INTERNAL MEDICINE

## 2022-05-04 PROCEDURE — G8420 CALC BMI NORM PARAMETERS: HCPCS | Performed by: INTERNAL MEDICINE

## 2022-05-04 PROCEDURE — 4040F PNEUMOC VAC/ADMIN/RCVD: CPT | Performed by: INTERNAL MEDICINE

## 2022-05-04 PROCEDURE — G8427 DOCREV CUR MEDS BY ELIG CLIN: HCPCS | Performed by: INTERNAL MEDICINE

## 2022-05-04 PROCEDURE — 1036F TOBACCO NON-USER: CPT | Performed by: INTERNAL MEDICINE

## 2022-05-04 PROCEDURE — 99214 OFFICE O/P EST MOD 30 MIN: CPT | Performed by: INTERNAL MEDICINE

## 2022-05-04 PROCEDURE — 96372 THER/PROPH/DIAG INJ SC/IM: CPT

## 2022-05-04 PROCEDURE — 99212 OFFICE O/P EST SF 10 MIN: CPT

## 2022-05-04 PROCEDURE — G8400 PT W/DXA NO RESULTS DOC: HCPCS | Performed by: INTERNAL MEDICINE

## 2022-05-04 PROCEDURE — 1123F ACP DISCUSS/DSCN MKR DOCD: CPT | Performed by: INTERNAL MEDICINE

## 2022-05-04 PROCEDURE — 6360000002 HC RX W HCPCS: Performed by: NURSE PRACTITIONER

## 2022-05-04 PROCEDURE — 1090F PRES/ABSN URINE INCON ASSESS: CPT | Performed by: INTERNAL MEDICINE

## 2022-05-04 RX ORDER — LEVOTHYROXINE SODIUM 0.03 MG/1
TABLET ORAL
Qty: 30 TABLET | Refills: 5 | Status: SHIPPED | OUTPATIENT
Start: 2022-05-04

## 2022-05-04 RX ADMIN — DENOSUMAB 120 MG: 120 INJECTION SUBCUTANEOUS at 12:13

## 2022-05-04 NOTE — TELEPHONE ENCOUNTER
Met with pt at request of infusion staff re: possible self neglect concerns. Pt is 40-year-old female being treated for metastatic breast cancer. Pt's mood appeared euthymic with liable affect, she appeared alert and oriented to place/self, and was willing/able to participate in session. Insight and judgment appeared poor. She appeared poorly groomed, presented in soiled clothing, and was malodorous. Pt walked very slowly with unsteady gait requiring the wall to remain steady. Pt indicated that she has no concerns at this time. Noted that she had recent scans and got in fight with provider about continuing treatment. Stated that she is confused about how cancer cannot be progressing but she has to continue monthly injections. Stated that she lives alone in her childhood home and has the support of her sister. Inquired about pts ability to complete ADLs and she reported she is able to do these all independently. Pt has expressed that her priority is remaining in her home stating \"I'll be either in my home or 6 feet under. \"  Attempted to provide education on supportive services intended to help seniors maintain in their homes. Emphasized importance of home safety as a means to allow her to remain in her home by reducing risk for falls and injury. Pt became highly agitated reporting that she will not go back to assisted living. She was able to be deescalated although option of LTC was not discussed with pt prior to her outburst.  Pt's sister was present and reported that pt had previously had a fall and they found her on the floor of the home \"almost non responsive. \"  Stated that she was on the floor again the following day resulting in a hospitalization and rehab placement. Pt began talking about wanting to drive (sister reported that license lapsed many years ago) and get a job (pt discussed history of working \"with people\").   Provided support and explored ways that she could participate in social activities such as with senior services. Pt was not agreeable to any form of referrals at this time but sister was willing to accept information on [facility]. No additional needs identified at this time. Reviewed role of oncology SW and encouraged pt to notify this provider if additional needs arise.     Pretty Flanagan MSW, LISW-S  Oncology Social Worker

## 2022-05-04 NOTE — PROGRESS NOTES
[address] [facility] MEDICAL ONCOLOGY  [address]  [place] [address]  Dept: [phone]  Loc: [phone]  Attending Progress Note      Reason for Visit:   Metastatic breast cancer. PCP:  Yue Adler DO    History of Present Illness: The patient is a 74 y.o. lady, with a past medical history significant for hydrocephalus, who had presented with left breast changes, she had noticed a dark spot on her left breast, then she had developed induration, she thinks that she had the breast changes for months. The patient had never had a mammogram done, had a CT scan of the chest done, revealing a solid left breast mass measuring at least 6.8 x 2.9 cm in size is identified. The mass appears to extend to the skin surface. The deep margin of the mass abuts the chest wall but a fat plane appears to be preserved. Pathologically enlarged left  axillary lymph nodes are seen measuring up to 4.5 x 1.9 cm in size. CT abdomen and pelvis was done on 8/30/2019 revealing partial visualization of soft tissue mass in the left breast, nonspecific lymph nodes in the upper abdomen. Bone scan was done on 8/30/2019, revealing a Prominent focus of radiotracer uptake in the left anterior superior iliac spine, with corresponding abnormality on recent CT, suggestive of metastasis. More subtle focal radiotracer uptake in the right anterior superior iliac spine could be due to metastasis or  degenerative change.   2. Focal radiotracer uptake in the lateral aspect of the right ninth  rib, which appears to be due to a nondisplaced healing fracture. Correlation with history of recent trauma is recommended. In light of  other findings, this could represent a pathologic fracture with an  underlying metastatic lesion. 3. Osteoarthritic uptake in the shoulders, knees, wrists, and hands.     She underwent on 8/30/2019 a biopsy of 1 of the left axillary lymph nodes, she was consistent with metastatic ductal carcinoma, nuclear grade 2, ER positive greater than 95% MS +70% HER-2/elder 1+, by IHC, negative. The patient was started on systemic therapy with Femara and Ibrance on 9/13/2019. She denies any side effects from the treatment. She denies any new problems. The appearance of the left breast has significant improved, no drainage. Ash Crowell returns for a follow-up visit. Ash Crowell is doing well overall. No reported side effects from the [place] and Femara. No left breast pain or discharge. She is keeping herself well-hydrated. She continues to follow with neurosurgery, no interventions were recommended at this time. The patient returns for a follow-up visit, she denies any new problems. She is accompanied by her sister. Taking the [place] and Femara as prescribed. Review of Systems;  CONSTITUTIONAL: No fever, chills. Good appetite and energy level. ENMT: Eyes: No diplopia; Nose: No epistaxis. Mouth: No sore throat. RESPIRATORY: No hemoptysis, shortness of breath, cough. CARDIOVASCULAR: No chest pain, palpitations. GASTROINTESTINAL: No nausea/vomiting, abdominal pain, diarrhea/constipation. GENITOURINARY: No dysuria, urinary frequency, hematuria. NEURO: No syncope, presyncope, headache. MSK: left knee pain resolved.   Remainder:  ROS NEGATIVE    Past Medical History:      Diagnosis Date    Arm fracture, left 2009    Balance problems since April, 2011    Hydrocephalus [facility])     compensated    Thyroid disease     Vertigo      Patient Active Problem List   Diagnosis    Hydrocephalus, adult ([address]    Hydrocephalus ([address]    Hyponatremia    Breast mass    Malignant neoplasm of axillary tail of left female breast ([address]    Breast cancer metastasized to bone (HCC)    Carcinoma of breast metastatic to bone (HCC)    Anemia    Thrombocytopenia, unspecified        Past Surgical History:      Procedure Laterality Date    ARM SURGERY  9/15/2009    FRACTURE SURGERY      HUMERUS FRACTURE SURGERY  2009    Broke left arm        Family History:  Family History   Problem Relation Age of Onset    Colon Cancer Mother 68    Heart Disease Father     Diabetes Father        Medications:  Reviewed and reconciled. Social History:  Social History     Socioeconomic History    Marital status: Single     Spouse name: Not on file    Number of children: Not on file    Years of education: Not on file    Highest education level: Not on file   Occupational History    Not on file   Tobacco Use    Smoking status: Never Smoker    Smokeless tobacco: Never Used   Vaping Use    Vaping Use: Never used   Substance and Sexual Activity    Alcohol use: Yes     Comment: drinks it occasional on social events    Drug use: No    Sexual activity: Never   Other Topics Concern    Not on file   Social History Narrative    Not on file     Social Determinants of Health     Financial Resource Strain: Low Risk     Difficulty of Paying Living Expenses: Not hard at all   Food Insecurity: No Food Insecurity    Worried About [employer] in the Last Year: Never true    [address] in the Last Year: Never true   Transportation Needs:     Lack of Transportation (Medical): Not on file    Lack of Transportation (Non-Medical):  Not on file   Physical Activity: Inactive    Days of Exercise per Week: 0 days    Minutes of Exercise per Session: 0 min   Stress:     Feeling of Stress : Not on file   Social Connections:     Frequency of Communication with Friends and Family: Not on file    Frequency of Social Gatherings with Friends and Family: Not on file    Attends [employer] Services: Not on file    Active Member of Clubs or Organizations: Not on file    Attends Club or Organization Meetings: Not on file    Marital Status: Not on file   Intimate Partner Violence:     Fear of Current or Ex-Partner: Not on file    Emotionally Abused: Not on file    Physically Abused: Not on file   [employer] Sexually Abused: Not on file   Housing Stability:     Unable to Pay for Housing in the Last Year: Not on file    Number of Places Lived in the Last Year: Not on file    Unstable Housing in the Last Year: Not on file       Allergies:  No Known Allergies    Physical Exam:  /70   Pulse 82   Temp 98.3 °F (36.8 °C)   Ht 5' 6\" (1.676 m)   Wt 152 lb 12.8 oz (69.3 kg)   SpO2 100%   BMI 24.66 kg/m²     GENERAL: Alert, oriented x 3, not in acute distress. HEENT: PERRLA; EOMI. Oropharynx clear. NECK: Supple. No palpable cervical or supraclavicular lymphadenopathy. LUNGS: Good air entry bilaterally. No wheezing, crackles or rhonchi. CARDIOVASCULAR: Regular rate. No murmurs, rubs or gallops. BREASTS: The right breast exam is negative for any skin changes, nipple discharge, no palpable masses, no palpable right axillary adenopathy, left breast appearance had markedly improved, the nodules had resolved, the mass decreased in size, no drainage, no bleeding, decrease in the size of the left axillary lymphadenopathy  ABDOMEN: Soft. Non-tender, non-distended. Positive bowel sounds. EXTREMITIES: mild chronic edema of the LLE  NEUROLOGIC: No focal deficits. ECOG PS 1     Impression/Plan:     The patient is a 74 y.o. lady, with a past medical history significant for hydrocephalus, who had presented with left breast changes, who was diagnosed with a left breast ductal carcinoma, ER positive greater than 95% ME +70% HER-2/elder 1+, she has a very locally advanced disease, and metastatic disease to the bones. I discussed with the patient and her sister today her diagnosis, prognosis and recommendations for treatment. Will complete the staging work-up, the patient will have a brain MRI with and without contrast done, and a PET scan. For her HR pos, HER-2/elder negative metastatic breast cancer, I recommended treatment with Femara and Ibrance, the schedule and side effects of the treatment were reviewed with her. She has metastatic disease to the bones, recommended treatment with Xgeva. MRI brain done on 09/16/2019: no evidence of metastatic disease. PET/CT on 09/17/2019: The lytic soft tissue finding in the iliac crest in the left pelvis shows hypermetabolic FDG uptake, with maximum measurements in the 5.7 SUV range, strongly suggesting metastatic disease. She has uptake in the thyroid, could be physiologic. Femara/Ibrance were started on 09/13/2019. Bone scan done on 12/17/2019: Redemonstration of focus of radiotracer uptake involving posterior lateral right ninth rib suggestive of healing fracture. New radiotracer activity is seen at level of lateral right 10th rib and anterior right seventh rib which may be related to new healing fractures. Metastases is unlikely. Clinical correlation recommended. No additional abnormal areas of increased radiotracer uptake. CT chest done on 12/17/2019: Significant interval decrease in size of the large mass seen in the left breast 8/29/2019. Interval decrease in size of enlarged left axillary lymph nodes. Cardiomegaly. Enlarged thyroid, unchanged. Small hiatal hernia. Diffuse interstitial changes and multifocal atelectasis or scarring in both lungs. Stable small nodular density in the right upper lobe. Sclerotic changes involving the lateral right ninth rib. CT abdomen/pelvis done on 12/17/2019: Small right renal cyst. Retroverted uterus. Distended colon and rectum containing a large amount of stool. Mildly enlarged retroperitoneal and periportal lymph nodes, unchanged. Findings in the left iliac bone as described. The results/images of the scans were reviewed with the patient and her sister, she is responding nicely to the Femara and Nguy[place]n Ramos, will continue the same treatment. She did have teeth extraction on 1/20/2020, dental clearance has been obtained. On 4/3/2020, the patient was started on Ibrance and Femara cycle #8.     Restaging CT Chest/Abdomen/Pelvis and bone scan on 04/24/2020:  CT Chest noted stable small bilateral pulmonary nodules; CT abdomen/pelvis no evidence of new active neoplasm compared to prior. Bone scan 3 foci of radiotracer uptake in the lower right ribs, 2 of which demonstrate less activity on the current study, possibly indicative of healing fractures. Radiotracer in the more anterior focus is more persistent, and may be a metastatic lesion. The results/images of the scans were reviewed with the patient and her sister, the patient has an overall stable disease. Recommended to continue with Femara and Ibrance. The patient had a PET scan done on December 3, 2020, she had complete metabolic response since the PET scan on September 17, 9891, no metabolically active recurrent or metastatic disease, metabolic activity throughout the thyroid lobes can be seen with chronic thyroiditis. The patient had complete response to treatment, continue Ibrance and Femara. The dose of the [place] has been to 75 mg due to leukopenia/neutropenia. The patient had restaging CT scans of the chest, abdomen pelvis done on 6/1/2021, bone scan on 6/11/2021, revealing an overall stable disease. Recommended to continue Ibrance and Femara. Patient had restaging scans done on 10/26/2021, revealing an overall stable disease, couple of upper lobe pulmonary nodules and rib sclerosis are redemonstrated, stable. No new areas of concern. Recommended to continue with Ibrance and Femara. Restaging CT scans of the chest, abdomen, pelvis and a bone scan were done on 5/2/2022, there was no evidence of progression of disease within the chest with stable left upper lobe pulmonary nodule areas and indeterminate sclerosis in the rib, there was no uptake on the bone scan, recommended to continue with Ibrance and Femara.       Labs reviewed today 5/4/2022, she has leukopenia and grade 2 neutropenia, secondary to Ibrance, no fever or infections, continue same dose of Ibrance 75 mg. We will continue to monitor her CBCD. Proceed with [facility] today 5/4/2022. 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 RTC in 4 weeks. Thank you for allowing us to participate in the care of Ms. Dee Singletary.     Dede Armendariz MD   HEMATOLOGY/MEDICAL [address] MEDICAL ONCOLOGY  [address] [place] [id number]  Dept: [phone]: [phone]

## 2022-05-05 LAB — CA 15-3: 16 U/ML (ref 0–31)

## 2022-05-31 ENCOUNTER — HOSPITAL ENCOUNTER (OUTPATIENT)
Age: 72
Discharge: HOME | End: 2022-05-31
Payer: MEDICARE

## 2022-05-31 DIAGNOSIS — C50.612 MALIGNANT NEOPLASM OF AXILLARY TAIL OF LEFT FEMALE BREAST, UNSPECIFIED ESTROGEN RECEPTOR STATUS (HCC): ICD-10-CM

## 2022-05-31 DIAGNOSIS — C50.919 CARCINOMA OF BREAST METASTATIC TO BONE, UNSPECIFIED LATERALITY (HCC): ICD-10-CM

## 2022-05-31 DIAGNOSIS — C79.51 CARCINOMA OF BREAST METASTATIC TO BONE, UNSPECIFIED LATERALITY (HCC): ICD-10-CM

## 2022-05-31 LAB
ALBUMIN SERPL-MCNC: 4.9 G/DL (ref 3.5–5.2)
ALP BLD-CCNC: 68 U/L (ref 35–104)
ALT SERPL-CCNC: 19 U/L (ref 0–32)
ANION GAP SERPL CALCULATED.3IONS-SCNC: 11 MMOL/L (ref 7–16)
ANISOCYTOSIS: ABNORMAL
AST SERPL-CCNC: 30 U/L (ref 0–31)
BASOPHILS ABSOLUTE: 0.03 E9/L (ref 0–0.2)
BASOPHILS RELATIVE PERCENT: 1.7 % (ref 0–2)
BILIRUB SERPL-MCNC: 0.6 MG/DL (ref 0–1.2)
BUN BLDV-MCNC: 25 MG/DL (ref 6–23)
CALCIUM SERPL-MCNC: 9.9 MG/DL (ref 8.6–10.2)
CEA: 1.2 NG/ML (ref 0–5.2)
CHLORIDE BLD-SCNC: 101 MMOL/L (ref 98–107)
CO2: 23 MMOL/L (ref 22–29)
CREAT SERPL-MCNC: 1 MG/DL (ref 0.5–1)
EOSINOPHILS ABSOLUTE: 0.02 E9/L (ref 0.05–0.5)
EOSINOPHILS RELATIVE PERCENT: 0.9 % (ref 0–6)
GFR AFRICAN AMERICAN: >60
GFR NON-AFRICAN AMERICAN: 54 ML/MIN/1.73
GLUCOSE BLD-MCNC: 91 MG/DL (ref 74–99)
HCT VFR BLD CALC: 32.2 % (ref 34–48)
HEMOGLOBIN: 11.1 G/DL (ref 11.5–15.5)
LYMPHOCYTES ABSOLUTE: 0.41 E9/L (ref 1.5–4)
LYMPHOCYTES RELATIVE PERCENT: 23.9 % (ref 20–42)
MCH RBC QN AUTO: 33.7 PG (ref 26–35)
MCHC RBC AUTO-ENTMCNC: 34.5 % (ref 32–34.5)
MCV RBC AUTO: 97.9 FL (ref 80–99.9)
MONOCYTES ABSOLUTE: 0.03 E9/L (ref 0.1–0.95)
MONOCYTES RELATIVE PERCENT: 1.8 % (ref 2–12)
NEUTROPHILS ABSOLUTE: 1.22 E9/L (ref 1.8–7.3)
NEUTROPHILS RELATIVE PERCENT: 71.7 % (ref 43–80)
NUCLEATED RED BLOOD CELLS: 0 /100 WBC
OVALOCYTES: ABNORMAL
PDW BLD-RTO: 14.3 FL (ref 11.5–15)
PLATELET # BLD: 132 E9/L (ref 130–450)
PMV BLD AUTO: 9.4 FL (ref 7–12)
POIKILOCYTES: ABNORMAL
POTASSIUM SERPL-SCNC: 4.7 MMOL/L (ref 3.5–5)
RBC # BLD: 3.29 E12/L (ref 3.5–5.5)
SODIUM BLD-SCNC: 135 MMOL/L (ref 132–146)
TEAR DROP CELLS: ABNORMAL
TOTAL PROTEIN: 9.3 G/DL (ref 6.4–8.3)
WBC # BLD: 1.7 E9/L (ref 4.5–11.5)

## 2022-05-31 PROCEDURE — 82378 CARCINOEMBRYONIC ANTIGEN: CPT

## 2022-05-31 PROCEDURE — 86300 IMMUNOASSAY TUMOR CA 15-3: CPT

## 2022-05-31 PROCEDURE — 80053 COMPREHEN METABOLIC PANEL: CPT

## 2022-05-31 PROCEDURE — 85025 COMPLETE CBC W/AUTO DIFF WBC: CPT

## 2022-05-31 PROCEDURE — 36415 COLL VENOUS BLD VENIPUNCTURE: CPT

## 2022-06-01 ENCOUNTER — HOSPITAL ENCOUNTER (OUTPATIENT)
Dept: INFUSION THERAPY | Age: 72
Discharge: HOME | End: 2022-06-01
Payer: MEDICARE

## 2022-06-01 ENCOUNTER — OFFICE VISIT (OUTPATIENT)
Dept: ONCOLOGY | Age: 72
End: 2022-06-01
Payer: MEDICARE

## 2022-06-01 VITALS
WEIGHT: 149 LBS | HEART RATE: 79 BPM | BODY MASS INDEX: 23.95 KG/M2 | HEIGHT: 66 IN | TEMPERATURE: 98.6 F | OXYGEN SATURATION: 98 % | SYSTOLIC BLOOD PRESSURE: 140 MMHG | DIASTOLIC BLOOD PRESSURE: 62 MMHG

## 2022-06-01 DIAGNOSIS — C50.612 MALIGNANT NEOPLASM OF AXILLARY TAIL OF LEFT FEMALE BREAST, UNSPECIFIED ESTROGEN RECEPTOR STATUS (HCC): Primary | ICD-10-CM

## 2022-06-01 DIAGNOSIS — C50.919 CARCINOMA OF BREAST METASTATIC TO BONE, UNSPECIFIED LATERALITY (HCC): ICD-10-CM

## 2022-06-01 DIAGNOSIS — C79.51 CARCINOMA OF BREAST METASTATIC TO BONE, UNSPECIFIED LATERALITY (HCC): ICD-10-CM

## 2022-06-01 PROCEDURE — G8400 PT W/DXA NO RESULTS DOC: HCPCS | Performed by: INTERNAL MEDICINE

## 2022-06-01 PROCEDURE — 99212 OFFICE O/P EST SF 10 MIN: CPT

## 2022-06-01 PROCEDURE — 99214 OFFICE O/P EST MOD 30 MIN: CPT | Performed by: INTERNAL MEDICINE

## 2022-06-01 PROCEDURE — 96372 THER/PROPH/DIAG INJ SC/IM: CPT

## 2022-06-01 PROCEDURE — G8427 DOCREV CUR MEDS BY ELIG CLIN: HCPCS | Performed by: INTERNAL MEDICINE

## 2022-06-01 PROCEDURE — 1090F PRES/ABSN URINE INCON ASSESS: CPT | Performed by: INTERNAL MEDICINE

## 2022-06-01 PROCEDURE — 1036F TOBACCO NON-USER: CPT | Performed by: INTERNAL MEDICINE

## 2022-06-01 PROCEDURE — G8420 CALC BMI NORM PARAMETERS: HCPCS | Performed by: INTERNAL MEDICINE

## 2022-06-01 PROCEDURE — 1123F ACP DISCUSS/DSCN MKR DOCD: CPT | Performed by: INTERNAL MEDICINE

## 2022-06-01 PROCEDURE — 3017F COLORECTAL CA SCREEN DOC REV: CPT | Performed by: INTERNAL MEDICINE

## 2022-06-01 PROCEDURE — 6360000002 HC RX W HCPCS: Performed by: NURSE PRACTITIONER

## 2022-06-01 RX ADMIN — DENOSUMAB 120 MG: 120 INJECTION SUBCUTANEOUS at 12:50

## 2022-06-01 NOTE — PROGRESS NOTES
consistent with metastatic ductal carcinoma, nuclear grade 2, ER positive greater than 95% NM +70% HER-2/elder 1+, by IHC, negative. The patient was started on systemic therapy with Femara and Ibrance on 9/13/2019. She denies any side effects from the treatment. She denies any new problems. The appearance of the left breast has significant improved, no drainage. Vlad Lyles returns for a follow-up visit. Vlad Lyles is doing well overall. No reported side effects from the [place] and Femara. No left breast pain or discharge. She is keeping herself well-hydrated. She continues to follow with neurosurgery, no interventions were recommended at this time. The patient returns for a follow-up visit, she denies any new problems. She is accompanied by her sister. Taking the [place] and Femara as prescribed. No side effects. Review of Systems;  CONSTITUTIONAL: No fever, chills. Good appetite and energy level. ENMT: Eyes: No diplopia; Nose: No epistaxis. Mouth: No sore throat. RESPIRATORY: No hemoptysis, shortness of breath, cough. CARDIOVASCULAR: No chest pain, palpitations. GASTROINTESTINAL: No nausea/vomiting, abdominal pain, diarrhea/constipation. GENITOURINARY: No dysuria, urinary frequency, hematuria. NEURO: No syncope, presyncope, headache. MSK: left knee pain resolved.   Remainder:  ROS NEGATIVE    Past Medical History:      Diagnosis Date    Arm fracture, left 2009    Balance problems since April, 2011    Hydrocephalus [facility])     compensated    Thyroid disease     Vertigo      Patient Active Problem List   Diagnosis    Hydrocephalus, adult ([address]    Hydrocephalus ([address]    Hyponatremia    Breast mass    Malignant neoplasm of axillary tail of left female breast ([address]    Breast cancer metastasized to bone ([address]    Carcinoma of breast metastatic to bone (HCC)    Anemia    Thrombocytopenia, unspecified        Past Surgical History:      Procedure Laterality Date    ARM SURGERY  9/15/2009  FRACTURE SURGERY      HUMERUS FRACTURE SURGERY  2009    Broke left arm        Family History:  Family History   Problem Relation Age of Onset    Colon Cancer Mother 68    Heart Disease Father     Diabetes Father        Medications:  Reviewed and reconciled. Social History:  Social History     Socioeconomic History    Marital status: Single     Spouse name: Not on file    Number of children: Not on file    Years of education: Not on file    Highest education level: Not on file   Occupational History    Not on file   Tobacco Use    Smoking status: Never Smoker    Smokeless tobacco: Never Used   Vaping Use    Vaping Use: Never used   Substance and Sexual Activity    Alcohol use: Yes     Comment: drinks it occasional on social events    Drug use: No    Sexual activity: Never   Other Topics Concern    Not on file   Social History Narrative    Not on file     Social Determinants of Health     Financial Resource Strain: Low Risk     Difficulty of Paying Living Expenses: Not hard at all   Food Insecurity: No Food Insecurity    Worried About [employer] in the Last Year: Never true    [facility] [employer] in the Last Year: Never true   Transportation Needs:     Lack of Transportation (Medical): Not on file    Lack of Transportation (Non-Medical):  Not on file   Physical Activity: Inactive    Days of Exercise per Week: 0 days    Minutes of Exercise per Session: 0 min   Stress:     Feeling of Stress : Not on file   Social Connections:     Frequency of Communication with Friends and Family: Not on file    Frequency of Social Gatherings with Friends and Family: Not on file    Attends Scientology Services: Not on file    Active Member of Clubs or Organizations: Not on file    Attends Club or Organization Meetings: Not on file    Marital Status: Not on file   Intimate Partner Violence:     Fear of Current or Ex-Partner: Not on file    Emotionally Abused: Not on file    Physically Abused: Not on file    Sexually Abused: Not on file   Housing Stability:     Unable to Pay for Housing in the Last Year: Not on file    Number of Places Lived in the Last Year: Not on file    Unstable Housing in the Last Year: Not on file       Allergies:  No Known Allergies    Physical Exam:  BP (!) 140/62   Pulse 79   Temp 98.6 °F (37 °C)   Ht 5' 6\" (1.676 m)   Wt 149 lb (67.6 kg)   SpO2 98%   BMI 24.05 kg/m²     GENERAL: Alert, oriented x 3, not in acute distress. HEENT: PERRLA; EOMI. Oropharynx clear. NECK: Supple. No palpable cervical or supraclavicular lymphadenopathy. LUNGS: Good air entry bilaterally. No wheezing, crackles or rhonchi. CARDIOVASCULAR: Regular rate. No murmurs, rubs or gallops. BREASTS: The right breast exam is negative for any skin changes, nipple discharge, no palpable masses, no palpable right axillary adenopathy, left breast appearance had markedly improved, the nodules had resolved, the mass decreased in size, no drainage, no bleeding, decrease in the size of the left axillary lymphadenopathy  ABDOMEN: Soft. Non-tender, non-distended. Positive bowel sounds. EXTREMITIES: mild chronic edema of the LLE  NEUROLOGIC: No focal deficits. ECOG PS 1     Impression/Plan:     The patient is a 74 y.o. lady, with a past medical history significant for hydrocephalus, who had presented with left breast changes, who was diagnosed with a left breast ductal carcinoma, ER positive greater than 95% [place] +70% HER-2/elder 1+, she has a very locally advanced disease, and metastatic disease to the bones. I discussed with the patient and her sister today her diagnosis, prognosis and recommendations for treatment. Will complete the staging work-up, the patient will have a brain MRI with and without contrast done, and a PET scan.   For her HR pos, HER-2/elder negative metastatic breast cancer, I recommended treatment with Femara and Ibrance, the schedule and side effects of the treatment were reviewed with her.  She has metastatic disease to the bones, recommended treatment with Xgeva. MRI brain done on 09/16/2019: no evidence of metastatic disease. PET/CT on 09/17/2019: The lytic soft tissue finding in the iliac crest in the left pelvis shows hypermetabolic FDG uptake, with maximum measurements in the 5.7 SUV range, strongly suggesting metastatic disease. She has uptake in the thyroid, could be physiologic. Femara/Ibrance were started on 09/13/2019. Bone scan done on 12/17/2019: Redemonstration of focus of radiotracer uptake involving posterior lateral right ninth rib suggestive of healing fracture. New radiotracer activity is seen at level of lateral right 10th rib and anterior right seventh rib which may be related to new healing fractures. Metastases is unlikely. Clinical correlation recommended. No additional abnormal areas of increased radiotracer uptake. CT chest done on 12/17/2019: Significant interval decrease in size of the large mass seen in the left breast 8/29/2019. Interval decrease in size of enlarged left axillary lymph nodes. Cardiomegaly. Enlarged thyroid, unchanged. Small hiatal hernia. Diffuse interstitial changes and multifocal atelectasis or scarring in both lungs. Stable small nodular density in the right upper lobe. Sclerotic changes involving the lateral right ninth rib. CT abdomen/pelvis done on 12/17/2019: Small right renal cyst. Retroverted uterus. Distended colon and rectum containing a large amount of stool. Mildly enlarged retroperitoneal and periportal lymph nodes, unchanged. Findings in the left iliac bone as described. The results/images of the scans were reviewed with the patient and her sister, she is responding nicely to the Femara and Madelin Ramirez, will continue the same treatment. She did have teeth extraction on 1/20/2020, dental clearance has been obtained. On 4/3/2020, the patient was started on Ibrance and Femara cycle #8.     Restaging CT Chest/Abdomen/Pelvis and bone scan on 04/24/2020:  CT Chest noted stable small bilateral pulmonary nodules; CT abdomen/pelvis no evidence of new active neoplasm compared to prior. Bone scan 3 foci of radiotracer uptake in the lower right ribs, 2 of which demonstrate less activity on the current study, possibly indicative of healing fractures. Radiotracer in the more anterior focus is more persistent, and may be a metastatic lesion. The results/images of the scans were reviewed with the patient and her sister, the patient has an overall stable disease. Recommended to continue with Femara and Ibrance. The patient had a PET scan done on December 3, 2020, she had complete metabolic response since the PET scan on September 17, 1185, no metabolically active recurrent or metastatic disease, metabolic activity throughout the thyroid lobes can be seen with chronic thyroiditis. The patient had complete response to treatment, continue Ibrance and Femara. The dose of the [place] has been to 75 mg due to leukopenia/neutropenia. The patient had restaging CT scans of the chest, abdomen pelvis done on 6/1/2021, bone scan on 6/11/2021, revealing an overall stable disease. Recommended to continue Ibrance and Femara. Patient had restaging scans done on 10/26/2021, revealing an overall stable disease, couple of upper lobe pulmonary nodules and rib sclerosis are redemonstrated, stable. No new areas of concern. Recommended to continue with Ibrance and Femara. Restaging CT scans of the chest, abdomen, pelvis and a bone scan were done on 5/2/2022, there was no evidence of progression of disease within the chest with stable left upper lobe pulmonary nodule areas and indeterminate sclerosis in the rib, there was no uptake on the bone scan, recommended to continue with Ibrance and Femara.       Labs reviewed today 6/1/2022, she has leukopenia and grade 2 neutropenia, secondary to Ibrance, no fever or infections, no indication for dose adjustment of the Ibrance, continue same dose of Ibrance 75 mg. We will continue to monitor her CBCD. Proceed with Yoshi Plunkett today 6/1/2022. 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 RTC in 4 weeks. Thank you for allowing us to participate in the care of Ms. Tj Sloan.     Gui Haddad MD   HEMATOLOGY/MEDICAL [address] MEDICAL ONCOLOGY  [address] [place] [id number]  Dept: [phone] T[address] Chris: [phone]

## 2022-06-02 LAB — CA 15-3: 16 U/ML (ref 0–31)

## 2022-06-03 ENCOUNTER — TELEPHONE (OUTPATIENT)
Dept: ONCOLOGY | Age: 72
End: 2022-06-03

## 2022-06-03 NOTE — TELEPHONE ENCOUNTER
Patient called and wanted refill on Little yellow pill and little blue pill. When I asked the name of the medications she said Never mind she doesn't want a refill and she will stop taking them then hung up.

## 2022-06-16 ENCOUNTER — TELEPHONE (OUTPATIENT)
Dept: INFUSION THERAPY | Age: 72
End: 2022-06-16

## 2022-06-16 NOTE — TELEPHONE ENCOUNTER
This nurse attempted to call pt back regarding her questions about the Ibrance and Femara that she takes multiple times and the line was busy-will re-attempt later-OSCAR Baez, RN

## 2022-06-28 ENCOUNTER — HOSPITAL ENCOUNTER (OUTPATIENT)
Age: 72
Discharge: HOME | End: 2022-06-28
Payer: MEDICARE

## 2022-06-28 DIAGNOSIS — C50.612 MALIGNANT NEOPLASM OF AXILLARY TAIL OF LEFT FEMALE BREAST, UNSPECIFIED ESTROGEN RECEPTOR STATUS (HCC): ICD-10-CM

## 2022-06-28 LAB
ALBUMIN SERPL-MCNC: 4.3 G/DL (ref 3.5–5.2)
ALP BLD-CCNC: 83 U/L (ref 35–104)
ALT SERPL-CCNC: 18 U/L (ref 0–32)
ANION GAP SERPL CALCULATED.3IONS-SCNC: 11 MMOL/L (ref 7–16)
ANISOCYTOSIS: ABNORMAL
AST SERPL-CCNC: 27 U/L (ref 0–31)
ATYPICAL LYMPHOCYTE RELATIVE PERCENT: 0.9 % (ref 0–4)
BASOPHILS ABSOLUTE: 0.03 E9/L (ref 0–0.2)
BASOPHILS RELATIVE PERCENT: 1.8 % (ref 0–2)
BILIRUB SERPL-MCNC: 0.4 MG/DL (ref 0–1.2)
BUN BLDV-MCNC: 28 MG/DL (ref 6–23)
CALCIUM SERPL-MCNC: 9.6 MG/DL (ref 8.6–10.2)
CEA: 1.6 NG/ML (ref 0–5.2)
CHLORIDE BLD-SCNC: 101 MMOL/L (ref 98–107)
CO2: 24 MMOL/L (ref 22–29)
CREAT SERPL-MCNC: 1.1 MG/DL (ref 0.5–1)
EOSINOPHILS ABSOLUTE: 0.06 E9/L (ref 0.05–0.5)
EOSINOPHILS RELATIVE PERCENT: 3.5 % (ref 0–6)
GFR AFRICAN AMERICAN: 59
GFR NON-AFRICAN AMERICAN: 49 ML/MIN/1.73
GLUCOSE BLD-MCNC: 98 MG/DL (ref 74–99)
HCT VFR BLD CALC: 29.2 % (ref 34–48)
HEMOGLOBIN: 9.6 G/DL (ref 11.5–15.5)
LYMPHOCYTES ABSOLUTE: 0.2 E9/L (ref 1.5–4)
LYMPHOCYTES RELATIVE PERCENT: 9.6 % (ref 20–42)
MCH RBC QN AUTO: 33 PG (ref 26–35)
MCHC RBC AUTO-ENTMCNC: 32.9 % (ref 32–34.5)
MCV RBC AUTO: 100.3 FL (ref 80–99.9)
MONOCYTES ABSOLUTE: 0.13 E9/L (ref 0.1–0.95)
MONOCYTES RELATIVE PERCENT: 7 % (ref 2–12)
NEUTROPHILS ABSOLUTE: 1.39 E9/L (ref 1.8–7.3)
NEUTROPHILS RELATIVE PERCENT: 77.2 % (ref 43–80)
NUCLEATED RED BLOOD CELLS: 0 /100 WBC
PDW BLD-RTO: 14.3 FL (ref 11.5–15)
PLATELET # BLD: 119 E9/L (ref 130–450)
PMV BLD AUTO: 9.8 FL (ref 7–12)
POLYCHROMASIA: ABNORMAL
POTASSIUM SERPL-SCNC: 4.2 MMOL/L (ref 3.5–5)
RBC # BLD: 2.91 E12/L (ref 3.5–5.5)
SODIUM BLD-SCNC: 136 MMOL/L (ref 132–146)
TOTAL PROTEIN: 8.2 G/DL (ref 6.4–8.3)
WBC # BLD: 1.8 E9/L (ref 4.5–11.5)

## 2022-06-28 PROCEDURE — 85025 COMPLETE CBC W/AUTO DIFF WBC: CPT

## 2022-06-28 PROCEDURE — 82378 CARCINOEMBRYONIC ANTIGEN: CPT

## 2022-06-28 PROCEDURE — 36415 COLL VENOUS BLD VENIPUNCTURE: CPT

## 2022-06-28 PROCEDURE — 80053 COMPREHEN METABOLIC PANEL: CPT

## 2022-06-28 PROCEDURE — 86300 IMMUNOASSAY TUMOR CA 15-3: CPT

## 2022-06-29 ENCOUNTER — HOSPITAL ENCOUNTER (OUTPATIENT)
Dept: INFUSION THERAPY | Age: 72
End: 2022-06-29

## 2022-06-29 LAB — CA 15-3: 15 U/ML (ref 0–31)

## 2022-07-06 ENCOUNTER — TELEPHONE (OUTPATIENT)
Dept: ONCOLOGY | Age: 72
End: 2022-07-06

## 2022-07-06 ENCOUNTER — OFFICE VISIT (OUTPATIENT)
Dept: ONCOLOGY | Age: 72
End: 2022-07-06
Payer: MEDICARE

## 2022-07-06 ENCOUNTER — HOSPITAL ENCOUNTER (OUTPATIENT)
Dept: INFUSION THERAPY | Age: 72
Discharge: HOME | End: 2022-07-06
Payer: MEDICARE

## 2022-07-06 VITALS
OXYGEN SATURATION: 100 % | BODY MASS INDEX: 24.46 KG/M2 | TEMPERATURE: 97.4 F | SYSTOLIC BLOOD PRESSURE: 129 MMHG | WEIGHT: 152.2 LBS | HEART RATE: 76 BPM | DIASTOLIC BLOOD PRESSURE: 60 MMHG | HEIGHT: 66 IN

## 2022-07-06 DIAGNOSIS — C50.612 MALIGNANT NEOPLASM OF AXILLARY TAIL OF LEFT FEMALE BREAST, UNSPECIFIED ESTROGEN RECEPTOR STATUS (HCC): Primary | ICD-10-CM

## 2022-07-06 DIAGNOSIS — C50.919 CARCINOMA OF BREAST METASTATIC TO BONE, UNSPECIFIED LATERALITY (HCC): ICD-10-CM

## 2022-07-06 DIAGNOSIS — C79.51 CARCINOMA OF BREAST METASTATIC TO BONE, UNSPECIFIED LATERALITY (HCC): ICD-10-CM

## 2022-07-06 PROCEDURE — 99212 OFFICE O/P EST SF 10 MIN: CPT

## 2022-07-06 PROCEDURE — 96372 THER/PROPH/DIAG INJ SC/IM: CPT

## 2022-07-06 PROCEDURE — 1090F PRES/ABSN URINE INCON ASSESS: CPT | Performed by: INTERNAL MEDICINE

## 2022-07-06 PROCEDURE — G8400 PT W/DXA NO RESULTS DOC: HCPCS | Performed by: INTERNAL MEDICINE

## 2022-07-06 PROCEDURE — 1123F ACP DISCUSS/DSCN MKR DOCD: CPT | Performed by: INTERNAL MEDICINE

## 2022-07-06 PROCEDURE — G8427 DOCREV CUR MEDS BY ELIG CLIN: HCPCS | Performed by: INTERNAL MEDICINE

## 2022-07-06 PROCEDURE — G8420 CALC BMI NORM PARAMETERS: HCPCS | Performed by: INTERNAL MEDICINE

## 2022-07-06 PROCEDURE — 3017F COLORECTAL CA SCREEN DOC REV: CPT | Performed by: INTERNAL MEDICINE

## 2022-07-06 PROCEDURE — 6360000002 HC RX W HCPCS: Performed by: NURSE PRACTITIONER

## 2022-07-06 PROCEDURE — 1036F TOBACCO NON-USER: CPT | Performed by: INTERNAL MEDICINE

## 2022-07-06 PROCEDURE — 99214 OFFICE O/P EST MOD 30 MIN: CPT | Performed by: INTERNAL MEDICINE

## 2022-07-06 RX ADMIN — DENOSUMAB 120 MG: 120 INJECTION SUBCUTANEOUS at 16:14

## 2022-07-06 NOTE — PROGRESS NOTES
[address] [id number] MEDICAL ONCOLOGY  [address] [place] [address]  [place] [address]  Dept: [phone]  Loc: [phone]  Attending Progress Note      Reason for Visit:   Metastatic breast cancer. PCP:  Tonia Carvajal DO    History of Present Illness: The patient is a 74 y.o. lady, with a past medical history significant for hydrocephalus, who had presented with left breast changes, she had noticed a dark spot on her left breast, then she had developed induration, she thinks that she had the breast changes for months. The patient had never had a mammogram done, had a CT scan of the chest done, revealing a solid left breast mass measuring at least 6.8 x 2.9 cm in size is identified. The mass appears to extend to the skin surface. The deep margin of the mass abuts the chest wall but a fat plane appears to be preserved. Pathologically enlarged left  axillary lymph nodes are seen measuring up to 4.5 x 1.9 cm in size. CT abdomen and pelvis was done on 8/30/2019 revealing partial visualization of soft tissue mass in the left breast, nonspecific lymph nodes in the upper abdomen. Bone scan was done on 8/30/2019, revealing a Prominent focus of radiotracer uptake in the left anterior superior iliac spine, with corresponding abnormality on recent CT, suggestive of metastasis. More subtle focal radiotracer uptake in the right anterior superior iliac spine could be due to metastasis or  degenerative change.   2. Focal radiotracer uptake in the lateral aspect of the right ninth  rib, which appears to be due to a nondisplaced healing fracture. Correlation with history of recent trauma is recommended. In light of  other findings, this could represent a pathologic fracture with an  underlying metastatic lesion. 3. Osteoarthritic uptake in the shoulders, knees, wrists, and hands.     She underwent on 8/30/2019 a biopsy of 1 of the left axillary lymph nodes, she was consistent with metastatic ductal carcinoma, nuclear grade 2, ER positive greater than 95% LA +70% HER-2/elder 1+, by IHC, negative. The patient was started on systemic therapy with Femara and Ibrance on 9/13/2019. She denies any side effects from the treatment. She denies any new problems. The appearance of the left breast has significant improved, no drainage. [facility] returns for a follow-up visit. [facility] is doing well overall. No reported side effects from the [place] and Femara. No left breast pain or discharge. She is keeping herself well-hydrated. She continues to follow with neurosurgery, no interventions were recommended at this time. The patient returns for a follow-up visit, she denies any new problems. She is accompanied by her sister. Taking the [place] and Femara as prescribed. No side effects. Good appetite, weight gain. Review of Systems;  CONSTITUTIONAL: No fever, chills. Good appetite and energy level. ENMT: Eyes: No diplopia; Nose: No epistaxis. Mouth: No sore throat. RESPIRATORY: No hemoptysis, shortness of breath, cough. CARDIOVASCULAR: No chest pain, palpitations. GASTROINTESTINAL: No nausea/vomiting, abdominal pain, diarrhea/constipation. GENITOURINARY: No dysuria, urinary frequency, hematuria. NEURO: No syncope, presyncope, headache. MSK: left knee pain resolved.   Remainder:  ROS NEGATIVE    Past Medical History:      Diagnosis Date    Arm fracture, left 2009    Balance problems since April, 2011    Hydrocephalus [facility])     compensated    Thyroid disease     Vertigo      Patient Active Problem List   Diagnosis    Hydrocephalus, adult ([address]    Hydrocephalus ([address]    Hyponatremia    Breast mass    Malignant neoplasm of axillary tail of left female breast ([address]    Breast cancer metastasized to bone (HCC)    Carcinoma of breast metastatic to bone (HCC)    Anemia    Thrombocytopenia, unspecified        Past Surgical History:      Procedure Laterality Date  ARM SURGERY  9/15/2009    FRACTURE SURGERY      HUMERUS FRACTURE SURGERY  2009    Broke left arm        Family History:  Family History   Problem Relation Age of Onset    Colon Cancer Mother 68    Heart Disease Father     Diabetes Father        Medications:  Reviewed and reconciled. Social History:  Social History     Socioeconomic History    Marital status: Single     Spouse name: Not on file    Number of children: Not on file    Years of education: Not on file    Highest education level: Not on file   Occupational History    Not on file   Tobacco Use    Smoking status: Never Smoker    Smokeless tobacco: Never Used   Vaping Use    Vaping Use: Never used   Substance and Sexual Activity    Alcohol use: Yes     Comment: drinks it occasional on social events    Drug use: No    Sexual activity: Never   Other Topics Concern    Not on file   Social History Narrative    Not on file     Social Determinants of Health     Financial Resource Strain: Low Risk     Difficulty of Paying Living Expenses: Not hard at all   Food Insecurity: No Food Insecurity    Worried About [employer] [employer] in the Last Year: Never true    [facility] in the Last Year: Never true   Transportation Needs:     Lack of Transportation (Medical): Not on file    Lack of Transportation (Non-Medical):  Not on file   Physical Activity: Inactive    Days of Exercise per Week: 0 days    Minutes of Exercise per Session: 0 min   Stress:     Feeling of Stress : Not on file   Social Connections:     Frequency of Communication with Friends and Family: Not on file    Frequency of Social Gatherings with Friends and Family: Not on file    Attends Zoroastrian Services: Not on file    Active Member of Clubs or Organizations: Not on file    Attends Club or Organization Meetings: Not on file    Marital Status: Not on file   Intimate Partner Violence:     Fear of Current or Ex-Partner: Not on file    Emotionally Abused: Not on file    Physically Abused: Not on file    Sexually Abused: Not on file   Housing Stability:     Unable to Pay for Housing in the Last Year: Not on file    Number of [place] in the Last Year: Not on file    Unstable Housing in the Last Year: Not on file       Allergies:  No Known Allergies    Physical Exam:  /60   Pulse 76   Temp 97.4 °F (36.3 °C)   Ht 5' 6\" (1.676 m)   Wt 152 lb 3.2 oz (69 kg)   SpO2 100%   BMI 24.57 kg/m²     GENERAL: Alert, oriented x 3, not in acute distress. HEENT: PERRLA; EOMI. Oropharynx clear. NECK: Supple. No palpable cervical or supraclavicular lymphadenopathy. LUNGS: Good air entry bilaterally. No wheezing, crackles or rhonchi. CARDIOVASCULAR: Regular rate. No murmurs, rubs or gallops. BREASTS: The right breast exam is negative for any skin changes, nipple discharge, no palpable masses, no palpable right axillary adenopathy, left breast appearance had markedly improved, the nodules had resolved, the mass decreased in size, no drainage, no bleeding, decrease in the size of the left axillary lymphadenopathy  ABDOMEN: Soft. Non-tender, non-distended. Positive bowel sounds. EXTREMITIES: mild chronic edema of the LLE  NEUROLOGIC: No focal deficits. ECOG PS 1     Impression/Plan:     The patient is a 74 y.o. lady, with a past medical history significant for hydrocephalus, who had presented with left breast changes, who was diagnosed with a left breast ductal carcinoma, ER positive greater than 95% [place] +70% HER-2/elder 1+, she has a very locally advanced disease, and metastatic disease to the bones. I discussed with the patient and her sister today her diagnosis, prognosis and recommendations for treatment. Will complete the staging work-up, the patient will have a brain MRI with and without contrast done, and a PET scan.   For her HR pos, HER-2/elder negative metastatic breast cancer, I recommended treatment with Femara and Ibrance, the schedule and side effects of the treatment were reviewed with her. She has metastatic disease to the bones, recommended treatment with Xgeva. MRI brain done on 09/16/2019: no evidence of metastatic disease. PET/CT on 09/17/2019: The lytic soft tissue finding in the iliac crest in the left pelvis shows hypermetabolic FDG uptake, with maximum measurements in the 5.7 SUV range, strongly suggesting metastatic disease. She has uptake in the thyroid, could be physiologic. Femara/Ibrance were started on 09/13/2019. Bone scan done on 12/17/2019: Redemonstration of focus of radiotracer uptake involving posterior lateral right ninth rib suggestive of healing fracture. New radiotracer activity is seen at level of lateral right 10th rib and anterior right seventh rib which may be related to new healing fractures. Metastases is unlikely. Clinical correlation recommended. No additional abnormal areas of increased radiotracer uptake. CT chest done on 12/17/2019: Significant interval decrease in size of the large mass seen in the left breast 8/29/2019. Interval decrease in size of enlarged left axillary lymph nodes. Cardiomegaly. Enlarged thyroid, unchanged. Small hiatal hernia. Diffuse interstitial changes and multifocal atelectasis or scarring in both lungs. Stable small nodular density in the right upper lobe. Sclerotic changes involving the lateral right ninth rib. CT abdomen/pelvis done on 12/17/2019: Small right renal cyst. Retroverted uterus. Distended colon and rectum containing a large amount of stool. Mildly enlarged retroperitoneal and periportal lymph nodes, unchanged. Findings in the left iliac bone as described. The results/images of the scans were reviewed with the patient and her sister, she is responding nicely to the Femara and Aubree Zimmer, will continue the same treatment. She did have teeth extraction on 1/20/2020, dental clearance has been obtained.     On 4/3/2020, the patient was started on Ibrance and Femara cycle #8.    Restaging CT Chest/Abdomen/Pelvis and bone scan on 04/24/2020:  CT Chest noted stable small bilateral pulmonary nodules; CT abdomen/pelvis no evidence of new active neoplasm compared to prior. Bone scan 3 foci of radiotracer uptake in the lower right ribs, 2 of which demonstrate less activity on the current study, possibly indicative of healing fractures. Radiotracer in the more anterior focus is more persistent, and may be a metastatic lesion. The results/images of the scans were reviewed with the patient and her sister, the patient has an overall stable disease. Recommended to continue with Femara and Ibrance. The patient had a PET scan done on December 3, 2020, she had complete metabolic response since the PET scan on September 17, 1702, no metabolically active recurrent or metastatic disease, metabolic activity throughout the thyroid lobes can be seen with chronic thyroiditis. The patient had complete response to treatment, continue Ibrance and Femara. The dose of the Joceline Los has been to 75 mg due to leukopenia/neutropenia. The patient had restaging CT scans of the chest, abdomen pelvis done on 6/1/2021, bone scan on 6/11/2021, revealing an overall stable disease. Recommended to continue Ibrance and Femara. Patient had restaging scans done on 10/26/2021, revealing an overall stable disease, couple of upper lobe pulmonary nodules and rib sclerosis are redemonstrated, stable. No new areas of concern. Recommended to continue with Ibrance and Femara. Restaging CT scans of the chest, abdomen, pelvis and a bone scan were done on 5/2/2022, there was no evidence of progression of disease within the chest with stable left upper lobe pulmonary nodule areas and indeterminate sclerosis in the rib, there was no uptake on the bone scan, recommended to continue with Ibrance and Femara.      She has leukopenia and grade 2 neutropenia, secondary to Ibrance, no fever or infections, no indication for dose adjustment of the Ibrance, continue same dose of Ibrance 75 mg. We will continue to monitor her CBCD. Proceed with Kashif Belcher today 7/6/2022. 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 RTC in 4 weeks. Thank you for allowing us to participate in the care of Ms. Nati Antoine.     Gabby Tijerina MD   HEMATOLOGY/MEDICAL [address] MEDICAL ONCOLOGY  [address] [place] [id number]  Dept: [phone] Chris: [phone]

## 2022-07-06 NOTE — TELEPHONE ENCOUNTER
Met with pt and pt's sister in conjunction with medical oncology visit at request of cancer center staff. Pt is 75-year-old female being treated for metastatic breast cancer. Pt's mood appeared euthymic with congruent affect, she appeared alert and oriented to place/self, and was willing/able to participate in session. Insight and judgment appeared poor. She appeared fairly groomed and was malodorous. Pt walked very slowly with unsteady gait requiring the wall to remain steady. Pt indicated that she is doing well at this time. Again reiterated that she refuses to go to a nursing home despite this provider not discussing any topics related to LTC placement. Inquired about ability to complete ADL/IADL; pt reported that she is able to complete all tasks, including laundry and navigating stairs, at this time. When asked about food preparation pt indicated that her sister assists her. Again, provided information on in home supportive services, particularly for guardian alert button and home delivered meals. Pt adamantly refused services stated \"johnny would not want other people in the house. \"  When asked what she meant by this due to pt residing alone pt unwilling to expand. Sister present throughout session but did not participate. Although pt does have severe mobility issues, appeared to have poor self care, and has adamantly refused services she has been compliant with all medical care and presents with a family member who reports being available to provide support. No additional needs identified at this time. Reviewed role of oncology SW and encouraged pt to notify this provider if additional needs arise.     Ra Delvalle, NANDO, IVORY-S  Oncology Social Worker

## 2022-07-25 DIAGNOSIS — E55.9 VITAMIN D DEFICIENCY: ICD-10-CM

## 2022-07-25 RX ORDER — ERGOCALCIFEROL 1.25 MG/1
CAPSULE ORAL
Qty: 12 CAPSULE | Refills: 0 | Status: SHIPPED
Start: 2022-07-25 | End: 2022-10-24 | Stop reason: SDUPTHER

## 2022-08-02 ENCOUNTER — HOSPITAL ENCOUNTER (OUTPATIENT)
Age: 72
Discharge: HOME | End: 2022-08-02
Payer: MEDICARE

## 2022-08-02 DIAGNOSIS — C50.919 CARCINOMA OF BREAST METASTATIC TO BONE, UNSPECIFIED LATERALITY (HCC): ICD-10-CM

## 2022-08-02 DIAGNOSIS — C50.612 MALIGNANT NEOPLASM OF AXILLARY TAIL OF LEFT FEMALE BREAST, UNSPECIFIED ESTROGEN RECEPTOR STATUS (HCC): ICD-10-CM

## 2022-08-02 DIAGNOSIS — C79.51 CARCINOMA OF BREAST METASTATIC TO BONE, UNSPECIFIED LATERALITY (HCC): ICD-10-CM

## 2022-08-02 LAB
ALBUMIN SERPL-MCNC: 4.2 G/DL (ref 3.5–5.2)
ALP BLD-CCNC: 66 U/L (ref 35–104)
ALT SERPL-CCNC: 16 U/L (ref 0–32)
ANION GAP SERPL CALCULATED.3IONS-SCNC: 10 MMOL/L (ref 7–16)
AST SERPL-CCNC: 26 U/L (ref 0–31)
BASOPHILS ABSOLUTE: 0.04 E9/L (ref 0–0.2)
BASOPHILS RELATIVE PERCENT: 2.1 % (ref 0–2)
BILIRUB SERPL-MCNC: 0.3 MG/DL (ref 0–1.2)
BUN BLDV-MCNC: 19 MG/DL (ref 6–23)
CALCIUM SERPL-MCNC: 9 MG/DL (ref 8.6–10.2)
CEA: 1.4 NG/ML (ref 0–5.2)
CHLORIDE BLD-SCNC: 105 MMOL/L (ref 98–107)
CO2: 22 MMOL/L (ref 22–29)
CREAT SERPL-MCNC: 0.7 MG/DL (ref 0.5–1)
EOSINOPHILS ABSOLUTE: 0.02 E9/L (ref 0.05–0.5)
EOSINOPHILS RELATIVE PERCENT: 1.1 % (ref 0–6)
GFR AFRICAN AMERICAN: >60
GFR NON-AFRICAN AMERICAN: >60 ML/MIN/1.73
GLUCOSE BLD-MCNC: 93 MG/DL (ref 74–99)
HCT VFR BLD CALC: 31.1 % (ref 34–48)
HEMOGLOBIN: 10.2 G/DL (ref 11.5–15.5)
IMMATURE GRANULOCYTES #: 0 E9/L
IMMATURE GRANULOCYTES %: 0 % (ref 0–5)
LYMPHOCYTES ABSOLUTE: 0.48 E9/L (ref 1.5–4)
LYMPHOCYTES RELATIVE PERCENT: 25.5 % (ref 20–42)
MCH RBC QN AUTO: 32.9 PG (ref 26–35)
MCHC RBC AUTO-ENTMCNC: 32.8 % (ref 32–34.5)
MCV RBC AUTO: 100.3 FL (ref 80–99.9)
MONOCYTES ABSOLUTE: 0.26 E9/L (ref 0.1–0.95)
MONOCYTES RELATIVE PERCENT: 13.8 % (ref 2–12)
NEUTROPHILS ABSOLUTE: 1.08 E9/L (ref 1.8–7.3)
NEUTROPHILS RELATIVE PERCENT: 57.5 % (ref 43–80)
PDW BLD-RTO: 13.5 FL (ref 11.5–15)
PLATELET # BLD: 140 E9/L (ref 130–450)
PMV BLD AUTO: 9.7 FL (ref 7–12)
POTASSIUM SERPL-SCNC: 4.2 MMOL/L (ref 3.5–5)
RBC # BLD: 3.1 E12/L (ref 3.5–5.5)
RBC # BLD: NORMAL 10*6/UL
SODIUM BLD-SCNC: 137 MMOL/L (ref 132–146)
TOTAL PROTEIN: 8.3 G/DL (ref 6.4–8.3)
WBC # BLD: 1.9 E9/L (ref 4.5–11.5)

## 2022-08-02 PROCEDURE — 80053 COMPREHEN METABOLIC PANEL: CPT

## 2022-08-02 PROCEDURE — 86300 IMMUNOASSAY TUMOR CA 15-3: CPT

## 2022-08-02 PROCEDURE — 85025 COMPLETE CBC W/AUTO DIFF WBC: CPT

## 2022-08-02 PROCEDURE — 82378 CARCINOEMBRYONIC ANTIGEN: CPT

## 2022-08-02 PROCEDURE — 36415 COLL VENOUS BLD VENIPUNCTURE: CPT

## 2022-08-03 ENCOUNTER — HOSPITAL ENCOUNTER (OUTPATIENT)
Dept: INFUSION THERAPY | Age: 72
Discharge: HOME | End: 2022-08-03
Payer: MEDICARE

## 2022-08-03 ENCOUNTER — OFFICE VISIT (OUTPATIENT)
Dept: ONCOLOGY | Age: 72
End: 2022-08-03
Payer: MEDICARE

## 2022-08-03 VITALS
BODY MASS INDEX: 24.43 KG/M2 | OXYGEN SATURATION: 100 % | HEIGHT: 66 IN | DIASTOLIC BLOOD PRESSURE: 73 MMHG | HEART RATE: 72 BPM | TEMPERATURE: 97.4 F | SYSTOLIC BLOOD PRESSURE: 155 MMHG | WEIGHT: 152 LBS

## 2022-08-03 DIAGNOSIS — C79.51 CARCINOMA OF BREAST METASTATIC TO BONE, UNSPECIFIED LATERALITY (HCC): ICD-10-CM

## 2022-08-03 DIAGNOSIS — C50.612 MALIGNANT NEOPLASM OF AXILLARY TAIL OF LEFT FEMALE BREAST, UNSPECIFIED ESTROGEN RECEPTOR STATUS (HCC): Primary | ICD-10-CM

## 2022-08-03 DIAGNOSIS — C50.919 CARCINOMA OF BREAST METASTATIC TO BONE, UNSPECIFIED LATERALITY (HCC): ICD-10-CM

## 2022-08-03 PROCEDURE — 1090F PRES/ABSN URINE INCON ASSESS: CPT | Performed by: INTERNAL MEDICINE

## 2022-08-03 PROCEDURE — 1036F TOBACCO NON-USER: CPT | Performed by: INTERNAL MEDICINE

## 2022-08-03 PROCEDURE — 99214 OFFICE O/P EST MOD 30 MIN: CPT | Performed by: INTERNAL MEDICINE

## 2022-08-03 PROCEDURE — 99212 OFFICE O/P EST SF 10 MIN: CPT

## 2022-08-03 PROCEDURE — 3017F COLORECTAL CA SCREEN DOC REV: CPT | Performed by: INTERNAL MEDICINE

## 2022-08-03 PROCEDURE — G8427 DOCREV CUR MEDS BY ELIG CLIN: HCPCS | Performed by: INTERNAL MEDICINE

## 2022-08-03 PROCEDURE — G8420 CALC BMI NORM PARAMETERS: HCPCS | Performed by: INTERNAL MEDICINE

## 2022-08-03 PROCEDURE — 6360000002 HC RX W HCPCS: Performed by: NURSE PRACTITIONER

## 2022-08-03 PROCEDURE — 96372 THER/PROPH/DIAG INJ SC/IM: CPT

## 2022-08-03 PROCEDURE — 1123F ACP DISCUSS/DSCN MKR DOCD: CPT | Performed by: INTERNAL MEDICINE

## 2022-08-03 PROCEDURE — G8400 PT W/DXA NO RESULTS DOC: HCPCS | Performed by: INTERNAL MEDICINE

## 2022-08-03 RX ADMIN — DENOSUMAB 120 MG: 120 INJECTION SUBCUTANEOUS at 12:17

## 2022-08-03 NOTE — PROGRESS NOTES
[address] [facility] MEDICAL ONCOLOGY  [address] [place] [address]  [place] [address]  Dept: [phone]  Loc: [phone]  Attending Progress Note      Reason for Visit:   Metastatic breast cancer. PCP:  Kellen Baltazar DO    History of Present Illness: The patient is a 74 y.o. lady, with a past medical history significant for hydrocephalus, who had presented with left breast changes, she had noticed a dark spot on her left breast, then she had developed induration, she thinks that she had the breast changes for months. The patient had never had a mammogram done, had a CT scan of the chest done, revealing a solid left breast mass measuring at least 6.8 x 2.9 cm in size is identified. The mass appears to extend to the skin surface. The deep margin of the mass abuts the chest wall but a fat plane appears to be preserved. Pathologically enlarged left  axillary lymph nodes are seen measuring up to 4.5 x 1.9 cm in size. CT abdomen and pelvis was done on 8/30/2019 revealing partial visualization of soft tissue mass in the left breast, nonspecific lymph nodes in the upper abdomen. Bone scan was done on 8/30/2019, revealing a Prominent focus of radiotracer uptake in the left anterior superior iliac spine, with corresponding abnormality on recent CT, suggestive of metastasis. More subtle focal radiotracer uptake in the right anterior superior iliac spine could be due to metastasis or  degenerative change. 2. Focal radiotracer uptake in the lateral aspect of the right ninth  rib, which appears to be due to a nondisplaced healing fracture. Correlation with history of recent trauma is recommended. In light of  other findings, this could represent a pathologic fracture with an  underlying metastatic lesion. 3. Osteoarthritic uptake in the shoulders, knees, wrists, and hands.     She underwent on 8/30/2019 a biopsy of 1 of the left axillary lymph nodes, she was consistent with metastatic ductal carcinoma, nuclear grade 2, ER positive greater than 95% [place] +70% HER-2/elder 1+, by IHC, negative. The patient was started on systemic therapy with Femara and Ibrance on 9/13/2019. She denies any side effects from the treatment. She denies any new problems. The appearance of the left breast has significant improved, no drainage. Eleazar Cameron returns for a follow-up visit. Eleazar Cameron is doing well overall. No reported side effects from the [place] and Femara. No left breast pain or discharge. She is keeping herself well-hydrated. She continues to follow with neurosurgery, no interventions were recommended at this time. The patient returns for a follow-up visit, she denies any new problems. She is accompanied by her sister. Taking the [place] and Femara as prescribed. No side effects. She has a follow-up scheduled with Dr. Estelita Sampson in September    Review of Systems;  CONSTITUTIONAL: No fever, chills. Good appetite and energy level. ENMT: Eyes: No diplopia; Nose: No epistaxis. Mouth: No sore throat. RESPIRATORY: No hemoptysis, shortness of breath, cough. CARDIOVASCULAR: No chest pain, palpitations. GASTROINTESTINAL: No nausea/vomiting, abdominal pain, diarrhea/constipation. GENITOURINARY: No dysuria, urinary frequency, hematuria. NEURO: No syncope, presyncope, headache. MSK: left knee pain resolved.   Remainder:  ROS NEGATIVE    Past Medical History:      Diagnosis Date    Arm fracture, left 2009    Balance problems since April, 2011    Hydrocephalus [facility])     compensated    Thyroid disease     Vertigo      Patient Active Problem List   Diagnosis    Hydrocephalus, adult ([address]    Hydrocephalus [address] [address]    Hyponatremia    Breast mass    Malignant neoplasm of axillary tail of left female breast ([facility] [address]    Breast cancer metastasized to bone (HCC)    Carcinoma of breast metastatic to bone (HCC)    Anemia    Thrombocytopenia, unspecified        Past Surgical History: Procedure Laterality Date    ARM SURGERY  9/15/2009    FRACTURE SURGERY      HUMERUS FRACTURE SURGERY  2009    Broke left arm        Family History:  Family History   Problem Relation Age of Onset    Colon Cancer Mother 68    Heart Disease Father     Diabetes Father        Medications:  Reviewed and reconciled. Social History:  Social History     Socioeconomic History    Marital status: Single     Spouse name: Not on file    Number of children: Not on file    Years of education: Not on file    Highest education level: Not on file   Occupational History    Not on file   Tobacco Use    Smoking status: Never    Smokeless tobacco: Never   Vaping Use    Vaping Use: Never used   Substance and Sexual Activity    Alcohol use: Yes     Comment: drinks it occasional on social events    Drug use: No    Sexual activity: Never   Other Topics Concern    Not on file   Social History Narrative    Not on file     Social Determinants of Health     Financial Resource Strain: Low Risk     Difficulty of Paying Living Expenses: Not hard at all   Food Insecurity: No Food Insecurity    Worried About Running Out of Food in the Last Year: Never true    Ran Out of Food in the Last Year: Never true   Transportation Needs: Not on file   Physical Activity: Inactive    Days of Exercise per Week: 0 days    Minutes of Exercise per Session: 0 min   Stress: Not on file   Social Connections: Not on file   Intimate Partner Violence: Not on file   Housing Stability: Not on file       Allergies:  No Known Allergies    Physical Exam:  BP (!) 155/73   Pulse 72   Temp 97.4 °F (36.3 °C)   Ht 5' 6\" (1.676 m)   Wt 152 lb (68.9 kg)   SpO2 100%   BMI 24.53 kg/m²     GENERAL: Alert, oriented x 3, not in acute distress. HEENT: PERRLA; EOMI. Oropharynx clear. NECK: Supple. No palpable cervical or supraclavicular lymphadenopathy. LUNGS: Good air entry bilaterally. No wheezing, crackles or rhonchi. CARDIOVASCULAR: Regular rate.  No murmurs, rubs or gallops. BREASTS: The right breast exam is negative for any skin changes, nipple discharge, no palpable masses, no palpable right axillary adenopathy, left breast appearance had markedly improved, the nodules had resolved, the mass decreased in size, no drainage, no bleeding, decrease in the size of the left axillary lymphadenopathy  ABDOMEN: Soft. Non-tender, non-distended. Positive bowel sounds. EXTREMITIES: mild chronic edema of the LLE  NEUROLOGIC: No focal deficits. ECOG PS 1     Impression/Plan:     The patient is a 74 y.o. lady, with a past medical history significant for hydrocephalus, who had presented with left breast changes, who was diagnosed with a left breast ductal carcinoma, ER positive greater than 95% [place] +70% HER-2/elder 1+, she has a very locally advanced disease, and metastatic disease to the bones. I discussed with the patient and her sister today her diagnosis, prognosis and recommendations for treatment. Will complete the staging work-up, the patient will have a brain MRI with and without contrast done, and a PET scan. For her HR pos, HER-2/elder negative metastatic breast cancer, I recommended treatment with Femara and Ibrance, the schedule and side effects of the treatment were reviewed with her. She has metastatic disease to the bones, recommended treatment with Xgeva. MRI brain done on 09/16/2019: no evidence of metastatic disease. PET/CT on 09/17/2019: The lytic soft tissue finding in the iliac crest in the left pelvis shows hypermetabolic FDG uptake, with maximum measurements in the 5.7 SUV range, strongly suggesting metastatic disease. She has uptake in the thyroid, could be physiologic. Femara/Ibrance were started on 09/13/2019. Bone scan done on 12/17/2019: Redemonstration of focus of radiotracer uptake involving posterior lateral right ninth rib suggestive of healing fracture.  New radiotracer activity is seen at level of lateral right 10th rib and anterior right seventh rib which may be related to new healing fractures. Metastases is unlikely. Clinical correlation recommended. No additional abnormal areas of increased radiotracer uptake. CT chest done on 12/17/2019: Significant interval decrease in size of the large mass seen in the left breast 8/29/2019. Interval decrease in size of enlarged left axillary lymph nodes. Cardiomegaly. Enlarged thyroid, unchanged. Small hiatal hernia. Diffuse interstitial changes and multifocal atelectasis or scarring in both lungs. Stable small nodular density in the right upper lobe. Sclerotic changes involving the lateral right ninth rib. CT abdomen/pelvis done on 12/17/2019: Small right renal cyst. Retroverted uterus. Distended colon and rectum containing a large amount of stool. Mildly enlarged retroperitoneal and periportal lymph nodes, unchanged. Findings in the left iliac bone as described. The results/images of the scans were reviewed with the patient and her sister, she is responding nicely to the [place], will continue the same treatment. She did have teeth extraction on 1/20/2020, dental clearance has been obtained. On 4/3/2020, the patient was started on Ibrance and Femara cycle #8. Restaging CT Chest/Abdomen/Pelvis and bone scan on 04/24/2020:  CT Chest noted stable small bilateral pulmonary nodules; CT abdomen/pelvis no evidence of new active neoplasm compared to prior. Bone scan 3 foci of radiotracer uptake in the lower right ribs, 2 of which demonstrate less activity on the current study, possibly indicative of healing fractures. Radiotracer in the more anterior focus is more persistent, and may be a metastatic lesion. The results/images of the scans were reviewed with the patient and her sister, the patient has an overall stable disease. Recommended to continue with Femara and Ibrance.     The patient had a PET scan done on December 3, 2020, she had complete metabolic response since the PET scan on September 17, 7626, no metabolically active recurrent or metastatic disease, metabolic activity throughout the thyroid lobes can be seen with chronic thyroiditis. The patient had complete response to treatment, continue Ibrance and Femara. The dose of the Mariaa Rosa has been to 75 mg due to leukopenia/neutropenia. The patient had restaging CT scans of the chest, abdomen pelvis done on 6/1/2021, bone scan on 6/11/2021, revealing an overall stable disease. Recommended to continue Ibrance and Femara. Patient had restaging scans done on 10/26/2021, revealing an overall stable disease, couple of upper lobe pulmonary nodules and rib sclerosis are redemonstrated, stable. No new areas of concern. Recommended to continue with Ibrance and Femara. Restaging CT scans of the chest, abdomen, pelvis and a bone scan were done on 5/2/2022, there was no evidence of progression of disease within the chest with stable left upper lobe pulmonary nodule areas and indeterminate sclerosis in the rib, there was no uptake on the bone scan, recommended to continue with Ibrance and Femara. She has leukopenia and grade 2 neutropenia, secondary to Ibrance, no fever or infections, no indication for dose adjustment of the Ibrance, continue same dose of Ibrance 75 mg. Macrocytosis also secondary to Ibrance. We will continue to monitor her CBCD. Labs reviewed, proceed with [facility] today 8/3/2022. 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 RTC in 4 weeks. Thank you for allowing us to participate in the care of Ms. Michelle San.     Ashlyn Landon MD   HEMATOLOGY/MEDICAL [address] MEDICAL ONCOLOGY  [address] [place] [id number]  Dept: [phone]: [phone]

## 2022-08-05 LAB — CA 15-3: 15 U/ML (ref 0–31)

## 2022-08-29 DIAGNOSIS — C79.51 CARCINOMA OF BREAST METASTATIC TO BONE, UNSPECIFIED LATERALITY (HCC): Primary | ICD-10-CM

## 2022-08-29 DIAGNOSIS — C50.919 CARCINOMA OF BREAST METASTATIC TO BONE, UNSPECIFIED LATERALITY (HCC): Primary | ICD-10-CM

## 2022-08-29 DIAGNOSIS — C50.612 MALIGNANT NEOPLASM OF AXILLARY TAIL OF LEFT FEMALE BREAST, UNSPECIFIED ESTROGEN RECEPTOR STATUS (HCC): ICD-10-CM

## 2022-08-30 ENCOUNTER — HOSPITAL ENCOUNTER (OUTPATIENT)
Age: 72
Discharge: HOME | End: 2022-08-30
Payer: MEDICARE

## 2022-08-30 LAB
ALBUMIN SERPL-MCNC: 4.1 G/DL (ref 3.5–5.2)
ALP BLD-CCNC: 62 U/L (ref 35–104)
ALT SERPL-CCNC: 14 U/L (ref 0–32)
ANION GAP SERPL CALCULATED.3IONS-SCNC: 7 MMOL/L (ref 7–16)
AST SERPL-CCNC: 26 U/L (ref 0–31)
BASOPHILS ABSOLUTE: 0.03 E9/L (ref 0–0.2)
BASOPHILS RELATIVE PERCENT: 2.1 % (ref 0–2)
BILIRUB SERPL-MCNC: 0.4 MG/DL (ref 0–1.2)
BUN BLDV-MCNC: 22 MG/DL (ref 6–23)
CALCIUM SERPL-MCNC: 9.5 MG/DL (ref 8.6–10.2)
CEA: 1.5 NG/ML (ref 0–5.2)
CHLORIDE BLD-SCNC: 103 MMOL/L (ref 98–107)
CO2: 25 MMOL/L (ref 22–29)
CREAT SERPL-MCNC: 0.8 MG/DL (ref 0.5–1)
EOSINOPHILS ABSOLUTE: 0.02 E9/L (ref 0.05–0.5)
EOSINOPHILS RELATIVE PERCENT: 1.4 % (ref 0–6)
GFR AFRICAN AMERICAN: >60
GFR NON-AFRICAN AMERICAN: >60 ML/MIN/1.73
GLUCOSE BLD-MCNC: 97 MG/DL (ref 74–99)
HCT VFR BLD CALC: 29.7 % (ref 34–48)
HEMOGLOBIN: 10.1 G/DL (ref 11.5–15.5)
IMMATURE GRANULOCYTES #: 0.01 E9/L
IMMATURE GRANULOCYTES %: 0.7 % (ref 0–5)
LYMPHOCYTES ABSOLUTE: 0.39 E9/L (ref 1.5–4)
LYMPHOCYTES RELATIVE PERCENT: 27.3 % (ref 20–42)
MCH RBC QN AUTO: 33.4 PG (ref 26–35)
MCHC RBC AUTO-ENTMCNC: 34 % (ref 32–34.5)
MCV RBC AUTO: 98.3 FL (ref 80–99.9)
MONOCYTES ABSOLUTE: 0.21 E9/L (ref 0.1–0.95)
MONOCYTES RELATIVE PERCENT: 14.7 % (ref 2–12)
NEUTROPHILS ABSOLUTE: 0.77 E9/L (ref 1.8–7.3)
NEUTROPHILS RELATIVE PERCENT: 53.8 % (ref 43–80)
PDW BLD-RTO: 13.6 FL (ref 11.5–15)
PLATELET # BLD: 101 E9/L (ref 130–450)
PMV BLD AUTO: 9.9 FL (ref 7–12)
POTASSIUM SERPL-SCNC: 4.3 MMOL/L (ref 3.5–5)
RBC # BLD: 3.02 E12/L (ref 3.5–5.5)
RBC # BLD: NORMAL 10*6/UL
SODIUM BLD-SCNC: 135 MMOL/L (ref 132–146)
TOTAL PROTEIN: 8.3 G/DL (ref 6.4–8.3)
WBC # BLD: 1.4 E9/L (ref 4.5–11.5)

## 2022-08-30 PROCEDURE — 80053 COMPREHEN METABOLIC PANEL: CPT

## 2022-08-30 PROCEDURE — 85025 COMPLETE CBC W/AUTO DIFF WBC: CPT

## 2022-08-30 PROCEDURE — 82378 CARCINOEMBRYONIC ANTIGEN: CPT

## 2022-08-30 PROCEDURE — 36415 COLL VENOUS BLD VENIPUNCTURE: CPT

## 2022-08-31 ENCOUNTER — OFFICE VISIT (OUTPATIENT)
Dept: ONCOLOGY | Age: 72
End: 2022-08-31
Payer: MEDICARE

## 2022-08-31 ENCOUNTER — HOSPITAL ENCOUNTER (OUTPATIENT)
Dept: INFUSION THERAPY | Age: 72
Discharge: HOME | End: 2022-08-31
Payer: MEDICARE

## 2022-08-31 VITALS
BODY MASS INDEX: 24.75 KG/M2 | TEMPERATURE: 97.4 F | HEART RATE: 65 BPM | OXYGEN SATURATION: 100 % | HEIGHT: 66 IN | SYSTOLIC BLOOD PRESSURE: 134 MMHG | WEIGHT: 154 LBS | DIASTOLIC BLOOD PRESSURE: 54 MMHG

## 2022-08-31 DIAGNOSIS — C79.51 CARCINOMA OF BREAST METASTATIC TO BONE, UNSPECIFIED LATERALITY (HCC): ICD-10-CM

## 2022-08-31 DIAGNOSIS — C50.612 MALIGNANT NEOPLASM OF AXILLARY TAIL OF LEFT FEMALE BREAST, UNSPECIFIED ESTROGEN RECEPTOR STATUS (HCC): Primary | ICD-10-CM

## 2022-08-31 DIAGNOSIS — C50.919 CARCINOMA OF BREAST METASTATIC TO BONE, UNSPECIFIED LATERALITY (HCC): ICD-10-CM

## 2022-08-31 PROCEDURE — 6360000002 HC RX W HCPCS: Performed by: NURSE PRACTITIONER

## 2022-08-31 PROCEDURE — G8427 DOCREV CUR MEDS BY ELIG CLIN: HCPCS | Performed by: INTERNAL MEDICINE

## 2022-08-31 PROCEDURE — 3017F COLORECTAL CA SCREEN DOC REV: CPT | Performed by: INTERNAL MEDICINE

## 2022-08-31 PROCEDURE — G8400 PT W/DXA NO RESULTS DOC: HCPCS | Performed by: INTERNAL MEDICINE

## 2022-08-31 PROCEDURE — 1036F TOBACCO NON-USER: CPT | Performed by: INTERNAL MEDICINE

## 2022-08-31 PROCEDURE — 1090F PRES/ABSN URINE INCON ASSESS: CPT | Performed by: INTERNAL MEDICINE

## 2022-08-31 PROCEDURE — 99214 OFFICE O/P EST MOD 30 MIN: CPT | Performed by: INTERNAL MEDICINE

## 2022-08-31 PROCEDURE — 1123F ACP DISCUSS/DSCN MKR DOCD: CPT | Performed by: INTERNAL MEDICINE

## 2022-08-31 PROCEDURE — 99213 OFFICE O/P EST LOW 20 MIN: CPT

## 2022-08-31 PROCEDURE — 96372 THER/PROPH/DIAG INJ SC/IM: CPT

## 2022-08-31 PROCEDURE — G8420 CALC BMI NORM PARAMETERS: HCPCS | Performed by: INTERNAL MEDICINE

## 2022-08-31 RX ADMIN — DENOSUMAB 120 MG: 120 INJECTION SUBCUTANEOUS at 12:59

## 2022-08-31 NOTE — PROGRESS NOTES
[address] [facility] MEDICAL ONCOLOGY  [address] [place] [address]  [place] [address]  Dept: [phone]  Loc: [phone]  Attending Progress Note      Reason for Visit:   Metastatic breast cancer. PCP:  Ray Merlos DO    History of Present Illness: The patient is a 74 y.o. lady, with a past medical history significant for hydrocephalus, who had presented with left breast changes, she had noticed a dark spot on her left breast, then she had developed induration, she thinks that she had the breast changes for months. The patient had never had a mammogram done, had a CT scan of the chest done, revealing a solid left breast mass measuring at least 6.8 x 2.9 cm in size is identified. The mass appears to extend to the skin surface. The deep margin of the mass abuts the chest wall but a fat plane appears to be preserved. Pathologically enlarged left  axillary lymph nodes are seen measuring up to 4.5 x 1.9 cm in size. CT abdomen and pelvis was done on 8/30/2019 revealing partial visualization of soft tissue mass in the left breast, nonspecific lymph nodes in the upper abdomen. Bone scan was done on 8/30/2019, revealing a Prominent focus of radiotracer uptake in the left anterior superior iliac spine, with corresponding abnormality on recent CT, suggestive of metastasis. More subtle focal radiotracer uptake in the right anterior superior iliac spine could be due to metastasis or  degenerative change. 2. Focal radiotracer uptake in the lateral aspect of the right ninth  rib, which appears to be due to a nondisplaced healing fracture. Correlation with history of recent trauma is recommended. In light of  other findings, this could represent a pathologic fracture with an  underlying metastatic lesion. 3. Osteoarthritic uptake in the shoulders, knees, wrists, and hands.     She underwent on 8/30/2019 a biopsy of 1 of the left axillary lymph nodes, she was consistent with metastatic ductal carcinoma, nuclear grade 2, ER positive greater than 95% [place] +70% HER-2/elder 1+, by IHC, negative. The patient was started on systemic therapy with Femara and Ibrance on 9/13/2019. She denies any side effects from the treatment. She denies any new problems. The appearance of the left breast has significant improved, no drainage. Donna Perez returns for a follow-up visit. Donna Perez is doing well overall. No reported side effects from the [place] and Femara. No left breast pain or discharge. She is keeping herself well-hydrated. She continues to follow with neurosurgery, no interventions were recommended at this time. The patient returns for a follow-up visit, she denies any new problems. She is accompanied by her sister. Taking the [place] and Femara as prescribed. Today will be the start of the new cycle of Ibrance. no side effects. She has a follow-up scheduled with Dr. Nolberto Valadez in September. Review of Systems;  CONSTITUTIONAL: No fever, chills. Good appetite and energy level. ENMT: Eyes: No diplopia; Nose: No epistaxis. Mouth: No sore throat. RESPIRATORY: No hemoptysis, shortness of breath, cough. CARDIOVASCULAR: No chest pain, palpitations. GASTROINTESTINAL: No nausea/vomiting, abdominal pain, diarrhea/constipation. GENITOURINARY: No dysuria, urinary frequency, hematuria. NEURO: No syncope, presyncope, headache. MSK: left knee pain resolved.   Remainder:  ROS NEGATIVE    Past Medical History:      Diagnosis Date    Arm fracture, left 2009    Balance problems since April, 2011    Hydrocephalus [facility])     compensated    Thyroid disease     Vertigo      Patient Active Problem List   Diagnosis    Hydrocephalus, adult ([facility] [address]    Hydrocephalus ([facility] [address]    Hyponatremia    Breast mass    Malignant neoplasm of axillary tail of left female breast ([facility] [address]    Breast cancer metastasized to bone (HCC)    Carcinoma of breast metastatic to bone (HCC)    Anemia    Thrombocytopenia, unspecified        Past Surgical History:      Procedure Laterality Date    ARM SURGERY  9/15/2009    FRACTURE SURGERY      HUMERUS FRACTURE SURGERY  2009    Broke left arm        Family History:  Family History   Problem Relation Age of Onset    Colon Cancer Mother 68    Heart Disease Father     Diabetes Father        Medications:  Reviewed and reconciled. Social History:  Social History     Socioeconomic History    Marital status: Single     Spouse name: Not on file    Number of children: Not on file    Years of education: Not on file    Highest education level: Not on file   Occupational History    Not on file   Tobacco Use    Smoking status: Never    Smokeless tobacco: Never   Vaping Use    Vaping Use: Never used   Substance and Sexual Activity    Alcohol use: Yes     Comment: drinks it occasional on social events    Drug use: No    Sexual activity: Never   Other Topics Concern    Not on file   Social History Narrative    Not on file     Social Determinants of Health     Financial Resource Strain: Low Risk     Difficulty of Paying Living Expenses: Not hard at all   Food Insecurity: No Food Insecurity    Worried About Running Out of Food in the Last Year: Never true    Ran Out of Food in the Last Year: Never true   Transportation Needs: Not on file   Physical Activity: Inactive    Days of Exercise per Week: 0 days    Minutes of Exercise per Session: 0 min   Stress: Not on file   Social Connections: Not on file   Intimate Partner Violence: Not on file   Housing Stability: Not on file       Allergies:  No Known Allergies    Physical Exam:  BP (!) 134/54   Pulse 65   Temp 97.4 °F (36.3 °C)   Ht 5' 6\" (1.676 m)   Wt 154 lb (69.9 kg)   SpO2 100%   BMI 24.86 kg/m²     GENERAL: Alert, oriented x 3, not in acute distress. HEENT: PERRLA; EOMI. Oropharynx clear. NECK: Supple. No palpable cervical or supraclavicular lymphadenopathy. LUNGS: Good air entry bilaterally. No wheezing, crackles or rhonchi. level of lateral right 10th rib and anterior right seventh rib which may be related to new healing fractures. Metastases is unlikely. Clinical correlation recommended. No additional abnormal areas of increased radiotracer uptake. CT chest done on 12/17/2019: Significant interval decrease in size of the large mass seen in the left breast 8/29/2019. Interval decrease in size of enlarged left axillary lymph nodes. Cardiomegaly. Enlarged thyroid, unchanged. Small hiatal hernia. Diffuse interstitial changes and multifocal atelectasis or scarring in both lungs. Stable small nodular density in the right upper lobe. Sclerotic changes involving the lateral right ninth rib. CT abdomen/pelvis done on 12/17/2019: Small right renal cyst. Retroverted uterus. Distended colon and rectum containing a large amount of stool. Mildly enlarged retroperitoneal and periportal lymph nodes, unchanged. Findings in the left iliac bone as described. The results/images of the scans were reviewed with the patient and her sister, she is responding nicely to the Femara and Yanick Shelton, will continue the same treatment. She did have teeth extraction on 1/20/2020, dental clearance has been obtained. On 4/3/2020, the patient was started on Ibrance and Femara cycle #8. Restaging CT Chest/Abdomen/Pelvis and bone scan on 04/24/2020:  CT Chest noted stable small bilateral pulmonary nodules; CT abdomen/pelvis no evidence of new active neoplasm compared to prior. Bone scan 3 foci of radiotracer uptake in the lower right ribs, 2 of which demonstrate less activity on the current study, possibly indicative of healing fractures. Radiotracer in the more anterior focus is more persistent, and may be a metastatic lesion. The results/images of the scans were reviewed with the patient and her sister, the patient has an overall stable disease. Recommended to continue with Femara and Ibrance.     The patient had a PET scan done on December 3, 2020, [id number]: [phone]

## 2022-09-28 ENCOUNTER — OFFICE VISIT (OUTPATIENT)
Dept: ONCOLOGY | Age: 72
End: 2022-09-28
Payer: MEDICARE

## 2022-09-28 ENCOUNTER — HOSPITAL ENCOUNTER (OUTPATIENT)
Dept: INFUSION THERAPY | Age: 72
Discharge: HOME | End: 2022-09-28
Payer: MEDICARE

## 2022-09-28 ENCOUNTER — HOSPITAL ENCOUNTER (OUTPATIENT)
Age: 72
Discharge: HOME | End: 2022-09-28
Payer: MEDICARE

## 2022-09-28 VITALS
DIASTOLIC BLOOD PRESSURE: 59 MMHG | HEART RATE: 65 BPM | BODY MASS INDEX: 24.68 KG/M2 | OXYGEN SATURATION: 100 % | TEMPERATURE: 97.4 F | WEIGHT: 153.6 LBS | SYSTOLIC BLOOD PRESSURE: 124 MMHG | HEIGHT: 66 IN

## 2022-09-28 DIAGNOSIS — C50.612 MALIGNANT NEOPLASM OF AXILLARY TAIL OF LEFT FEMALE BREAST, UNSPECIFIED ESTROGEN RECEPTOR STATUS (HCC): ICD-10-CM

## 2022-09-28 DIAGNOSIS — C50.612 MALIGNANT NEOPLASM OF AXILLARY TAIL OF LEFT FEMALE BREAST, UNSPECIFIED ESTROGEN RECEPTOR STATUS (HCC): Primary | ICD-10-CM

## 2022-09-28 DIAGNOSIS — C79.51 CARCINOMA OF BREAST METASTATIC TO BONE, UNSPECIFIED LATERALITY (HCC): ICD-10-CM

## 2022-09-28 DIAGNOSIS — C50.919 CARCINOMA OF BREAST METASTATIC TO BONE, UNSPECIFIED LATERALITY (HCC): ICD-10-CM

## 2022-09-28 LAB
ALBUMIN SERPL-MCNC: 4.2 G/DL (ref 3.5–5.2)
ALP BLD-CCNC: 80 U/L (ref 35–104)
ALT SERPL-CCNC: 14 U/L (ref 0–32)
ANION GAP SERPL CALCULATED.3IONS-SCNC: 7 MMOL/L (ref 7–16)
AST SERPL-CCNC: 25 U/L (ref 0–31)
ATYPICAL LYMPHOCYTE RELATIVE PERCENT: 2.6 % (ref 0–4)
BASOPHILS ABSOLUTE: 0.09 E9/L (ref 0–0.2)
BASOPHILS RELATIVE PERCENT: 4.4 % (ref 0–2)
BILIRUB SERPL-MCNC: 0.4 MG/DL (ref 0–1.2)
BUN BLDV-MCNC: 16 MG/DL (ref 6–23)
CALCIUM SERPL-MCNC: 9.5 MG/DL (ref 8.6–10.2)
CEA: 1.4 NG/ML (ref 0–5.2)
CHLORIDE BLD-SCNC: 105 MMOL/L (ref 98–107)
CO2: 25 MMOL/L (ref 22–29)
CREAT SERPL-MCNC: 0.9 MG/DL (ref 0.5–1)
EOSINOPHILS ABSOLUTE: 0.02 E9/L (ref 0.05–0.5)
EOSINOPHILS RELATIVE PERCENT: 0.9 % (ref 0–6)
GFR AFRICAN AMERICAN: >60
GFR NON-AFRICAN AMERICAN: >60 ML/MIN/1.73
GLUCOSE BLD-MCNC: 96 MG/DL (ref 74–99)
HCT VFR BLD CALC: 31.2 % (ref 34–48)
HEMOGLOBIN: 10.2 G/DL (ref 11.5–15.5)
LYMPHOCYTES ABSOLUTE: 0.5 E9/L (ref 1.5–4)
LYMPHOCYTES RELATIVE PERCENT: 21 % (ref 20–42)
MCH RBC QN AUTO: 32.8 PG (ref 26–35)
MCHC RBC AUTO-ENTMCNC: 32.7 % (ref 32–34.5)
MCV RBC AUTO: 100.3 FL (ref 80–99.9)
MONOCYTES ABSOLUTE: 0.1 E9/L (ref 0.1–0.95)
MONOCYTES RELATIVE PERCENT: 5.3 % (ref 2–12)
NEUTROPHILS ABSOLUTE: 1.39 E9/L (ref 1.8–7.3)
NEUTROPHILS RELATIVE PERCENT: 65.8 % (ref 43–80)
NUCLEATED RED BLOOD CELLS: 0 /100 WBC
OVALOCYTES: ABNORMAL
PDW BLD-RTO: 14.4 FL (ref 11.5–15)
PLATELET # BLD: 122 E9/L (ref 130–450)
PMV BLD AUTO: 10.5 FL (ref 7–12)
POLYCHROMASIA: ABNORMAL
POTASSIUM SERPL-SCNC: 4 MMOL/L (ref 3.5–5)
RBC # BLD: 3.11 E12/L (ref 3.5–5.5)
SODIUM BLD-SCNC: 137 MMOL/L (ref 132–146)
TEAR DROP CELLS: ABNORMAL
TOTAL PROTEIN: 8.4 G/DL (ref 6.4–8.3)
WBC # BLD: 2.1 E9/L (ref 4.5–11.5)

## 2022-09-28 PROCEDURE — 1036F TOBACCO NON-USER: CPT | Performed by: INTERNAL MEDICINE

## 2022-09-28 PROCEDURE — G8400 PT W/DXA NO RESULTS DOC: HCPCS | Performed by: INTERNAL MEDICINE

## 2022-09-28 PROCEDURE — 1090F PRES/ABSN URINE INCON ASSESS: CPT | Performed by: INTERNAL MEDICINE

## 2022-09-28 PROCEDURE — 96372 THER/PROPH/DIAG INJ SC/IM: CPT

## 2022-09-28 PROCEDURE — 6360000002 HC RX W HCPCS: Performed by: NURSE PRACTITIONER

## 2022-09-28 PROCEDURE — 86300 IMMUNOASSAY TUMOR CA 15-3: CPT

## 2022-09-28 PROCEDURE — 1123F ACP DISCUSS/DSCN MKR DOCD: CPT | Performed by: INTERNAL MEDICINE

## 2022-09-28 PROCEDURE — 36415 COLL VENOUS BLD VENIPUNCTURE: CPT

## 2022-09-28 PROCEDURE — 80053 COMPREHEN METABOLIC PANEL: CPT

## 2022-09-28 PROCEDURE — 85025 COMPLETE CBC W/AUTO DIFF WBC: CPT

## 2022-09-28 PROCEDURE — G8427 DOCREV CUR MEDS BY ELIG CLIN: HCPCS | Performed by: INTERNAL MEDICINE

## 2022-09-28 PROCEDURE — G8420 CALC BMI NORM PARAMETERS: HCPCS | Performed by: INTERNAL MEDICINE

## 2022-09-28 PROCEDURE — 99214 OFFICE O/P EST MOD 30 MIN: CPT | Performed by: INTERNAL MEDICINE

## 2022-09-28 PROCEDURE — 3017F COLORECTAL CA SCREEN DOC REV: CPT | Performed by: INTERNAL MEDICINE

## 2022-09-28 PROCEDURE — 82378 CARCINOEMBRYONIC ANTIGEN: CPT

## 2022-09-28 PROCEDURE — 99212 OFFICE O/P EST SF 10 MIN: CPT

## 2022-09-28 RX ADMIN — DENOSUMAB 120 MG: 120 INJECTION SUBCUTANEOUS at 11:50

## 2022-09-28 NOTE — PROGRESS NOTES
[id number] [facility] MEDICAL ONCOLOGY  [address] [place] [address]  [place] [address]  Dept: [phone]  Loc: [phone]  Attending Progress Note      Reason for Visit:   Metastatic breast cancer. PCP:  Macho Guzman DO    History of Present Illness: The patient is a 74 y.o. lady, with a past medical history significant for hydrocephalus, who had presented with left breast changes, she had noticed a dark spot on her left breast, then she had developed induration, she thinks that she had the breast changes for months. The patient had never had a mammogram done, had a CT scan of the chest done, revealing a solid left breast mass measuring at least 6.8 x 2.9 cm in size is identified. The mass appears to extend to the skin surface. The deep margin of the mass abuts the chest wall but a fat plane appears to be preserved. Pathologically enlarged left  axillary lymph nodes are seen measuring up to 4.5 x 1.9 cm in size. CT abdomen and pelvis was done on 8/30/2019 revealing partial visualization of soft tissue mass in the left breast, nonspecific lymph nodes in the upper abdomen. Bone scan was done on 8/30/2019, revealing a Prominent focus of radiotracer uptake in the left anterior superior iliac spine, with corresponding abnormality on recent CT, suggestive of metastasis. More subtle focal radiotracer uptake in the right anterior superior iliac spine could be due to metastasis or  degenerative change. 2. Focal radiotracer uptake in the lateral aspect of the right ninth  rib, which appears to be due to a nondisplaced healing fracture. Correlation with history of recent trauma is recommended. In light of  other findings, this could represent a pathologic fracture with an  underlying metastatic lesion. 3. Osteoarthritic uptake in the shoulders, knees, wrists, and hands.     She underwent on 8/30/2019 a biopsy of 1 of the left axillary lymph nodes, she was consistent with metastatic ductal carcinoma, nuclear grade 2, ER positive greater than 95% [place] +70% HER-2/elder 1+, by IHC, negative. The patient was started on systemic therapy with Femara and Ibrance on 9/13/2019. She denies any side effects from the treatment. She denies any new problems. The appearance of the left breast has significant improved, no drainage. Feroz Jarquin returns for a follow-up visit. Feroz Jarquin is doing well overall. No reported side effects from the Raymond Ranks and Femara. No left breast pain or discharge. She is keeping herself well-hydrated. She continues to follow with neurosurgery, no interventions were recommended at this time. The patient returns for a follow-up visit, she denies any new problems. She is accompanied by her sister. Taking the Raymond Ranks and Femara as prescribed. Significant side effects she has not been able to do the scans due to transportation issues. Review of Systems;  CONSTITUTIONAL: No fever, chills. Good appetite and energy level. ENMT: Eyes: No diplopia; Nose: No epistaxis. Mouth: No sore throat. RESPIRATORY: No hemoptysis, shortness of breath, cough. CARDIOVASCULAR: No chest pain, palpitations. GASTROINTESTINAL: No nausea/vomiting, abdominal pain, diarrhea/constipation. GENITOURINARY: No dysuria, urinary frequency, hematuria. NEURO: No syncope, presyncope, headache. MSK: left knee pain resolved.   Remainder:  ROS NEGATIVE    Past Medical History:      Diagnosis Date    Arm fracture, left 2009    Balance problems since April, 2011    Hydrocephalus [facility])     compensated    Thyroid disease     Vertigo      Patient Active Problem List   Diagnosis    Hydrocephalus, adult ([facility] [address]    Hydrocephalus ([facility] [address]    Hyponatremia    Breast mass    Malignant neoplasm of axillary tail of left female breast ([facility] [address]    Breast cancer metastasized to bone ([facility])    Carcinoma of breast metastatic to bone ([facility])    Anemia    Thrombocytopenia, unspecified        Past Surgical History:      Procedure Laterality Date    ARM SURGERY  9/15/2009    FRACTURE SURGERY      HUMERUS FRACTURE SURGERY  2009    Broke left arm        Family History:  Family History   Problem Relation Age of Onset    Colon Cancer Mother 68    Heart Disease Father     Diabetes Father        Medications:  Reviewed and reconciled. Social History:  Social History     Socioeconomic History    Marital status: Single     Spouse name: Not on file    Number of children: Not on file    Years of education: Not on file    Highest education level: Not on file   Occupational History    Not on file   Tobacco Use    Smoking status: Never    Smokeless tobacco: Never   Vaping Use    Vaping Use: Never used   Substance and Sexual Activity    Alcohol use: Yes     Comment: drinks it occasional on social events    Drug use: No    Sexual activity: Never   Other Topics Concern    Not on file   Social History Narrative    Not on file     Social Determinants of Health     Financial Resource Strain: Low Risk     Difficulty of Paying Living Expenses: Not hard at all   Food Insecurity: No Food Insecurity    Worried About Running Out of Food in the Last Year: Never true    Ran Out of Food in the Last Year: Never true   Transportation Needs: Not on file   Physical Activity: Inactive    Days of Exercise per Week: 0 days    Minutes of Exercise per Session: 0 min   Stress: Not on file   Social Connections: Not on file   Intimate Partner Violence: Not on file   Housing Stability: Not on file       Allergies:  No Known Allergies    Physical Exam:  BP (!) 124/59   Pulse 65   Temp 97.4 °F (36.3 °C)   Ht 5' 6\" (1.676 m)   Wt 153 lb 9.6 oz (69.7 kg)   SpO2 100%   BMI 24.79 kg/m²     GENERAL: Alert, oriented x 3, not in acute distress. HEENT: PERRLA; EOMI. Oropharynx clear. NECK: Supple. No palpable cervical or supraclavicular lymphadenopathy. LUNGS: Good air entry bilaterally. No wheezing, crackles or rhonchi. CARDIOVASCULAR: Regular rate.  No murmurs, rubs or gallops. BREASTS: The right breast exam is negative for any skin changes, nipple discharge, no palpable masses, no palpable right axillary adenopathy, left breast appearance had markedly improved, the nodules had resolved, the mass decreased in size, no drainage, no bleeding, decrease in the size of the left axillary lymphadenopathy  ABDOMEN: Soft. Non-tender, non-distended. Positive bowel sounds. EXTREMITIES: mild chronic edema of the LLE  NEUROLOGIC: No focal deficits. ECOG PS 1     Impression/Plan:     The patient is a 74 y.o. lady, with a past medical history significant for hydrocephalus, who had presented with left breast changes, who was diagnosed with a left breast ductal carcinoma, ER positive greater than 95% [place] +70% HER-2/elder 1+, she has a very locally advanced disease, and metastatic disease to the bones. I discussed with the patient and her sister today her diagnosis, prognosis and recommendations for treatment. Will complete the staging work-up, the patient will have a brain MRI with and without contrast done, and a PET scan. For her HR pos, HER-2/elder negative metastatic breast cancer, I recommended treatment with Femara and Ibrance, the schedule and side effects of the treatment were reviewed with her. She has metastatic disease to the bones, recommended treatment with Xgeva. MRI brain done on 09/16/2019: no evidence of metastatic disease. PET/CT on 09/17/2019: The lytic soft tissue finding in the iliac crest in the left pelvis shows hypermetabolic FDG uptake, with maximum measurements in the 5.7 SUV range, strongly suggesting metastatic disease. She has uptake in the thyroid, could be physiologic. Femara/Ibrance were started on 09/13/2019. Bone scan done on 12/17/2019: Redemonstration of focus of radiotracer uptake involving posterior lateral right ninth rib suggestive of healing fracture.  New radiotracer activity is seen at level of lateral right 10th rib and anterior right seventh rib which may be related to new healing fractures. Metastases is unlikely. Clinical correlation recommended. No additional abnormal areas of increased radiotracer uptake. CT chest done on 12/17/2019: Significant interval decrease in size of the large mass seen in the left breast 8/29/2019. Interval decrease in size of enlarged left axillary lymph nodes. Cardiomegaly. Enlarged thyroid, unchanged. Small hiatal hernia. Diffuse interstitial changes and multifocal atelectasis or scarring in both lungs. Stable small nodular density in the right upper lobe. Sclerotic changes involving the lateral right ninth rib. CT abdomen/pelvis done on 12/17/2019: Small right renal cyst. Retroverted uterus. Distended colon and rectum containing a large amount of stool. Mildly enlarged retroperitoneal and periportal lymph nodes, unchanged. Findings in the left iliac bone as described. The results/images of the scans were reviewed with the patient and her sister, she is responding nicely to the Femara and Petrona Blanquita, will continue the same treatment. She did have teeth extraction on 1/20/2020, dental clearance has been obtained. On 4/3/2020, the patient was started on Ibrance and Femara cycle #8. Restaging CT Chest/Abdomen/Pelvis and bone scan on 04/24/2020:  CT Chest noted stable small bilateral pulmonary nodules; CT abdomen/pelvis no evidence of new active neoplasm compared to prior. Bone scan 3 foci of radiotracer uptake in the lower right ribs, 2 of which demonstrate less activity on the current study, possibly indicative of healing fractures. Radiotracer in the more anterior focus is more persistent, and may be a metastatic lesion. The results/images of the scans were reviewed with the patient and her sister, the patient has an overall stable disease. Recommended to continue with Femara and Ibrance.     The patient had a PET scan done on December 3, 2020, she had complete metabolic response since the PET scan on September 17, 8665, no metabolically active recurrent or metastatic disease, metabolic activity throughout the thyroid lobes can be seen with chronic thyroiditis. The patient had complete response to treatment, continue Ibrance and Femara. The dose of the Mallie Holiday has been to 75 mg due to leukopenia/neutropenia. The patient had restaging CT scans of the chest, abdomen pelvis done on 6/1/2021, bone scan on 6/11/2021, revealing an overall stable disease. Recommended to continue Ibrance and Femara. Patient had restaging scans done on 10/26/2021, revealing an overall stable disease, couple of upper lobe pulmonary nodules and rib sclerosis are redemonstrated, stable. No new areas of concern. Recommended to continue with Ibrance and Femara. Restaging CT scans of the chest, abdomen, pelvis and a bone scan were done on 5/2/2022, there was no evidence of progression of disease within the chest with stable left upper lobe pulmonary nodule areas and indeterminate sclerosis in the rib, there was no uptake on the bone scan, recommended to continue with Ibrance and Femara. Today 9/28/2022, the patient is doing well clinically, labs reviewed, she has macrocytic anemia and leukopenia as well as mild thrombocytopenia, secondary to Ibrance, no indication for dose adjustment. We will continue to monitor her CBCD. Restaging CT scans of the chest, abdomen, pelvis and a bone scan were ordered, she has not been able to have them done due to transportation issues. Labs reviewed, proceed with Quesada Pacolet today 9/28/2022. 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 RTC in 4 weeks. Thank you for allowing us to participate in the care of Ms. Brandon Jarvis.     Yomaira Chris MD   HEMATOLOGY/MEDICAL ONCOLOGY  [place] [id number] [facility] MEDICAL ONCOLOGY  [id number] [facility] [address]  Dept: [phone] Tien Chris: [phone]

## 2022-09-30 LAB — CA 15-3: 17 U/ML (ref 0–31)

## 2022-10-24 ENCOUNTER — OFFICE VISIT (OUTPATIENT)
Dept: FAMILY MEDICINE CLINIC | Age: 72
End: 2022-10-24
Payer: MEDICARE

## 2022-10-24 VITALS
BODY MASS INDEX: 24.83 KG/M2 | DIASTOLIC BLOOD PRESSURE: 58 MMHG | SYSTOLIC BLOOD PRESSURE: 116 MMHG | WEIGHT: 149 LBS | HEART RATE: 77 BPM | TEMPERATURE: 98.6 F | RESPIRATION RATE: 18 BRPM | OXYGEN SATURATION: 99 % | HEIGHT: 65 IN

## 2022-10-24 DIAGNOSIS — E03.9 HYPOTHYROIDISM, UNSPECIFIED TYPE: Primary | ICD-10-CM

## 2022-10-24 DIAGNOSIS — E55.9 VITAMIN D DEFICIENCY: ICD-10-CM

## 2022-10-24 PROCEDURE — 1036F TOBACCO NON-USER: CPT | Performed by: FAMILY MEDICINE

## 2022-10-24 PROCEDURE — 1123F ACP DISCUSS/DSCN MKR DOCD: CPT | Performed by: FAMILY MEDICINE

## 2022-10-24 PROCEDURE — 99214 OFFICE O/P EST MOD 30 MIN: CPT | Performed by: FAMILY MEDICINE

## 2022-10-24 PROCEDURE — G8400 PT W/DXA NO RESULTS DOC: HCPCS | Performed by: FAMILY MEDICINE

## 2022-10-24 PROCEDURE — G8484 FLU IMMUNIZE NO ADMIN: HCPCS | Performed by: FAMILY MEDICINE

## 2022-10-24 PROCEDURE — G8427 DOCREV CUR MEDS BY ELIG CLIN: HCPCS | Performed by: FAMILY MEDICINE

## 2022-10-24 PROCEDURE — 3017F COLORECTAL CA SCREEN DOC REV: CPT | Performed by: FAMILY MEDICINE

## 2022-10-24 PROCEDURE — G8420 CALC BMI NORM PARAMETERS: HCPCS | Performed by: FAMILY MEDICINE

## 2022-10-24 PROCEDURE — 1090F PRES/ABSN URINE INCON ASSESS: CPT | Performed by: FAMILY MEDICINE

## 2022-10-24 RX ORDER — ERGOCALCIFEROL 1.25 MG/1
CAPSULE ORAL
Qty: 12 CAPSULE | Refills: 0 | Status: SHIPPED | OUTPATIENT
Start: 2022-10-24

## 2022-10-24 NOTE — PROGRESS NOTES
Hypothyroidism:  Patient is here today to follow up chronic hypothyroidism. This is   generally controlled on current medication regimen. Takes medication as directed and tolerates well. Symptoms from thyroid standpoint include none. Most recent labs reviewed with patient and are not remarkable. Thyroid function in particular is  controlled. She is following with Dr. Danielle Ramirez and Dr. Jaylen Cantrell. She has multiple scans she needs to have done for their offices. Patient's past medical, surgical, social and/or family history reviewed, updated in chart, and are non-contributory (unless otherwise stated). Medications and allergies also reviewed and updated in chart. Review of Systems:  Constitutional:  No fever, no fatigue, no chills, no headaches, no weight change  Dermatology:  No rash, no mole, no dry or sensitive skin  ENT:  No cough, no sore throat, no sinus pain, no runny nose, no ear pain  Cardiology:  No chest pain, no palpitations, no leg edema, no shortness of breath, no PND  Gastroenterology:  No dysphagia, no abdominal pain, no nausea, no vomiting, no constipation, no diarrhea, no heartburn  Musculoskeletal:  No joint pain, no leg cramps, no back pain, no muscle aches  Respiratory:  No shortness of breath, no orthopnea, no wheezing, no SCHAFFER, no hemoptysis  Urology:  No blood in the urine, no urinary frequency, no urinary incontinence, no urinary urgency, no nocturia, no dysuria      Vitals:    10/24/22 1343   BP: (!) 116/58   Pulse: 77   Resp: 18   Temp: 98.6 °F (37 °C)   SpO2: 99%   Weight: 149 lb (67.6 kg)   Height: 5' 5\" (1.651 m)       Physical Exam  Vitals and nursing note reviewed. Constitutional:       Appearance: She is well-developed. HENT:      Head: Normocephalic and atraumatic.       Right Ear: External ear normal.      Left Ear: External ear normal.      Nose: Nose normal.   Eyes:      Conjunctiva/sclera: Conjunctivae normal.      Pupils: Pupils are equal, round, and reactive to light. Neck:      Thyroid: No thyromegaly. Cardiovascular:      Rate and Rhythm: Normal rate and regular rhythm. Heart sounds: Normal heart sounds. Pulmonary:      Effort: Pulmonary effort is normal.      Breath sounds: Normal breath sounds. No wheezing. Abdominal:      General: Bowel sounds are normal.      Palpations: Abdomen is soft. Tenderness: There is no abdominal tenderness. Musculoskeletal:         General: Normal range of motion. Cervical back: Normal range of motion and neck supple. Skin:     General: Skin is warm and dry. Findings: No rash. Neurological:      Mental Status: She is alert and oriented to person, place, and time. Deep Tendon Reflexes: Reflexes are normal and symmetric. Psychiatric:         Behavior: Behavior normal.       Assessment/Plan:      Marleny Lim was seen today for difficulty walking and hypothyroidism. Diagnoses and all orders for this visit:    Hypothyroidism, unspecified type  -     TSH; Future  -     T4, Free; Future  -     Comprehensive Metabolic Panel; Future  -     CBC with Auto Differential; Future  Well controlled  Continue current dose of synthroid. Labs ordered. Vitamin D deficiency  -     vitamin D (ERGOCALCIFEROL) 1.25 MG (59442 UT) CAPS capsule; 1 capsule by mouth po once a week  -     Vitamin D 25 Hydroxy; Future  Labs ordered. As above. Call or go to ED immediately if symptoms worsen or persist.  Return in about 6 months (around 4/24/2023) for hypothyroidism. , or sooner if necessary. Educational materials and/or home exercises printed for patient's review and were included in patient instructions on his/her After Visit Summary and given to patient at the end of visit. Counseled regarding above diagnosis, including possible risks and complications,  especially if left uncontrolled.     Counseled regarding the possible side effects, risks, benefits and alternatives to treatment; patient and/or guardian verbalizes understanding, agrees, feels comfortable with and wishes to proceed with above treatment plan. Advised patient to call with any new medication issues, and read all Rx info from pharmacy to assure aware of all possible risks and side effects of medication before taking. Reviewed age and gender appropriate health screening exams and vaccinations. Advised patient regarding importance of keeping up with recommended health maintenance and to schedule as soon as possible if overdue, as this is important in assessing for undiagnosed pathology, especially cancer, as well as protecting against potentially harmful/life threatening disease. Patient and/or guardian verbalizes understanding and agrees with above counseling, assessment and plan. All questions answered. Gini, DO  10/24/2022    I have personally reviewed and updated the chief complaint, HPI, Past Medical, Family and Social History, as well as the above Review of Systems.

## 2022-10-26 ENCOUNTER — HOSPITAL ENCOUNTER (OUTPATIENT)
Age: 72
Discharge: HOME | End: 2022-10-26
Payer: MEDICARE

## 2022-10-26 ENCOUNTER — HOSPITAL ENCOUNTER (OUTPATIENT)
Dept: INFUSION THERAPY | Age: 72
Discharge: HOME | End: 2022-10-26
Payer: MEDICARE

## 2022-10-26 ENCOUNTER — OFFICE VISIT (OUTPATIENT)
Dept: ONCOLOGY | Age: 72
End: 2022-10-26
Payer: MEDICARE

## 2022-10-26 VITALS
HEIGHT: 66 IN | HEART RATE: 69 BPM | OXYGEN SATURATION: 100 % | TEMPERATURE: 98.3 F | WEIGHT: 152.8 LBS | SYSTOLIC BLOOD PRESSURE: 138 MMHG | BODY MASS INDEX: 24.56 KG/M2 | DIASTOLIC BLOOD PRESSURE: 80 MMHG

## 2022-10-26 VITALS — DIASTOLIC BLOOD PRESSURE: 80 MMHG | SYSTOLIC BLOOD PRESSURE: 138 MMHG

## 2022-10-26 DIAGNOSIS — C50.612 MALIGNANT NEOPLASM OF AXILLARY TAIL OF LEFT FEMALE BREAST, UNSPECIFIED ESTROGEN RECEPTOR STATUS (HCC): Primary | ICD-10-CM

## 2022-10-26 DIAGNOSIS — C50.919 CARCINOMA OF BREAST METASTATIC TO BONE, UNSPECIFIED LATERALITY (HCC): Primary | ICD-10-CM

## 2022-10-26 DIAGNOSIS — C79.51 CARCINOMA OF BREAST METASTATIC TO BONE, UNSPECIFIED LATERALITY (HCC): Primary | ICD-10-CM

## 2022-10-26 DIAGNOSIS — E03.9 HYPOTHYROIDISM, UNSPECIFIED TYPE: ICD-10-CM

## 2022-10-26 DIAGNOSIS — E55.9 VITAMIN D DEFICIENCY: ICD-10-CM

## 2022-10-26 DIAGNOSIS — C50.612 MALIGNANT NEOPLASM OF AXILLARY TAIL OF LEFT FEMALE BREAST, UNSPECIFIED ESTROGEN RECEPTOR STATUS (HCC): ICD-10-CM

## 2022-10-26 LAB
ALBUMIN SERPL-MCNC: 4 G/DL (ref 3.5–5.2)
ALBUMIN SERPL-MCNC: 4.1 G/DL (ref 3.5–5.2)
ALP BLD-CCNC: 65 U/L (ref 35–104)
ALP BLD-CCNC: 65 U/L (ref 35–104)
ALT SERPL-CCNC: 13 U/L (ref 0–32)
ALT SERPL-CCNC: 14 U/L (ref 0–32)
ANION GAP SERPL CALCULATED.3IONS-SCNC: 4 MMOL/L (ref 7–16)
ANION GAP SERPL CALCULATED.3IONS-SCNC: 5 MMOL/L (ref 7–16)
ANISOCYTOSIS: ABNORMAL
ANISOCYTOSIS: ABNORMAL
AST SERPL-CCNC: 18 U/L (ref 0–31)
AST SERPL-CCNC: 18 U/L (ref 0–31)
ATYPICAL LYMPHOCYTE RELATIVE PERCENT: 0.9 % (ref 0–4)
BASOPHILS ABSOLUTE: 0.04 E9/L (ref 0–0.2)
BASOPHILS ABSOLUTE: 0.08 E9/L (ref 0–0.2)
BASOPHILS RELATIVE PERCENT: 2.6 % (ref 0–2)
BASOPHILS RELATIVE PERCENT: 4.3 % (ref 0–2)
BILIRUB SERPL-MCNC: 0.2 MG/DL (ref 0–1.2)
BILIRUB SERPL-MCNC: 0.3 MG/DL (ref 0–1.2)
BUN BLDV-MCNC: 22 MG/DL (ref 6–23)
BUN BLDV-MCNC: 22 MG/DL (ref 6–23)
CALCIUM SERPL-MCNC: 9.6 MG/DL (ref 8.6–10.2)
CALCIUM SERPL-MCNC: 9.7 MG/DL (ref 8.6–10.2)
CEA: 1.1 NG/ML (ref 0–5.2)
CHLORIDE BLD-SCNC: 103 MMOL/L (ref 98–107)
CHLORIDE BLD-SCNC: 104 MMOL/L (ref 98–107)
CO2: 27 MMOL/L (ref 22–29)
CO2: 27 MMOL/L (ref 22–29)
CREAT SERPL-MCNC: 0.7 MG/DL (ref 0.5–1)
CREAT SERPL-MCNC: 0.7 MG/DL (ref 0.5–1)
EOSINOPHILS ABSOLUTE: 0.03 E9/L (ref 0.05–0.5)
EOSINOPHILS ABSOLUTE: 0.06 E9/L (ref 0.05–0.5)
EOSINOPHILS RELATIVE PERCENT: 1.7 % (ref 0–6)
EOSINOPHILS RELATIVE PERCENT: 3.5 % (ref 0–6)
GFR SERPL CREATININE-BSD FRML MDRD: >60 ML/MIN/1.73
GFR SERPL CREATININE-BSD FRML MDRD: >60 ML/MIN/1.73
GLUCOSE BLD-MCNC: 117 MG/DL (ref 74–99)
GLUCOSE BLD-MCNC: 119 MG/DL (ref 74–99)
HCT VFR BLD CALC: 30.4 % (ref 34–48)
HCT VFR BLD CALC: 30.9 % (ref 34–48)
HEMOGLOBIN: 10.1 G/DL (ref 11.5–15.5)
HEMOGLOBIN: 10.3 G/DL (ref 11.5–15.5)
LYMPHOCYTES ABSOLUTE: 0.31 E9/L (ref 1.5–4)
LYMPHOCYTES ABSOLUTE: 0.38 E9/L (ref 1.5–4)
LYMPHOCYTES RELATIVE PERCENT: 17.4 % (ref 20–42)
LYMPHOCYTES RELATIVE PERCENT: 20.9 % (ref 20–42)
MCH RBC QN AUTO: 32.4 PG (ref 26–35)
MCH RBC QN AUTO: 32.7 PG (ref 26–35)
MCHC RBC AUTO-ENTMCNC: 33.2 % (ref 32–34.5)
MCHC RBC AUTO-ENTMCNC: 33.3 % (ref 32–34.5)
MCV RBC AUTO: 97.4 FL (ref 80–99.9)
MCV RBC AUTO: 98.1 FL (ref 80–99.9)
MONOCYTES ABSOLUTE: 0.07 E9/L (ref 0.1–0.95)
MONOCYTES ABSOLUTE: 0.16 E9/L (ref 0.1–0.95)
MONOCYTES RELATIVE PERCENT: 4.3 % (ref 2–12)
MONOCYTES RELATIVE PERCENT: 8.7 % (ref 2–12)
NEUTROPHILS ABSOLUTE: 1.15 E9/L (ref 1.8–7.3)
NEUTROPHILS ABSOLUTE: 1.21 E9/L (ref 1.8–7.3)
NEUTROPHILS RELATIVE PERCENT: 64.4 % (ref 43–80)
NEUTROPHILS RELATIVE PERCENT: 71.3 % (ref 43–80)
NUCLEATED RED BLOOD CELLS: 0 /100 WBC
NUCLEATED RED BLOOD CELLS: 0 /100 WBC
PDW BLD-RTO: 14.3 FL (ref 11.5–15)
PDW BLD-RTO: 14.4 FL (ref 11.5–15)
PLATELET # BLD: 101 E9/L (ref 130–450)
PLATELET # BLD: 105 E9/L (ref 130–450)
PMV BLD AUTO: 10.1 FL (ref 7–12)
PMV BLD AUTO: 10.2 FL (ref 7–12)
POTASSIUM SERPL-SCNC: 4.4 MMOL/L (ref 3.5–5)
POTASSIUM SERPL-SCNC: 4.4 MMOL/L (ref 3.5–5)
RBC # BLD: 3.12 E12/L (ref 3.5–5.5)
RBC # BLD: 3.15 E12/L (ref 3.5–5.5)
SODIUM BLD-SCNC: 135 MMOL/L (ref 132–146)
SODIUM BLD-SCNC: 135 MMOL/L (ref 132–146)
T4 FREE: 0.98 NG/DL (ref 0.93–1.7)
TOTAL PROTEIN: 7.8 G/DL (ref 6.4–8.3)
TOTAL PROTEIN: 7.8 G/DL (ref 6.4–8.3)
TSH SERPL DL<=0.05 MIU/L-ACNC: 4.06 UIU/ML (ref 0.27–4.2)
VITAMIN D 25-HYDROXY: 57 NG/ML (ref 30–100)
WBC # BLD: 1.7 E9/L (ref 4.5–11.5)
WBC # BLD: 1.8 E9/L (ref 4.5–11.5)

## 2022-10-26 PROCEDURE — 1036F TOBACCO NON-USER: CPT | Performed by: INTERNAL MEDICINE

## 2022-10-26 PROCEDURE — 84443 ASSAY THYROID STIM HORMONE: CPT

## 2022-10-26 PROCEDURE — 1123F ACP DISCUSS/DSCN MKR DOCD: CPT | Performed by: INTERNAL MEDICINE

## 2022-10-26 PROCEDURE — 82306 VITAMIN D 25 HYDROXY: CPT

## 2022-10-26 PROCEDURE — 85025 COMPLETE CBC W/AUTO DIFF WBC: CPT

## 2022-10-26 PROCEDURE — 84439 ASSAY OF FREE THYROXINE: CPT

## 2022-10-26 PROCEDURE — 82378 CARCINOEMBRYONIC ANTIGEN: CPT

## 2022-10-26 PROCEDURE — 6360000002 HC RX W HCPCS: Performed by: INTERNAL MEDICINE

## 2022-10-26 PROCEDURE — G8420 CALC BMI NORM PARAMETERS: HCPCS | Performed by: INTERNAL MEDICINE

## 2022-10-26 PROCEDURE — 96372 THER/PROPH/DIAG INJ SC/IM: CPT

## 2022-10-26 PROCEDURE — 1090F PRES/ABSN URINE INCON ASSESS: CPT | Performed by: INTERNAL MEDICINE

## 2022-10-26 PROCEDURE — G8484 FLU IMMUNIZE NO ADMIN: HCPCS | Performed by: INTERNAL MEDICINE

## 2022-10-26 PROCEDURE — 86300 IMMUNOASSAY TUMOR CA 15-3: CPT

## 2022-10-26 PROCEDURE — 80053 COMPREHEN METABOLIC PANEL: CPT

## 2022-10-26 PROCEDURE — G8400 PT W/DXA NO RESULTS DOC: HCPCS | Performed by: INTERNAL MEDICINE

## 2022-10-26 PROCEDURE — 99214 OFFICE O/P EST MOD 30 MIN: CPT | Performed by: INTERNAL MEDICINE

## 2022-10-26 PROCEDURE — 36415 COLL VENOUS BLD VENIPUNCTURE: CPT

## 2022-10-26 PROCEDURE — 3017F COLORECTAL CA SCREEN DOC REV: CPT | Performed by: INTERNAL MEDICINE

## 2022-10-26 PROCEDURE — G8427 DOCREV CUR MEDS BY ELIG CLIN: HCPCS | Performed by: INTERNAL MEDICINE

## 2022-10-26 PROCEDURE — 99212 OFFICE O/P EST SF 10 MIN: CPT

## 2022-10-26 RX ADMIN — DENOSUMAB 120 MG: 120 INJECTION SUBCUTANEOUS at 11:36

## 2022-10-26 NOTE — PROGRESS NOTES
[id number] [facility] MEDICAL ONCOLOGY  [address] [place] [address]  [place] [address]  Dept: [phone]  Loc: [phone]  Attending Progress Note      Reason for Visit:   Metastatic breast cancer. PCP:  Rosa Leon DO    History of Present Illness: The patient is a 74 y.o. lady, with a past medical history significant for hydrocephalus, who had presented with left breast changes, she had noticed a dark spot on her left breast, then she had developed induration, she thinks that she had the breast changes for months. The patient had never had a mammogram done, had a CT scan of the chest done, revealing a solid left breast mass measuring at least 6.8 x 2.9 cm in size is identified. The mass appears to extend to the skin surface. The deep margin of the mass abuts the chest wall but a fat plane appears to be preserved. Pathologically enlarged left  axillary lymph nodes are seen measuring up to 4.5 x 1.9 cm in size. CT abdomen and pelvis was done on 8/30/2019 revealing partial visualization of soft tissue mass in the left breast, nonspecific lymph nodes in the upper abdomen. Bone scan was done on 8/30/2019, revealing a Prominent focus of radiotracer uptake in the left anterior superior iliac spine, with corresponding abnormality on recent CT, suggestive of metastasis. More subtle focal radiotracer uptake in the right anterior superior iliac spine could be due to metastasis or  degenerative change. 2. Focal radiotracer uptake in the lateral aspect of the right ninth  rib, which appears to be due to a nondisplaced healing fracture. Correlation with history of recent trauma is recommended. In light of  other findings, this could represent a pathologic fracture with an  underlying metastatic lesion. 3. Osteoarthritic uptake in the shoulders, knees, wrists, and hands.     She underwent on 8/30/2019 a biopsy of 1 of the left axillary lymph nodes, she was consistent with metastatic ductal carcinoma, nuclear grade 2, ER positive greater than 95% [place] +70% HER-2/elder 1+, by IHC, negative. The patient was started on systemic therapy with Femara and Ibrance on 9/13/2019. She denies any side effects from the treatment. She denies any new problems. The appearance of the left breast has significant improved, no drainage. Stefan Velasco returns for a follow-up visit. Stefan Velasco is doing well overall. No reported side effects from the Alanna and Femara. No left breast pain or discharge. She is keeping herself well-hydrated. She continues to follow with neurosurgery, no interventions were recommended at this time. The patient returns for a follow-up visit, she denies any new problems. She is accompanied by her sister. Taking the Fries and Femara as prescribed. No reported side effects, she has not been able to do the scans due to transportation issues. Review of Systems;  CONSTITUTIONAL: No fever, chills. Good appetite and energy level. ENMT: Eyes: No diplopia; Nose: No epistaxis. Mouth: No sore throat. RESPIRATORY: No hemoptysis, shortness of breath, cough. CARDIOVASCULAR: No chest pain, palpitations. GASTROINTESTINAL: No nausea/vomiting, abdominal pain, diarrhea/constipation. GENITOURINARY: No dysuria, urinary frequency, hematuria. NEURO: No syncope, presyncope, headache. MSK: left knee pain resolved.   Remainder:  ROS NEGATIVE    Past Medical History:      Diagnosis Date    Arm fracture, left 2009    Balance problems since April, 2011    Hydrocephalus [facility])     compensated    Thyroid disease     Vertigo      Patient Active Problem List   Diagnosis    Hydrocephalus, adult ([facility]    Hydrocephalus ([facility]    Hyponatremia    Breast mass    Malignant neoplasm of axillary tail of left female breast ([facility]    Breast cancer metastasized to bone ([facility])    Carcinoma of breast metastatic to bone ([facility])    Anemia    Thrombocytopenia, unspecified        Past Surgical History:      Procedure Laterality Date    ARM SURGERY  9/15/2009    FRACTURE SURGERY      HUMERUS FRACTURE SURGERY  2009    Broke left arm        Family History:  Family History   Problem Relation Age of Onset    Colon Cancer Mother 68    Heart Disease Father     Diabetes Father        Medications:  Reviewed and reconciled. Social History:  Social History     Socioeconomic History    Marital status: Single     Spouse name: Not on file    Number of children: Not on file    Years of education: Not on file    Highest education level: Not on file   Occupational History    Not on file   Tobacco Use    Smoking status: Never    Smokeless tobacco: Never   Vaping Use    Vaping Use: Never used   Substance and Sexual Activity    Alcohol use: Yes     Comment: drinks it occasional on social events    Drug use: No    Sexual activity: Never   Other Topics Concern    Not on file   Social History Narrative    Not on file     Social Determinants of Health     Financial Resource Strain: Low Risk     Difficulty of Paying Living Expenses: Not hard at all   Food Insecurity: No Food Insecurity    Worried About Running Out of Food in the Last Year: Never true    Ran Out of Food in the Last Year: Never true   Transportation Needs: Not on file   Physical Activity: Inactive    Days of Exercise per Week: 0 days    Minutes of Exercise per Session: 0 min   Stress: Not on file   Social Connections: Not on file   Intimate Partner Violence: Not on file   Housing Stability: Not on file       Allergies:  No Known Allergies    Physical Exam:  BP (!) 134/110   Pulse 69   Temp 98.3 °F (36.8 °C)   Ht 5' 6\" (1.676 m)   Wt 152 lb 12.8 oz (69.3 kg)   SpO2 100%   BMI 24.66 kg/m²     GENERAL: Alert, oriented x 3, not in acute distress. HEENT: PERRLA; EOMI. Oropharynx clear. NECK: Supple. No palpable cervical or supraclavicular lymphadenopathy. LUNGS: Good air entry bilaterally. No wheezing, crackles or rhonchi. CARDIOVASCULAR: Regular rate. No murmurs, rubs or gallops. BREASTS: The right breast exam is negative for any skin changes, nipple discharge, no palpable masses, no palpable right axillary adenopathy, left breast appearance had markedly improved, the nodules had resolved, the mass decreased in size, no drainage, no bleeding, decrease in the size of the left axillary lymphadenopathy  ABDOMEN: Soft. Non-tender, non-distended. Positive bowel sounds. EXTREMITIES: mild chronic edema of the LLE  NEUROLOGIC: No focal deficits. ECOG PS 1     Impression/Plan:     The patient is a 74 y.o. lady, with a past medical history significant for hydrocephalus, who had presented with left breast changes, who was diagnosed with a left breast ductal carcinoma, ER positive greater than 95% [place] +70% HER-2/elder 1+, she has a very locally advanced disease, and metastatic disease to the bones. I discussed with the patient and her sister today her diagnosis, prognosis and recommendations for treatment. Will complete the staging work-up, the patient will have a brain MRI with and without contrast done, and a PET scan. For her HR pos, HER-2/elder negative metastatic breast cancer, I recommended treatment with Femara and Ibrance, the schedule and side effects of the treatment were reviewed with her. She has metastatic disease to the bones, recommended treatment with Xgeva. MRI brain done on 09/16/2019: no evidence of metastatic disease. PET/CT on 09/17/2019: The lytic soft tissue finding in the iliac crest in the left pelvis shows hypermetabolic FDG uptake, with maximum measurements in the 5.7 SUV range, strongly suggesting metastatic disease. She has uptake in the thyroid, could be physiologic. Femara/Ibrance were started on 09/13/2019. Bone scan done on 12/17/2019: Redemonstration of focus of radiotracer uptake involving posterior lateral right ninth rib suggestive of healing fracture.  New radiotracer activity is seen at level of lateral right 10th rib and anterior right seventh rib which may be related to new healing fractures. Metastases is unlikely. Clinical correlation recommended. No additional abnormal areas of increased radiotracer uptake. CT chest done on 12/17/2019: Significant interval decrease in size of the large mass seen in the left breast 8/29/2019. Interval decrease in size of enlarged left axillary lymph nodes. Cardiomegaly. Enlarged thyroid, unchanged. Small hiatal hernia. Diffuse interstitial changes and multifocal atelectasis or scarring in both lungs. Stable small nodular density in the right upper lobe. Sclerotic changes involving the lateral right ninth rib. CT abdomen/pelvis done on 12/17/2019: Small right renal cyst. Retroverted uterus. Distended colon and rectum containing a large amount of stool. Mildly enlarged retroperitoneal and periportal lymph nodes, unchanged. Findings in the left iliac bone as described. The results/images of the scans were reviewed with the patient and her sister, she is responding nicely to the Femara and Yanira Vernon, will continue the same treatment. She did have teeth extraction on 1/20/2020, dental clearance has been obtained. On 4/3/2020, the patient was started on Ibrance and Femara cycle #8. Restaging CT Chest/Abdomen/Pelvis and bone scan on 04/24/2020:  CT Chest noted stable small bilateral pulmonary nodules; CT abdomen/pelvis no evidence of new active neoplasm compared to prior. Bone scan 3 foci of radiotracer uptake in the lower right ribs, 2 of which demonstrate less activity on the current study, possibly indicative of healing fractures. Radiotracer in the more anterior focus is more persistent, and may be a metastatic lesion. The results/images of the scans were reviewed with the patient and her sister, the patient has an overall stable disease. Recommended to continue with Femara and Ibrance.     The patient had a PET scan done on December 3, 2020, she had complete metabolic response since the PET scan on September 17, 8323, no metabolically active recurrent or metastatic disease, metabolic activity throughout the thyroid lobes can be seen with chronic thyroiditis. The patient had complete response to treatment, continue Ibrance and Femara. The dose of the [place] has been to 75 mg due to leukopenia/neutropenia. The patient had restaging CT scans of the chest, abdomen pelvis done on 6/1/2021, bone scan on 6/11/2021, revealing an overall stable disease. Recommended to continue Ibrance and Femara. Patient had restaging scans done on 10/26/2021, revealing an overall stable disease, couple of upper lobe pulmonary nodules and rib sclerosis are redemonstrated, stable. No new areas of concern. Recommended to continue with Ibrance and Femara. Restaging CT scans of the chest, abdomen, pelvis and a bone scan were done on 5/2/2022, there was no evidence of progression of disease within the chest with stable left upper lobe pulmonary nodule areas and indeterminate sclerosis in the rib, there was no uptake on the bone scan, recommended to continue with Ibrance and Femara. Today 10/26/2022, the patient is doing well clinically, labs reviewed, she has macrocytic anemia and leukopenia as well as mild thrombocytopenia, secondary to Ibrance. Her counts are overall stable. We will continue to monitor her CBCD. Restaging CT scans of the chest, abdomen, pelvis and a bone scan were ordered, she has not been able to have them done due to transportation issues. 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 Recheck BP. RTC in 4 weeks. Thank you for allowing us to participate in the care of Ms. Estefany Ayers.     Melba Flores MD   HEMATOLOGY/MEDICAL [address] [place] [address] [address] [facility] MEDICAL ONCOLOGY  [address] [place] [id number]  Dept: [phone] Tien Chris: [phone]

## 2022-10-28 LAB — CA 15-3: 13 U/ML (ref 0–31)

## 2022-11-11 ENCOUNTER — HOSPITAL ENCOUNTER (OUTPATIENT)
Dept: MRI IMAGING | Age: 72
Discharge: HOME | End: 2022-11-13
Payer: MEDICARE

## 2022-11-11 DIAGNOSIS — G91.9 HYDROCEPHALUS, UNSPECIFIED TYPE (HCC): ICD-10-CM

## 2022-11-11 PROCEDURE — 70551 MRI BRAIN STEM W/O DYE: CPT

## 2022-11-22 ENCOUNTER — TELEPHONE (OUTPATIENT)
Dept: INFUSION THERAPY | Age: 72
End: 2022-11-22

## 2022-11-22 DIAGNOSIS — C79.51 CARCINOMA OF BREAST METASTATIC TO BONE, UNSPECIFIED LATERALITY (HCC): ICD-10-CM

## 2022-11-22 DIAGNOSIS — E03.9 HYPOTHYROIDISM, UNSPECIFIED TYPE: ICD-10-CM

## 2022-11-22 DIAGNOSIS — C50.919 CARCINOMA OF BREAST METASTATIC TO BONE, UNSPECIFIED LATERALITY (HCC): ICD-10-CM

## 2022-11-22 DIAGNOSIS — C50.612 MALIGNANT NEOPLASM OF AXILLARY TAIL OF LEFT FEMALE BREAST, UNSPECIFIED ESTROGEN RECEPTOR STATUS (HCC): ICD-10-CM

## 2022-11-22 RX ORDER — PALBOCICLIB 75 MG/1
75 TABLET, FILM COATED ORAL DAILY
Qty: 21 TABLET | Refills: 3 | Status: SHIPPED | OUTPATIENT
Start: 2022-11-22 | End: 2022-11-29 | Stop reason: SDUPTHER

## 2022-11-22 RX ORDER — LEVOTHYROXINE SODIUM 0.03 MG/1
TABLET ORAL
Qty: 30 TABLET | Refills: 5 | Status: SHIPPED | OUTPATIENT
Start: 2022-11-22

## 2022-11-22 NOTE — TELEPHONE ENCOUNTER
Pts niece Dallas Scott phoned in requesting a refill on pts Ibrance before 1700 today to ensure on-time delivery despite the holiday-This nurse routed Dr Torres Hash the RX request and also spoke with Barbara Garcia RN at [facility] ([facility]) about the timely matter of this refill-This nurse also left a voicemail for Gery Cowden (the financial navigator) to reach out to the pts nimahogany as she has been trying to contact her about some upcoming [facility], RN

## 2022-11-23 ENCOUNTER — HOSPITAL ENCOUNTER (OUTPATIENT)
Dept: INFUSION THERAPY | Age: 72
Discharge: HOME | End: 2022-11-23
Payer: MEDICARE

## 2022-11-23 ENCOUNTER — HOSPITAL ENCOUNTER (OUTPATIENT)
Age: 72
Discharge: HOME | End: 2022-11-23
Payer: MEDICARE

## 2022-11-23 ENCOUNTER — OFFICE VISIT (OUTPATIENT)
Dept: ONCOLOGY | Age: 72
End: 2022-11-23
Payer: MEDICARE

## 2022-11-23 VITALS
HEART RATE: 67 BPM | OXYGEN SATURATION: 100 % | BODY MASS INDEX: 24.75 KG/M2 | TEMPERATURE: 99.6 F | SYSTOLIC BLOOD PRESSURE: 153 MMHG | DIASTOLIC BLOOD PRESSURE: 68 MMHG | HEIGHT: 66 IN | WEIGHT: 154 LBS

## 2022-11-23 DIAGNOSIS — C50.612 MALIGNANT NEOPLASM OF AXILLARY TAIL OF LEFT FEMALE BREAST, UNSPECIFIED ESTROGEN RECEPTOR STATUS (HCC): Primary | ICD-10-CM

## 2022-11-23 DIAGNOSIS — C50.919 CARCINOMA OF BREAST METASTATIC TO BONE, UNSPECIFIED LATERALITY (HCC): ICD-10-CM

## 2022-11-23 DIAGNOSIS — C79.51 CARCINOMA OF BREAST METASTATIC TO BONE, UNSPECIFIED LATERALITY (HCC): ICD-10-CM

## 2022-11-23 LAB
ALBUMIN SERPL-MCNC: 4.2 G/DL (ref 3.5–5.2)
ALP BLD-CCNC: 67 U/L (ref 35–104)
ALT SERPL-CCNC: 13 U/L (ref 0–32)
ANION GAP SERPL CALCULATED.3IONS-SCNC: 7 MMOL/L (ref 7–16)
AST SERPL-CCNC: 20 U/L (ref 0–31)
BASOPHILS ABSOLUTE: 0.02 E9/L (ref 0–0.2)
BASOPHILS RELATIVE PERCENT: 0.9 % (ref 0–2)
BILIRUB SERPL-MCNC: 0.4 MG/DL (ref 0–1.2)
BUN BLDV-MCNC: 19 MG/DL (ref 6–23)
CALCIUM SERPL-MCNC: 9.7 MG/DL (ref 8.6–10.2)
CEA: 1.7 NG/ML (ref 0–5.2)
CHLORIDE BLD-SCNC: 101 MMOL/L (ref 98–107)
CO2: 26 MMOL/L (ref 22–29)
CREAT SERPL-MCNC: 0.9 MG/DL (ref 0.5–1)
EOSINOPHILS ABSOLUTE: 0.03 E9/L (ref 0.05–0.5)
EOSINOPHILS RELATIVE PERCENT: 1.7 % (ref 0–6)
GFR SERPL CREATININE-BSD FRML MDRD: >60 ML/MIN/1.73
GLUCOSE BLD-MCNC: 96 MG/DL (ref 74–99)
HCT VFR BLD CALC: 30.9 % (ref 34–48)
HEMOGLOBIN: 10.3 G/DL (ref 11.5–15.5)
LYMPHOCYTES ABSOLUTE: 0.26 E9/L (ref 1.5–4)
LYMPHOCYTES RELATIVE PERCENT: 14.8 % (ref 20–42)
MCH RBC QN AUTO: 32.4 PG (ref 26–35)
MCHC RBC AUTO-ENTMCNC: 33.3 % (ref 32–34.5)
MCV RBC AUTO: 97.2 FL (ref 80–99.9)
MONOCYTES ABSOLUTE: 0.07 E9/L (ref 0.1–0.95)
MONOCYTES RELATIVE PERCENT: 4.3 % (ref 2–12)
NEUTROPHILS ABSOLUTE: 1.33 E9/L (ref 1.8–7.3)
NEUTROPHILS RELATIVE PERCENT: 78.3 % (ref 43–80)
NUCLEATED RED BLOOD CELLS: 0.9 /100 WBC
PDW BLD-RTO: 14.6 FL (ref 11.5–15)
PLATELET # BLD: 126 E9/L (ref 130–450)
PMV BLD AUTO: 9.8 FL (ref 7–12)
POTASSIUM SERPL-SCNC: 4.8 MMOL/L (ref 3.5–5)
RBC # BLD: 3.18 E12/L (ref 3.5–5.5)
RBC # BLD: NORMAL 10*6/UL
SODIUM BLD-SCNC: 134 MMOL/L (ref 132–146)
TOTAL PROTEIN: 8.1 G/DL (ref 6.4–8.3)
WBC # BLD: 1.7 E9/L (ref 4.5–11.5)

## 2022-11-23 PROCEDURE — 1123F ACP DISCUSS/DSCN MKR DOCD: CPT | Performed by: INTERNAL MEDICINE

## 2022-11-23 PROCEDURE — 86300 IMMUNOASSAY TUMOR CA 15-3: CPT

## 2022-11-23 PROCEDURE — 99212 OFFICE O/P EST SF 10 MIN: CPT

## 2022-11-23 PROCEDURE — 6360000002 HC RX W HCPCS: Performed by: INTERNAL MEDICINE

## 2022-11-23 PROCEDURE — 3017F COLORECTAL CA SCREEN DOC REV: CPT | Performed by: INTERNAL MEDICINE

## 2022-11-23 PROCEDURE — G8420 CALC BMI NORM PARAMETERS: HCPCS | Performed by: INTERNAL MEDICINE

## 2022-11-23 PROCEDURE — G8400 PT W/DXA NO RESULTS DOC: HCPCS | Performed by: INTERNAL MEDICINE

## 2022-11-23 PROCEDURE — 1036F TOBACCO NON-USER: CPT | Performed by: INTERNAL MEDICINE

## 2022-11-23 PROCEDURE — 82378 CARCINOEMBRYONIC ANTIGEN: CPT

## 2022-11-23 PROCEDURE — 96372 THER/PROPH/DIAG INJ SC/IM: CPT

## 2022-11-23 PROCEDURE — 85025 COMPLETE CBC W/AUTO DIFF WBC: CPT

## 2022-11-23 PROCEDURE — G8427 DOCREV CUR MEDS BY ELIG CLIN: HCPCS | Performed by: INTERNAL MEDICINE

## 2022-11-23 PROCEDURE — 36415 COLL VENOUS BLD VENIPUNCTURE: CPT

## 2022-11-23 PROCEDURE — 99214 OFFICE O/P EST MOD 30 MIN: CPT | Performed by: INTERNAL MEDICINE

## 2022-11-23 PROCEDURE — 1090F PRES/ABSN URINE INCON ASSESS: CPT | Performed by: INTERNAL MEDICINE

## 2022-11-23 PROCEDURE — G8484 FLU IMMUNIZE NO ADMIN: HCPCS | Performed by: INTERNAL MEDICINE

## 2022-11-23 PROCEDURE — 80053 COMPREHEN METABOLIC PANEL: CPT

## 2022-11-23 RX ADMIN — DENOSUMAB 120 MG: 120 INJECTION SUBCUTANEOUS at 12:37

## 2022-11-23 NOTE — PROGRESS NOTES
[id number] [facility] MEDICAL ONCOLOGY  [address] [place] [address]  [place] [address]  Dept: [phone]  Loc: [phone]  Attending Progress Note      Reason for Visit:   Metastatic breast cancer. PCP:  Michelle Multani DO    History of Present Illness: The patient is a 74 y.o. lady, with a past medical history significant for hydrocephalus, who had presented with left breast changes, she had noticed a dark spot on her left breast, then she had developed induration, she thinks that she had the breast changes for months. The patient had never had a mammogram done, had a CT scan of the chest done, revealing a solid left breast mass measuring at least 6.8 x 2.9 cm in size is identified. The mass appears to extend to the skin surface. The deep margin of the mass abuts the chest wall but a fat plane appears to be preserved. Pathologically enlarged left  axillary lymph nodes are seen measuring up to 4.5 x 1.9 cm in size. CT abdomen and pelvis was done on 8/30/2019 revealing partial visualization of soft tissue mass in the left breast, nonspecific lymph nodes in the upper abdomen. Bone scan was done on 8/30/2019, revealing a Prominent focus of radiotracer uptake in the left anterior superior iliac spine, with corresponding abnormality on recent CT, suggestive of metastasis. More subtle focal radiotracer uptake in the right anterior superior iliac spine could be due to metastasis or  degenerative change. 2. Focal radiotracer uptake in the lateral aspect of the right ninth  rib, which appears to be due to a nondisplaced healing fracture. Correlation with history of recent trauma is recommended. In light of  other findings, this could represent a pathologic fracture with an  underlying metastatic lesion. 3. Osteoarthritic uptake in the shoulders, knees, wrists, and hands.     She underwent on 8/30/2019 a biopsy of 1 of the left axillary lymph nodes, she was consistent with metastatic ductal carcinoma, nuclear grade 2, ER positive greater than 95% [place] +70% HER-2/elder 1+, by IHC, negative. The patient was started on systemic therapy with Femara and Ibrance on 9/13/2019. She denies any side effects from the treatment. She denies any new problems. The appearance of the left breast has significant improved, no drainage. Antonio Banks returns for a follow-up visit. Antonio Banks is doing well overall. No reported side effects from the [place] and Femara. No left breast pain or discharge. She is keeping herself well-hydrated. She continues to follow with neurosurgery, no interventions were recommended at this time. The patient returns for a follow-up visit, she denies any new problems. She is accompanied by her sister. Taking the [place] and Femara as prescribed. No reported side effects, she has not been able to do the scans due to transportation issues. She continues to follow with neurosurgery. Review of Systems;  CONSTITUTIONAL: No fever, chills. Good appetite and energy level. ENMT: Eyes: No diplopia; Nose: No epistaxis. Mouth: No sore throat. RESPIRATORY: No hemoptysis, shortness of breath, cough. CARDIOVASCULAR: No chest pain, palpitations. GASTROINTESTINAL: No nausea/vomiting, abdominal pain, diarrhea/constipation. GENITOURINARY: No dysuria, urinary frequency, hematuria. NEURO: No syncope, presyncope, headache. MSK: left knee pain resolved.   Remainder:  ROS NEGATIVE    Past Medical History:      Diagnosis Date    Arm fracture, left 2009    Balance problems since April, 2011    Hydrocephalus [facility])     compensated    Thyroid disease     Vertigo      Patient Active Problem List   Diagnosis    Hydrocephalus, adult ([facility]    Hydrocephalus ([facility]    Hyponatremia    Breast mass    Malignant neoplasm of axillary tail of left female breast ([facility]    Breast cancer metastasized to bone [facility])    Carcinoma of breast metastatic to bone ([facility])    Anemia Thrombocytopenia, unspecified        Past Surgical History:      Procedure Laterality Date    ARM SURGERY  9/15/2009    FRACTURE SURGERY      HUMERUS FRACTURE SURGERY  2009    Broke left arm        Family History:  Family History   Problem Relation Age of Onset    Colon Cancer Mother 68    Heart Disease Father     Diabetes Father        Medications:  Reviewed and reconciled. Social History:  Social History     Socioeconomic History    Marital status: Single     Spouse name: Not on file    Number of children: Not on file    Years of education: Not on file    Highest education level: Not on file   Occupational History    Not on file   Tobacco Use    Smoking status: Never    Smokeless tobacco: Never   Vaping Use    Vaping Use: Never used   Substance and Sexual Activity    Alcohol use: Yes     Comment: drinks it occasional on social events    Drug use: No    Sexual activity: Never   Other Topics Concern    Not on file   Social History Narrative    Not on file     Social Determinants of Health     Financial Resource Strain: Low Risk     Difficulty of Paying Living Expenses: Not hard at all   Food Insecurity: No Food Insecurity    Worried About Running Out of Food in the Last Year: Never true    Ran Out of Food in the Last Year: Never true   Transportation Needs: Not on file   Physical Activity: Inactive    Days of Exercise per Week: 0 days    Minutes of Exercise per Session: 0 min   Stress: Not on file   Social Connections: Not on file   Intimate Partner Violence: Not on file   Housing Stability: Not on file       Allergies:  No Known Allergies    Physical Exam:  BP (!) 153/68   Pulse 67   Temp 99.6 °F (37.6 °C)   Ht 5' 6\" (1.676 m)   Wt 154 lb (69.9 kg)   SpO2 100%   BMI 24.86 kg/m²     GENERAL: Alert, oriented x 3, not in acute distress. HEENT: PERRLA; EOMI. Oropharynx clear. NECK: Supple. No palpable cervical or supraclavicular lymphadenopathy. LUNGS: Good air entry bilaterally.  No wheezing, crackles or rhonchi. CARDIOVASCULAR: Regular rate. No murmurs, rubs or gallops. BREASTS: The right breast exam is negative for any skin changes, nipple discharge, no palpable masses, no palpable right axillary adenopathy, left breast appearance had markedly improved, the nodules had resolved, the mass decreased in size, no drainage, no bleeding, decrease in the size of the left axillary lymphadenopathy  ABDOMEN: Soft. Non-tender, non-distended. Positive bowel sounds. EXTREMITIES: mild chronic edema of the LLE  NEUROLOGIC: No focal deficits. ECOG PS 1     Impression/Plan:     The patient is a 74 y.o. lady, with a past medical history significant for hydrocephalus, who had presented with left breast changes, who was diagnosed with a left breast ductal carcinoma, ER positive greater than 95% [place] +70% HER-2/elder 1+, she has a very locally advanced disease, and metastatic disease to the bones. I discussed with the patient and her sister today her diagnosis, prognosis and recommendations for treatment. Will complete the staging work-up, the patient will have a brain MRI with and without contrast done, and a PET scan. For her HR pos, HER-2/elder negative metastatic breast cancer, I recommended treatment with Femara and Ibrance, the schedule and side effects of the treatment were reviewed with her. She has metastatic disease to the bones, recommended treatment with Xgeva. MRI brain done on 09/16/2019: no evidence of metastatic disease. PET/CT on 09/17/2019: The lytic soft tissue finding in the iliac crest in the left pelvis shows hypermetabolic FDG uptake, with maximum measurements in the 5.7 SUV range, strongly suggesting metastatic disease. She has uptake in the thyroid, could be physiologic. Femara/Ibrance were started on 09/13/2019. Bone scan done on 12/17/2019: Redemonstration of focus of radiotracer uptake involving posterior lateral right ninth rib suggestive of healing fracture.  New radiotracer activity is seen at level of lateral right 10th rib and anterior right seventh rib which may be related to new healing fractures. Metastases is unlikely. Clinical correlation recommended. No additional abnormal areas of increased radiotracer uptake. CT chest done on 12/17/2019: Significant interval decrease in size of the large mass seen in the left breast 8/29/2019. Interval decrease in size of enlarged left axillary lymph nodes. Cardiomegaly. Enlarged thyroid, unchanged. Small hiatal hernia. Diffuse interstitial changes and multifocal atelectasis or scarring in both lungs. Stable small nodular density in the right upper lobe. Sclerotic changes involving the lateral right ninth rib. CT abdomen/pelvis done on 12/17/2019: Small right renal cyst. Retroverted uterus. Distended colon and rectum containing a large amount of stool. Mildly enlarged retroperitoneal and periportal lymph nodes, unchanged. Findings in the left iliac bone as described. The results/images of the scans were reviewed with the patient and her sister, she is responding nicely to the Femara and Arzella Narrow, will continue the same treatment. She did have teeth extraction on 1/20/2020, dental clearance has been obtained. On 4/3/2020, the patient was started on Ibrance and Femara cycle #8. Restaging CT Chest/Abdomen/Pelvis and bone scan on 04/24/2020:  CT Chest noted stable small bilateral pulmonary nodules; CT abdomen/pelvis no evidence of new active neoplasm compared to prior. Bone scan 3 foci of radiotracer uptake in the lower right ribs, 2 of which demonstrate less activity on the current study, possibly indicative of healing fractures. Radiotracer in the more anterior focus is more persistent, and may be a metastatic lesion. The results/images of the scans were reviewed with the patient and her sister, the patient has an overall stable disease. Recommended to continue with Femara and Ibrance.     The patient had a PET scan done on December 3, 2020, she had complete metabolic response since the PET scan on September 17, 6064, no metabolically active recurrent or metastatic disease, metabolic activity throughout the thyroid lobes can be seen with chronic thyroiditis. The patient had complete response to treatment, continue Ibrance and Femara. The dose of the Jinnald Leonidasz has been to 75 mg due to leukopenia/neutropenia. The patient had restaging CT scans of the chest, abdomen pelvis done on 6/1/2021, bone scan on 6/11/2021, revealing an overall stable disease. Recommended to continue Ibrance and Femara. Patient had restaging scans done on 10/26/2021, revealing an overall stable disease, couple of upper lobe pulmonary nodules and rib sclerosis are redemonstrated, stable. No new areas of concern. Recommended to continue with Ibrance and Femara. Restaging CT scans of the chest, abdomen, pelvis and a bone scan were done on 5/2/2022, there was no evidence of progression of disease within the chest with stable left upper lobe pulmonary nodule areas and indeterminate sclerosis in the rib, there was no uptake on the bone scan, recommended to continue with Ibrance and Femara. Today 11/23/2022, the patient is doing well clinically, labs reviewed, she has macrocytic anemia and leukopenia as well as mild thrombocytopenia, secondary to Ibrance. Her counts are overall stable. ANC remains greater than 1000. No indication for dose adjustment of the Ibrance, we will continue to monitor her CBCD. Restaging CT scans of the chest, abdomen, pelvis and a bone scan were ordered, she has not been able to have them done due to transportation issues. Jose Metcalf was on 10/26/2022, today 11/23/2022 labs reviewed, proceed with Xgeva. 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 RTC in 4 weeks.     Thank you for allowing us to participate in the care of Ms. Мария Yusuf.     Kyleigh Hawkins MD   HEMATOLOGY/MEDICAL [address] MEDICAL ONCOLOGY  [address] [place], [place] [id number]  Dept: [phone]: [phone]

## 2022-11-26 LAB — CA 15-3: 15 U/ML (ref 0–31)

## 2022-11-29 DIAGNOSIS — C79.51 CARCINOMA OF BREAST METASTATIC TO BONE, UNSPECIFIED LATERALITY (HCC): ICD-10-CM

## 2022-11-29 DIAGNOSIS — C50.612 MALIGNANT NEOPLASM OF AXILLARY TAIL OF LEFT FEMALE BREAST, UNSPECIFIED ESTROGEN RECEPTOR STATUS (HCC): ICD-10-CM

## 2022-11-29 DIAGNOSIS — C50.919 CARCINOMA OF BREAST METASTATIC TO BONE, UNSPECIFIED LATERALITY (HCC): ICD-10-CM

## 2022-11-29 RX ORDER — PALBOCICLIB 75 MG/1
75 TABLET, FILM COATED ORAL DAILY
Qty: 21 TABLET | Refills: 3 | Status: ACTIVE | OUTPATIENT
Start: 2022-11-29

## 2022-11-29 NOTE — PROGRESS NOTES
[facility]    Date: 11/29/22    Patient receives free drug through [facility]. Prescription has been routed there. Please call us with any questions at [phone] opt.  2.

## 2022-12-16 DIAGNOSIS — C50.919 CARCINOMA OF BREAST METASTATIC TO BONE, UNSPECIFIED LATERALITY (HCC): ICD-10-CM

## 2022-12-16 DIAGNOSIS — C50.612 MALIGNANT NEOPLASM OF AXILLARY TAIL OF LEFT FEMALE BREAST, UNSPECIFIED ESTROGEN RECEPTOR STATUS (HCC): ICD-10-CM

## 2022-12-16 DIAGNOSIS — C79.51 CARCINOMA OF BREAST METASTATIC TO BONE, UNSPECIFIED LATERALITY (HCC): ICD-10-CM

## 2022-12-16 DIAGNOSIS — N63.0 BREAST MASS: ICD-10-CM

## 2022-12-20 DIAGNOSIS — C50.919 CARCINOMA OF BREAST METASTATIC TO BONE, UNSPECIFIED LATERALITY (HCC): ICD-10-CM

## 2022-12-20 DIAGNOSIS — C79.51 CARCINOMA OF BREAST METASTATIC TO BONE, UNSPECIFIED LATERALITY (HCC): ICD-10-CM

## 2022-12-20 DIAGNOSIS — N63.0 BREAST MASS: ICD-10-CM

## 2022-12-20 DIAGNOSIS — C50.612 MALIGNANT NEOPLASM OF AXILLARY TAIL OF LEFT FEMALE BREAST, UNSPECIFIED ESTROGEN RECEPTOR STATUS (HCC): ICD-10-CM

## 2022-12-20 RX ORDER — LETROZOLE 2.5 MG/1
2.5 TABLET, FILM COATED ORAL DAILY
Qty: 90 TABLET | Refills: 3 | Status: SHIPPED | OUTPATIENT
Start: 2022-12-20 | End: 2022-12-21 | Stop reason: SDUPTHER

## 2022-12-21 ENCOUNTER — TELEPHONE (OUTPATIENT)
Dept: INFUSION THERAPY | Age: 72
End: 2022-12-21

## 2022-12-21 ENCOUNTER — HOSPITAL ENCOUNTER (OUTPATIENT)
Dept: INFUSION THERAPY | Age: 72
Discharge: HOME | End: 2022-12-21
Payer: MEDICARE

## 2022-12-21 ENCOUNTER — HOSPITAL ENCOUNTER (OUTPATIENT)
Age: 72
Discharge: HOME | End: 2022-12-21
Payer: MEDICARE

## 2022-12-21 ENCOUNTER — OFFICE VISIT (OUTPATIENT)
Dept: ONCOLOGY | Age: 72
End: 2022-12-21
Payer: MEDICARE

## 2022-12-21 VITALS
HEART RATE: 69 BPM | DIASTOLIC BLOOD PRESSURE: 58 MMHG | HEIGHT: 66 IN | WEIGHT: 153.2 LBS | OXYGEN SATURATION: 100 % | TEMPERATURE: 98.7 F | SYSTOLIC BLOOD PRESSURE: 128 MMHG | BODY MASS INDEX: 24.62 KG/M2

## 2022-12-21 DIAGNOSIS — C79.51 CARCINOMA OF BREAST METASTATIC TO BONE, UNSPECIFIED LATERALITY (HCC): ICD-10-CM

## 2022-12-21 DIAGNOSIS — C50.612 MALIGNANT NEOPLASM OF AXILLARY TAIL OF LEFT FEMALE BREAST, UNSPECIFIED ESTROGEN RECEPTOR STATUS (HCC): Primary | ICD-10-CM

## 2022-12-21 DIAGNOSIS — C50.919 CARCINOMA OF BREAST METASTATIC TO BONE, UNSPECIFIED LATERALITY (HCC): ICD-10-CM

## 2022-12-21 LAB
ALBUMIN SERPL-MCNC: 4 G/DL (ref 3.5–5.2)
ALP BLD-CCNC: 78 U/L (ref 35–104)
ALT SERPL-CCNC: 18 U/L (ref 0–32)
ANION GAP SERPL CALCULATED.3IONS-SCNC: 6 MMOL/L (ref 7–16)
AST SERPL-CCNC: 25 U/L (ref 0–31)
BASOPHILS ABSOLUTE: 0.02 E9/L (ref 0–0.2)
BASOPHILS RELATIVE PERCENT: 0.9 % (ref 0–2)
BILIRUB SERPL-MCNC: 0.3 MG/DL (ref 0–1.2)
BUN BLDV-MCNC: 29 MG/DL (ref 6–23)
CALCIUM SERPL-MCNC: 9.5 MG/DL (ref 8.6–10.2)
CEA: 1.7 NG/ML (ref 0–5.2)
CHLORIDE BLD-SCNC: 99 MMOL/L (ref 98–107)
CO2: 28 MMOL/L (ref 22–29)
CREAT SERPL-MCNC: 0.9 MG/DL (ref 0.5–1)
EOSINOPHILS ABSOLUTE: 0.05 E9/L (ref 0.05–0.5)
EOSINOPHILS RELATIVE PERCENT: 2.6 % (ref 0–6)
GFR SERPL CREATININE-BSD FRML MDRD: >60 ML/MIN/1.73
GLUCOSE BLD-MCNC: 85 MG/DL (ref 74–99)
HCT VFR BLD CALC: 32.9 % (ref 34–48)
HEMOGLOBIN: 10.7 G/DL (ref 11.5–15.5)
LYMPHOCYTES ABSOLUTE: 0.26 E9/L (ref 1.5–4)
LYMPHOCYTES RELATIVE PERCENT: 13 % (ref 20–42)
MCH RBC QN AUTO: 32 PG (ref 26–35)
MCHC RBC AUTO-ENTMCNC: 32.5 % (ref 32–34.5)
MCV RBC AUTO: 98.5 FL (ref 80–99.9)
MONOCYTES ABSOLUTE: 0.08 E9/L (ref 0.1–0.95)
MONOCYTES RELATIVE PERCENT: 3.5 % (ref 2–12)
NEUTROPHILS ABSOLUTE: 1.6 E9/L (ref 1.8–7.3)
NEUTROPHILS RELATIVE PERCENT: 80 % (ref 43–80)
NUCLEATED RED BLOOD CELLS: 0 /100 WBC
OVALOCYTES: ABNORMAL
PDW BLD-RTO: 13.8 FL (ref 11.5–15)
PLATELET # BLD: 175 E9/L (ref 130–450)
PMV BLD AUTO: 10.2 FL (ref 7–12)
POIKILOCYTES: ABNORMAL
POLYCHROMASIA: ABNORMAL
POTASSIUM SERPL-SCNC: 4.8 MMOL/L (ref 3.5–5)
RBC # BLD: 3.34 E12/L (ref 3.5–5.5)
SODIUM BLD-SCNC: 133 MMOL/L (ref 132–146)
TOTAL PROTEIN: 8.2 G/DL (ref 6.4–8.3)
WBC # BLD: 2 E9/L (ref 4.5–11.5)

## 2022-12-21 PROCEDURE — G8484 FLU IMMUNIZE NO ADMIN: HCPCS | Performed by: INTERNAL MEDICINE

## 2022-12-21 PROCEDURE — 3017F COLORECTAL CA SCREEN DOC REV: CPT | Performed by: INTERNAL MEDICINE

## 2022-12-21 PROCEDURE — 96372 THER/PROPH/DIAG INJ SC/IM: CPT

## 2022-12-21 PROCEDURE — 86300 IMMUNOASSAY TUMOR CA 15-3: CPT

## 2022-12-21 PROCEDURE — G8420 CALC BMI NORM PARAMETERS: HCPCS | Performed by: INTERNAL MEDICINE

## 2022-12-21 PROCEDURE — 1123F ACP DISCUSS/DSCN MKR DOCD: CPT | Performed by: INTERNAL MEDICINE

## 2022-12-21 PROCEDURE — 99214 OFFICE O/P EST MOD 30 MIN: CPT | Performed by: INTERNAL MEDICINE

## 2022-12-21 PROCEDURE — 36415 COLL VENOUS BLD VENIPUNCTURE: CPT

## 2022-12-21 PROCEDURE — 80053 COMPREHEN METABOLIC PANEL: CPT

## 2022-12-21 PROCEDURE — G8400 PT W/DXA NO RESULTS DOC: HCPCS | Performed by: INTERNAL MEDICINE

## 2022-12-21 PROCEDURE — 6360000002 HC RX W HCPCS: Performed by: INTERNAL MEDICINE

## 2022-12-21 PROCEDURE — 82378 CARCINOEMBRYONIC ANTIGEN: CPT

## 2022-12-21 PROCEDURE — 1036F TOBACCO NON-USER: CPT | Performed by: INTERNAL MEDICINE

## 2022-12-21 PROCEDURE — G8427 DOCREV CUR MEDS BY ELIG CLIN: HCPCS | Performed by: INTERNAL MEDICINE

## 2022-12-21 PROCEDURE — 85025 COMPLETE CBC W/AUTO DIFF WBC: CPT

## 2022-12-21 PROCEDURE — 99212 OFFICE O/P EST SF 10 MIN: CPT

## 2022-12-21 PROCEDURE — 1090F PRES/ABSN URINE INCON ASSESS: CPT | Performed by: INTERNAL MEDICINE

## 2022-12-21 RX ORDER — LETROZOLE 2.5 MG/1
2.5 TABLET, FILM COATED ORAL DAILY
Qty: 90 TABLET | Refills: 3 | Status: SHIPPED | OUTPATIENT
Start: 2022-12-21

## 2022-12-21 RX ORDER — LETROZOLE 2.5 MG/1
TABLET, FILM COATED ORAL
Qty: 90 TABLET | Refills: 0 | OUTPATIENT
Start: 2022-12-21

## 2022-12-21 RX ADMIN — DENOSUMAB 120 MG: 120 INJECTION SUBCUTANEOUS at 12:23

## 2022-12-21 NOTE — TELEPHONE ENCOUNTER
This nurse called and left a message on financial navigator, Sun Grace V's voicemail explaining that pts family member, Piotr Awad, needs assistance with paperwork for pts medication and that she needs a phone call back before the end of the year-[facility]

## 2022-12-21 NOTE — PROGRESS NOTES
[id number] [facility] MEDICAL ONCOLOGY  [address] [place] [address]  [place] [address]  Dept: [phone]  Loc: [phone]  Attending Progress Note      Reason for Visit:   Metastatic breast cancer. PCP:  Rosa Leon DO    History of Present Illness: The patient is a 74 y.o. lady, with a past medical history significant for hydrocephalus, who had presented with left breast changes, she had noticed a dark spot on her left breast, then she had developed induration, she thinks that she had the breast changes for months. The patient had never had a mammogram done, had a CT scan of the chest done, revealing a solid left breast mass measuring at least 6.8 x 2.9 cm in size is identified. The mass appears to extend to the skin surface. The deep margin of the mass abuts the chest wall but a fat plane appears to be preserved. Pathologically enlarged left  axillary lymph nodes are seen measuring up to 4.5 x 1.9 cm in size. CT abdomen and pelvis was done on 8/30/2019 revealing partial visualization of soft tissue mass in the left breast, nonspecific lymph nodes in the upper abdomen. Bone scan was done on 8/30/2019, revealing a Prominent focus of radiotracer uptake in the left anterior superior iliac spine, with corresponding abnormality on recent CT, suggestive of metastasis. More subtle focal radiotracer uptake in the right anterior superior iliac spine could be due to metastasis or  degenerative change. 2. Focal radiotracer uptake in the lateral aspect of the right ninth  rib, which appears to be due to a nondisplaced healing fracture. Correlation with history of recent trauma is recommended. In light of  other findings, this could represent a pathologic fracture with an  underlying metastatic lesion. 3. Osteoarthritic uptake in the shoulders, knees, wrists, and hands.     She underwent on 8/30/2019 a biopsy of 1 of the left axillary lymph nodes, she was consistent with metastatic ductal carcinoma, nuclear grade 2, ER positive greater than 95% [place] +70% HER-2/elder 1+, by IHC, negative. The patient was started on systemic therapy with Femara and Ibrance on 9/13/2019. She denies any side effects from the treatment. She denies any new problems. The appearance of the left breast has significant improved, no drainage. [facility] returns for a follow-up visit. [facility] is doing well overall. No reported side effects from the [place] and Femara. No left breast pain or discharge. She is keeping herself well-hydrated. She continues to follow with neurosurgery, no interventions were recommended at this time. The patient returns for a follow-up visit, she denies any new problems. She is accompanied by her sister. Taking the [place] and Femara as prescribed. No reported side effects, she has not been able to do the scans due to transportation issues. Her sister does not have a car at this time. She continues to follow with neurosurgery. Review of Systems;  CONSTITUTIONAL: No fever, chills. Good appetite and energy level. ENMT: Eyes: No diplopia; Nose: No epistaxis. Mouth: No sore throat. RESPIRATORY: No hemoptysis, shortness of breath, cough. CARDIOVASCULAR: No chest pain, palpitations. GASTROINTESTINAL: No nausea/vomiting, abdominal pain, diarrhea/constipation. GENITOURINARY: No dysuria, urinary frequency, hematuria. NEURO: No syncope, presyncope, headache. MSK: left knee pain resolved.   Remainder:  ROS NEGATIVE    Past Medical History:      Diagnosis Date    Arm fracture, left 2009    Balance problems since April, 2011    Hydrocephalus [facility])     compensated    Thyroid disease     Vertigo      Patient Active Problem List   Diagnosis    Hydrocephalus, adult ([facility] [address]    Hydrocephalus ([facility] [address]    Hyponatremia    Breast mass    Malignant neoplasm of axillary tail of left female breast ([facility] [address]    Breast cancer metastasized to bone [facility])    Carcinoma of breast metastatic to bone ([facility])    Anemia    Thrombocytopenia, unspecified        Past Surgical History:      Procedure Laterality Date    ARM SURGERY  9/15/2009    FRACTURE SURGERY      HUMERUS FRACTURE SURGERY  2009    Broke left arm        Family History:  Family History   Problem Relation Age of Onset    Colon Cancer Mother 68    Heart Disease Father     Diabetes Father        Medications:  Reviewed and reconciled. Social History:  Social History     Socioeconomic History    Marital status: Single     Spouse name: Not on file    Number of children: Not on file    Years of education: Not on file    Highest education level: Not on file   Occupational History    Not on file   Tobacco Use    Smoking status: Never    Smokeless tobacco: Never   Vaping Use    Vaping Use: Never used   Substance and Sexual Activity    Alcohol use: Yes     Comment: drinks it occasional on social events    Drug use: No    Sexual activity: Never   Other Topics Concern    Not on file   Social History Narrative    Not on file     Social Determinants of Health     Financial Resource Strain: Low Risk     Difficulty of Paying Living Expenses: Not hard at all   Food Insecurity: No Food Insecurity    Worried About Running Out of Food in the Last Year: Never true    Ran Out of Food in the Last Year: Never true   Transportation Needs: Not on file   Physical Activity: Inactive    Days of Exercise per Week: 0 days    Minutes of Exercise per Session: 0 min   Stress: Not on file   Social Connections: Not on file   Intimate Partner Violence: Not on file   Housing Stability: Not on file       Allergies:  No Known Allergies    Physical Exam:  BP (!) 128/58   Pulse 69   Temp 98.7 °F (37.1 °C)   Ht 5' 6\" (1.676 m)   Wt 153 lb 3.2 oz (69.5 kg)   SpO2 100%   BMI 24.73 kg/m²     GENERAL: Alert, oriented x 3, not in acute distress. HEENT: PERRLA; EOMI. Oropharynx clear. NECK: Supple. No palpable cervical or supraclavicular lymphadenopathy.    LUNGS: Good air entry bilaterally. No wheezing, crackles or rhonchi. CARDIOVASCULAR: Regular rate. No murmurs, rubs or gallops. BREASTS: The right breast exam is negative for any skin changes, nipple discharge, no palpable masses, no palpable right axillary adenopathy, left breast appearance had markedly improved, the nodules had resolved, the mass decreased in size, no drainage, no bleeding, decrease in the size of the left axillary lymphadenopathy  ABDOMEN: Soft. Non-tender, non-distended. Positive bowel sounds. EXTREMITIES: mild chronic edema of the LLE  NEUROLOGIC: No focal deficits. ECOG PS 1     Impression/Plan:     The patient is a 74 y.o. lady, with a past medical history significant for hydrocephalus, who had presented with left breast changes, who was diagnosed with a left breast ductal carcinoma, ER positive greater than 95% [place] +70% HER-2/elder 1+, she has a very locally advanced disease, and metastatic disease to the bones. I discussed with the patient and her sister today her diagnosis, prognosis and recommendations for treatment. Will complete the staging work-up, the patient will have a brain MRI with and without contrast done, and a PET scan. For her HR pos, HER-2/elder negative metastatic breast cancer, I recommended treatment with Femara and Ibrance, the schedule and side effects of the treatment were reviewed with her. She has metastatic disease to the bones, recommended treatment with Xgeva. MRI brain done on 09/16/2019: no evidence of metastatic disease. PET/CT on 09/17/2019: The lytic soft tissue finding in the iliac crest in the left pelvis shows hypermetabolic FDG uptake, with maximum measurements in the 5.7 SUV range, strongly suggesting metastatic disease. She has uptake in the thyroid, could be physiologic. Femara/Ibrance were started on 09/13/2019.       Bone scan done on 12/17/2019: Redemonstration of focus of radiotracer uptake involving posterior lateral right ninth rib suggestive of healing fracture. New radiotracer activity is seen at level of lateral right 10th rib and anterior right seventh rib which may be related to new healing fractures. Metastases is unlikely. Clinical correlation recommended. No additional abnormal areas of increased radiotracer uptake. CT chest done on 12/17/2019: Significant interval decrease in size of the large mass seen in the left breast 8/29/2019. Interval decrease in size of enlarged left axillary lymph nodes. Cardiomegaly. Enlarged thyroid, unchanged. Small hiatal hernia. Diffuse interstitial changes and multifocal atelectasis or scarring in both lungs. Stable small nodular density in the right upper lobe. Sclerotic changes involving the lateral right ninth rib. CT abdomen/pelvis done on 12/17/2019: Small right renal cyst. Retroverted uterus. Distended colon and rectum containing a large amount of stool. Mildly enlarged retroperitoneal and periportal lymph nodes, unchanged. Findings in the left iliac bone as described. The results/images of the scans were reviewed with the patient and her sister, she is responding nicely to the [place] and Lea Puentes, will continue the same treatment. She did have teeth extraction on 1/20/2020, dental clearance has been obtained. On 4/3/2020, the patient was started on Ibrance and Femara cycle #8. Restaging CT Chest/Abdomen/Pelvis and bone scan on 04/24/2020:  CT Chest noted stable small bilateral pulmonary nodules; CT abdomen/pelvis no evidence of new active neoplasm compared to prior. Bone scan 3 foci of radiotracer uptake in the lower right ribs, 2 of which demonstrate less activity on the current study, possibly indicative of healing fractures. Radiotracer in the more anterior focus is more persistent, and may be a metastatic lesion. The results/images of the scans were reviewed with the patient and her sister, the patient has an overall stable disease.   Recommended to continue with Femara and Ibrance. The patient had a PET scan done on December 3, 2020, she had complete metabolic response since the PET scan on September 17, 8895, no metabolically active recurrent or metastatic disease, metabolic activity throughout the thyroid lobes can be seen with chronic thyroiditis. The patient had complete response to treatment, continue Ibrance and Femara. The dose of the Yanira Vernon has been to 75 mg due to leukopenia/neutropenia. The patient had restaging CT scans of the chest, abdomen pelvis done on 6/1/2021, bone scan on 6/11/2021, revealing an overall stable disease. Recommended to continue Ibrance and Femara. Patient had restaging scans done on 10/26/2021, revealing an overall stable disease, couple of upper lobe pulmonary nodules and rib sclerosis are redemonstrated, stable. No new areas of concern. Recommended to continue with Ibrance and Femara. Restaging CT scans of the chest, abdomen, pelvis and a bone scan were done on 5/2/2022, there was no evidence of progression of disease within the chest with stable left upper lobe pulmonary nodule areas and indeterminate sclerosis in the rib, there was no uptake on the bone scan, recommended to continue with Ibrance and Femara. Today 1 12/21/2022, the patient is doing well clinically, labs reviewed, she has macrocytic anemia and leukopenia secondary to Ibrance. Her counts are overall stable. ANC remains greater than 1000. No indication for dose adjustment of the Ibrance, we will continue to monitor her CBCD. Restaging CT scans of the chest, abdomen, pelvis and a bone scan were ordered, she has not been able to have them done due to transportation issues. We discussed referral to [facility] to help with transportation if needed. [facility] was on 11/23/2022, today 12/21/2022, labs reviewed, proceed with Xgeva.     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 RTC in 4 weeks. Thank you for allowing us to participate in the care of Ms. Henry Caban.     Jin Romeo MD   HEMATOLOGY/MEDICAL [address] MEDICAL ONCOLOGY  [address] [place] [id number]  Dept: [phone]: [phone]

## 2022-12-24 LAB — CA 15-3: 15 U/ML (ref 0–31)

## 2022-12-29 ENCOUNTER — TELEPHONE (OUTPATIENT)
Dept: INFUSION THERAPY | Age: 72
End: 2022-12-29

## 2022-12-29 NOTE — TELEPHONE ENCOUNTER
Recvd message that pts sister wanted me to call her,. Regarding her asst. I called her back and she had questions reagrding the enrollment for new year.  I answered all her questions and and I will meet with her at next appt

## 2023-01-18 ENCOUNTER — OFFICE VISIT (OUTPATIENT)
Dept: ONCOLOGY | Age: 73
End: 2023-01-18
Payer: MEDICARE

## 2023-01-18 ENCOUNTER — HOSPITAL ENCOUNTER (OUTPATIENT)
Dept: INFUSION THERAPY | Age: 73
Discharge: HOME | End: 2023-01-18
Payer: MEDICARE

## 2023-01-18 ENCOUNTER — HOSPITAL ENCOUNTER (OUTPATIENT)
Age: 73
Discharge: HOME | End: 2023-01-18
Payer: MEDICARE

## 2023-01-18 VITALS
BODY MASS INDEX: 24.33 KG/M2 | WEIGHT: 151.4 LBS | TEMPERATURE: 97.4 F | SYSTOLIC BLOOD PRESSURE: 148 MMHG | DIASTOLIC BLOOD PRESSURE: 64 MMHG | HEART RATE: 71 BPM | HEIGHT: 66 IN

## 2023-01-18 DIAGNOSIS — D69.6 THROMBOCYTOPENIA, UNSPECIFIED (HCC): ICD-10-CM

## 2023-01-18 DIAGNOSIS — C50.919 CARCINOMA OF BREAST METASTATIC TO BONE, UNSPECIFIED LATERALITY (HCC): Primary | ICD-10-CM

## 2023-01-18 DIAGNOSIS — C50.919 CARCINOMA OF BREAST METASTATIC TO BONE, UNSPECIFIED LATERALITY (HCC): ICD-10-CM

## 2023-01-18 DIAGNOSIS — C79.51 CARCINOMA OF BREAST METASTATIC TO BONE, UNSPECIFIED LATERALITY (HCC): ICD-10-CM

## 2023-01-18 DIAGNOSIS — C79.51 CARCINOMA OF BREAST METASTATIC TO BONE, UNSPECIFIED LATERALITY (HCC): Primary | ICD-10-CM

## 2023-01-18 DIAGNOSIS — C50.612 MALIGNANT NEOPLASM OF AXILLARY TAIL OF LEFT FEMALE BREAST, UNSPECIFIED ESTROGEN RECEPTOR STATUS (HCC): Primary | ICD-10-CM

## 2023-01-18 LAB
ALBUMIN SERPL-MCNC: 4.4 G/DL (ref 3.5–5.2)
ALP BLD-CCNC: 85 U/L (ref 35–104)
ALT SERPL-CCNC: 17 U/L (ref 0–32)
ANION GAP SERPL CALCULATED.3IONS-SCNC: 8 MMOL/L (ref 7–16)
AST SERPL-CCNC: 24 U/L (ref 0–31)
BASOPHILS ABSOLUTE: 0.03 E9/L (ref 0–0.2)
BASOPHILS RELATIVE PERCENT: 1.7 % (ref 0–2)
BILIRUB SERPL-MCNC: 0.5 MG/DL (ref 0–1.2)
BUN BLDV-MCNC: 22 MG/DL (ref 6–23)
CALCIUM SERPL-MCNC: 9.7 MG/DL (ref 8.6–10.2)
CEA: 1.6 NG/ML (ref 0–5.2)
CHLORIDE BLD-SCNC: 103 MMOL/L (ref 98–107)
CO2: 26 MMOL/L (ref 22–29)
CREAT SERPL-MCNC: 0.9 MG/DL (ref 0.5–1)
EOSINOPHILS ABSOLUTE: 0.03 E9/L (ref 0.05–0.5)
EOSINOPHILS RELATIVE PERCENT: 1.7 % (ref 0–6)
GFR SERPL CREATININE-BSD FRML MDRD: >60 ML/MIN/1.73
GLUCOSE BLD-MCNC: 95 MG/DL (ref 74–99)
HCT VFR BLD CALC: 32.4 % (ref 34–48)
HEMOGLOBIN: 10.8 G/DL (ref 11.5–15.5)
IMMATURE GRANULOCYTES #: 0.01 E9/L
IMMATURE GRANULOCYTES %: 0.6 % (ref 0–5)
LYMPHOCYTES ABSOLUTE: 0.4 E9/L (ref 1.5–4)
LYMPHOCYTES RELATIVE PERCENT: 22.6 % (ref 20–42)
MCH RBC QN AUTO: 32.2 PG (ref 26–35)
MCHC RBC AUTO-ENTMCNC: 33.3 % (ref 32–34.5)
MCV RBC AUTO: 96.7 FL (ref 80–99.9)
MONOCYTES ABSOLUTE: 0.12 E9/L (ref 0.1–0.95)
MONOCYTES RELATIVE PERCENT: 6.8 % (ref 2–12)
NEUTROPHILS ABSOLUTE: 1.18 E9/L (ref 1.8–7.3)
NEUTROPHILS RELATIVE PERCENT: 66.6 % (ref 43–80)
PDW BLD-RTO: 12.5 FL (ref 11.5–15)
PLATELET # BLD: 187 E9/L (ref 130–450)
PMV BLD AUTO: 10.2 FL (ref 7–12)
POTASSIUM SERPL-SCNC: 4.6 MMOL/L (ref 3.5–5)
RBC # BLD: 3.35 E12/L (ref 3.5–5.5)
RBC # BLD: NORMAL 10*6/UL
SODIUM BLD-SCNC: 137 MMOL/L (ref 132–146)
TOTAL PROTEIN: 8.5 G/DL (ref 6.4–8.3)
WBC # BLD: 1.8 E9/L (ref 4.5–11.5)

## 2023-01-18 PROCEDURE — 6360000002 HC RX W HCPCS: Performed by: INTERNAL MEDICINE

## 2023-01-18 PROCEDURE — G8427 DOCREV CUR MEDS BY ELIG CLIN: HCPCS | Performed by: INTERNAL MEDICINE

## 2023-01-18 PROCEDURE — G8420 CALC BMI NORM PARAMETERS: HCPCS | Performed by: INTERNAL MEDICINE

## 2023-01-18 PROCEDURE — 1090F PRES/ABSN URINE INCON ASSESS: CPT | Performed by: INTERNAL MEDICINE

## 2023-01-18 PROCEDURE — 99214 OFFICE O/P EST MOD 30 MIN: CPT | Performed by: INTERNAL MEDICINE

## 2023-01-18 PROCEDURE — 82378 CARCINOEMBRYONIC ANTIGEN: CPT

## 2023-01-18 PROCEDURE — 85025 COMPLETE CBC W/AUTO DIFF WBC: CPT

## 2023-01-18 PROCEDURE — 1123F ACP DISCUSS/DSCN MKR DOCD: CPT | Performed by: INTERNAL MEDICINE

## 2023-01-18 PROCEDURE — 80053 COMPREHEN METABOLIC PANEL: CPT

## 2023-01-18 PROCEDURE — 3017F COLORECTAL CA SCREEN DOC REV: CPT | Performed by: INTERNAL MEDICINE

## 2023-01-18 PROCEDURE — 99212 OFFICE O/P EST SF 10 MIN: CPT

## 2023-01-18 PROCEDURE — G8400 PT W/DXA NO RESULTS DOC: HCPCS | Performed by: INTERNAL MEDICINE

## 2023-01-18 PROCEDURE — 1036F TOBACCO NON-USER: CPT | Performed by: INTERNAL MEDICINE

## 2023-01-18 PROCEDURE — G8484 FLU IMMUNIZE NO ADMIN: HCPCS | Performed by: INTERNAL MEDICINE

## 2023-01-18 PROCEDURE — 96372 THER/PROPH/DIAG INJ SC/IM: CPT

## 2023-01-18 PROCEDURE — 86300 IMMUNOASSAY TUMOR CA 15-3: CPT

## 2023-01-18 RX ADMIN — DENOSUMAB 120 MG: 120 INJECTION SUBCUTANEOUS at 12:47

## 2023-01-18 NOTE — PROGRESS NOTES
[id number] [facility] MEDICAL ONCOLOGY  [address] [place] [address]  [place] [address]  Dept: [phone]  Loc: [phone]  Attending Progress Note      Reason for Visit:   Metastatic breast cancer. PCP:  Shahla Hernnadez DO    History of Present Illness: The patient is a 74 y.o. lady, with a past medical history significant for hydrocephalus, who had presented with left breast changes, she had noticed a dark spot on her left breast, then she had developed induration, she thinks that she had the breast changes for months. The patient had never had a mammogram done, had a CT scan of the chest done, revealing a solid left breast mass measuring at least 6.8 x 2.9 cm in size is identified. The mass appears to extend to the skin surface. The deep margin of the mass abuts the chest wall but a fat plane appears to be preserved. Pathologically enlarged left  axillary lymph nodes are seen measuring up to 4.5 x 1.9 cm in size. CT abdomen and pelvis was done on 8/30/2019 revealing partial visualization of soft tissue mass in the left breast, nonspecific lymph nodes in the upper abdomen. Bone scan was done on 8/30/2019, revealing a Prominent focus of radiotracer uptake in the left anterior superior iliac spine, with corresponding abnormality on recent CT, suggestive of metastasis. More subtle focal radiotracer uptake in the right anterior superior iliac spine could be due to metastasis or  degenerative change. 2. Focal radiotracer uptake in the lateral aspect of the right ninth  rib, which appears to be due to a nondisplaced healing fracture. Correlation with history of recent trauma is recommended. In light of  other findings, this could represent a pathologic fracture with an  underlying metastatic lesion. 3. Osteoarthritic uptake in the shoulders, knees, wrists, and hands.     She underwent on 8/30/2019 a biopsy of 1 of the left axillary lymph nodes, she was consistent with metastatic ductal carcinoma, nuclear grade 2, ER positive greater than 95% GA +70% HER-2/elder 1+, by IHC, negative. The patient was started on systemic therapy with Femara and Ibrance on 9/13/2019. She denies any side effects from the treatment. She denies any new problems. The appearance of the left breast has significant improved, no drainage. Shelli Byrne returns for a follow-up visit. Shelli Byrne is doing well overall. No reported side effects from the [place] and Femara. No left breast pain or discharge. She is keeping herself well-hydrated. She continues to follow with neurosurgery, no interventions were recommended at this time. The patient returns for a follow-up visit, the patient is doing well overall. She is accompanied by her sister. Taking the [place] and Femara as prescribed. No reported side effects, she has not been able to do the scans due to transportation issues. Her sister does not have a car at this time. She continues to follow with neurosurgery. Review of Systems;  CONSTITUTIONAL: No fever, chills. Good appetite and energy level. ENMT: Eyes: No diplopia; Nose: No epistaxis. Mouth: No sore throat. RESPIRATORY: No hemoptysis, shortness of breath, cough. CARDIOVASCULAR: No chest pain, palpitations. GASTROINTESTINAL: No nausea/vomiting, abdominal pain, diarrhea/constipation. GENITOURINARY: No dysuria, urinary frequency, hematuria. NEURO: No syncope, presyncope, headache. MSK: left knee pain resolved.   Remainder:  ROS NEGATIVE    Past Medical History:      Diagnosis Date    Arm fracture, left 2009    Balance problems since April, 2011    Hydrocephalus [facility])     compensated    Thyroid disease     Vertigo      Patient Active Problem List   Diagnosis    Hydrocephalus, adult ([facility] [address]    Hydrocephalus ([facility] [address]    Hyponatremia    Breast mass    Malignant neoplasm of axillary tail of left female breast ([address]    Breast cancer metastasized to bone ([facility])    Carcinoma of breast metastatic to bone ([facility])    Anemia    Thrombocytopenia, unspecified        Past Surgical History:      Procedure Laterality Date    ARM SURGERY  9/15/2009    FRACTURE SURGERY      HUMERUS FRACTURE SURGERY  2009    Broke left arm        Family History:  Family History   Problem Relation Age of Onset    Colon Cancer Mother 68    Heart Disease Father     Diabetes Father        Medications:  Reviewed and reconciled. Social History:  Social History     Socioeconomic History    Marital status: Single     Spouse name: Not on file    Number of children: Not on file    Years of education: Not on file    Highest education level: Not on file   Occupational History    Not on file   Tobacco Use    Smoking status: Never    Smokeless tobacco: Never   Vaping Use    Vaping Use: Never used   Substance and Sexual Activity    Alcohol use: Yes     Comment: drinks it occasional on social events    Drug use: No    Sexual activity: Never   Other Topics Concern    Not on file   Social History Narrative    Not on file     Social Determinants of Health     Financial Resource Strain: Low Risk     Difficulty of Paying Living Expenses: Not hard at all   Food Insecurity: No Food Insecurity    Worried About Running Out of Food in the Last Year: Never true    Ran Out of Food in the Last Year: Never true   Transportation Needs: Not on file   Physical Activity: Inactive    Days of Exercise per Week: 0 days    Minutes of Exercise per Session: 0 min   Stress: Not on file   Social Connections: Not on file   Intimate Partner Violence: Not on file   Housing Stability: Not on file       Allergies:  No Known Allergies    Physical Exam:  BP (!) 148/64   Pulse 71   Temp 97.4 °F (36.3 °C)   Ht 5' 6\" (1.676 m)   Wt 151 lb 6.4 oz (68.7 kg)   BMI 24.44 kg/m²     GENERAL: Alert, oriented x 3, not in acute distress. HEENT: PERRLA; EOMI. Oropharynx clear. NECK: Supple. No palpable cervical or supraclavicular lymphadenopathy.    LUNGS: Good air entry bilaterally. No wheezing, crackles or rhonchi. CARDIOVASCULAR: Regular rate. No murmurs, rubs or gallops. BREASTS: The right breast exam is negative for any skin changes, nipple discharge, no palpable masses, no palpable right axillary adenopathy, left breast appearance had markedly improved, the nodules had resolved, the mass decreased in size, no drainage, no bleeding, decrease in the size of the left axillary lymphadenopathy  ABDOMEN: Soft. Non-tender, non-distended. Positive bowel sounds. EXTREMITIES: mild chronic edema of the LLE  NEUROLOGIC: No focal deficits. ECOG PS 1     Impression/Plan:     The patient is a 74 y.o. lady, with a past medical history significant for hydrocephalus, who had presented with left breast changes, who was diagnosed with a left breast ductal carcinoma, ER positive greater than 95% NH +70% HER-2/elder 1+, she has a very locally advanced disease, and metastatic disease to the bones. I discussed with the patient and her sister today her diagnosis, prognosis and recommendations for treatment. Will complete the staging work-up, the patient will have a brain MRI with and without contrast done, and a PET scan. For her HR pos, HER-2/elder negative metastatic breast cancer, I recommended treatment with Femara and Ibrance, the schedule and side effects of the treatment were reviewed with her. She has metastatic disease to the bones, recommended treatment with Xgeva. MRI brain done on 09/16/2019: no evidence of metastatic disease. PET/CT on 09/17/2019: The lytic soft tissue finding in the iliac crest in the left pelvis shows hypermetabolic FDG uptake, with maximum measurements in the 5.7 SUV range, strongly suggesting metastatic disease. She has uptake in the thyroid, could be physiologic. Femara/Ibrance were started on 09/13/2019.       Bone scan done on 12/17/2019: Redemonstration of focus of radiotracer uptake involving posterior lateral right ninth rib suggestive of healing fracture. New radiotracer activity is seen at level of lateral right 10th rib and anterior right seventh rib which may be related to new healing fractures. Metastases is unlikely. Clinical correlation recommended. No additional abnormal areas of increased radiotracer uptake. CT chest done on 12/17/2019: Significant interval decrease in size of the large mass seen in the left breast 8/29/2019. Interval decrease in size of enlarged left axillary lymph nodes. Cardiomegaly. Enlarged thyroid, unchanged. Small hiatal hernia. Diffuse interstitial changes and multifocal atelectasis or scarring in both lungs. Stable small nodular density in the right upper lobe. Sclerotic changes involving the lateral right ninth rib. CT abdomen/pelvis done on 12/17/2019: Small right renal cyst. Retroverted uterus. Distended colon and rectum containing a large amount of stool. Mildly enlarged retroperitoneal and periportal lymph nodes, unchanged. Findings in the left iliac bone as described. The results/images of the scans were reviewed with the patient and her sister, she is responding nicely to the Femara and Madelin James, will continue the same treatment. She did have teeth extraction on 1/20/2020, dental clearance has been obtained. On 4/3/2020, the patient was started on Ibrance and Femara cycle #8. Restaging CT Chest/Abdomen/Pelvis and bone scan on 04/24/2020:  CT Chest noted stable small bilateral pulmonary nodules; CT abdomen/pelvis no evidence of new active neoplasm compared to prior. Bone scan 3 foci of radiotracer uptake in the lower right ribs, 2 of which demonstrate less activity on the current study, possibly indicative of healing fractures. Radiotracer in the more anterior focus is more persistent, and may be a metastatic lesion. The results/images of the scans were reviewed with the patient and her sister, the patient has an overall stable disease. Recommended to continue with Femara and Ibrance.     The patient had a PET scan done on December 3, 2020, she had complete metabolic response since the PET scan on September 17, 0080, no metabolically active recurrent or metastatic disease, metabolic activity throughout the thyroid lobes can be seen with chronic thyroiditis. The patient had complete response to treatment, continue Ibrance and Femara. The dose of the Mayra Zamorano has been to 75 mg due to leukopenia/neutropenia. The patient had restaging CT scans of the chest, abdomen pelvis done on 6/1/2021, bone scan on 6/11/2021, revealing an overall stable disease. Recommended to continue Ibrance and Femara. Patient had restaging scans done on 10/26/2021, revealing an overall stable disease, couple of upper lobe pulmonary nodules and rib sclerosis are redemonstrated, stable. No new areas of concern. Recommended to continue with Ibrance and Femara. Restaging CT scans of the chest, abdomen, pelvis and a bone scan were done on 5/2/2022, there was no evidence of progression of disease within the chest with stable left upper lobe pulmonary nodule areas and indeterminate sclerosis in the rib, there was no uptake on the bone scan, recommended to continue with Ibrance and Femara. Today 1/18/2023, the patient is doing well clinically, labs reviewed, she has macrocytic anemia and leukopenia with grade 2 neutropenia, secondary to Ibrance. Her counts are overall stable. ANC remains greater than 1000. No indication for dose adjustment of the Ibrance, we will continue to monitor her CBCD. Restaging CT scans of the chest, abdomen, pelvis and a bone scan were ordered, she has not been able to have them done due to transportation issues, the patient sister will get them scheduled. Last Lizzy Dubois was on 12/21/2022, today 1/18/2023, labs reviewed, proceed with Xgeva. 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     RTC in 4 weeks.    Thank you for allowing us to participate in the care of Ms. Mp Mcgee.     Elvis Mcqueen MD   HEMATOLOGY/MEDICAL [address] MEDICAL ONCOLOGY  [address] [place] [address]  Dept: [phone] [place]: [phone]

## 2023-01-21 LAB — CA 15-3: 13 U/ML (ref 0–31)

## 2023-01-30 DIAGNOSIS — E55.9 VITAMIN D DEFICIENCY: ICD-10-CM

## 2023-01-30 RX ORDER — ERGOCALCIFEROL 1.25 MG/1
CAPSULE ORAL
Qty: 12 CAPSULE | Refills: 0 | Status: SHIPPED | OUTPATIENT
Start: 2023-01-30

## 2023-02-02 ENCOUNTER — TELEPHONE (OUTPATIENT)
Dept: INFUSION THERAPY | Age: 73
End: 2023-02-02

## 2023-02-02 NOTE — TELEPHONE ENCOUNTER
Pt called in earlier in the week with some questions-This nurse called pt on her listed phone numbers and pts sister, Latoya Corley, answered and stated that she handles all of pts calls and medical concerns-This nurse explained to Latoya Corley that pt had called in and left a voicemail stating that she thought her cancer was cured and now she is confused and needs to speak to someone-Chelly stated that pt has spoke to Dr Danielle Ramirez about this several times and it was explained to her several times, but pt becomes forgetful-Chelly stated that this nurse does not need to reach pt to discuss this because it has been explained to the pt multiple times-Sister was reminded to call central scheduling to have pts scan scheduled-Chelly voiced Mando Jacobsen RN

## 2023-02-14 ENCOUNTER — HOSPITAL ENCOUNTER (OUTPATIENT)
Dept: NUCLEAR MEDICINE | Age: 73
Discharge: HOME | End: 2023-02-14
Payer: MEDICARE

## 2023-02-14 ENCOUNTER — HOSPITAL ENCOUNTER (OUTPATIENT)
Dept: CT IMAGING | Age: 73
Discharge: HOME | End: 2023-02-16
Payer: MEDICARE

## 2023-02-14 DIAGNOSIS — C50.612 MALIGNANT NEOPLASM OF AXILLARY TAIL OF LEFT FEMALE BREAST, UNSPECIFIED ESTROGEN RECEPTOR STATUS (HCC): ICD-10-CM

## 2023-02-14 PROCEDURE — 3430000000 HC RX DIAGNOSTIC RADIOPHARMACEUTICAL: Performed by: RADIOLOGY

## 2023-02-14 PROCEDURE — A9503 TC99M MEDRONATE: HCPCS | Performed by: RADIOLOGY

## 2023-02-14 PROCEDURE — 78306 BONE IMAGING WHOLE BODY: CPT | Performed by: INTERNAL MEDICINE

## 2023-02-14 PROCEDURE — 6360000004 HC RX CONTRAST MEDICATION: Performed by: RADIOLOGY

## 2023-02-14 PROCEDURE — G1010 CDSM STANSON: HCPCS

## 2023-02-14 RX ORDER — TC 99M MEDRONATE 20 MG/10ML
25 INJECTION, POWDER, LYOPHILIZED, FOR SOLUTION INTRAVENOUS
Status: COMPLETED | OUTPATIENT
Start: 2023-02-14 | End: 2023-02-14

## 2023-02-14 RX ADMIN — IOPAMIDOL 18 ML: 755 INJECTION, SOLUTION INTRAVENOUS at 12:04

## 2023-02-14 RX ADMIN — TC 99M MEDRONATE 25 MILLICURIE: 20 INJECTION, POWDER, LYOPHILIZED, FOR SOLUTION INTRAVENOUS at 10:28

## 2023-02-15 ENCOUNTER — TELEPHONE (OUTPATIENT)
Dept: CASE MANAGEMENT | Age: 73
End: 2023-02-15

## 2023-02-15 ENCOUNTER — HOSPITAL ENCOUNTER (OUTPATIENT)
Dept: INFUSION THERAPY | Age: 73
Discharge: HOME | End: 2023-02-15
Payer: MEDICARE

## 2023-02-15 ENCOUNTER — OFFICE VISIT (OUTPATIENT)
Dept: ONCOLOGY | Age: 73
End: 2023-02-15
Payer: MEDICARE

## 2023-02-15 ENCOUNTER — HOSPITAL ENCOUNTER (OUTPATIENT)
Age: 73
Discharge: HOME | End: 2023-02-15
Payer: MEDICARE

## 2023-02-15 VITALS
DIASTOLIC BLOOD PRESSURE: 51 MMHG | TEMPERATURE: 98.1 F | HEART RATE: 71 BPM | SYSTOLIC BLOOD PRESSURE: 124 MMHG | WEIGHT: 154.6 LBS | BODY MASS INDEX: 24.85 KG/M2 | HEIGHT: 66 IN | OXYGEN SATURATION: 100 %

## 2023-02-15 DIAGNOSIS — C50.919 CARCINOMA OF BREAST METASTATIC TO BONE, UNSPECIFIED LATERALITY (HCC): Primary | ICD-10-CM

## 2023-02-15 DIAGNOSIS — C50.612 MALIGNANT NEOPLASM OF AXILLARY TAIL OF LEFT FEMALE BREAST, UNSPECIFIED ESTROGEN RECEPTOR STATUS (HCC): ICD-10-CM

## 2023-02-15 DIAGNOSIS — C79.51 CARCINOMA OF BREAST METASTATIC TO BONE, UNSPECIFIED LATERALITY (HCC): Primary | ICD-10-CM

## 2023-02-15 DIAGNOSIS — C50.919 CARCINOMA OF BREAST METASTATIC TO BONE, UNSPECIFIED LATERALITY (HCC): ICD-10-CM

## 2023-02-15 DIAGNOSIS — C79.51 CARCINOMA OF BREAST METASTATIC TO BONE, UNSPECIFIED LATERALITY (HCC): ICD-10-CM

## 2023-02-15 LAB
ALBUMIN SERPL-MCNC: 3.9 G/DL (ref 3.5–5.2)
ALP BLD-CCNC: 81 U/L (ref 35–104)
ALT SERPL-CCNC: 18 U/L (ref 0–32)
ANION GAP SERPL CALCULATED.3IONS-SCNC: 7 MMOL/L (ref 7–16)
AST SERPL-CCNC: 25 U/L (ref 0–31)
BASOPHILS ABSOLUTE: 0.02 E9/L (ref 0–0.2)
BASOPHILS RELATIVE PERCENT: 0.9 % (ref 0–2)
BILIRUB SERPL-MCNC: 0.3 MG/DL (ref 0–1.2)
BUN BLDV-MCNC: 26 MG/DL (ref 6–23)
CALCIUM SERPL-MCNC: 9 MG/DL (ref 8.6–10.2)
CEA: 1.6 NG/ML (ref 0–5.2)
CHLORIDE BLD-SCNC: 99 MMOL/L (ref 98–107)
CO2: 28 MMOL/L (ref 22–29)
CREAT SERPL-MCNC: 1.1 MG/DL (ref 0.5–1)
EOSINOPHILS ABSOLUTE: 0.03 E9/L (ref 0.05–0.5)
EOSINOPHILS RELATIVE PERCENT: 1.8 % (ref 0–6)
GFR SERPL CREATININE-BSD FRML MDRD: 52 ML/MIN/1.73
GLUCOSE BLD-MCNC: 87 MG/DL (ref 74–99)
HCT VFR BLD CALC: 29.8 % (ref 34–48)
HEMOGLOBIN: 9.9 G/DL (ref 11.5–15.5)
LYMPHOCYTES ABSOLUTE: 0.31 E9/L (ref 1.5–4)
LYMPHOCYTES RELATIVE PERCENT: 17.3 % (ref 20–42)
MCH RBC QN AUTO: 31.9 PG (ref 26–35)
MCHC RBC AUTO-ENTMCNC: 33.2 % (ref 32–34.5)
MCV RBC AUTO: 96.1 FL (ref 80–99.9)
MONOCYTES ABSOLUTE: 0.09 E9/L (ref 0.1–0.95)
MONOCYTES RELATIVE PERCENT: 4.5 % (ref 2–12)
NEUTROPHILS ABSOLUTE: 1.35 E9/L (ref 1.8–7.3)
NEUTROPHILS RELATIVE PERCENT: 74.6 % (ref 43–80)
NUCLEATED RED BLOOD CELLS: 0 /100 WBC
OVALOCYTES: ABNORMAL
PDW BLD-RTO: 13.2 FL (ref 11.5–15)
PLATELET # BLD: 187 E9/L (ref 130–450)
PMV BLD AUTO: 10.2 FL (ref 7–12)
POTASSIUM SERPL-SCNC: 4.4 MMOL/L (ref 3.5–5)
PROMYELOCYTES PERCENT: 0.9 % (ref 0–0)
RBC # BLD: 3.1 E12/L (ref 3.5–5.5)
SODIUM BLD-SCNC: 134 MMOL/L (ref 132–146)
TOTAL PROTEIN: 7.8 G/DL (ref 6.4–8.3)
WBC # BLD: 1.8 E9/L (ref 4.5–11.5)

## 2023-02-15 PROCEDURE — 82378 CARCINOEMBRYONIC ANTIGEN: CPT

## 2023-02-15 PROCEDURE — 1036F TOBACCO NON-USER: CPT | Performed by: INTERNAL MEDICINE

## 2023-02-15 PROCEDURE — 80053 COMPREHEN METABOLIC PANEL: CPT

## 2023-02-15 PROCEDURE — 1090F PRES/ABSN URINE INCON ASSESS: CPT | Performed by: INTERNAL MEDICINE

## 2023-02-15 PROCEDURE — G8427 DOCREV CUR MEDS BY ELIG CLIN: HCPCS | Performed by: INTERNAL MEDICINE

## 2023-02-15 PROCEDURE — 85025 COMPLETE CBC W/AUTO DIFF WBC: CPT

## 2023-02-15 PROCEDURE — G8400 PT W/DXA NO RESULTS DOC: HCPCS | Performed by: INTERNAL MEDICINE

## 2023-02-15 PROCEDURE — 86300 IMMUNOASSAY TUMOR CA 15-3: CPT

## 2023-02-15 PROCEDURE — 1123F ACP DISCUSS/DSCN MKR DOCD: CPT | Performed by: INTERNAL MEDICINE

## 2023-02-15 PROCEDURE — 36415 COLL VENOUS BLD VENIPUNCTURE: CPT

## 2023-02-15 PROCEDURE — G8484 FLU IMMUNIZE NO ADMIN: HCPCS | Performed by: INTERNAL MEDICINE

## 2023-02-15 PROCEDURE — 3017F COLORECTAL CA SCREEN DOC REV: CPT | Performed by: INTERNAL MEDICINE

## 2023-02-15 PROCEDURE — 96372 THER/PROPH/DIAG INJ SC/IM: CPT

## 2023-02-15 PROCEDURE — G8420 CALC BMI NORM PARAMETERS: HCPCS | Performed by: INTERNAL MEDICINE

## 2023-02-15 PROCEDURE — 99214 OFFICE O/P EST MOD 30 MIN: CPT | Performed by: INTERNAL MEDICINE

## 2023-02-15 PROCEDURE — 6360000002 HC RX W HCPCS: Performed by: INTERNAL MEDICINE

## 2023-02-15 RX ADMIN — DENOSUMAB 120 MG: 120 INJECTION SUBCUTANEOUS at 11:48

## 2023-02-15 NOTE — TELEPHONE ENCOUNTER
Patient's CT of the chest is now scheduled for 2/27 at the [place] location at 4:00 PM with an arrival time of 3:30 PM. Called patient's sister Sunil Cintron and went over this appointment date and time and also provided her with the scheduling phone number if she needs to reschedule the appointment. She was appreciative of assistance.

## 2023-02-15 NOTE — PROGRESS NOTES
[id number] [facility] MEDICAL ONCOLOGY  [address] [place] [address]  [place] [address]  Dept: [phone]  Loc: [phone]  Attending Progress Note      Reason for Visit:   Metastatic breast cancer. PCP:  Nelida Salmon DO    History of Present Illness: The patient is a 74 y.o. lady, with a past medical history significant for hydrocephalus, who had presented with left breast changes, she had noticed a dark spot on her left breast, then she had developed induration, she thinks that she had the breast changes for months. The patient had never had a mammogram done, had a CT scan of the chest done, revealing a solid left breast mass measuring at least 6.8 x 2.9 cm in size is identified. The mass appears to extend to the skin surface. The deep margin of the mass abuts the chest wall but a fat plane appears to be preserved. Pathologically enlarged left  axillary lymph nodes are seen measuring up to 4.5 x 1.9 cm in size. CT abdomen and pelvis was done on 8/30/2019 revealing partial visualization of soft tissue mass in the left breast, nonspecific lymph nodes in the upper abdomen. Bone scan was done on 8/30/2019, revealing a Prominent focus of radiotracer uptake in the left anterior superior iliac spine, with corresponding abnormality on recent CT, suggestive of metastasis. More subtle focal radiotracer uptake in the right anterior superior iliac spine could be due to metastasis or  degenerative change. 2. Focal radiotracer uptake in the lateral aspect of the right ninth  rib, which appears to be due to a nondisplaced healing fracture. Correlation with history of recent trauma is recommended. In light of  other findings, this could represent a pathologic fracture with an  underlying metastatic lesion. 3. Osteoarthritic uptake in the shoulders, knees, wrists, and hands.     She underwent on 8/30/2019 a biopsy of 1 of the left axillary lymph nodes, she was consistent with metastatic ductal carcinoma, nuclear grade 2, ER positive greater than 95% MN +70% HER-2/elder 1+, by IHC, negative. The patient was started on systemic therapy with Femara and Ibrance on 9/13/2019. She denies any side effects from the treatment. She denies any new problems. The appearance of the left breast has significant improved, no drainage. Saurav Grewal returns for a follow-up visit. Saurav Grewal is doing well overall. No reported side effects from the [place] and Femara. No left breast pain or discharge. She is keeping herself well-hydrated. She continues to follow with neurosurgery, no interventions were recommended at this time. The patient returns for a follow-up visit, the patient is doing well overall. She is accompanied by her sister. Taking the [place] and Femara as prescribed. No reported side effects, she had a CT scan of the abdomen the pelvis and a bone scan done, the chest CT was not done. Review of Systems;  CONSTITUTIONAL: No fever, chills. Good appetite and energy level. ENMT: Eyes: No diplopia; Nose: No epistaxis. Mouth: No sore throat. RESPIRATORY: No hemoptysis, shortness of breath, cough. CARDIOVASCULAR: No chest pain, palpitations. GASTROINTESTINAL: No nausea/vomiting, abdominal pain, diarrhea/constipation. GENITOURINARY: No dysuria, urinary frequency, hematuria. NEURO: No syncope, presyncope, headache. MSK: left knee pain resolved.   Remainder:  ROS NEGATIVE    Past Medical History:      Diagnosis Date    Arm fracture, left 2009    Balance problems since April, 2011    Hydrocephalus [facility])     compensated    Thyroid disease     Vertigo      Patient Active Problem List   Diagnosis    Hydrocephalus, adult ([address]    Hydrocephalus ([address]    Hyponatremia    Breast mass    Malignant neoplasm of axillary tail of left female breast ([address]    Breast cancer metastasized to bone ([facility])    Carcinoma of breast metastatic to bone (H[facility])    Anemia    Thrombocytopenia, unspecified        Past Surgical History:      Procedure Laterality Date    ARM SURGERY  9/15/2009    FRACTURE SURGERY      HUMERUS FRACTURE SURGERY  2009    Broke left arm        Family History:  Family History   Problem Relation Age of Onset    Colon Cancer Mother 68    Heart Disease Father     Diabetes Father        Medications:  Reviewed and reconciled. Social History:  Social History     Socioeconomic History    Marital status: Single     Spouse name: Not on file    Number of children: Not on file    Years of education: Not on file    Highest education level: Not on file   Occupational History    Not on file   Tobacco Use    Smoking status: Never    Smokeless tobacco: Never   Vaping Use    Vaping Use: Never used   Substance and Sexual Activity    Alcohol use: Yes     Comment: drinks it occasional on social events    Drug use: No    Sexual activity: Never   Other Topics Concern    Not on file   Social History Narrative    Not on file     Social Determinants of Health     Financial Resource Strain: Low Risk     Difficulty of Paying Living Expenses: Not hard at all   Food Insecurity: No Food Insecurity    Worried About Running Out of Food in the Last Year: Never true    Ran Out of Food in the Last Year: Never true   Transportation Needs: Not on file   Physical Activity: Inactive    Days of Exercise per Week: 0 days    Minutes of Exercise per Session: 0 min   Stress: Not on file   Social Connections: Not on file   Intimate Partner Violence: Not on file   Housing Stability: Not on file       Allergies:  No Known Allergies    Physical Exam:  BP (!) 124/51   Pulse 71   Temp 98.1 °F (36.7 °C)   Ht 5' 6\" (1.676 m)   Wt 154 lb 9.6 oz (70.1 kg)   SpO2 100%   BMI 24.95 kg/m²     GENERAL: Alert, oriented x 3, not in acute distress. HEENT: PERRLA; EOMI. Oropharynx clear. NECK: Supple. No palpable cervical or supraclavicular lymphadenopathy. LUNGS: Good air entry bilaterally. No wheezing, crackles or rhonchi. CARDIOVASCULAR: Regular rate. No murmurs, rubs or gallops. BREASTS: The right breast exam is negative for any skin changes, nipple discharge, no palpable masses, no palpable right axillary adenopathy, left breast appearance had markedly improved, the nodules had resolved, the mass decreased in size, no drainage, no bleeding, decrease in the size of the left axillary lymphadenopathy  ABDOMEN: Soft. Non-tender, non-distended. Positive bowel sounds. EXTREMITIES: mild chronic edema of the LLE  NEUROLOGIC: No focal deficits. ECOG PS 1     Impression/Plan:     The patient is a 74 y.o. lady, with a past medical history significant for hydrocephalus, who had presented with left breast changes, who was diagnosed with a left breast ductal carcinoma, ER positive greater than 95% MA +70% HER-2/elder 1+, she has a very locally advanced disease, and metastatic disease to the bones. I discussed with the patient and her sister today her diagnosis, prognosis and recommendations for treatment. Will complete the staging work-up, the patient will have a brain MRI with and without contrast done, and a PET scan. For her HR pos, HER-2/elder negative metastatic breast cancer, I recommended treatment with Femara and Ibrance, the schedule and side effects of the treatment were reviewed with her. She has metastatic disease to the bones, recommended treatment with Xgeva. MRI brain done on 09/16/2019: no evidence of metastatic disease. PET/CT on 09/17/2019: The lytic soft tissue finding in the iliac crest in the left pelvis shows hypermetabolic FDG uptake, with maximum measurements in the 5.7 SUV range, strongly suggesting metastatic disease. She has uptake in the thyroid, could be physiologic. Femara/Ibrance were started on 09/13/2019. Bone scan done on 12/17/2019: Redemonstration of focus of radiotracer uptake involving posterior lateral right ninth rib suggestive of healing fracture.  New radiotracer activity is seen at level of lateral right 10th rib and anterior right seventh rib which may be related to new healing fractures. Metastases is unlikely. Clinical correlation recommended. No additional abnormal areas of increased radiotracer uptake. CT chest done on 12/17/2019: Significant interval decrease in size of the large mass seen in the left breast 8/29/2019. Interval decrease in size of enlarged left axillary lymph nodes. Cardiomegaly. Enlarged thyroid, unchanged. Small hiatal hernia. Diffuse interstitial changes and multifocal atelectasis or scarring in both lungs. Stable small nodular density in the right upper lobe. Sclerotic changes involving the lateral right ninth rib. CT abdomen/pelvis done on 12/17/2019: Small right renal cyst. Retroverted uterus. Distended colon and rectum containing a large amount of stool. Mildly enlarged retroperitoneal and periportal lymph nodes, unchanged. Findings in the left iliac bone as described. The results/images of the scans were reviewed with the patient and her sister, she is responding nicely to the Femara and Manasa Castelan, will continue the same treatment. She did have teeth extraction on 1/20/2020, dental clearance has been obtained. On 4/3/2020, the patient was started on Ibrance and Femara cycle #8. Restaging CT Chest/Abdomen/Pelvis and bone scan on 04/24/2020:  CT Chest noted stable small bilateral pulmonary nodules; CT abdomen/pelvis no evidence of new active neoplasm compared to prior. Bone scan 3 foci of radiotracer uptake in the lower right ribs, 2 of which demonstrate less activity on the current study, possibly indicative of healing fractures. Radiotracer in the more anterior focus is more persistent, and may be a metastatic lesion. The results/images of the scans were reviewed with the patient and her sister, the patient has an overall stable disease. Recommended to continue with Femara and Ibrance.     The patient had a PET scan done on December 3, 2020, she had complete metabolic response since the PET scan on September 17, 8175, no metabolically active recurrent or metastatic disease, metabolic activity throughout the thyroid lobes can be seen with chronic thyroiditis. The patient had complete response to treatment, continue Ibrance and Femara. The dose of the Verlinda Matt has been to 75 mg due to leukopenia/neutropenia. The patient had restaging CT scans of the chest, abdomen pelvis done on 6/1/2021, bone scan on 6/11/2021, revealing an overall stable disease. Recommended to continue Ibrance and Femara. Patient had restaging scans done on 10/26/2021, revealing an overall stable disease, couple of upper lobe pulmonary nodules and rib sclerosis are redemonstrated, stable. No new areas of concern. Recommended to continue with Ibrance and Femara. Restaging CT scans of the chest, abdomen, pelvis and a bone scan were done on 5/2/2022, there was no evidence of progression of disease within the chest with stable left upper lobe pulmonary nodule areas and indeterminate sclerosis in the rib, there was no uptake on the bone scan, recommended to continue with Ibrance and Femara. Today 2/15/2023, the patient is doing well clinically, labs reviewed, she has macrocytic anemia and leukopenia with grade 2 neutropenia, secondary to Ibrance. Her counts are overall stable. ANC remains greater than 1000, it is 1350. No indication for dose adjustment of the Ibrance, we will continue to monitor her CBCD. Restaging CT scans of the chest, abdomen, pelvis and a bone scan were ordered, she had a CT scan of the abdomen the pelvis and a bone scan done on 2/14/2023, were negative for disease progression, the results/images were reviewed, radiology will be called to clarify why the chest CT scan was not done. Last Wilfrid Hooks was on 1/18/2023 today 2/15/2023, labs reviewed, proceed with Xgeva.     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 RTC in 4 weeks. Thank you for allowing us to participate in the care of Ms. Jessica Avendano.     Annette Rivas MD   HEMATOLOGY/MEDICAL [address] MEDICAL ONCOLOGY  [address] [place] [id number]  Dept: [phone]: [phone]

## 2023-02-16 LAB — CA 15-3: 12 U/ML (ref 0–31)

## 2023-02-20 DIAGNOSIS — C79.51 CARCINOMA OF BREAST METASTATIC TO BONE, UNSPECIFIED LATERALITY (HCC): Primary | ICD-10-CM

## 2023-02-20 DIAGNOSIS — C50.919 CARCINOMA OF BREAST METASTATIC TO BONE, UNSPECIFIED LATERALITY (HCC): Primary | ICD-10-CM

## 2023-02-21 ENCOUNTER — TELEPHONE (OUTPATIENT)
Dept: INFUSION THERAPY | Age: 73
End: 2023-02-21

## 2023-02-21 NOTE — TELEPHONE ENCOUNTER
PTS SISTER VENTURA CALLED BACK REGARDING POI FOR HER IBRANCE REENROLLMENT OF ASST., I AM MEETING WITH THEM ON THURS AT THE SITE AND ALSO GIVING THEM SAMPLES PER DR SHIVA GRADY

## 2023-02-23 ENCOUNTER — CLINICAL DOCUMENTATION (OUTPATIENT)
Facility: HOSPITAL | Age: 73
End: 2023-02-23

## 2023-02-27 ENCOUNTER — HOSPITAL ENCOUNTER (OUTPATIENT)
Dept: CT IMAGING | Age: 73
Discharge: HOME | End: 2023-03-01
Payer: MEDICARE

## 2023-02-27 DIAGNOSIS — C50.612 MALIGNANT NEOPLASM OF AXILLARY TAIL OF LEFT FEMALE BREAST, UNSPECIFIED ESTROGEN RECEPTOR STATUS (HCC): ICD-10-CM

## 2023-02-27 PROCEDURE — G1010 CDSM STANSON: HCPCS

## 2023-03-08 ENCOUNTER — TELEPHONE (OUTPATIENT)
Facility: HOSPITAL | Age: 73
End: 2023-03-08

## 2023-03-08 NOTE — TELEPHONE ENCOUNTER
RECVD FAX FROM [facility] THE PT IS APPROVED THROUGH THE [facility]GRAM FOR HER IBRANCE TO REC HER RX FREE OF CHARGE TILL 12/31/23

## 2023-03-15 ENCOUNTER — HOSPITAL ENCOUNTER (OUTPATIENT)
Dept: INFUSION THERAPY | Age: 73
Discharge: HOME | End: 2023-03-15
Payer: MEDICARE

## 2023-03-15 ENCOUNTER — OFFICE VISIT (OUTPATIENT)
Dept: ONCOLOGY | Age: 73
End: 2023-03-15
Payer: MEDICARE

## 2023-03-15 ENCOUNTER — HOSPITAL ENCOUNTER (OUTPATIENT)
Age: 73
Discharge: HOME | End: 2023-03-15
Payer: MEDICARE

## 2023-03-15 VITALS
WEIGHT: 147.8 LBS | DIASTOLIC BLOOD PRESSURE: 59 MMHG | HEART RATE: 73 BPM | SYSTOLIC BLOOD PRESSURE: 127 MMHG | BODY MASS INDEX: 23.75 KG/M2 | TEMPERATURE: 97 F | OXYGEN SATURATION: 100 % | HEIGHT: 66 IN

## 2023-03-15 DIAGNOSIS — C50.919 CARCINOMA OF BREAST METASTATIC TO BONE, UNSPECIFIED LATERALITY (HCC): ICD-10-CM

## 2023-03-15 DIAGNOSIS — C79.51 CARCINOMA OF BREAST METASTATIC TO BONE, UNSPECIFIED LATERALITY (HCC): ICD-10-CM

## 2023-03-15 DIAGNOSIS — C50.612 MALIGNANT NEOPLASM OF AXILLARY TAIL OF LEFT FEMALE BREAST, UNSPECIFIED ESTROGEN RECEPTOR STATUS (HCC): Primary | ICD-10-CM

## 2023-03-15 LAB
ALBUMIN SERPL-MCNC: 3.8 G/DL (ref 3.5–5.2)
ALP SERPL-CCNC: 74 U/L (ref 35–104)
ALT SERPL-CCNC: 15 U/L (ref 0–32)
ANION GAP SERPL CALCULATED.3IONS-SCNC: 5 MMOL/L (ref 7–16)
AST SERPL-CCNC: 22 U/L (ref 0–31)
BILIRUB SERPL-MCNC: 0.4 MG/DL (ref 0–1.2)
BUN SERPL-MCNC: 19 MG/DL (ref 6–23)
CALCIUM SERPL-MCNC: 9.2 MG/DL (ref 8.6–10.2)
CEA SERPL-MCNC: 1.6 NG/ML (ref 0–5.2)
CHLORIDE SERPL-SCNC: 103 MMOL/L (ref 98–107)
CO2 SERPL-SCNC: 27 MMOL/L (ref 22–29)
CREAT SERPL-MCNC: 1 MG/DL (ref 0.5–1)
ERYTHROCYTE [DISTWIDTH] IN BLOOD BY AUTOMATED COUNT: 14.6 FL (ref 11.5–15)
GLUCOSE SERPL-MCNC: 84 MG/DL (ref 74–99)
HCT VFR BLD AUTO: 31.7 % (ref 34–48)
HGB BLD-MCNC: 10.6 G/DL (ref 11.5–15.5)
MCH RBC QN AUTO: 32.5 PG (ref 26–35)
MCHC RBC AUTO-ENTMCNC: 33.4 % (ref 32–34.5)
MCV RBC AUTO: 97.2 FL (ref 80–99.9)
PLATELET # BLD AUTO: 232 E9/L (ref 130–450)
PMV BLD AUTO: 10.4 FL (ref 7–12)
POTASSIUM SERPL-SCNC: 4.6 MMOL/L (ref 3.5–5)
PROT SERPL-MCNC: 7.9 G/DL (ref 6.4–8.3)
RBC # BLD AUTO: 3.26 E12/L (ref 3.5–5.5)
SODIUM SERPL-SCNC: 135 MMOL/L (ref 132–146)
WBC # BLD: 2.2 E9/L (ref 4.5–11.5)

## 2023-03-15 PROCEDURE — 1036F TOBACCO NON-USER: CPT | Performed by: INTERNAL MEDICINE

## 2023-03-15 PROCEDURE — 80053 COMPREHEN METABOLIC PANEL: CPT

## 2023-03-15 PROCEDURE — G8400 PT W/DXA NO RESULTS DOC: HCPCS | Performed by: INTERNAL MEDICINE

## 2023-03-15 PROCEDURE — 3017F COLORECTAL CA SCREEN DOC REV: CPT | Performed by: INTERNAL MEDICINE

## 2023-03-15 PROCEDURE — G8484 FLU IMMUNIZE NO ADMIN: HCPCS | Performed by: INTERNAL MEDICINE

## 2023-03-15 PROCEDURE — 96372 THER/PROPH/DIAG INJ SC/IM: CPT

## 2023-03-15 PROCEDURE — 1123F ACP DISCUSS/DSCN MKR DOCD: CPT | Performed by: INTERNAL MEDICINE

## 2023-03-15 PROCEDURE — 82378 CARCINOEMBRYONIC ANTIGEN: CPT

## 2023-03-15 PROCEDURE — 99214 OFFICE O/P EST MOD 30 MIN: CPT | Performed by: INTERNAL MEDICINE

## 2023-03-15 PROCEDURE — 85027 COMPLETE CBC AUTOMATED: CPT

## 2023-03-15 PROCEDURE — 6360000002 HC RX W HCPCS: Performed by: INTERNAL MEDICINE

## 2023-03-15 PROCEDURE — 86300 IMMUNOASSAY TUMOR CA 15-3: CPT

## 2023-03-15 PROCEDURE — G8427 DOCREV CUR MEDS BY ELIG CLIN: HCPCS | Performed by: INTERNAL MEDICINE

## 2023-03-15 PROCEDURE — 36415 COLL VENOUS BLD VENIPUNCTURE: CPT

## 2023-03-15 PROCEDURE — G8420 CALC BMI NORM PARAMETERS: HCPCS | Performed by: INTERNAL MEDICINE

## 2023-03-15 PROCEDURE — 1090F PRES/ABSN URINE INCON ASSESS: CPT | Performed by: INTERNAL MEDICINE

## 2023-03-15 RX ADMIN — DENOSUMAB 120 MG: 120 INJECTION SUBCUTANEOUS at 12:28

## 2023-03-15 NOTE — PROGRESS NOTES
[address] [facility], later [facility] MEDICAL ONCOLOGY  [address] [place] [address]  [place] [address]  Dept: [phone]  Loc: [phone]  Attending Progress Note      Reason for Visit:   Metastatic breast cancer. PCP:  Manuel Marte DO    History of Present Illness: The patient is a 74 y.o. lady, with a past medical history significant for hydrocephalus, who had presented with left breast changes, she had noticed a dark spot on her left breast, then she had developed induration, she thinks that she had the breast changes for months. The patient had never had a mammogram done, had a CT scan of the chest done, revealing a solid left breast mass measuring at least 6.8 x 2.9 cm in size is identified. The mass appears to extend to the skin surface. The deep margin of the mass abuts the chest wall but a fat plane appears to be preserved. Pathologically enlarged left  axillary lymph nodes are seen measuring up to 4.5 x 1.9 cm in size. CT abdomen and pelvis was done on 8/30/2019 revealing partial visualization of soft tissue mass in the left breast, nonspecific lymph nodes in the upper abdomen. Bone scan was done on 8/30/2019, revealing a Prominent focus of radiotracer uptake in the left anterior superior iliac spine, with corresponding abnormality on recent CT, suggestive of metastasis. More subtle focal radiotracer uptake in the right anterior superior iliac spine could be due to metastasis or  degenerative change. 2. Focal radiotracer uptake in the lateral aspect of the right ninth  rib, which appears to be due to a nondisplaced healing fracture. Correlation with history of recent trauma is recommended. In light of  other findings, this could represent a pathologic fracture with an  underlying metastatic lesion. 3. Osteoarthritic uptake in the shoulders, knees, wrists, and hands.     She underwent on 8/30/2019 a biopsy of 1 of the left axillary lymph nodes, she was consistent with metastatic ductal carcinoma, nuclear grade 2, ER positive greater than 95% WI +70% HER-2/elder 1+, by IHC, negative. The patient was started on systemic therapy with Femara and Ibrance on 9/13/2019. She denies any side effects from the treatment. She denies any new problems. The appearance of the left breast has significant improved, no drainage. Shelby Guallpa returns for a follow-up visit. Shelby Guallpa is doing well overall. No reported side effects from the [place] and Femara. No left breast pain or discharge. She is keeping herself well-hydrated. She continues to follow with neurosurgery, no interventions were recommended at this time. The patient returns for a follow-up visit, the patient is doing well overall. She is accompanied by her sister. Taking the [place] and Femara as prescribed. No reported side effects, she had a chest CT scan done. No new problems. Review of Systems;  CONSTITUTIONAL: No fever, chills. Good appetite and energy level. ENMT: Eyes: No diplopia; Nose: No epistaxis. Mouth: No sore throat. RESPIRATORY: No hemoptysis, shortness of breath, cough. CARDIOVASCULAR: No chest pain, palpitations. GASTROINTESTINAL: No nausea/vomiting, abdominal pain, diarrhea/constipation. GENITOURINARY: No dysuria, urinary frequency, hematuria. NEURO: No syncope, presyncope, headache. MSK: left knee pain resolved.   Remainder:  ROS NEGATIVE    Past Medical History:      Diagnosis Date    Arm fracture, left 2009    Balance problems since April, 2011    Hydrocephalus [facility])     compensated    Thyroid disease     Vertigo      Patient Active Problem List   Diagnosis    Hydrocephalus, adult ([address]    Hydrocephalus ([address]    Hyponatremia    Breast mass    Malignant neoplasm of axillary tail of left female breast ([address]    Breast cancer metastasized to bone (HCC)    Carcinoma of breast metastatic to bone (HCC)    Anemia    Thrombocytopenia, unspecified        Past Surgical History: Procedure Laterality Date    ARM SURGERY  9/15/2009    FRACTURE SURGERY      HUMERUS FRACTURE SURGERY  2009    Broke left arm        Family History:  Family History   Problem Relation Age of Onset    Colon Cancer Mother 68    Heart Disease Father     Diabetes Father        Medications:  Reviewed and reconciled. Social History:  Social History     Socioeconomic History    Marital status: Single     Spouse name: Not on file    Number of children: Not on file    Years of education: Not on file    Highest education level: Not on file   Occupational History    Not on file   Tobacco Use    Smoking status: Never    Smokeless tobacco: Never   Vaping Use    Vaping Use: Never used   Substance and Sexual Activity    Alcohol use: Yes     Comment: drinks it occasional on social events    Drug use: No    Sexual activity: Never   Other Topics Concern    Not on file   Social History Narrative    Not on file     Social Determinants of Health     Financial Resource Strain: Low Risk     Difficulty of Paying Living Expenses: Not hard at all   Food Insecurity: No Food Insecurity    Worried About Running Out of Food in the Last Year: Never true    Ran Out of Food in the Last Year: Never true   Transportation Needs: Not on file   Physical Activity: Inactive    Days of Exercise per Week: 0 days    Minutes of Exercise per Session: 0 min   Stress: Not on file   Social Connections: Not on file   Intimate Partner Violence: Not on file   Housing Stability: Not on file       Allergies:  No Known Allergies    Physical Exam:  BP (!) 127/59   Pulse 73   Temp 97 °F (36.1 °C)   Ht 5' 6\" (1.676 m)   Wt 147 lb 12.8 oz (67 kg)   SpO2 100%   BMI 23.86 kg/m²     GENERAL: Alert, oriented x 3, not in acute distress. HEENT: PERRLA; EOMI. Oropharynx clear. NECK: Supple. No palpable cervical or supraclavicular lymphadenopathy. LUNGS: Good air entry bilaterally. No wheezing, crackles or rhonchi. CARDIOVASCULAR: Regular rate.  No murmurs, rubs or gallops. BREASTS: The right breast exam is negative for any skin changes, nipple discharge, no palpable masses, no palpable right axillary adenopathy, left breast appearance had markedly improved, the nodules had resolved, the mass decreased in size, no drainage, no bleeding, decrease in the size of the left axillary lymphadenopathy  ABDOMEN: Soft. Non-tender, non-distended. Positive bowel sounds. EXTREMITIES: mild chronic edema of the LLE  NEUROLOGIC: No focal deficits. ECOG PS 1     Impression/Plan:     The patient is a 74 y.o. lady, with a past medical history significant for hydrocephalus, who had presented with left breast changes, who was diagnosed with a left breast ductal carcinoma, ER positive greater than 95% [place] +70% HER-2/elder 1+, she has a very locally advanced disease, and metastatic disease to the bones. I discussed with the patient and her sister today her diagnosis, prognosis and recommendations for treatment. Will complete the staging work-up, the patient will have a brain MRI with and without contrast done, and a PET scan. For her HR pos, HER-2/elder negative metastatic breast cancer, I recommended treatment with Femara and Ibrance, the schedule and side effects of the treatment were reviewed with her. She has metastatic disease to the bones, recommended treatment with Xgeva. MRI brain done on 09/16/2019: no evidence of metastatic disease. PET/CT on 09/17/2019: The lytic soft tissue finding in the iliac crest in the left pelvis shows hypermetabolic FDG uptake, with maximum measurements in the 5.7 SUV range, strongly suggesting metastatic disease. She has uptake in the thyroid, could be physiologic. Femara/Ibrance were started on 09/13/2019. Bone scan done on 12/17/2019: Redemonstration of focus of radiotracer uptake involving posterior lateral right ninth rib suggestive of healing fracture.  New radiotracer activity is seen at level of lateral right 10th rib and anterior right seventh rib which may be related to new healing fractures. Metastases is unlikely. Clinical correlation recommended. No additional abnormal areas of increased radiotracer uptake.    CT chest done on 12/17/2019: Significant interval decrease in size of the large mass seen in the left breast 8/29/2019. Interval decrease in size of enlarged left axillary lymph nodes. Cardiomegaly.  Enlarged thyroid, unchanged. Small hiatal hernia. Diffuse interstitial changes and multifocal atelectasis or scarring in both lungs. Stable small nodular density in the right upper lobe. Sclerotic changes involving the lateral right ninth rib.    CT abdomen/pelvis done on 12/17/2019: Small right renal cyst. Retroverted uterus. Distended colon and rectum containing a large amount of stool. Mildly enlarged retroperitoneal and periportal lymph nodes, unchanged. Findings in the left iliac bone as described.    The results/images of the scans were reviewed with the patient and her sister, she is responding nicely to the Femara and Ibrance, will continue the same treatment.   She did have teeth extraction on 1/20/2020, dental clearance has been obtained.    On 4/3/2020, the patient was started on Ibrance and Femara cycle #8.    Restaging CT Chest/Abdomen/Pelvis and bone scan on 04/24/2020:  CT Chest noted stable small bilateral pulmonary nodules; CT abdomen/pelvis no evidence of new active neoplasm compared to prior.  Bone scan 3 foci of radiotracer uptake in the lower right ribs, 2 of which demonstrate less activity on the current study, possibly indicative of healing fractures.  Radiotracer in the more anterior focus is more persistent, and may be a metastatic lesion.  The results/images of the scans were reviewed with the patient and her sister, the patient has an overall stable disease.  Recommended to continue with Femara and Ibrance.    The patient had a PET scan done on December 3, 2020, she had complete metabolic response since the PET  scan on September 17, 7227, no metabolically active recurrent or metastatic disease, metabolic activity throughout the thyroid lobes can be seen with chronic thyroiditis. The patient had complete response to treatment, continue Ibrance and Femara. The dose of the Anika Dunks has been to 75 mg due to leukopenia/neutropenia. The patient had restaging CT scans of the chest, abdomen pelvis done on 6/1/2021, bone scan on 6/11/2021, revealing an overall stable disease. Recommended to continue Ibrance and Femara. Patient had restaging scans done on 10/26/2021, revealing an overall stable disease, couple of upper lobe pulmonary nodules and rib sclerosis are redemonstrated, stable. No new areas of concern. Recommended to continue with Ibrance and Femara. Restaging CT scans of the chest, abdomen, pelvis and a bone scan were done on 5/2/2022, there was no evidence of progression of disease within the chest with stable left upper lobe pulmonary nodule areas and indeterminate sclerosis in the rib, there was no uptake on the bone scan, recommended to continue with Ibrance and Femara. Today 3/15/2023, the patient is doing well clinically, labs reviewed, she has macrocytic anemia and leukopenia, secondary to Ibrance. Her white count had improved. No indication for dose adjustment of the Ibrance, we will continue to monitor her CBCD. Restaging CT scans of the chest, abdomen, pelvis and a bone scan were ordered, she had a CT scan of the abdomen the pelvis and a bone scan done on 2/14/2023, were negative for disease progression, chest CT scan was done on 2/27/2023, revealing persistent 5 mm pulmonary nodules without change, sclerosis of the thoracic spine, no disease progression in the left breast.  Recommended to continue Ibrance and Femara. Last Bonner Apple was on 2/15/2023 today 3/15/2023, labs reviewed, proceed with Xgeva.     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 RTC in 4 weeks. Thank you for allowing us to participate in the care of Ms. Rhea Montemayor.     Jules Gonzalez MD   HEMATOLOGY/MEDICAL [address] MEDICAL ONCOLOGY  [address] [place], [place] [address]  Dept: [phone] [place]: [phone]

## 2023-03-17 LAB — CANCER AG15-3 SERPL-ACNC: 13 U/ML (ref 0–31)

## 2023-03-30 ENCOUNTER — TELEPHONE (OUTPATIENT)
Facility: HOSPITAL | Age: 73
End: 2023-03-30

## 2023-03-30 ENCOUNTER — CLINICAL DOCUMENTATION (OUTPATIENT)
Facility: HOSPITAL | Age: 73
End: 2023-03-30

## 2023-03-30 NOTE — TELEPHONE ENCOUNTER
Patient Assistance    Pts sister called in stated that the pts Mariaa Lee Rx will need to be refilled in May, I assured her that we will make sure that this will be taken care of. I put on my calendar to send over new Rx request to provider mid April         All questions were answered.

## 2023-04-06 PROBLEM — S72.142A DISPLACED INTERTROCHANTERIC FRACTURE OF LEFT FEMUR, INITIAL ENCOUNTER FOR CLOSED FRACTURE (HCC): Status: ACTIVE | Noted: 2023-04-06

## 2023-04-07 ENCOUNTER — ANESTHESIA EVENT (OUTPATIENT)
Dept: OPERATING ROOM | Age: 73
End: 2023-04-07
Payer: MEDICARE

## 2023-04-07 ENCOUNTER — ANESTHESIA (OUTPATIENT)
Dept: OPERATING ROOM | Age: 73
End: 2023-04-07
Payer: MEDICARE

## 2023-04-07 PROBLEM — T79.6XXA TRAUMATIC RHABDOMYOLYSIS (HCC): Status: ACTIVE | Noted: 2023-04-07

## 2023-04-07 PROCEDURE — 6360000002 HC RX W HCPCS: Performed by: ANESTHESIOLOGIST ASSISTANT

## 2023-04-07 PROCEDURE — 2500000003 HC RX 250 WO HCPCS: Performed by: STUDENT IN AN ORGANIZED HEALTH CARE EDUCATION/TRAINING PROGRAM

## 2023-04-07 PROCEDURE — 2580000003 HC RX 258: Performed by: INTERNAL MEDICINE

## 2023-04-07 PROCEDURE — 2500000003 HC RX 250 WO HCPCS: Performed by: ANESTHESIOLOGIST ASSISTANT

## 2023-04-07 PROCEDURE — 2580000003 HC RX 258: Performed by: STUDENT IN AN ORGANIZED HEALTH CARE EDUCATION/TRAINING PROGRAM

## 2023-04-07 RX ORDER — DEXAMETHASONE SODIUM PHOSPHATE 10 MG/ML
INJECTION INTRAMUSCULAR; INTRAVENOUS PRN
Status: DISCONTINUED | OUTPATIENT
Start: 2023-04-07 | End: 2023-04-07 | Stop reason: SDUPTHER

## 2023-04-07 RX ORDER — ROCURONIUM BROMIDE 10 MG/ML
INJECTION, SOLUTION INTRAVENOUS PRN
Status: DISCONTINUED | OUTPATIENT
Start: 2023-04-07 | End: 2023-04-07 | Stop reason: SDUPTHER

## 2023-04-07 RX ORDER — CEFAZOLIN SODIUM 1 G/3ML
INJECTION, POWDER, FOR SOLUTION INTRAMUSCULAR; INTRAVENOUS PRN
Status: DISCONTINUED | OUTPATIENT
Start: 2023-04-07 | End: 2023-04-07 | Stop reason: SDUPTHER

## 2023-04-07 RX ORDER — PROPOFOL 10 MG/ML
INJECTION, EMULSION INTRAVENOUS PRN
Status: DISCONTINUED | OUTPATIENT
Start: 2023-04-07 | End: 2023-04-07 | Stop reason: SDUPTHER

## 2023-04-07 RX ORDER — LIDOCAINE HYDROCHLORIDE 20 MG/ML
INJECTION, SOLUTION INTRAVENOUS PRN
Status: DISCONTINUED | OUTPATIENT
Start: 2023-04-07 | End: 2023-04-07 | Stop reason: SDUPTHER

## 2023-04-07 RX ORDER — FENTANYL CITRATE 50 UG/ML
INJECTION, SOLUTION INTRAMUSCULAR; INTRAVENOUS PRN
Status: DISCONTINUED | OUTPATIENT
Start: 2023-04-07 | End: 2023-04-07 | Stop reason: SDUPTHER

## 2023-04-07 RX ORDER — ONDANSETRON 2 MG/ML
INJECTION INTRAMUSCULAR; INTRAVENOUS PRN
Status: DISCONTINUED | OUTPATIENT
Start: 2023-04-07 | End: 2023-04-07 | Stop reason: SDUPTHER

## 2023-04-07 RX ORDER — PHENYLEPHRINE HCL IN 0.9% NACL 1 MG/10 ML
SYRINGE (ML) INTRAVENOUS PRN
Status: DISCONTINUED | OUTPATIENT
Start: 2023-04-07 | End: 2023-04-07 | Stop reason: SDUPTHER

## 2023-04-07 RX ADMIN — FENTANYL CITRATE 50 MCG: 0.05 INJECTION, SOLUTION INTRAMUSCULAR; INTRAVENOUS at 11:46

## 2023-04-07 RX ADMIN — Medication 100 MCG: at 12:40

## 2023-04-07 RX ADMIN — ROCURONIUM BROMIDE 10 MG: 10 INJECTION, SOLUTION INTRAVENOUS at 11:32

## 2023-04-07 RX ADMIN — DEXAMETHASONE SODIUM PHOSPHATE 10 MG: 10 INJECTION INTRAMUSCULAR; INTRAVENOUS at 10:58

## 2023-04-07 RX ADMIN — TRANEXAMIC ACID 1000 MG: 100 INJECTION, SOLUTION INTRAVENOUS at 11:15

## 2023-04-07 RX ADMIN — PROPOFOL 20 MG: 10 INJECTION, EMULSION INTRAVENOUS at 12:58

## 2023-04-07 RX ADMIN — SUGAMMADEX 140 MG: 100 INJECTION, SOLUTION INTRAVENOUS at 13:08

## 2023-04-07 RX ADMIN — SODIUM CHLORIDE: 9 INJECTION, SOLUTION INTRAVENOUS at 13:21

## 2023-04-07 RX ADMIN — Medication 100 MCG: at 11:03

## 2023-04-07 RX ADMIN — FENTANYL CITRATE 50 MCG: 0.05 INJECTION, SOLUTION INTRAMUSCULAR; INTRAVENOUS at 10:51

## 2023-04-07 RX ADMIN — FENTANYL CITRATE 25 MCG: 0.05 INJECTION, SOLUTION INTRAMUSCULAR; INTRAVENOUS at 12:55

## 2023-04-07 RX ADMIN — Medication 200 MCG: at 10:56

## 2023-04-07 RX ADMIN — FENTANYL CITRATE 25 MCG: 0.05 INJECTION, SOLUTION INTRAMUSCULAR; INTRAVENOUS at 12:37

## 2023-04-07 RX ADMIN — ROCURONIUM BROMIDE 40 MG: 10 INJECTION, SOLUTION INTRAVENOUS at 10:52

## 2023-04-07 RX ADMIN — Medication 100 MCG: at 12:16

## 2023-04-07 RX ADMIN — LIDOCAINE HYDROCHLORIDE 80 MG: 20 INJECTION, SOLUTION INTRAVENOUS at 10:51

## 2023-04-07 RX ADMIN — PROPOFOL 90 MG: 10 INJECTION, EMULSION INTRAVENOUS at 10:51

## 2023-04-07 RX ADMIN — Medication 200 MCG: at 10:58

## 2023-04-07 RX ADMIN — CEFAZOLIN 2 G: 1 INJECTION, POWDER, FOR SOLUTION INTRAMUSCULAR; INTRAVENOUS at 11:09

## 2023-04-07 RX ADMIN — Medication 200 MCG: at 11:00

## 2023-04-07 RX ADMIN — FENTANYL CITRATE 50 MCG: 0.05 INJECTION, SOLUTION INTRAMUSCULAR; INTRAVENOUS at 11:53

## 2023-04-07 RX ADMIN — ONDANSETRON HYDROCHLORIDE 4 MG: 2 SOLUTION INTRAMUSCULAR; INTRAVENOUS at 12:41

## 2023-04-07 NOTE — ANESTHESIA PRE PROCEDURE
reviewed  Airway: Mallampati: II  TM distance: >3 FB   Neck ROM: full  Mouth opening: > = 3 FB   Dental:    (+) edentulous      Pulmonary:Negative Pulmonary ROS                              Cardiovascular:Negative CV ROS          ECG reviewed  Rhythm: regular  Rate: normal                 ROS comment: +cardiac clearance (Dr. Paulo Zazueta), normal EF in prelim reading on ECHO     Neuro/Psych:   (+) neuromuscular disease (Traumatic rhabdomyolysis ):,              ROS comment: Hydrocephalus   vertigo GI/Hepatic/Renal:   (+) renal disease: ARF,           Endo/Other:    (+) hypothyroidism, blood dyscrasia: anemia and thrombocytopenia:., malignancy/cancer (Carcinoma of breast metastatic to bone ). ROS comment: Recent fall-Displaced intertrochanteric fracture of left femur, abrasions right forehead (came to OR with dressing on face) Abdominal:             Vascular: negative vascular ROS. Other Findings:           Anesthesia Plan      general     ASA 3       Induction: intravenous. Anesthetic plan and risks discussed with patient. Use of blood products discussed with patient whom consented to blood products. Plan discussed with attending.                     Izabela Patino   4/7/2023

## 2023-04-07 NOTE — ANESTHESIA POSTPROCEDURE EVALUATION
Department of Anesthesiology  Postprocedure Note    Patient: July Mccall  MRN: [id number]  YOB: 1950  Date of evaluation: 4/7/2023      Procedure Summary     Date: 04/07/23 Room / Location: [facility] OR  / [facility]    Anesthesia Start: 5977 Anesthesia Stop: 2623    Procedure: insertion IM inocencia left hip with depuy synthes instrumentation and inocencia and screws under flouroscopy (Left) Diagnosis:       Closed comminuted intertrochanteric fracture of left femur, initial encounter ([address]      (Closed comminuted intertrochanteric fracture of left femur, initial encounter ([address] [K63.169A])    Surgeons: Lyndon Diamond DO Responsible Provider: Becky Donald MD    Anesthesia Type: general ASA Status: 3          Anesthesia Type: No value filed.     Tanisha Phase I: Tanisha Score: 8    Tanisha Phase II:        Anesthesia Post Evaluation    Patient location during evaluation: PACU  Patient participation: complete - patient participated  Level of consciousness: awake and alert  Airway patency: patent  Nausea & Vomiting: no nausea and no vomiting  Complications: no  Cardiovascular status: blood pressure returned to baseline and hemodynamically stable  Respiratory status: acceptable and spontaneous ventilation  Hydration status: euvolemic  Multimodal analgesia pain management approach

## 2023-04-08 PROBLEM — E44.0 MODERATE PROTEIN-CALORIE MALNUTRITION (HCC): Chronic | Status: ACTIVE | Noted: 2023-04-08

## 2023-04-12 ENCOUNTER — OUTSIDE SERVICES (OUTPATIENT)
Dept: PRIMARY CARE CLINIC | Age: 73
End: 2023-04-12

## 2023-04-12 DIAGNOSIS — S72.143A: ICD-10-CM

## 2023-04-12 DIAGNOSIS — C79.51 CARCINOMA OF BREAST METASTATIC TO BONE, UNSPECIFIED LATERALITY (HCC): Primary | ICD-10-CM

## 2023-04-12 DIAGNOSIS — C50.919 CARCINOMA OF BREAST METASTATIC TO BONE, UNSPECIFIED LATERALITY (HCC): Primary | ICD-10-CM

## 2023-04-12 DIAGNOSIS — C50.612 MALIGNANT NEOPLASM OF AXILLARY TAIL OF LEFT FEMALE BREAST, UNSPECIFIED ESTROGEN RECEPTOR STATUS (HCC): ICD-10-CM

## 2023-04-12 DIAGNOSIS — D69.6 THROMBOCYTOPENIA, UNSPECIFIED (HCC): ICD-10-CM

## 2023-04-12 DIAGNOSIS — S72.142A DISPLACED INTERTROCHANTERIC FRACTURE OF LEFT FEMUR, INITIAL ENCOUNTER FOR CLOSED FRACTURE (HCC): ICD-10-CM

## 2023-04-12 DIAGNOSIS — G91.9 HYDROCEPHALUS, UNSPECIFIED TYPE (HCC): ICD-10-CM

## 2023-04-12 DIAGNOSIS — E44.0 MODERATE PROTEIN-CALORIE MALNUTRITION (HCC): Chronic | ICD-10-CM

## 2023-04-12 DIAGNOSIS — T79.6XXD TRAUMATIC RHABDOMYOLYSIS, SUBSEQUENT ENCOUNTER: ICD-10-CM

## 2023-04-12 RX ORDER — OXYCODONE HYDROCHLORIDE AND ACETAMINOPHEN 5; 325 MG/1; MG/1
1 TABLET ORAL EVERY 6 HOURS PRN
Qty: 60 TABLET | Refills: 0 | Status: SHIPPED | OUTPATIENT
Start: 2023-04-12 | End: 2023-04-27

## 2023-04-17 PROCEDURE — 99308 SBSQ NF CARE LOW MDM 20: CPT | Performed by: INTERNAL MEDICINE

## 2023-04-18 ENCOUNTER — OUTSIDE SERVICES (OUTPATIENT)
Dept: PRIMARY CARE CLINIC | Age: 73
End: 2023-04-18
Payer: MEDICARE

## 2023-04-18 DIAGNOSIS — G91.9 HYDROCEPHALUS, UNSPECIFIED TYPE (HCC): ICD-10-CM

## 2023-04-18 DIAGNOSIS — D69.6 THROMBOCYTOPENIA, UNSPECIFIED (HCC): ICD-10-CM

## 2023-04-18 DIAGNOSIS — S72.143A: ICD-10-CM

## 2023-04-18 DIAGNOSIS — C79.51 CARCINOMA OF BREAST METASTATIC TO BONE, UNSPECIFIED LATERALITY (HCC): ICD-10-CM

## 2023-04-18 DIAGNOSIS — E44.0 MODERATE PROTEIN-CALORIE MALNUTRITION (HCC): ICD-10-CM

## 2023-04-18 DIAGNOSIS — C50.919 CARCINOMA OF BREAST METASTATIC TO BONE, UNSPECIFIED LATERALITY (HCC): ICD-10-CM

## 2023-04-18 DIAGNOSIS — R63.4 WEIGHT LOSS: Primary | ICD-10-CM

## 2023-04-19 ENCOUNTER — HOSPITAL ENCOUNTER (OUTPATIENT)
Dept: INFUSION THERAPY | Age: 73
Discharge: HOME | End: 2023-04-19

## 2023-04-19 ENCOUNTER — OFFICE VISIT (OUTPATIENT)
Dept: ONCOLOGY | Age: 73
End: 2023-04-19

## 2023-04-19 ENCOUNTER — HOSPITAL ENCOUNTER (INPATIENT)
Age: 73
LOS: 3 days | Discharge: NURSING, REHAB OR LONG-TERM CARE | DRG: 812 | End: 2023-04-22
Attending: EMERGENCY MEDICINE | Admitting: INTERNAL MEDICINE
Payer: MEDICARE

## 2023-04-19 VITALS
SYSTOLIC BLOOD PRESSURE: 101 MMHG | WEIGHT: 137 LBS | TEMPERATURE: 98.3 F | OXYGEN SATURATION: 100 % | HEART RATE: 80 BPM | HEIGHT: 66 IN | BODY MASS INDEX: 22.02 KG/M2 | DIASTOLIC BLOOD PRESSURE: 64 MMHG

## 2023-04-19 DIAGNOSIS — D64.9 ANEMIA, UNSPECIFIED TYPE: ICD-10-CM

## 2023-04-19 DIAGNOSIS — D64.9 ANEMIA, UNSPECIFIED TYPE: Primary | ICD-10-CM

## 2023-04-19 DIAGNOSIS — C79.51 CARCINOMA OF BREAST METASTATIC TO BONE, UNSPECIFIED LATERALITY (HCC): Primary | ICD-10-CM

## 2023-04-19 DIAGNOSIS — C50.919 CARCINOMA OF BREAST METASTATIC TO BONE, UNSPECIFIED LATERALITY (HCC): Primary | ICD-10-CM

## 2023-04-19 LAB
ABO + RH BLD: NORMAL
ALBUMIN SERPL-MCNC: 2.8 G/DL (ref 3.5–5.2)
ALP SERPL-CCNC: 214 U/L (ref 35–104)
ALT SERPL-CCNC: 33 U/L (ref 0–32)
ANION GAP SERPL CALCULATED.3IONS-SCNC: 9 MMOL/L (ref 7–16)
APTT BLD: 51.3 SEC (ref 24.5–35.1)
AST SERPL-CCNC: 33 U/L (ref 0–31)
BACTERIA URNS QL MICRO: ABNORMAL /HPF
BASOPHILS # BLD: 0 E9/L (ref 0–0.2)
BASOPHILS NFR BLD: 0 % (ref 0–2)
BILIRUB SERPL-MCNC: 0.3 MG/DL (ref 0–1.2)
BILIRUB UR QL STRIP: NEGATIVE
BLD GP AB SCN SERPL QL: NORMAL
BUN SERPL-MCNC: 19 MG/DL (ref 6–23)
CALCIUM SERPL-MCNC: 7.9 MG/DL (ref 8.6–10.2)
CHLORIDE SERPL-SCNC: 104 MMOL/L (ref 98–107)
CLARITY UR: CLEAR
CO2 SERPL-SCNC: 22 MMOL/L (ref 22–29)
COLOR UR: YELLOW
CREAT SERPL-MCNC: 0.7 MG/DL (ref 0.5–1)
EKG ATRIAL RATE: 76 BPM
EKG P AXIS: 35 DEGREES
EKG P-R INTERVAL: 134 MS
EKG Q-T INTERVAL: 396 MS
EKG QRS DURATION: 68 MS
EKG QTC CALCULATION (BAZETT): 445 MS
EKG R AXIS: 4 DEGREES
EKG T AXIS: 29 DEGREES
EKG VENTRICULAR RATE: 76 BPM
EOSINOPHIL # BLD: 0.05 E9/L (ref 0.05–0.5)
EOSINOPHIL NFR BLD: 0.9 % (ref 0–6)
ERYTHROCYTE [DISTWIDTH] IN BLOOD BY AUTOMATED COUNT: 15.2 FL (ref 11.5–15)
GLUCOSE SERPL-MCNC: 102 MG/DL (ref 74–99)
GLUCOSE UR STRIP-MCNC: NEGATIVE MG/DL
HCT VFR BLD AUTO: 22.7 % (ref 34–48)
HCT VFR BLD AUTO: 23.9 % (ref 34–48)
HGB BLD-MCNC: 7 G/DL (ref 11.5–15.5)
HGB BLD-MCNC: 7.5 G/DL (ref 11.5–15.5)
HGB UR QL STRIP: ABNORMAL
HYPOCHROMIA: ABNORMAL
INR BLD: 1.3
KETONES UR STRIP-MCNC: NEGATIVE MG/DL
LEUKOCYTE ESTERASE UR QL STRIP: ABNORMAL
LYMPHOCYTES # BLD: 0.28 E9/L (ref 1.5–4)
LYMPHOCYTES NFR BLD: 5.3 % (ref 20–42)
MCH RBC QN AUTO: 30.7 PG (ref 26–35)
MCHC RBC AUTO-ENTMCNC: 31.4 % (ref 32–34.5)
MCV RBC AUTO: 98 FL (ref 80–99.9)
MONOCYTES # BLD: 0 E9/L (ref 0.1–0.95)
MONOCYTES NFR BLD: 0 % (ref 2–12)
NEUTROPHILS # BLD: 5.17 E9/L (ref 1.8–7.3)
NEUTS SEG NFR BLD: 93.8 % (ref 43–80)
NITRITE UR QL STRIP: POSITIVE
NRBC BLD-RTO: 0 /100 WBC
OVALOCYTES: ABNORMAL
PH UR STRIP: 5.5 [PH] (ref 5–9)
PLATELET # BLD AUTO: 473 E9/L (ref 130–450)
PMV BLD AUTO: 9.6 FL (ref 7–12)
POIKILOCYTES: ABNORMAL
POLYCHROMASIA: ABNORMAL
POTASSIUM SERPL-SCNC: 4.3 MMOL/L (ref 3.5–5)
PROT SERPL-MCNC: 6.6 G/DL (ref 6.4–8.3)
PROT UR STRIP-MCNC: 30 MG/DL
PROTHROMBIN TIME: 13.8 SEC (ref 9.3–12.4)
RBC # BLD AUTO: 2.44 E12/L (ref 3.5–5.5)
RBC #/AREA URNS HPF: ABNORMAL /HPF (ref 0–2)
SODIUM SERPL-SCNC: 135 MMOL/L (ref 132–146)
SP GR UR STRIP: >=1.03 (ref 1–1.03)
STOMATOCYTES: ABNORMAL
TROPONIN, HIGH SENSITIVITY: 44 NG/L (ref 0–9)
UROBILINOGEN UR STRIP-ACNC: 0.2 E.U./DL
WBC # BLD: 5.5 E9/L (ref 4.5–11.5)
WBC #/AREA URNS HPF: >20 /HPF (ref 0–5)

## 2023-04-19 PROCEDURE — 99222 1ST HOSP IP/OBS MODERATE 55: CPT | Performed by: INTERNAL MEDICINE

## 2023-04-19 PROCEDURE — 87088 URINE BACTERIA CULTURE: CPT

## 2023-04-19 PROCEDURE — 85025 COMPLETE CBC W/AUTO DIFF WBC: CPT

## 2023-04-19 PROCEDURE — 96374 THER/PROPH/DIAG INJ IV PUSH: CPT

## 2023-04-19 PROCEDURE — 51702 INSERT TEMP BLADDER CATH: CPT

## 2023-04-19 PROCEDURE — 2580000003 HC RX 258: Performed by: STUDENT IN AN ORGANIZED HEALTH CARE EDUCATION/TRAINING PROGRAM

## 2023-04-19 PROCEDURE — 85610 PROTHROMBIN TIME: CPT

## 2023-04-19 PROCEDURE — 83540 ASSAY OF IRON: CPT

## 2023-04-19 PROCEDURE — 80053 COMPREHEN METABOLIC PANEL: CPT

## 2023-04-19 PROCEDURE — 93005 ELECTROCARDIOGRAM TRACING: CPT | Performed by: STUDENT IN AN ORGANIZED HEALTH CARE EDUCATION/TRAINING PROGRAM

## 2023-04-19 PROCEDURE — 86900 BLOOD TYPING SEROLOGIC ABO: CPT

## 2023-04-19 PROCEDURE — 6360000002 HC RX W HCPCS: Performed by: INTERNAL MEDICINE

## 2023-04-19 PROCEDURE — 6370000000 HC RX 637 (ALT 250 FOR IP): Performed by: INTERNAL MEDICINE

## 2023-04-19 PROCEDURE — 81001 URINALYSIS AUTO W/SCOPE: CPT

## 2023-04-19 PROCEDURE — 83550 IRON BINDING TEST: CPT

## 2023-04-19 PROCEDURE — P9016 RBC LEUKOCYTES REDUCED: HCPCS

## 2023-04-19 PROCEDURE — 86850 RBC ANTIBODY SCREEN: CPT

## 2023-04-19 PROCEDURE — 30233N1 TRANSFUSION OF NONAUTOLOGOUS RED BLOOD CELLS INTO PERIPHERAL VEIN, PERCUTANEOUS APPROACH: ICD-10-PCS | Performed by: INTERNAL MEDICINE

## 2023-04-19 PROCEDURE — A4216 STERILE WATER/SALINE, 10 ML: HCPCS | Performed by: STUDENT IN AN ORGANIZED HEALTH CARE EDUCATION/TRAINING PROGRAM

## 2023-04-19 PROCEDURE — 93010 ELECTROCARDIOGRAM REPORT: CPT | Performed by: INTERNAL MEDICINE

## 2023-04-19 PROCEDURE — C9113 INJ PANTOPRAZOLE SODIUM, VIA: HCPCS | Performed by: STUDENT IN AN ORGANIZED HEALTH CARE EDUCATION/TRAINING PROGRAM

## 2023-04-19 PROCEDURE — 6360000002 HC RX W HCPCS: Performed by: STUDENT IN AN ORGANIZED HEALTH CARE EDUCATION/TRAINING PROGRAM

## 2023-04-19 PROCEDURE — 85018 HEMOGLOBIN: CPT

## 2023-04-19 PROCEDURE — C9113 INJ PANTOPRAZOLE SODIUM, VIA: HCPCS | Performed by: INTERNAL MEDICINE

## 2023-04-19 PROCEDURE — 86923 COMPATIBILITY TEST ELECTRIC: CPT

## 2023-04-19 PROCEDURE — 85730 THROMBOPLASTIN TIME PARTIAL: CPT

## 2023-04-19 PROCEDURE — 36415 COLL VENOUS BLD VENIPUNCTURE: CPT

## 2023-04-19 PROCEDURE — 85014 HEMATOCRIT: CPT

## 2023-04-19 PROCEDURE — 99285 EMERGENCY DEPT VISIT HI MDM: CPT

## 2023-04-19 PROCEDURE — 86901 BLOOD TYPING SEROLOGIC RH(D): CPT

## 2023-04-19 PROCEDURE — 96375 TX/PRO/DX INJ NEW DRUG ADDON: CPT

## 2023-04-19 PROCEDURE — 84484 ASSAY OF TROPONIN QUANT: CPT

## 2023-04-19 PROCEDURE — 2060000000 HC ICU INTERMEDIATE R&B

## 2023-04-19 RX ORDER — M-VIT,TX,IRON,MINS/CALC/FOLIC 27MG-0.4MG
1 TABLET ORAL DAILY
Status: DISCONTINUED | OUTPATIENT
Start: 2023-04-19 | End: 2023-04-22 | Stop reason: HOSPADM

## 2023-04-19 RX ORDER — CALCIUM CARBONATE 500(1250)
500 TABLET ORAL DAILY
Status: DISCONTINUED | OUTPATIENT
Start: 2023-04-19 | End: 2023-04-22 | Stop reason: HOSPADM

## 2023-04-19 RX ORDER — LANOLIN ALCOHOL/MO/W.PET/CERES
6 CREAM (GRAM) TOPICAL NIGHTLY
Status: DISCONTINUED | OUTPATIENT
Start: 2023-04-19 | End: 2023-04-22 | Stop reason: HOSPADM

## 2023-04-19 RX ORDER — ENOXAPARIN SODIUM 100 MG/ML
40 INJECTION SUBCUTANEOUS DAILY
Status: DISCONTINUED | OUTPATIENT
Start: 2023-04-19 | End: 2023-04-22 | Stop reason: HOSPADM

## 2023-04-19 RX ORDER — LETROZOLE 2.5 MG/1
2.5 TABLET, FILM COATED ORAL DAILY
Status: DISCONTINUED | OUTPATIENT
Start: 2023-04-19 | End: 2023-04-22 | Stop reason: HOSPADM

## 2023-04-19 RX ORDER — ERGOCALCIFEROL 1.25 MG/1
50000 CAPSULE ORAL WEEKLY
Status: DISCONTINUED | OUTPATIENT
Start: 2023-04-23 | End: 2023-04-22 | Stop reason: HOSPADM

## 2023-04-19 RX ORDER — SODIUM CHLORIDE 9 MG/ML
INJECTION, SOLUTION INTRAVENOUS PRN
Status: DISCONTINUED | OUTPATIENT
Start: 2023-04-19 | End: 2023-04-22 | Stop reason: HOSPADM

## 2023-04-19 RX ORDER — POLYETHYLENE GLYCOL 3350 17 G/17G
17 POWDER, FOR SOLUTION ORAL DAILY PRN
Status: DISCONTINUED | OUTPATIENT
Start: 2023-04-19 | End: 2023-04-22 | Stop reason: HOSPADM

## 2023-04-19 RX ORDER — LEVOTHYROXINE SODIUM 0.03 MG/1
25 TABLET ORAL DAILY
Status: DISCONTINUED | OUTPATIENT
Start: 2023-04-20 | End: 2023-04-22 | Stop reason: HOSPADM

## 2023-04-19 RX ORDER — PANTOPRAZOLE SODIUM 40 MG/10ML
40 INJECTION, POWDER, LYOPHILIZED, FOR SOLUTION INTRAVENOUS 2 TIMES DAILY
Status: DISCONTINUED | OUTPATIENT
Start: 2023-04-19 | End: 2023-04-22 | Stop reason: HOSPADM

## 2023-04-19 RX ORDER — OXYCODONE HYDROCHLORIDE AND ACETAMINOPHEN 5; 325 MG/1; MG/1
1 TABLET ORAL EVERY 6 HOURS PRN
Status: DISCONTINUED | OUTPATIENT
Start: 2023-04-19 | End: 2023-04-22 | Stop reason: HOSPADM

## 2023-04-19 RX ORDER — IBUPROFEN 200 MG
TABLET ORAL 2 TIMES DAILY
Status: DISCONTINUED | OUTPATIENT
Start: 2023-04-19 | End: 2023-04-22 | Stop reason: HOSPADM

## 2023-04-19 RX ADMIN — METOPROLOL TARTRATE 25 MG: 25 TABLET, FILM COATED ORAL at 21:29

## 2023-04-19 RX ADMIN — POLYMYXIN B SULFATE, BACITRACIN ZINC, NEOMYCIN SULFATE: 5000; 3.5; 4 OINTMENT TOPICAL at 21:29

## 2023-04-19 RX ADMIN — CEFTRIAXONE SODIUM 2000 MG: 2 INJECTION, POWDER, FOR SOLUTION INTRAMUSCULAR; INTRAVENOUS at 17:10

## 2023-04-19 RX ADMIN — PANTOPRAZOLE SODIUM 40 MG: 40 INJECTION, POWDER, FOR SOLUTION INTRAVENOUS at 21:29

## 2023-04-19 RX ADMIN — CALCIUM 500 MG: 500 TABLET ORAL at 21:29

## 2023-04-19 RX ADMIN — ENOXAPARIN SODIUM 40 MG: 100 INJECTION SUBCUTANEOUS at 21:30

## 2023-04-19 RX ADMIN — SODIUM CHLORIDE 40 MG: 9 INJECTION INTRAMUSCULAR; INTRAVENOUS; SUBCUTANEOUS at 14:40

## 2023-04-19 RX ADMIN — Medication 6 MG: at 21:29

## 2023-04-19 RX ADMIN — LETROZOLE 2.5 MG: 2.5 TABLET ORAL at 21:28

## 2023-04-19 RX ADMIN — MULTIPLE VITAMINS W/ MINERALS TAB 1 TABLET: TAB at 21:45

## 2023-04-19 ASSESSMENT — PAIN - FUNCTIONAL ASSESSMENT: PAIN_FUNCTIONAL_ASSESSMENT: NONE - DENIES PAIN

## 2023-04-19 NOTE — ED PROVIDER NOTES
CHANGED    Details   oxyCODONE-acetaminophen (PERCOCET) 5-325 MG per tablet Take 1 tablet by mouth every 6 hours as needed for Pain for up to 15 days. Intended supply: 5 days. Take lowest dose possible to manage pain Max Daily Amount: 4 tablets  Qty: 60 tablet, Refills: 0    Comments: Reduce doses taken as pain becomes manageable  Associated Diagnoses: Closed displaced intertrochanteric fracture of femur, unspecified laterality, initial encounter ([facility])      neomycin-bacitracin-polymyxin (NEOSPORIN) 400-5-5000 ointment Apply topically 2 times daily. Apply to right face, right shoulder  Qty: 3 g      metoprolol tartrate (LOPRESSOR) 25 MG tablet Take 1 tablet by mouth 2 times daily  Qty: 60 tablet, Refills: 3      polyethylene glycol (GLYCOLAX) 17 g packet Take 17 g by mouth daily as needed for Constipation  Qty: 527 g, Refills: 1      melatonin 5 MG TBDP disintegrating tablet Take 1 tablet by mouth nightly      aspirin 325 MG EC tablet Take 1 tablet by mouth 2 times daily  Qty: 60 tablet, Refills: 0      levothyroxine (SYNTHROID) 25 MCG tablet Take 1 tablet by mouth Daily      palbociclib (IBRANCE) 75 MG capsule Take 75 mg by mouth See Admin Instructions Given for 21 days then off for 7 days      ergocalciferol (ERGOCALCIFEROL) 1.25 MG (36543 UT) capsule Take 1 capsule by mouth once a week Given Sunday      letrozole (FEMARA) 2.5 MG tablet Take 1 tablet by mouth daily  Qty: 90 tablet, Refills: 3    Associated Diagnoses: Carcinoma of breast metastatic to bone, unspecified laterality ([facility])      calcium carbonate 600 MG TABS tablet Take 1 tablet by mouth daily      Multiple Vitamins-Minerals (THERAPEUTIC MULTIVITAMIN-MINERALS) tablet Take 1 tablet by mouth daily Mail order vitamin made by her cardiologist.             ALLERGIES     Patient has no known allergies.     FAMILYHISTORY       Family History   Problem Relation Age of Onset    Colon Cancer Mother 68    Heart Disease Father     Diabetes Father         SOCIAL

## 2023-04-19 NOTE — ED NOTES
Department of Emergency Medicine    FIRST PROVIDER TRIAGE NOTE             Independent MLP           4/19/23  12:33 PM EDT    Date of Encounter: 4/19/23   MRN: [id number]    Vitals:    04/19/23 1231   BP: (!) 105/59   Pulse: 72   Resp: 16   Temp: 97.7 °F (36.5 °C)   TempSrc: Temporal   SpO2: 100%   Weight: 137 lb (62.1 kg)   Height: 5' 6\" (1.676 m)      HPI: Sharlene Moran is a 74 y.o. female who presents to the ED for Abnormal Lab (Low hgb 6.6. Unsure if she is bleeding anywhere, patient lived at [address] [place] [address] )     ROS: Negative for cp. Physical Exam:   Gen Appearance/Constitutional: alert  CV: regular rate     Initial Plan of Care: All treatment areas with department are currently occupied. Plan to order/Initiate the following while awaiting opening in ED: imaging studies.     Initial Plan of Care: Initiate Treatment-Testing, Proceed toTreatment Area When Bed Available for ED Attending/MLP to Continue Care    Electronically signed by Temitope Saxena PA-C   DD: 4/19/23       Temitope Saxena PA-C  04/19/23 0970
limits       Background  History:   Past Medical History:   Diagnosis Date    Arm fracture, left 2009    Balance problems since April, 2011    Hydrocephalus [facility])     compensated    Thyroid disease     Vertigo        Assessment    Vitals/MEWS: MEWS Score: 1  Level of Consciousness: Alert (0)   Vitals:    04/19/23 1231   BP: (!) 105/59   Pulse: 72   Resp: 16   Temp: 97.7 °F (36.5 °C)   TempSrc: Temporal   SpO2: 100%   Weight: 137 lb (62.1 kg)   Height: 5' 6\" (1.676 m)     PO Status: Regular  O2 Flow Rate: O2 Device: None (Room air)    Cardiac Rhythm: NSR  Last documented pain medication administered:   NIH Score: NIH     Active LDA's:   Peripheral IV 04/19/23 Right;Ventral Forearm (Active)   Site Assessment Clean, dry & intact 04/19/23 1739   Line Status Blood return noted;Brisk blood return;Capped;Flushed;Normal saline locked 04/19/23 1739   Line Care Connections checked and tightened 04/19/23 1739   Alcohol Cap Used Yes 04/19/23 1739   Dressing Status New dressing applied;Clean, dry & intact 04/19/23 1739   Dressing Type Transparent 04/19/23 1739   Dressing Intervention New 04/19/23 1739       Pertinent or High Risk Medications/Drips: no   If Yes, please provide details:   Blood Product Administration: no  If Yes, please provide details:   Recommendation    Incomplete orders   Additional Comments: Patient pending blood from blood bank  If any further questions, please call Sending RN     Electronically signed by: Electronically signed by Beth Ahn RN on 4/19/2023 at 7:08 PM       Beth Ahn RN  04/19/23 7210

## 2023-04-19 NOTE — PROGRESS NOTES
[address] [facility] MEDICAL ONCOLOGY  [address]  [place] [address]  Dept: [phone]  Loc: [phone]  Attending Progress Note      Reason for Visit:   Metastatic breast cancer. PCP:  Eboni Jennings DO    History of Present Illness: The patient is a 74 y.o. lady, with a past medical history significant for hydrocephalus, who had presented with left breast changes, she had noticed a dark spot on her left breast, then she had developed induration, she thinks that she had the breast changes for months. The patient had never had a mammogram done, had a CT scan of the chest done, revealing a solid left breast mass measuring at least 6.8 x 2.9 cm in size is identified. The mass appears to extend to the skin surface. The deep margin of the mass abuts the chest wall but a fat plane appears to be preserved. Pathologically enlarged left  axillary lymph nodes are seen measuring up to 4.5 x 1.9 cm in size. CT abdomen and pelvis was done on 8/30/2019 revealing partial visualization of soft tissue mass in the left breast, nonspecific lymph nodes in the upper abdomen. Bone scan was done on 8/30/2019, revealing a Prominent focus of radiotracer uptake in the left anterior superior iliac spine, with corresponding abnormality on recent CT, suggestive of metastasis. More subtle focal radiotracer uptake in the right anterior superior iliac spine could be due to metastasis or  degenerative change. 2. Focal radiotracer uptake in the lateral aspect of the right ninth  rib, which appears to be due to a nondisplaced healing fracture. Correlation with history of recent trauma is recommended. In light of  other findings, this could represent a pathologic fracture with an  underlying metastatic lesion. 3. Osteoarthritic uptake in the shoulders, knees, wrists, and hands.     She underwent on 8/30/2019 a biopsy of 1 of the left axillary lymph nodes, she was

## 2023-04-20 ENCOUNTER — APPOINTMENT (OUTPATIENT)
Dept: ULTRASOUND IMAGING | Age: 73
DRG: 812 | End: 2023-04-20
Payer: MEDICARE

## 2023-04-20 LAB
BLOOD BANK DISPENSE STATUS: NORMAL
BLOOD BANK PRODUCT CODE: NORMAL
BPU ID: NORMAL
DESCRIPTION BLOOD BANK: NORMAL
HCT VFR BLD AUTO: 21.7 % (ref 34–48)
HCT VFR BLD AUTO: 22.1 % (ref 34–48)
HCT VFR BLD AUTO: 24.7 % (ref 34–48)
HCT VFR BLD AUTO: 25 % (ref 34–48)
HEMOCCULT STL QL: NORMAL
HGB BLD-MCNC: 6.9 G/DL (ref 11.5–15.5)
HGB BLD-MCNC: 7.8 G/DL (ref 11.5–15.5)
HGB BLD-MCNC: 7.9 G/DL (ref 11.5–15.5)
IMMATURE RETIC FRACT: 27.1 % (ref 3–15.9)
IRON SATN MFR SERPL: 11 % (ref 15–50)
IRON SERPL-MCNC: 23 MCG/DL (ref 37–145)
PATHOLOGIST REVIEW: NORMAL
RETIC HGB EQUIVALENT: 23.5 PG (ref 28.2–36.6)
RETICS/RBC NFR: 3.3 % (ref 0.4–1.9)
RETICULOCYTE ABSOLUTE COUNT: 0.07 E12/L
TIBC SERPL-MCNC: 219 MCG/DL (ref 250–450)

## 2023-04-20 PROCEDURE — 36430 TRANSFUSION BLD/BLD COMPNT: CPT

## 2023-04-20 PROCEDURE — 6360000002 HC RX W HCPCS: Performed by: INTERNAL MEDICINE

## 2023-04-20 PROCEDURE — 36415 COLL VENOUS BLD VENIPUNCTURE: CPT

## 2023-04-20 PROCEDURE — 6370000000 HC RX 637 (ALT 250 FOR IP): Performed by: INTERNAL MEDICINE

## 2023-04-20 PROCEDURE — 2580000003 HC RX 258: Performed by: INTERNAL MEDICINE

## 2023-04-20 PROCEDURE — 85014 HEMATOCRIT: CPT

## 2023-04-20 PROCEDURE — 85045 AUTOMATED RETICULOCYTE COUNT: CPT

## 2023-04-20 PROCEDURE — 99232 SBSQ HOSP IP/OBS MODERATE 35: CPT | Performed by: STUDENT IN AN ORGANIZED HEALTH CARE EDUCATION/TRAINING PROGRAM

## 2023-04-20 PROCEDURE — 2060000000 HC ICU INTERMEDIATE R&B

## 2023-04-20 PROCEDURE — 85018 HEMOGLOBIN: CPT

## 2023-04-20 PROCEDURE — 84630 ASSAY OF ZINC: CPT

## 2023-04-20 PROCEDURE — 97165 OT EVAL LOW COMPLEX 30 MIN: CPT

## 2023-04-20 PROCEDURE — 93971 EXTREMITY STUDY: CPT

## 2023-04-20 PROCEDURE — 82525 ASSAY OF COPPER: CPT

## 2023-04-20 PROCEDURE — C9113 INJ PANTOPRAZOLE SODIUM, VIA: HCPCS | Performed by: INTERNAL MEDICINE

## 2023-04-20 PROCEDURE — 99232 SBSQ HOSP IP/OBS MODERATE 35: CPT | Performed by: INTERNAL MEDICINE

## 2023-04-20 PROCEDURE — 84165 PROTEIN E-PHORESIS SERUM: CPT

## 2023-04-20 PROCEDURE — 97530 THERAPEUTIC ACTIVITIES: CPT

## 2023-04-20 RX ADMIN — CALCIUM 500 MG: 500 TABLET ORAL at 11:10

## 2023-04-20 RX ADMIN — LETROZOLE 2.5 MG: 2.5 TABLET ORAL at 11:09

## 2023-04-20 RX ADMIN — METOPROLOL TARTRATE 25 MG: 25 TABLET, FILM COATED ORAL at 11:10

## 2023-04-20 RX ADMIN — POLYMYXIN B SULFATE, BACITRACIN ZINC, NEOMYCIN SULFATE: 5000; 3.5; 4 OINTMENT TOPICAL at 21:37

## 2023-04-20 RX ADMIN — METOPROLOL TARTRATE 25 MG: 25 TABLET, FILM COATED ORAL at 21:36

## 2023-04-20 RX ADMIN — SODIUM CHLORIDE 100 MG: 9 INJECTION, SOLUTION INTRAVENOUS at 16:35

## 2023-04-20 RX ADMIN — MULTIPLE VITAMINS W/ MINERALS TAB 1 TABLET: TAB at 11:10

## 2023-04-20 RX ADMIN — POLYMYXIN B SULFATE, BACITRACIN ZINC, NEOMYCIN SULFATE: 5000; 3.5; 4 OINTMENT TOPICAL at 16:35

## 2023-04-20 RX ADMIN — Medication 6 MG: at 21:36

## 2023-04-20 RX ADMIN — PANTOPRAZOLE SODIUM 40 MG: 40 INJECTION, POWDER, FOR SOLUTION INTRAVENOUS at 11:09

## 2023-04-20 RX ADMIN — OXYCODONE HYDROCHLORIDE AND ACETAMINOPHEN 1 TABLET: 5; 325 TABLET ORAL at 17:34

## 2023-04-20 RX ADMIN — SODIUM CHLORIDE 25 MG: 9 INJECTION, SOLUTION INTRAVENOUS at 11:11

## 2023-04-20 RX ADMIN — LEVOTHYROXINE SODIUM 25 MCG: 25 TABLET ORAL at 05:24

## 2023-04-20 RX ADMIN — PANTOPRAZOLE SODIUM 40 MG: 40 INJECTION, POWDER, FOR SOLUTION INTRAVENOUS at 21:36

## 2023-04-20 RX ADMIN — ENOXAPARIN SODIUM 40 MG: 100 INJECTION SUBCUTANEOUS at 09:00

## 2023-04-20 RX ADMIN — WATER 1000 MG: 1 INJECTION INTRAMUSCULAR; INTRAVENOUS; SUBCUTANEOUS at 19:02

## 2023-04-20 ASSESSMENT — PAIN DESCRIPTION - ONSET: ONSET: PROGRESSIVE

## 2023-04-20 ASSESSMENT — PAIN SCALES - GENERAL: PAINLEVEL_OUTOF10: 6

## 2023-04-20 ASSESSMENT — PAIN DESCRIPTION - LOCATION: LOCATION: LEG

## 2023-04-20 ASSESSMENT — PAIN DESCRIPTION - FREQUENCY: FREQUENCY: INTERMITTENT

## 2023-04-20 ASSESSMENT — PAIN DESCRIPTION - ORIENTATION: ORIENTATION: LEFT

## 2023-04-20 NOTE — DISCHARGE INSTR - COC
Cleansed Cleansed with saline 04/19/23 1945   Dressing/Treatment Foam;Xeroform 04/19/23 1945   Dressing Change Due 04/20/23 04/19/23 1945   Wound Length (cm) 4 cm 04/19/23 1945   Wound Width (cm) 4 cm 04/19/23 1945   Wound Surface Area (cm^2) 16 cm^2 04/19/23 1945   Change in Wound Size % (l*w) -47.06 04/19/23 1945   Wound Assessment Pink/red 04/19/23 1945   Drainage Amount None 04/19/23 1945   Lorena-wound Assessment Fragile 04/19/23 1945   Number of days: 12       Incision 04/07/23 Hip Left (Active)   Dressing Status New dressing applied 04/19/23 1945   Incision Cleansed Cleansed with saline 04/19/23 1945   Dressing/Treatment Foam 04/19/23 1945   Incision Assessment Dry 04/19/23 1945   Drainage Amount None 04/19/23 1945   Number of days: 12        Elimination:  Continence: Bowel: No  Bladder: Nayak  Urinary Catheter: Insertion Date: 4/19 and Indication for Use of Catheter: Acute urinary retention/obstruction   Colostomy/Ileostomy/Ileal Conduit: No       Date of Last BM:     Intake/Output Summary (Last 24 hours) at 4/20/2023 1245  Last data filed at 4/20/2023 0830  Gross per 24 hour   Intake 472 ml   Output 600 ml   Net -128 ml     I/O last 3 completed shifts:  In: -   Out: 600 [Urine:600]    Safety Concerns: At Risk for Falls    Impairments/Disabilities:      None    Nutrition Therapy:  Current Nutrition Therapy:   - Oral Diet:  General    Routes of Feeding: Oral  Liquids: Thin Liquids  Daily Fluid Restriction: no  Last Modified Barium Swallow with Video (Video Swallowing Test): not done    Treatments at the Time of Hospital Discharge:   Respiratory Treatments:   Oxygen Therapy:  is not on home oxygen therapy.   Ventilator:    - No ventilator support    Rehab Therapies: Physical Therapy and Occupational Therapy  Weight Bearing Status/Restrictions: No weight bearing restrictions  Other Medical Equipment (for information only, NOT a DME order):  walker  Other Treatments: ***    Patient's personal belongings

## 2023-04-20 NOTE — ACP (ADVANCE CARE PLANNING)
Advance Care Planning   Healthcare Decision Maker:    Primary Decision Maker: Re Jimenez - Brother/Sister - [phone]    Click here to complete Healthcare Decision Makers including selection of the Healthcare Decision Maker Relationship (ie \"Primary\").

## 2023-04-20 NOTE — CONSULTS
Comprehensive Nutrition Assessment    Type and Reason for Visit:  Initial, Consult, Wound    Nutrition Recommendations/Plan:   Advance diet as tolerated & add ONS w/ nutrition progression     Malnutrition Assessment:  Malnutrition Status: Moderate malnutrition (04/20/23 1515)    Context:  Chronic Illness     Findings of the 6 clinical characteristics of malnutrition:  Energy Intake:  Unable to assess  Weight Loss:  No significant weight loss     Body Fat Loss:  Mild body fat loss Orbital   Muscle Mass Loss:  Mild muscle mass loss Temples (temporalis), Clavicles (pectoralis & deltoids)  Fluid Accumulation:  No significant fluid accumulation     Strength:  Not Performed    Nutrition Assessment:    Pt w/ severe anemia, UTI. Hx metastatic breast CA on chemo, hydrocephalus, recent hx L hip fracture s/p cephalomedullary nail 4/7. Pt currently w/out diet order, ADAT & add ONS w/ nutrition progression. Nutrition Related Findings:    A&O X4, I&Os WDL, no edema, abd WDL, elevated LFTs Wound Type: Surgical Incision, Multiple, Wound Consult Pending (R shoulder, L knee wounds per wound consult)       Current Nutrition Intake & Therapies:    Average Meal Intake: NPO (no diet order at this time)  Average Supplements Intake: NPO  No diet orders on file    Anthropometric Measures:  Height: 5' 6\" (167.6 cm)  Ideal Body Weight (IBW): 130 lbs (59 kg)    Admission Body Weight: 156 lb 5 oz (70.9 kg) (4/20 bed)  Current Body Weight: 156 lb 5 oz (70.9 kg), 120.2 % IBW. Weight Source: Bed Scale (4/20)  Current BMI (kg/m2): 25.2  Usual Body Weight: 156 lb (70.8 kg) (actual X 1 year per EMR)  % Weight Change (Calculated): 0.2  Weight Adjustment For: No Adjustment                 BMI Categories: Overweight (BMI 25.0-29. 9)    Estimated Daily Nutrient Needs:  Energy Requirements Based On: Formula  Weight Used for Energy Requirements: Current  Energy (kcal/day):   Weight Used for Protein Requirements: Ideal  Protein (g/day): 75-85
nodes, she was consistent with metastatic ductal carcinoma, nuclear grade 2, ER positive greater than 95% MO +70% HER-2/elder 1+, by IHC, negative. The patient was started on systemic therapy with Femara and Ibrance on 9/13/2019. The patient had left femur intertrochanteric fracture recently status post fall, she underwent left femur intertrochanteric fracture cephalomedullary nail stabilization on 4/7/2023. On 4/10/2023 hemoglobin was 8.8G/DL, the patient was seen in the office today, her hemoglobin was 6. 6G/DL, she was referred to the ED. -Anemia with a hemoglobin of 6. 6G/DL, the patient has a history of macrocytosis secondary to Ibrance, her hemoglobin and hematocrit prior to surgery were okay, the anemia could be secondary to recent surgery, FOBT was negative in the ED, iron panel from 4/10/2023 was consistent with iron deficiency as well as chronic inflammation, no vitamin B12 or folate deficiency, transfuse to keep hemoglobin greater than 7G/DL, 1 unit of packed RBCs was ordered, monitor hemoglobin hematocrit. We will order parenteral iron infusion. We will order a work-up to evaluate for other etiologies that could be contributing to the anemia. D/W ED physician. -Metastatic breast cancer, she does not have leukopenia or thrombocytopenia, recommended to continue Ibrance, and Femara, the current cycle was started on 4/11/2023. Lissette Huang was held today, will resume as outpatient. -UTI, the patient was started on Rocephin.     Thank you for allowing us to participate in the care of     Curtis Monroy MD   HEMATOLOGY/MEDICAL [address] [place] [id number]  Dept: [address] [place] [address] Avenue: [phone]

## 2023-04-21 LAB
ALBUMIN SERPL-MCNC: 1.9 G/DL (ref 3.5–4.7)
ALPHA1 GLOB SERPL ELPH-MCNC: 0.4 G/DL (ref 0.2–0.4)
ALPHA2 GLOB SERPL ELPH-MCNC: 1 G/DL (ref 0.5–1)
B-GLOBULIN SERPL ELPH-MCNC: 0.8 G/DL (ref 0.8–1.3)
ELECTROPHORESIS: ABNORMAL
GAMMA GLOB SERPL ELPH-MCNC: 1.3 G/DL (ref 0.7–1.6)
HAPTOGLOB SERPL-MCNC: 371 MG/DL (ref 30–200)
HCT VFR BLD AUTO: 25.8 % (ref 34–48)
HCT VFR BLD AUTO: 28.5 % (ref 34–48)
HGB BLD-MCNC: 8.2 G/DL (ref 11.5–15.5)
HGB BLD-MCNC: 8.9 G/DL (ref 11.5–15.5)
LDH SERPL-CCNC: 262 U/L (ref 135–214)
PROT SERPL-MCNC: 5.4 G/DL (ref 6.4–8.3)

## 2023-04-21 PROCEDURE — 6360000002 HC RX W HCPCS: Performed by: INTERNAL MEDICINE

## 2023-04-21 PROCEDURE — 85014 HEMATOCRIT: CPT

## 2023-04-21 PROCEDURE — 83615 LACTATE (LD) (LDH) ENZYME: CPT

## 2023-04-21 PROCEDURE — 97161 PT EVAL LOW COMPLEX 20 MIN: CPT

## 2023-04-21 PROCEDURE — 6370000000 HC RX 637 (ALT 250 FOR IP): Performed by: INTERNAL MEDICINE

## 2023-04-21 PROCEDURE — 97530 THERAPEUTIC ACTIVITIES: CPT

## 2023-04-21 PROCEDURE — 85018 HEMOGLOBIN: CPT

## 2023-04-21 PROCEDURE — 2580000003 HC RX 258: Performed by: INTERNAL MEDICINE

## 2023-04-21 PROCEDURE — 36415 COLL VENOUS BLD VENIPUNCTURE: CPT

## 2023-04-21 PROCEDURE — 99232 SBSQ HOSP IP/OBS MODERATE 35: CPT | Performed by: INTERNAL MEDICINE

## 2023-04-21 PROCEDURE — C9113 INJ PANTOPRAZOLE SODIUM, VIA: HCPCS | Performed by: INTERNAL MEDICINE

## 2023-04-21 PROCEDURE — 2060000000 HC ICU INTERMEDIATE R&B

## 2023-04-21 PROCEDURE — 99238 HOSP IP/OBS DSCHRG MGMT 30/<: CPT | Performed by: INTERNAL MEDICINE

## 2023-04-21 PROCEDURE — 83010 ASSAY OF HAPTOGLOBIN QUANT: CPT

## 2023-04-21 RX ORDER — PANTOPRAZOLE SODIUM 40 MG/1
40 TABLET, DELAYED RELEASE ORAL
Qty: 30 TABLET | Refills: 0 | DISCHARGE
Start: 2023-04-21

## 2023-04-21 RX ADMIN — LEVOTHYROXINE SODIUM 25 MCG: 25 TABLET ORAL at 05:17

## 2023-04-21 RX ADMIN — METOPROLOL TARTRATE 25 MG: 25 TABLET, FILM COATED ORAL at 09:44

## 2023-04-21 RX ADMIN — Medication 6 MG: at 20:16

## 2023-04-21 RX ADMIN — POLYMYXIN B SULFATE, BACITRACIN ZINC, NEOMYCIN SULFATE: 5000; 3.5; 4 OINTMENT TOPICAL at 20:20

## 2023-04-21 RX ADMIN — LETROZOLE 2.5 MG: 2.5 TABLET ORAL at 09:44

## 2023-04-21 RX ADMIN — METOPROLOL TARTRATE 25 MG: 25 TABLET, FILM COATED ORAL at 20:20

## 2023-04-21 RX ADMIN — CALCIUM 500 MG: 500 TABLET ORAL at 09:46

## 2023-04-21 RX ADMIN — PANTOPRAZOLE SODIUM 40 MG: 40 INJECTION, POWDER, FOR SOLUTION INTRAVENOUS at 20:17

## 2023-04-21 RX ADMIN — WATER 1000 MG: 1 INJECTION INTRAMUSCULAR; INTRAVENOUS; SUBCUTANEOUS at 16:21

## 2023-04-21 RX ADMIN — POLYMYXIN B SULFATE, BACITRACIN ZINC, NEOMYCIN SULFATE: 5000; 3.5; 4 OINTMENT TOPICAL at 09:45

## 2023-04-21 RX ADMIN — MULTIPLE VITAMINS W/ MINERALS TAB 1 TABLET: TAB at 09:44

## 2023-04-21 RX ADMIN — ENOXAPARIN SODIUM 40 MG: 100 INJECTION SUBCUTANEOUS at 09:43

## 2023-04-21 RX ADMIN — PANTOPRAZOLE SODIUM 40 MG: 40 INJECTION, POWDER, FOR SOLUTION INTRAVENOUS at 09:44

## 2023-04-21 ASSESSMENT — PAIN SCALES - GENERAL: PAINLEVEL_OUTOF10: 0

## 2023-04-21 NOTE — PROGRESS NOTES
Database initiated. Patient comes in from [facility]. She is A&O. Has been requiring a wheelchair and has the facilities wheelchair here with her. She is RA at baseline.
Inpatient Medical Oncology Consult Note    Subjective:  Fatigue weakness. No fever, chills. No nausea. Objective:  BP (!) 100/56   Pulse 67   Temp 98 °F (36.7 °C) (Oral)   Resp 16   Ht 5' 6\" (1.676 m)   Wt 156 lb 5 oz (70.9 kg)   SpO2 100%   BMI 25.23 kg/m²   GENERAL: Alert, oriented x 3, tired  HEENT: PERRLA; EOMI. Oropharynx clear. NECK: Supple. Without lymphadenopathy. LUNGS: Good air entry bilaterally. No wheezing, crackles or rhonchi. CARDIOVASCULAR: Regular rate. No murmurs, rubs or gallops. ABDOMEN: Soft. Non-tender, non-distended. EXTREMITIES: Without clubbing or cyanosis or edema. NEUROLOGIC: No focal deficits. Diagnostics:  Lab Results   Component Value Date    WBC 5.5 04/19/2023    HGB 8.2 (L) 04/21/2023    HCT 25.8 (L) 04/21/2023    MCV 98.0 04/19/2023     (H) 04/19/2023     Lab Results   Component Value Date     04/19/2023    K 4.3 04/19/2023     04/19/2023    CO2 22 04/19/2023    BUN 19 04/19/2023    CREATININE 0.7 04/19/2023    GLUCOSE 102 (H) 04/19/2023    CALCIUM 7.9 (L) 04/19/2023    PROT 6.6 04/19/2023    LABALBU 2.8 (L) 04/19/2023    BILITOT 0.3 04/19/2023    ALKPHOS 214 (H) 04/19/2023    AST 33 (H) 04/19/2023    ALT 33 (H) 04/19/2023    LABGLOM >60 04/19/2023    GFRAA >60 09/28/2022     Impression/Plan:  75 y/o female with metastatic breast cancer grade 2 ER + KS + HER/2 1+ by IHC negative  She was started on Femara + Ibrance 09/13/2019    Left femur intertrochanteric fx s/p left femur intertrochanteric fracture cephalomedullary nail stabilization on 04/07/2023  LLE U.S 04/20/2023 negative for DVT. Imaging reviewed. Normocytic anemia FOBT negative   Iron panel 04/10/2023 iron deficiency anemia   Iron deficiency and chronic inflammation  Hb 8.2 today 04/21/2023.  Labs reviewed  Transfuse if Hb <7.0  Metastatic breast cancer   Continue Ibrance  UTI continue Rocephin    04/21/2023  Oksana Reeves MD
Keyla Dolan NP notified of pt most recent hgb. Will recheck and update again with morning labs.
Maia Babin NP called regarding 1 unit PRBC that was ordered for patient. Ordered hemoglobin and hematocrit to be checked before giving unit.
Message sent to Dr. Coleen Hill checking Asprin recommendations on discharge per NP request.  Nelida Sampson RN
Message sent to Dr. Mykel Morton checking discharge.   America Holloway RN
Nurse to nurse report called to [facility].
Perfect serve sent to [facility] wound care regarding new consult.
Physical Therapy    Initial Assessment     Name: Ruby Mireles  : 1950  MRN: [id number]      Date of Service: 2023    Evaluating PT: Jessa Carney PT, DPT [id number]      Room #:  8093/8420-X  Diagnosis:  Anemia [D64.9]  PMHx/PSHx:   has a past medical history of Arm fracture, left, Balance problems, Hydrocephalus ([address] Thyroid disease, and Vertigo. Pertinent Information:  Left femur intertrochanteric fracture CM nail stabilization 23  Precautions:  Fall risk, WBAT L LE, Alarm    SUBJECTIVE:    Pt is a questionable historian. Per chart, pt was admitted from [place] [address]. Pt lives alone in a split level house with 1+1+1 stair(s) and 2 rail(s) to enter. 3+3 stairs to lower level bed and bath. Pt ambulated without AD prior to hip fx earlier this month. Pt reports she was ambulating at rehab but this is questionable. OBJECTIVE:   Initial Evaluation  Date: 23 Treatment Date: Short Term/ Long Term   Goals   AM-PAC 6 Clicks      Was pt agreeable to Eval/treatment? Yes     Does pt have pain? No complaints of pain     Bed Mobility  Rolling: NT  Supine to sit: Max A  Sit to supine: Max A  Scooting: Max A to EOB  Rolling: SBA  Supine to sit: SBA  Sit to supine: SBA  Scooting: SBA   Transfers Sit to stand: Max A (partial)  Stand to sit: Max A  Stand pivot: NT  Sit to stand: Min A  Stand to sit: Min A  Stand pivot: Mod A with [employer]   Ambulation   NT  >5 feet with [employer] with Mod A   Stair negotiation: ascended and descended NT  NA   ROM BUE: Refer to OT note  BLE: WFL     Strength BUE: Refer to OT note  BLE: NT     Balance Sitting EOB: CGA  Dynamic Standing: NT  Sitting EOB: Supervision  Dynamic Standing: SBA with WW     Pt is A & O x: 3 grossly to person, place, and month/year. Sensation: Pt denies numbness and tingling of extremities. Edema: Unremarkable. Patient education  Pt educated on PT role in acute care setting.     Patient response to education:   Pt verbalized understanding Pt demonstrated skill
Rigo Calle NP notified of patient AM hgb. Blood transfusion started.
[facility] Progress Note    Admitting Date and Time: 4/19/2023 12:33 PM  Admit Dx: Anemia [D64.9]    Subjective:  Patient is being followed for Anemia [D64.9]     Patient was lying in bed in no acute distress. Reports feeling fatigued. Denies any overt bleeding. Denies any black/ tarry stools. ROS: denies fever, chills, cp, sob, n/v, HA unless stated above.       calcium elemental  500 mg Oral Daily    [START ON 4/23/2023] vitamin D  50,000 Units Oral Weekly    letrozole  2.5 mg Oral Daily    levothyroxine  25 mcg Oral Daily    melatonin  6 mg Oral Nightly    metoprolol tartrate  25 mg Oral BID    therapeutic multivitamin-minerals  1 tablet Oral Daily    neomycin-bacitracin-polymyxin   Topical BID    palbociclib  75 mg Oral See Admin Instructions    enoxaparin  40 mg SubCUTAneous Daily    pantoprazole  40 mg IntraVENous BID    ferric gluconate (FERRLECIT) test dose IVPB  25 mg IntraVENous Once    Followed by    ferric gluconate (FERRLECIT) IVPB  100 mg IntraVENous Once     sodium chloride, , PRN  oxyCODONE-acetaminophen, 1 tablet, Q6H PRN  polyethylene glycol, 17 g, Daily PRN         Objective:    BP (!) 108/55   Pulse 71   Temp 97.9 °F (36.6 °C) (Oral)   Resp 16   Ht 5' 6\" (1.676 m)   Wt 156 lb 5 oz (70.9 kg)   SpO2 96%   BMI 25.23 kg/m²   General Appearance: alert and oriented to person, place and time and in no acute distress  Skin: warm and dry  Head: normocephalic and atraumatic  Eyes: pupils equal, round, and reactive to light, extraocular eye movements intact, conjunctivae normal  Neck: neck supple and non tender without mass   Pulmonary/Chest: clear to auscultation bilaterally- no wheezes, rales or rhonchi, normal air movement, no respiratory distress  Cardiovascular: normal rate, normal S1 and S2 and no carotid bruits  Abdomen: soft, non-tender, non-distended, normal bowel sounds, no masses or organomegaly  Extremities: no cyanosis, no clubbing and no edema  Neurologic: no
Tender on palpation. NEUROLOGIC: No focal deficits. ECOG PS 2    Impression/Plan:     The patient is a 74 y.o. lady, with a past medical history significant for hydrocephalus, who had presented with left breast changes, she had noticed a dark spot on her left breast, then she had developed induration, she thinks that she had the breast changes for months. The patient had never had a mammogram done, had a CT scan of the chest done, revealing a solid left breast mass measuring at least 6.8 x 2.9 cm in size is identified. The mass appears to extend to the skin surface. The deep margin of the mass abuts the chest wall but a fat plane appears to be preserved. Pathologically enlarged left  axillary lymph nodes are seen measuring up to 4.5 x 1.9 cm in size. CT abdomen and pelvis was done on 8/30/2019 revealing partial visualization of soft tissue mass in the left breast, nonspecific lymph nodes in the upper abdomen. Bone scan was done on 8/30/2019, revealing a Prominent focus of radiotracer uptake in the left anterior superior iliac spine, with corresponding abnormality on recent CT, suggestive of metastasis. More subtle focal radiotracer uptake in the right anterior superior iliac spine could be due to metastasis or  degenerative change. 2. Focal radiotracer uptake in the lateral aspect of the right ninth  rib, which appears to be due to a nondisplaced healing fracture. Correlation with history of recent trauma is recommended. In light of  other findings, this could represent a pathologic fracture with an  underlying metastatic lesion. 3. Osteoarthritic uptake in the shoulders, knees, wrists, and hands. She underwent on 8/30/2019 a biopsy of 1 of the left axillary lymph nodes, she was consistent with metastatic ductal carcinoma, nuclear grade 2, ER positive greater than 95% TN +70% HER-2/elder 1+, by IHC, negative. The patient was started on systemic therapy with Femara and Ibrance on 9/13/2019.
maximize independence with bed mobility as a preparatory method for functional transfers/mobility to optimize patient's participation/independence with ADLs. Patient education provided regardin) importance of participate in EOB/OOB activities. Patient indicated [address] understanding. Further skilled OT treatment indicated to increase patient's safety and independence with completion of ADL/IADL tasks in order to maximize patient's functional independence and quality of life. Rehab Potential: Good for established goals. Patient / Family Goal: Patient indicated that she would ultimately like to be able to return home; patient's sister anticipates patient will discharge to a SNF/[facility]. Patient and/or family were instructed on functional diagnosis, prognosis/goals, and OT plan of care. Reva demonstrated Fair understanding. Eval Complexity: Low    Time In: 1135  Time Out: 1205  Total Treatment Time: 15 minutes      Minutes Units   OT Eval Low 24006 15 1   OT Eval Medium 06658     OT Eval High 11192     OT Re-Eval [id number]     Therapeutic Ex 08675     Therapeutic Activities 98956 15 1   ADL/Self Care 36762     Orthotic Management 91152     Neuro Re-Ed 18792     Non-Billable Time N/A ---     Evaluation time includes thorough review of current medical information, gathering information on past medical history/social history and prior level of function, completion of standardized testing/informal observation of tasks, assessment of data, and education on plan of care and goals. CLAUDETTE Alas/L  License Number: [id number]

## 2023-04-21 NOTE — DISCHARGE SUMMARY
ordered. Hydrocephalus: stable     UTI: UA with small blood, nitrite, large leukocyte esterase, and >20 WBC. Urine culture with <10,000 GNR. Received Rocephin. No further antibiotics necessary. She is now hemodynamically stable and ready for discharge. She is to follow up with PCP and Hem/onc. Discharge Exam:  General Appearance: alert and oriented to person, place and time and in no acute distress  Skin: warm and dry  Head: normocephalic and atraumatic  Eyes: pupils equal, round, and reactive to light, extraocular eye movements intact, conjunctivae normal  Neck: neck supple and non tender without mass   Pulmonary/Chest: clear to auscultation bilaterally- no wheezes, rales or rhonchi, normal air movement, no respiratory distress  Cardiovascular: normal rate, normal S1 and S2 and no carotid bruits  Abdomen: soft, non-tender, non-distended, normal bowel sounds, no masses or organomegaly  Extremities: no cyanosis, no clubbing and no edema  Neurologic: no cranial nerve deficit and speech normal    I/O last 3 completed shifts: In: 472 [Blood:472]  Out: 1400 [Urine:1400]  No intake/output data recorded. LABS:  Recent Labs     04/19/23  1327      K 4.3      CO2 22   BUN 19   CREATININE 0.7   GLUCOSE 102*   CALCIUM 7.9*       Recent Labs     04/19/23  1327 04/19/23  2200 04/20/23  1940 04/21/23  0515 04/21/23  1415   WBC 5.5  --   --   --   --    RBC 2.44*  --   --   --   --    HGB 7.5*   < > 7.8* 8.2* 8.9*   HCT 23.9*   < > 24.7* 25.8* 28.5*   MCV 98.0  --   --   --   --    MCH 30.7  --   --   --   --    MCHC 31.4*  --   --   --   --    RDW 15.2*  --   --   --   --    *  --   --   --   --    MPV 9.6  --   --   --   --     < > = values in this interval not displayed. No results for input(s): POCGLU in the last 72 hours.     Imaging:  US DUP LOWER EXTREMITY LEFT GERONIMO    Result Date: 4/20/2023  EXAMINATION: DUPLEX VENOUS ULTRASOUND OF THE LEFT LOWER EXTREMITY 4/20/2023 2:47 pm

## 2023-04-21 NOTE — PLAN OF CARE
Problem: Skin/Tissue Integrity  Goal: Absence of new skin breakdown  Description: 1. Monitor for areas of redness and/or skin breakdown  2. Assess vascular access sites hourly  3. Every 4-6 hours minimum:  Change oxygen saturation probe site  4. Every 4-6 hours:  If on nasal continuous positive airway pressure, respiratory therapy assess nares and determine need for appliance change or resting period.   4/21/2023 1105 by Diana Baig RN  Outcome: Progressing  4/20/2023 2205 by Sheila Izaguirre RN  Outcome: Progressing     Problem: Discharge Planning  Goal: Discharge to home or other facility with appropriate resources  4/21/2023 1105 by Diana Baig RN  Outcome: Progressing  4/20/2023 2205 by Sheila Izaguirre RN  Outcome: Progressing     Problem: Safety - Adult  Goal: Free from fall injury  4/21/2023 1105 by Diana Baig RN  Outcome: Progressing  4/20/2023 2205 by Sheila Izaguirre RN  Outcome: Progressing     Problem: Nutrition Deficit:  Goal: Optimize nutritional status  4/20/2023 2205 by Sheila Izaguirre RN  Outcome: Progressing     Problem: Pain  Goal: Verbalizes/displays adequate comfort level or baseline comfort level  4/21/2023 1105 by Diana Baig RN  Outcome: Progressing  4/20/2023 2205 by Sheila Izaguirre RN  Outcome: Progressing

## 2023-04-21 NOTE — CARE COORDINATION
Readmission Assessment  Number of Days since last admission?: 8-30 days  Previous Disposition: SNF  Who is being Interviewed: Patient  What was the patient's/caregiver's perception as to why they think they needed to return back to the hospital?: Other (Comment) (abnormal labs)  Did you visit your Primary Care Physician after you left the hospital, before you returned this time?: No  Why weren't you able to visit your PCP?: Other (Comment) (at snf)  Did you see a specialist, such as Cardiac, Pulmonary, Orthopedic Physician, etc. after you left the hospital?: No  Who advised the patient to return to the hospital?: Skilled Unit  Does the patient report anything that got in the way of taking their medications?: No  In our efforts to provide the best possible care to you and others like you, can you think of anything that we could have done to help you after you left the hospital the first time, so that you might not have needed to return so soon?: Additional Community resources available for illness support
Social Work:    Chart review. Transfused RBC yesterday. Hemoglobin 8.2 today. Therapies ordered. Plan remains to return to [facility]. when medically stable.      Electronically signed by LOVE Dias on 4/21/2023 at 12:27 PM
representative Solomon Heart and her family were provided with a choice of provider and agrees with the discharge plan. Freedom of choice list with basic dialogue that supports the patient's individualized plan of care/goals and shares the quality data associated with the providers was provided to: Patient   Patient Representative Name:       The Patient and/or Patient Representative Agree with the Discharge Plan?  Yes    [facility]  Case Management Department  Ph: [phone] Fax: [phone]

## 2023-04-22 VITALS
WEIGHT: 156.31 LBS | HEART RATE: 70 BPM | DIASTOLIC BLOOD PRESSURE: 70 MMHG | HEIGHT: 66 IN | SYSTOLIC BLOOD PRESSURE: 95 MMHG | TEMPERATURE: 98.2 F | RESPIRATION RATE: 18 BRPM | BODY MASS INDEX: 25.12 KG/M2 | OXYGEN SATURATION: 98 %

## 2023-04-22 LAB — BACTERIA UR CULT: NORMAL

## 2023-04-24 LAB
COPPER SERPL-MCNC: 140.9 UG/DL (ref 80–155)
ZINC SERPL-MCNC: 67.1 UG/DL (ref 60–120)

## 2023-04-24 ASSESSMENT — ENCOUNTER SYMPTOMS
SORE THROAT: 0
VOICE CHANGE: 0
NAUSEA: 0
WHEEZING: 0
CHEST TIGHTNESS: 0
ABDOMINAL PAIN: 0
SHORTNESS OF BREATH: 0
VOMITING: 0

## 2023-05-01 ENCOUNTER — OFFICE VISIT (OUTPATIENT)
Dept: ORTHOPEDIC SURGERY | Age: 73
End: 2023-05-01
Payer: MEDICARE

## 2023-05-01 ENCOUNTER — HOSPITAL ENCOUNTER (OUTPATIENT)
Dept: GENERAL RADIOLOGY | Age: 73
Discharge: HOME | End: 2023-05-03
Payer: MEDICARE

## 2023-05-01 DIAGNOSIS — T14.8XXA FRACTURE: Primary | ICD-10-CM

## 2023-05-01 DIAGNOSIS — T14.8XXA FRACTURE: ICD-10-CM

## 2023-05-01 DIAGNOSIS — S72.142A DISPLACED INTERTROCHANTERIC FRACTURE OF LEFT FEMUR, INITIAL ENCOUNTER FOR CLOSED FRACTURE (HCC): Primary | ICD-10-CM

## 2023-05-01 PROCEDURE — 99213 OFFICE O/P EST LOW 20 MIN: CPT

## 2023-05-01 PROCEDURE — 99024 POSTOP FOLLOW-UP VISIT: CPT | Performed by: PHYSICIAN ASSISTANT

## 2023-05-01 PROCEDURE — 73552 X-RAY EXAM OF FEMUR 2/>: CPT

## 2023-05-01 PROCEDURE — 73502 X-RAY EXAM HIP UNI 2-3 VIEWS: CPT

## 2023-05-01 RX ORDER — BISACODYL 10 MG
10 SUPPOSITORY, RECTAL RECTAL DAILY PRN
COMMUNITY

## 2023-05-01 RX ORDER — OXYCODONE HYDROCHLORIDE AND ACETAMINOPHEN 5; 325 MG/1; MG/1
1 TABLET ORAL EVERY 6 HOURS PRN
COMMUNITY

## 2023-05-01 RX ORDER — ACETAMINOPHEN 325 MG/1
650 TABLET ORAL EVERY 6 HOURS PRN
COMMUNITY

## 2023-05-01 NOTE — PROGRESS NOTES
Chief Complaint   Patient presents with    Follow Up After Procedure     2 wk post op left hip IM inocencia 04/07/23. Residing at [address]. Patient having burning on the medial thigh. Patient in PT at facility. OP:SURGEON: Dr. Shivani Russell DO  DATE OF PROCEDURE: 4-7-23  PROCEDURE: Left femur intertrochanteric fracture cephalomedullary nail stabilization    POD: 3 weeks    Subjective:  Bonnie Bynum is following up from the above surgery. She is WBAT on left lower extremity. Pain to extremity is none and is not taking prescribed pain medication. They denies numbness or tingling to the left lower extremity. Denies calf pain, chest pain, or shortness of breath. Patient continues to use DVT prophylaxis,  mg BID. Patient is participating in therapy at the facility. Patient presents today with a family member. She denies pain in the left leg. She states that prior to the injury she was able to ambulate with some difficulty around her home using furniture for stabilization. She states that she is doing PT at the facility but has done a minimal amount of ambulating at this point. Review of Systems -  All pertinent positives/negative in HPI     Objective:    General: Alert and oriented X 3, normocephalic atraumatic, external ears and eye normal, sclera clear, no acute distress, respirations easy and unlabored with no audible wheezes, skin warm and dry, speech and dress appropriate for noted age, affect euthymic.     Extremity:  Left Lower Extremity  Skin clean dry and intact, without signs of infection   Incisions well-healed, staples removed and Steri-Strips applied  moderate edema noted diffusely in the leg  Compartments supple throughout thigh and leg  Calf supple and not tender  negative Homans  Demonstrates active motion with knee flexion/extension, ankle DF/PF  Presents in a wheelchair  States sensation intact to touch in sural, deep peroneal, superficial peroneal, saphenous, posterior

## 2023-05-01 NOTE — PATIENT INSTRUCTIONS
Weightbearing as tolerated left lower extremity. Continue PT at the facility. Work on stretching, strengthening and ROM of the left lower extremity as well as gait training with assistive devices and manual assistance. Continue aspirin for DVT prophylaxis. If Steri-Strips do not fall off left lower extremity incisions in 7 days on their own, okay to remove. Follow-up in 4-5 weeks for reevaluation and x-rays. Future Appointments   Date Time Provider Trip Jerez   6/5/2023 10:00 AM SCHEDULE, [facility]         Call if any questions or concerns.

## 2023-05-23 ENCOUNTER — OUTSIDE SERVICES (OUTPATIENT)
Dept: PRIMARY CARE CLINIC | Age: 73
End: 2023-05-23

## 2023-05-23 DIAGNOSIS — C50.919 CARCINOMA OF BREAST METASTATIC TO BONE, UNSPECIFIED LATERALITY (HCC): ICD-10-CM

## 2023-05-23 DIAGNOSIS — F32.A DEPRESSION, UNSPECIFIED DEPRESSION TYPE: Primary | ICD-10-CM

## 2023-05-23 DIAGNOSIS — R63.4 WEIGHT LOSS: ICD-10-CM

## 2023-05-23 DIAGNOSIS — F03.918 DEMENTIA WITH AGGRESSIVE BEHAVIOR (HCC): ICD-10-CM

## 2023-05-23 DIAGNOSIS — C79.51 CARCINOMA OF BREAST METASTATIC TO BONE, UNSPECIFIED LATERALITY (HCC): ICD-10-CM

## 2023-05-23 DIAGNOSIS — S72.142D INTERTROCHANTERIC FRACTURE OF LEFT HIP, CLOSED, WITH ROUTINE HEALING, SUBSEQUENT ENCOUNTER: ICD-10-CM

## 2023-05-24 VITALS
OXYGEN SATURATION: 97 % | WEIGHT: 141.8 LBS | TEMPERATURE: 98.4 F | BODY MASS INDEX: 22.89 KG/M2 | DIASTOLIC BLOOD PRESSURE: 68 MMHG | RESPIRATION RATE: 16 BRPM | HEART RATE: 68 BPM | SYSTOLIC BLOOD PRESSURE: 122 MMHG

## 2023-05-24 ASSESSMENT — ENCOUNTER SYMPTOMS
VOICE CHANGE: 0
CHEST TIGHTNESS: 0
SHORTNESS OF BREATH: 0
WHEEZING: 0
SORE THROAT: 0
NAUSEA: 0
VOMITING: 0
ABDOMINAL PAIN: 0

## 2023-05-24 NOTE — PROGRESS NOTES
very upset about that demanding to leave now. Discontinue Nayak catheter, trial voiding. No follow-ups on file.       Seen By:  Soniya Chaudhry MD

## 2023-05-31 ENCOUNTER — OFFICE VISIT (OUTPATIENT)
Dept: ONCOLOGY | Age: 73
End: 2023-05-31
Payer: MEDICARE

## 2023-05-31 ENCOUNTER — HOSPITAL ENCOUNTER (OUTPATIENT)
Dept: INFUSION THERAPY | Age: 73
Discharge: HOME | End: 2023-05-31
Payer: MEDICARE

## 2023-05-31 VITALS
OXYGEN SATURATION: 96 % | SYSTOLIC BLOOD PRESSURE: 118 MMHG | DIASTOLIC BLOOD PRESSURE: 54 MMHG | HEART RATE: 77 BPM | HEIGHT: 66 IN | BODY MASS INDEX: 22.89 KG/M2 | TEMPERATURE: 97.6 F

## 2023-05-31 DIAGNOSIS — C79.51 CARCINOMA OF BREAST METASTATIC TO BONE, UNSPECIFIED LATERALITY (HCC): Primary | ICD-10-CM

## 2023-05-31 DIAGNOSIS — C50.919 CARCINOMA OF BREAST METASTATIC TO BONE, UNSPECIFIED LATERALITY (HCC): Primary | ICD-10-CM

## 2023-05-31 DIAGNOSIS — C50.612 MALIGNANT NEOPLASM OF AXILLARY TAIL OF LEFT FEMALE BREAST, UNSPECIFIED ESTROGEN RECEPTOR STATUS (HCC): ICD-10-CM

## 2023-05-31 DIAGNOSIS — D64.9 ANEMIA, UNSPECIFIED TYPE: ICD-10-CM

## 2023-05-31 LAB
ALBUMIN SERPL-MCNC: 3.6 G/DL (ref 3.5–5.2)
ALP SERPL-CCNC: 80 U/L (ref 35–104)
ALT SERPL-CCNC: 6 U/L (ref 0–32)
ANION GAP SERPL CALCULATED.3IONS-SCNC: 7 MMOL/L (ref 7–16)
ANISOCYTOSIS: ABNORMAL
AST SERPL-CCNC: 9 U/L (ref 0–31)
BASOPHILS # BLD: 0.03 E9/L (ref 0–0.2)
BASOPHILS NFR BLD: 1.8 % (ref 0–2)
BILIRUB SERPL-MCNC: 0.2 MG/DL (ref 0–1.2)
BUN SERPL-MCNC: 16 MG/DL (ref 6–23)
CALCIUM SERPL-MCNC: 8.9 MG/DL (ref 8.6–10.2)
CHLORIDE SERPL-SCNC: 103 MMOL/L (ref 98–107)
CO2 SERPL-SCNC: 29 MMOL/L (ref 22–29)
CREAT SERPL-MCNC: 0.7 MG/DL (ref 0.5–1)
EOSINOPHIL # BLD: 0 E9/L (ref 0.05–0.5)
EOSINOPHIL NFR BLD: 0 % (ref 0–6)
ERYTHROCYTE [DISTWIDTH] IN BLOOD BY AUTOMATED COUNT: 19.1 FL (ref 11.5–15)
GLUCOSE SERPL-MCNC: 94 MG/DL (ref 74–99)
HCT VFR BLD AUTO: 30 % (ref 34–48)
HGB BLD-MCNC: 9.2 G/DL (ref 11.5–15.5)
HYPOCHROMIA: ABNORMAL
LYMPHOCYTES # BLD: 0.38 E9/L (ref 1.5–4)
LYMPHOCYTES NFR BLD: 17.8 % (ref 20–42)
MCH RBC QN AUTO: 29.1 PG (ref 26–35)
MCHC RBC AUTO-ENTMCNC: 30.7 % (ref 32–34.5)
MCV RBC AUTO: 94.9 FL (ref 80–99.9)
MONOCYTES # BLD: 0.04 E9/L (ref 0.1–0.95)
MONOCYTES NFR BLD: 1.8 % (ref 2–12)
NEUTROPHILS # BLD: 1.46 E9/L (ref 1.8–7.3)
NEUTS SEG NFR BLD: 76.8 % (ref 43–80)
NRBC BLD-RTO: 0 /100 WBC
OVALOCYTES: ABNORMAL
PLATELET # BLD AUTO: 223 E9/L (ref 130–450)
PMV BLD AUTO: 10 FL (ref 7–12)
POIKILOCYTES: ABNORMAL
POLYCHROMASIA: ABNORMAL
POTASSIUM SERPL-SCNC: 4.4 MMOL/L (ref 3.5–5)
PROT SERPL-MCNC: 7.2 G/DL (ref 6.4–8.3)
RBC # BLD AUTO: 3.16 E12/L (ref 3.5–5.5)
SODIUM SERPL-SCNC: 139 MMOL/L (ref 132–146)
TEAR DROP CELLS: ABNORMAL
VARIANT LYMPHS NFR BLD: 1.8 % (ref 0–4)
WBC # BLD: 1.9 E9/L (ref 4.5–11.5)

## 2023-05-31 PROCEDURE — 80053 COMPREHEN METABOLIC PANEL: CPT

## 2023-05-31 PROCEDURE — 36415 COLL VENOUS BLD VENIPUNCTURE: CPT

## 2023-05-31 PROCEDURE — G8420 CALC BMI NORM PARAMETERS: HCPCS | Performed by: INTERNAL MEDICINE

## 2023-05-31 PROCEDURE — 96372 THER/PROPH/DIAG INJ SC/IM: CPT

## 2023-05-31 PROCEDURE — 1090F PRES/ABSN URINE INCON ASSESS: CPT | Performed by: INTERNAL MEDICINE

## 2023-05-31 PROCEDURE — 85025 COMPLETE CBC W/AUTO DIFF WBC: CPT

## 2023-05-31 PROCEDURE — 3017F COLORECTAL CA SCREEN DOC REV: CPT | Performed by: INTERNAL MEDICINE

## 2023-05-31 PROCEDURE — 99214 OFFICE O/P EST MOD 30 MIN: CPT | Performed by: INTERNAL MEDICINE

## 2023-05-31 PROCEDURE — G8400 PT W/DXA NO RESULTS DOC: HCPCS | Performed by: INTERNAL MEDICINE

## 2023-05-31 PROCEDURE — 1036F TOBACCO NON-USER: CPT | Performed by: INTERNAL MEDICINE

## 2023-05-31 PROCEDURE — 1123F ACP DISCUSS/DSCN MKR DOCD: CPT | Performed by: INTERNAL MEDICINE

## 2023-05-31 PROCEDURE — 6360000002 HC RX W HCPCS: Performed by: INTERNAL MEDICINE

## 2023-05-31 PROCEDURE — G8427 DOCREV CUR MEDS BY ELIG CLIN: HCPCS | Performed by: INTERNAL MEDICINE

## 2023-05-31 PROCEDURE — 86300 IMMUNOASSAY TUMOR CA 15-3: CPT

## 2023-05-31 RX ADMIN — DENOSUMAB 120 MG: 120 INJECTION SUBCUTANEOUS at 15:33

## 2023-05-31 NOTE — PROGRESS NOTES
Recommended to continue with Femara and Ibrance. The patient had a PET scan done on December 3, 2020, she had complete metabolic response since the PET scan on September 17, 7792, no metabolically active recurrent or metastatic disease, metabolic activity throughout the thyroid lobes can be seen with chronic thyroiditis. The patient had complete response to treatment, continue Ibrance and Femara. The dose of the Kaleigh Born has been to 75 mg due to leukopenia/neutropenia. The patient had restaging CT scans of the chest, abdomen pelvis done on 6/1/2021, bone scan on 6/11/2021, revealing an overall stable disease. Recommended to continue Ibrance and Femara. Patient had restaging scans done on 10/26/2021, revealing an overall stable disease, couple of upper lobe pulmonary nodules and rib sclerosis are redemonstrated, stable. No new areas of concern. Recommended to continue with Ibrance and Femara. Restaging CT scans of the chest, abdomen, pelvis and a bone scan were done on 5/2/2022, there was no evidence of progression of disease within the chest with stable left upper lobe pulmonary nodule areas and indeterminate sclerosis in the rib, there was no uptake on the bone scan, recommended to continue with Ibrance and Femara. Today 3/15/2023, the patient is doing well clinically, labs reviewed, she has macrocytic anemia and leukopenia, secondary to Ibrance. Her white count had improved. No indication for dose adjustment of the Ibrance, we will continue to monitor her CBCD. Restaging CT scans of the chest, abdomen, pelvis and a bone scan were ordered, she had a CT scan of the abdomen the pelvis and a bone scan done on 2/14/2023, were negative for disease progression, chest CT scan was done on 2/27/2023, revealing persistent 5 mm pulmonary nodules without change, sclerosis of the thoracic spine, no disease progression in the left breast.  Recommended to continue Ibrance and Femara.     Today 5/31/2023,

## 2023-06-02 ENCOUNTER — OUTSIDE SERVICES (OUTPATIENT)
Dept: PRIMARY CARE CLINIC | Age: 73
End: 2023-06-02
Payer: MEDICARE

## 2023-06-02 DIAGNOSIS — C50.919 CARCINOMA OF BREAST METASTATIC TO BONE, UNSPECIFIED LATERALITY (HCC): ICD-10-CM

## 2023-06-02 DIAGNOSIS — C79.51 CARCINOMA OF BREAST METASTATIC TO BONE, UNSPECIFIED LATERALITY (HCC): ICD-10-CM

## 2023-06-02 DIAGNOSIS — E46 HYPOALBUMINEMIA DUE TO PROTEIN-CALORIE MALNUTRITION (HCC): ICD-10-CM

## 2023-06-02 DIAGNOSIS — S72.142A DISPLACED INTERTROCHANTERIC FRACTURE OF LEFT FEMUR, INITIAL ENCOUNTER FOR CLOSED FRACTURE (HCC): Primary | ICD-10-CM

## 2023-06-02 DIAGNOSIS — E88.09 HYPOALBUMINEMIA DUE TO PROTEIN-CALORIE MALNUTRITION (HCC): ICD-10-CM

## 2023-06-02 DIAGNOSIS — E44.0 MODERATE PROTEIN-CALORIE MALNUTRITION (HCC): Primary | ICD-10-CM

## 2023-06-02 PROCEDURE — 99309 SBSQ NF CARE MODERATE MDM 30: CPT | Performed by: INTERNAL MEDICINE

## 2023-06-03 LAB — CANCER AG15-3 SERPL-ACNC: 16 U/ML (ref 0–31)

## 2023-06-05 ENCOUNTER — TELEPHONE (OUTPATIENT)
Dept: ORTHOPEDIC SURGERY | Age: 73
End: 2023-06-05

## 2023-06-05 NOTE — TELEPHONE ENCOUNTER
Received call from [address] [facility], patient's [facility], requesting callback regarding appointments. Call placed at this time to Wilver Caldwell. Spoke to Wilver Caldwell and explained that patient had a missed appointment with our office today at [place] [address]. Per Wilver Caldwell, facility, [facility], told patient and family that she had no scheduled appointments today. AVS review from last visit and appointment was written. Informed Wilver Caldwell that facility would be contacted with new appointment date and time. Call placed to [facility] at this time and informed facility of new appointment date and time. Facility staff verbalized understanding and will work on patient transport.     Future Appointments   Date Time Provider Trip Jerez   6/12/2023  1:00 PM SCHEDULE, SE ORTHO [facility]   6/28/2023  1:15 PM Ambrose Gonzalez MD [facility] MED ON[facility]   6/28/2023  1:45 PM [facility] MED ONC FAST TRACK 2 [facility] Med Onc Sayra Arnold

## 2023-06-19 ENCOUNTER — OUTSIDE SERVICES (OUTPATIENT)
Dept: PRIMARY CARE CLINIC | Age: 73
End: 2023-06-19

## 2023-06-19 VITALS
TEMPERATURE: 97.2 F | OXYGEN SATURATION: 98 % | DIASTOLIC BLOOD PRESSURE: 57 MMHG | HEART RATE: 78 BPM | RESPIRATION RATE: 16 BRPM | SYSTOLIC BLOOD PRESSURE: 130 MMHG

## 2023-06-19 DIAGNOSIS — D72.819 LEUKOPENIA, UNSPECIFIED TYPE: ICD-10-CM

## 2023-06-19 DIAGNOSIS — D64.9 ANEMIA, UNSPECIFIED TYPE: ICD-10-CM

## 2023-06-19 DIAGNOSIS — R48.2 GAIT APRAXIA: ICD-10-CM

## 2023-06-19 DIAGNOSIS — C50.919 CARCINOMA OF BREAST METASTATIC TO BONE, UNSPECIFIED LATERALITY (HCC): ICD-10-CM

## 2023-06-19 DIAGNOSIS — R26.9 GAIT ABNORMALITY: ICD-10-CM

## 2023-06-19 DIAGNOSIS — C79.51 CARCINOMA OF BREAST METASTATIC TO BONE, UNSPECIFIED LATERALITY (HCC): ICD-10-CM

## 2023-06-19 DIAGNOSIS — C50.612 MALIGNANT NEOPLASM OF AXILLARY TAIL OF LEFT FEMALE BREAST, UNSPECIFIED ESTROGEN RECEPTOR STATUS (HCC): ICD-10-CM

## 2023-06-19 DIAGNOSIS — F03.918 DEMENTIA WITH AGGRESSIVE BEHAVIOR (HCC): Primary | ICD-10-CM

## 2023-06-19 ASSESSMENT — ENCOUNTER SYMPTOMS
SORE THROAT: 0
VOMITING: 0
CHEST TIGHTNESS: 0
ABDOMINAL PAIN: 0
SHORTNESS OF BREATH: 0
NAUSEA: 0
WHEEZING: 0
VOICE CHANGE: 0

## 2023-06-19 NOTE — PROGRESS NOTES
Lab Results   Component Value Date    CHOL 141 04/07/2023    CHOL 113 08/10/2020     Lab Results   Component Value Date    TRIG 172 (H) 04/07/2023    TRIG 101 08/10/2020     Lab Results   Component Value Date    HDL 34 04/07/2023    HDL 19 08/10/2020     Lab Results   Component Value Date    LDLCALC 73 04/07/2023    LDLCALC 74 08/10/2020     Lab Results   Component Value Date    LABVLDL 34 04/07/2023    LABVLDL 20 08/10/2020     No results found for: [facility]  Lab Results   Component Value Date    TSH 2.940 04/07/2023               ASSESSMENT/PLAN:  1. Dementia with aggressive behavior ([facility] [address]  Comments:  Patient lately have been less aggressive, calmer, disoriented and totally not able to do anything for herself including feeding herself dressing herself etc.  2. Gait abnormality  Comments:  Patient is unstable discussed with physical therapy today she is only able to take 5 steps between the bars and need to have 2 people assistance. 3. Carcinoma of breast metastatic to bone, unspecified laterality ([address]  4. 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 Leukopenia, unspecified type  6. Anemia, unspecified type  7. Gait apraxia  I have spent more than 35 minutes in face-to-face evaluation of the patient and discussing with the AD ON with PT and OT reviewing reports from speech therapy and in discussing the condition with the patient's sister, decision is to notify Adult Protective Services patient is not safe to be discharged and also is not competent to sign AMA  Social service to discuss with the patient's sister regarding next step for obtaining guardianship  Patient is recommended to be long-term in a facility preferably in a dementia unit. An electronic signature was used to authenticate this note.     --Jarrod Centeno MD

## 2023-06-28 ENCOUNTER — OFFICE VISIT (OUTPATIENT)
Dept: ONCOLOGY | Age: 73
End: 2023-06-28
Payer: MEDICARE

## 2023-06-28 ENCOUNTER — HOSPITAL ENCOUNTER (OUTPATIENT)
Dept: INFUSION THERAPY | Age: 73
Discharge: HOME | End: 2023-06-28
Payer: MEDICARE

## 2023-06-28 VITALS
SYSTOLIC BLOOD PRESSURE: 106 MMHG | BODY MASS INDEX: 22.89 KG/M2 | OXYGEN SATURATION: 99 % | HEIGHT: 66 IN | HEART RATE: 66 BPM | DIASTOLIC BLOOD PRESSURE: 59 MMHG | TEMPERATURE: 97.8 F

## 2023-06-28 VITALS
RESPIRATION RATE: 18 BRPM | OXYGEN SATURATION: 97 % | SYSTOLIC BLOOD PRESSURE: 118 MMHG | TEMPERATURE: 97 F | HEART RATE: 69 BPM | DIASTOLIC BLOOD PRESSURE: 70 MMHG

## 2023-06-28 DIAGNOSIS — C50.919 CARCINOMA OF BREAST METASTATIC TO BONE, UNSPECIFIED LATERALITY (HCC): Primary | ICD-10-CM

## 2023-06-28 DIAGNOSIS — C50.612 MALIGNANT NEOPLASM OF AXILLARY TAIL OF LEFT FEMALE BREAST, UNSPECIFIED ESTROGEN RECEPTOR STATUS (HCC): ICD-10-CM

## 2023-06-28 DIAGNOSIS — C79.51 CARCINOMA OF BREAST METASTATIC TO BONE, UNSPECIFIED LATERALITY (HCC): Primary | ICD-10-CM

## 2023-06-28 LAB
ALBUMIN SERPL-MCNC: 3.7 G/DL (ref 3.5–5.2)
ALP SERPL-CCNC: 80 U/L (ref 35–104)
ALT SERPL-CCNC: 6 U/L (ref 0–32)
ANION GAP SERPL CALCULATED.3IONS-SCNC: 8 MMOL/L (ref 7–16)
ANISOCYTOSIS: ABNORMAL
AST SERPL-CCNC: 12 U/L (ref 0–31)
BASOPHILS # BLD: 0.12 E9/L (ref 0–0.2)
BASOPHILS NFR BLD: 6.5 % (ref 0–2)
BILIRUB SERPL-MCNC: <0.2 MG/DL (ref 0–1.2)
BUN SERPL-MCNC: 20 MG/DL (ref 6–23)
CALCIUM SERPL-MCNC: 9.2 MG/DL (ref 8.6–10.2)
CEA SERPL-MCNC: 1.7 NG/ML (ref 0–5.2)
CHLORIDE SERPL-SCNC: 99 MMOL/L (ref 98–107)
CO2 SERPL-SCNC: 27 MMOL/L (ref 22–29)
CREAT SERPL-MCNC: 0.7 MG/DL (ref 0.5–1)
EOSINOPHIL # BLD: 0.07 E9/L (ref 0.05–0.5)
EOSINOPHIL NFR BLD: 3.7 % (ref 0–6)
ERYTHROCYTE [DISTWIDTH] IN BLOOD BY AUTOMATED COUNT: 18.9 FL (ref 11.5–15)
GLUCOSE SERPL-MCNC: 97 MG/DL (ref 74–99)
HCT VFR BLD AUTO: 31.9 % (ref 34–48)
HGB BLD-MCNC: 10 G/DL (ref 11.5–15.5)
LYMPHOCYTES # BLD: 0.36 E9/L (ref 1.5–4)
LYMPHOCYTES NFR BLD: 18.5 % (ref 20–42)
MCH RBC QN AUTO: 29.8 PG (ref 26–35)
MCHC RBC AUTO-ENTMCNC: 31.3 % (ref 32–34.5)
MCV RBC AUTO: 94.9 FL (ref 80–99.9)
METAMYELOCYTES NFR BLD MANUAL: 0.9 % (ref 0–1)
MONOCYTES # BLD: 0.13 E9/L (ref 0.1–0.95)
MONOCYTES NFR BLD: 7.4 % (ref 2–12)
NEUTROPHILS # BLD: 1.22 E9/L (ref 1.8–7.3)
NEUTS SEG NFR BLD: 63 % (ref 43–80)
NRBC BLD-RTO: 0 /100 WBC
PLATELET # BLD AUTO: 224 E9/L (ref 130–450)
PMV BLD AUTO: 9.7 FL (ref 7–12)
POLYCHROMASIA: ABNORMAL
POTASSIUM SERPL-SCNC: 4.4 MMOL/L (ref 3.5–5)
PROT SERPL-MCNC: 7.8 G/DL (ref 6.4–8.3)
RBC # BLD AUTO: 3.36 E12/L (ref 3.5–5.5)
SODIUM SERPL-SCNC: 134 MMOL/L (ref 132–146)
WBC # BLD: 1.9 E9/L (ref 4.5–11.5)

## 2023-06-28 PROCEDURE — 99213 OFFICE O/P EST LOW 20 MIN: CPT | Performed by: INTERNAL MEDICINE

## 2023-06-28 PROCEDURE — 3017F COLORECTAL CA SCREEN DOC REV: CPT | Performed by: INTERNAL MEDICINE

## 2023-06-28 PROCEDURE — 6360000002 HC RX W HCPCS: Performed by: INTERNAL MEDICINE

## 2023-06-28 PROCEDURE — 1123F ACP DISCUSS/DSCN MKR DOCD: CPT | Performed by: INTERNAL MEDICINE

## 2023-06-28 PROCEDURE — 99308 SBSQ NF CARE LOW MDM 20: CPT | Performed by: INTERNAL MEDICINE

## 2023-06-28 PROCEDURE — 99214 OFFICE O/P EST MOD 30 MIN: CPT | Performed by: INTERNAL MEDICINE

## 2023-06-28 PROCEDURE — 82378 CARCINOEMBRYONIC ANTIGEN: CPT

## 2023-06-28 PROCEDURE — 1090F PRES/ABSN URINE INCON ASSESS: CPT | Performed by: INTERNAL MEDICINE

## 2023-06-28 PROCEDURE — G8427 DOCREV CUR MEDS BY ELIG CLIN: HCPCS | Performed by: INTERNAL MEDICINE

## 2023-06-28 PROCEDURE — 36415 COLL VENOUS BLD VENIPUNCTURE: CPT

## 2023-06-28 PROCEDURE — 86300 IMMUNOASSAY TUMOR CA 15-3: CPT

## 2023-06-28 PROCEDURE — G8400 PT W/DXA NO RESULTS DOC: HCPCS | Performed by: INTERNAL MEDICINE

## 2023-06-28 PROCEDURE — 1036F TOBACCO NON-USER: CPT | Performed by: INTERNAL MEDICINE

## 2023-06-28 PROCEDURE — 80053 COMPREHEN METABOLIC PANEL: CPT

## 2023-06-28 PROCEDURE — 85025 COMPLETE CBC W/AUTO DIFF WBC: CPT

## 2023-06-28 PROCEDURE — 96372 THER/PROPH/DIAG INJ SC/IM: CPT

## 2023-06-28 PROCEDURE — G8420 CALC BMI NORM PARAMETERS: HCPCS | Performed by: INTERNAL MEDICINE

## 2023-06-28 RX ADMIN — DENOSUMAB 120 MG: 120 INJECTION SUBCUTANEOUS at 15:08

## 2023-06-29 ENCOUNTER — OUTSIDE SERVICES (OUTPATIENT)
Dept: PRIMARY CARE CLINIC | Age: 73
End: 2023-06-29
Payer: MEDICARE

## 2023-06-29 DIAGNOSIS — D72.819 LEUKOPENIA, UNSPECIFIED TYPE: ICD-10-CM

## 2023-06-29 DIAGNOSIS — C50.612 MALIGNANT NEOPLASM OF AXILLARY TAIL OF LEFT FEMALE BREAST, UNSPECIFIED ESTROGEN RECEPTOR STATUS (HCC): ICD-10-CM

## 2023-06-29 DIAGNOSIS — D64.9 ANEMIA, UNSPECIFIED TYPE: ICD-10-CM

## 2023-06-29 DIAGNOSIS — F01.C11 SEVERE VASCULAR DEMENTIA WITH AGITATION (HCC): Primary | ICD-10-CM

## 2023-06-30 LAB — CANCER AG15-3 SERPL-ACNC: 21 U/ML (ref 0–31)

## 2023-07-02 ASSESSMENT — ENCOUNTER SYMPTOMS
ABDOMINAL PAIN: 0
VOMITING: 0
WHEEZING: 0
SHORTNESS OF BREATH: 0
SORE THROAT: 0
NAUSEA: 0
VOICE CHANGE: 0
CHEST TIGHTNESS: 0

## 2023-07-03 NOTE — PROGRESS NOTES
23  Ewa Case : [id number] Sex: female  Age: 74 y.o.      HPI: The patient seen today for evaluation she is refusing therapy . very unstable requiring Purvi lift    Review of Systems   Constitutional:  Negative for chills, fever and unexpected weight change. HENT:  Negative for postnasal drip, sore throat and voice change. Respiratory:  Negative for chest tightness, shortness of breath and wheezing. Cardiovascular:  Negative for chest pain and leg swelling. Gastrointestinal:  Negative for abdominal pain, nausea and vomiting. Musculoskeletal:  Positive for gait problem. Negative for joint swelling. Skin:  Positive for wound (Right shoulder wound scabbing). Negative for rash. Neurological:  Positive for weakness (Generalized weakness . ). Psychiatric/Behavioral: Negative.          Current Outpatient Medications:     divalproex (DEPAKOTE SPRINKLE) 125 MG DR capsule, Take 1 capsule by mouth in the morning and 1 capsule at noon and 1 capsule in the evening., Disp: , Rfl:     Polyethylene Glycol 1450 POWD, Take 17 g by mouth daily as needed, Disp: , Rfl:     metoprolol tartrate (LOPRESSOR) 25 MG tablet, Take 1 tablet by mouth 2 times daily Hold if SBP<100 or AP<50, Disp: 180 tablet, Rfl: 1    acetaminophen (TYLENOL) 325 MG tablet, Take 2 tablets by mouth every 6 hours as needed for Pain or Fever, Disp: , Rfl:     bisacodyl (DULCOLAX) 10 MG suppository, Place 1 suppository rectally daily as needed for Constipation, Disp: , Rfl:     SODIUM PHOSPHATES RE, Place 1 Bottle rectally daily as needed Fleet enema every 24 hrs, Disp: , Rfl:     magnesium hydroxide (MILK OF MAGNESIA) 400 MG/5ML suspension, Take 30 mLs by mouth daily as needed for Constipation, Disp: , Rfl:     oxyCODONE-acetaminophen (PERCOCET) 5-325 MG per tablet, Take 1 tablet by mouth every 6 hours as needed for Pain., Disp: , Rfl:     pantoprazole (PROTONIX) 40 MG tablet, Take 1 tablet by mouth every morning (before breakfast),

## 2023-07-14 ENCOUNTER — OUTSIDE SERVICES (OUTPATIENT)
Dept: PRIMARY CARE CLINIC | Age: 73
End: 2023-07-14
Payer: MEDICARE

## 2023-07-14 DIAGNOSIS — D72.819 LEUKOPENIA, UNSPECIFIED TYPE: ICD-10-CM

## 2023-07-14 DIAGNOSIS — R48.2 GAIT APRAXIA: ICD-10-CM

## 2023-07-14 DIAGNOSIS — E46 HYPOALBUMINEMIA DUE TO PROTEIN-CALORIE MALNUTRITION (HCC): ICD-10-CM

## 2023-07-14 DIAGNOSIS — D64.9 ANEMIA, UNSPECIFIED TYPE: ICD-10-CM

## 2023-07-14 DIAGNOSIS — E88.09 HYPOALBUMINEMIA DUE TO PROTEIN-CALORIE MALNUTRITION (HCC): ICD-10-CM

## 2023-07-14 DIAGNOSIS — R26.9 GAIT ABNORMALITY: ICD-10-CM

## 2023-07-14 DIAGNOSIS — C50.919 CARCINOMA OF BREAST METASTATIC TO BONE, UNSPECIFIED LATERALITY (HCC): ICD-10-CM

## 2023-07-14 DIAGNOSIS — C79.51 CARCINOMA OF BREAST METASTATIC TO BONE, UNSPECIFIED LATERALITY (HCC): ICD-10-CM

## 2023-07-14 DIAGNOSIS — F03.918 DEMENTIA WITH AGGRESSIVE BEHAVIOR (HCC): Primary | ICD-10-CM

## 2023-07-14 PROCEDURE — 99309 SBSQ NF CARE MODERATE MDM 30: CPT | Performed by: INTERNAL MEDICINE

## 2023-07-22 VITALS
OXYGEN SATURATION: 100 % | DIASTOLIC BLOOD PRESSURE: 67 MMHG | HEART RATE: 64 BPM | BODY MASS INDEX: 23.86 KG/M2 | RESPIRATION RATE: 16 BRPM | SYSTOLIC BLOOD PRESSURE: 107 MMHG | WEIGHT: 147.8 LBS | TEMPERATURE: 97.5 F

## 2023-07-23 NOTE — PROGRESS NOTES
23  Carrie Bejarano (:  1950) is a 74 y.o. female, has dementia, patient is not very cooperative with physical therapy not progressing very well because of her dementia and her level of cooperation, patient feels she can go home but she is not oriented to place or time, patient is only oriented to self. Subjective   SUBJECTIVE/OBJECTIVE:  HPI: Patient seen today, she was upset and stated that she wants to get up and walk. All nursing and progress notes reviewed. Past Medical History:   Diagnosis Date    Arm fracture, left 2009    Balance problems since     Hydrocephalus [facility])     compensated    Thyroid disease     Vertigo        No Known Allergies     Review of Systems   Constitutional:  Negative for chills and fever. HENT:  Negative for postnasal drip, sore throat and voice change. Respiratory:  Negative for chest tightness, shortness of breath and wheezing. Cardiovascular:  Negative for chest pain and leg swelling. Gastrointestinal:  Negative for abdominal pain, nausea and vomiting. Musculoskeletal:  Positive for gait problem. Negative for joint swelling. Skin:  Negative for rash and wound. Neurological:  Positive for weakness. Psychiatric/Behavioral:          Patient demented continues to ask if she can go home. Patient upset and stated she wanted to get up and walk       Objective   Physical Exam  Constitutional:       Appearance: She is ill-appearing. HENT:      Head: Normocephalic and atraumatic. Right Ear: External ear normal.      Left Ear: External ear normal.      Nose: No rhinorrhea. Mouth/Throat:      Mouth: Mucous membranes are moist.   Eyes:      General: No scleral icterus. Right eye: No discharge. Left eye: No discharge. Extraocular Movements: Extraocular movements intact. Conjunctiva/sclera: Conjunctivae normal.   Cardiovascular:      Rate and Rhythm: Normal rate and regular rhythm.       Heart sounds: Normal heart

## 2023-07-26 ENCOUNTER — TELEPHONE (OUTPATIENT)
Dept: INFUSION THERAPY | Age: 73
End: 2023-07-26

## 2023-07-26 ENCOUNTER — OFFICE VISIT (OUTPATIENT)
Dept: ONCOLOGY | Age: 73
End: 2023-07-26
Payer: MEDICARE

## 2023-07-26 ENCOUNTER — HOSPITAL ENCOUNTER (OUTPATIENT)
Dept: INFUSION THERAPY | Age: 73
Discharge: HOME | End: 2023-07-26
Payer: MEDICARE

## 2023-07-26 VITALS — WEIGHT: 141.7 LBS | BODY MASS INDEX: 22.87 KG/M2

## 2023-07-26 VITALS
SYSTOLIC BLOOD PRESSURE: 98 MMHG | OXYGEN SATURATION: 100 % | TEMPERATURE: 96.8 F | HEART RATE: 65 BPM | DIASTOLIC BLOOD PRESSURE: 79 MMHG | BODY MASS INDEX: 23.86 KG/M2 | HEIGHT: 66 IN

## 2023-07-26 DIAGNOSIS — D64.9 ANEMIA, UNSPECIFIED TYPE: ICD-10-CM

## 2023-07-26 DIAGNOSIS — C79.51 CARCINOMA OF BREAST METASTATIC TO BONE, UNSPECIFIED LATERALITY (HCC): Primary | ICD-10-CM

## 2023-07-26 DIAGNOSIS — C79.51 CARCINOMA OF BREAST METASTATIC TO BONE, UNSPECIFIED LATERALITY (HCC): ICD-10-CM

## 2023-07-26 DIAGNOSIS — C50.919 CARCINOMA OF BREAST METASTATIC TO BONE, UNSPECIFIED LATERALITY (HCC): Primary | ICD-10-CM

## 2023-07-26 DIAGNOSIS — C50.612 MALIGNANT NEOPLASM OF AXILLARY TAIL OF LEFT FEMALE BREAST, UNSPECIFIED ESTROGEN RECEPTOR STATUS (HCC): Primary | ICD-10-CM

## 2023-07-26 DIAGNOSIS — C50.919 CARCINOMA OF BREAST METASTATIC TO BONE, UNSPECIFIED LATERALITY (HCC): ICD-10-CM

## 2023-07-26 PROCEDURE — G8427 DOCREV CUR MEDS BY ELIG CLIN: HCPCS | Performed by: INTERNAL MEDICINE

## 2023-07-26 PROCEDURE — 3017F COLORECTAL CA SCREEN DOC REV: CPT | Performed by: INTERNAL MEDICINE

## 2023-07-26 PROCEDURE — 99212 OFFICE O/P EST SF 10 MIN: CPT

## 2023-07-26 PROCEDURE — 1090F PRES/ABSN URINE INCON ASSESS: CPT | Performed by: INTERNAL MEDICINE

## 2023-07-26 PROCEDURE — 1123F ACP DISCUSS/DSCN MKR DOCD: CPT | Performed by: INTERNAL MEDICINE

## 2023-07-26 PROCEDURE — 96372 THER/PROPH/DIAG INJ SC/IM: CPT

## 2023-07-26 PROCEDURE — 6360000002 HC RX W HCPCS: Performed by: INTERNAL MEDICINE

## 2023-07-26 PROCEDURE — 1036F TOBACCO NON-USER: CPT | Performed by: INTERNAL MEDICINE

## 2023-07-26 PROCEDURE — G8420 CALC BMI NORM PARAMETERS: HCPCS | Performed by: INTERNAL MEDICINE

## 2023-07-26 PROCEDURE — G8400 PT W/DXA NO RESULTS DOC: HCPCS | Performed by: INTERNAL MEDICINE

## 2023-07-26 PROCEDURE — 99214 OFFICE O/P EST MOD 30 MIN: CPT | Performed by: INTERNAL MEDICINE

## 2023-07-26 RX ADMIN — DENOSUMAB 120 MG: 120 INJECTION SUBCUTANEOUS at 14:31

## 2023-07-26 NOTE — TELEPHONE ENCOUNTER
Spoke with Akanksha Mondragon at [facility] about having patient's lab work (cbc w diff, cmp, cea, cancer antigen 15-3) completed and faxed to us one day before her next appt. Aaknksha Mondragon reported understanding.

## 2023-07-26 NOTE — TELEPHONE ENCOUNTER
Called [facility] - spoke with [facility]([phone]) to obtain lab results. They will call us back.

## 2023-07-26 NOTE — PROGRESS NOTES
[address] [place] [address] MEDICAL ONCOLOGY  [address]  [facility] [address]  Dept: [phone]  Loc: [phone]  Attending Progress Note      Reason for Visit:   Metastatic breast cancer. PCP:  Ghada Carias DO    History of Present Illness: The patient is a 74 y.o. lady, with a past medical history significant for hydrocephalus, who had presented with left breast changes, she had noticed a dark spot on her left breast, then she had developed induration, she thinks that she had the breast changes for months. The patient had never had a mammogram done, had a CT scan of the chest done, revealing a solid left breast mass measuring at least 6.8 x 2.9 cm in size is identified. The mass appears to extend to the skin surface. The deep margin of the mass abuts the chest wall but a fat plane appears to be preserved. Pathologically enlarged left  axillary lymph nodes are seen measuring up to 4.5 x 1.9 cm in size. CT abdomen and pelvis was done on 8/30/2019 revealing partial visualization of soft tissue mass in the left breast, nonspecific lymph nodes in the upper abdomen. Bone scan was done on 8/30/2019, revealing a Prominent focus of radiotracer uptake in the left anterior superior iliac spine, with corresponding abnormality on recent CT, suggestive of metastasis. More subtle focal radiotracer uptake in the right anterior superior iliac spine could be due to metastasis or  degenerative change. 2. Focal radiotracer uptake in the lateral aspect of the right ninth  rib, which appears to be due to a nondisplaced healing fracture. Correlation with history of recent trauma is recommended. In light of  other findings, this could represent a pathologic fracture with an  underlying metastatic lesion. 3. Osteoarthritic uptake in the shoulders, knees, wrists, and hands.     She underwent on 8/30/2019 a biopsy of 1 of the left axillary lymph nodes, she was

## 2023-07-28 ENCOUNTER — HOSPITAL ENCOUNTER (OUTPATIENT)
Dept: CT IMAGING | Age: 73
End: 2023-07-28
Attending: INTERNAL MEDICINE
Payer: MEDICARE

## 2023-07-28 ENCOUNTER — HOSPITAL ENCOUNTER (OUTPATIENT)
Dept: NUCLEAR MEDICINE | Age: 73
Discharge: HOME | End: 2023-07-28
Attending: INTERNAL MEDICINE
Payer: MEDICARE

## 2023-07-28 DIAGNOSIS — C50.919 CARCINOMA OF BREAST METASTATIC TO BONE, UNSPECIFIED LATERALITY (HCC): ICD-10-CM

## 2023-07-28 DIAGNOSIS — C79.51 CARCINOMA OF BREAST METASTATIC TO BONE, UNSPECIFIED LATERALITY (HCC): ICD-10-CM

## 2023-07-28 PROCEDURE — A9503 TC99M MEDRONATE: HCPCS | Performed by: RADIOLOGY

## 2023-07-28 PROCEDURE — 3430000000 HC RX DIAGNOSTIC RADIOPHARMACEUTICAL: Performed by: RADIOLOGY

## 2023-07-28 PROCEDURE — 71250 CT THORAX DX C-: CPT

## 2023-07-28 PROCEDURE — 6360000004 HC RX CONTRAST MEDICATION: Performed by: RADIOLOGY

## 2023-07-28 PROCEDURE — 74176 CT ABD & PELVIS W/O CONTRAST: CPT

## 2023-07-28 PROCEDURE — 78306 BONE IMAGING WHOLE BODY: CPT | Performed by: INTERNAL MEDICINE

## 2023-07-28 RX ORDER — TC 99M MEDRONATE 20 MG/10ML
25 INJECTION, POWDER, LYOPHILIZED, FOR SOLUTION INTRAVENOUS
Status: COMPLETED | OUTPATIENT
Start: 2023-07-28 | End: 2023-07-28

## 2023-07-28 RX ADMIN — IOPAMIDOL 18 ML: 755 INJECTION, SOLUTION INTRAVENOUS at 14:28

## 2023-07-28 RX ADMIN — TC 99M MEDRONATE 25 MILLICURIE: 20 INJECTION, POWDER, LYOPHILIZED, FOR SOLUTION INTRAVENOUS at 10:50

## 2023-08-09 DIAGNOSIS — S72.142A DISPLACED INTERTROCHANTERIC FRACTURE OF LEFT FEMUR, INITIAL ENCOUNTER FOR CLOSED FRACTURE (HCC): Primary | ICD-10-CM

## 2023-08-10 ENCOUNTER — OFFICE VISIT (OUTPATIENT)
Dept: ORTHOPEDIC SURGERY | Age: 73
End: 2023-08-10
Payer: MEDICARE

## 2023-08-10 ENCOUNTER — HOSPITAL ENCOUNTER (OUTPATIENT)
Dept: GENERAL RADIOLOGY | Age: 73
Discharge: HOME | End: 2023-08-12
Payer: MEDICARE

## 2023-08-10 VITALS — WEIGHT: 141 LBS | HEIGHT: 66 IN | BODY MASS INDEX: 22.66 KG/M2

## 2023-08-10 DIAGNOSIS — S72.142A DISPLACED INTERTROCHANTERIC FRACTURE OF LEFT FEMUR, INITIAL ENCOUNTER FOR CLOSED FRACTURE (HCC): ICD-10-CM

## 2023-08-10 DIAGNOSIS — S72.142A DISPLACED INTERTROCHANTERIC FRACTURE OF LEFT FEMUR, INITIAL ENCOUNTER FOR CLOSED FRACTURE (HCC): Primary | ICD-10-CM

## 2023-08-10 PROCEDURE — 99213 OFFICE O/P EST LOW 20 MIN: CPT

## 2023-08-10 PROCEDURE — 73552 X-RAY EXAM OF FEMUR 2/>: CPT

## 2023-08-10 NOTE — PROGRESS NOTES
Chief Complaint   Patient presents with    Follow-up     Patient presents in wheelchair from [address] [place] [address] Dr. Powell hip IM inocencia DOS 4/7/23. Continues with therapy, does not use walker, mainly in the chair. OP:SURGEON: Dr. Zoey Brown,   DATE OF PROCEDURE: 4-7-23  PROCEDURE: Left femur intertrochanteric fracture cephalomedullary nail stabilization     POD: 4+ months    Subjective:  Shauna Clayton is following up from the above surgery. She is WBAT on left lower extremity. She ambulates with assistive device, walker. Pain to extremity is none and is not taking prescribed pain medication. They denies numbness or tingling to the left lower extremity. Denies calf pain, chest pain, or shortness of breath. Patient continues to use DVT prophylaxis,  mg BID. Patient is participating in therapy at the skilled nursing facility. She is doing okay after surgery. Presents today with her family member. She states that she has been doing a minimal amount of ambulating at the rehab center. She states that she stands up with the parallel bars and is able to ambulate about 10 feet with assistance. Review of Systems -  All pertinent positives/negative in HPI     Objective:    General: Alert and oriented X 3, normocephalic atraumatic, external ears and eye normal, sclera clear, no acute distress, respirations easy and unlabored with no audible wheezes, skin warm and dry, speech and dress appropriate for noted age, affect euthymic. Extremity:  Left Lower Extremity  Skin clean dry and intact, without signs of infection   Incision well-healed  no edema noted  Compartments supple throughout thigh and leg  Calf supple and not tender  negative Homans  Demonstrates active motion with hip flexion, knee flexion/extension and ankle DF/PF  Presents in a wheelchair  States sensation intact to touch in sural, deep peroneal, superficial peroneal, saphenous, posterior tibial  nerve distributions to foot/ankle.   Palpable

## 2023-08-10 NOTE — PATIENT INSTRUCTIONS
Weightbearing as tolerated left lower extremity. Continue PT at the facility. Recommend for patient to get up at least 3 times a day in therapy ambulating with assistive devices, parallel bars and manual assistance    Follow-up as needed. Call if any questions or concerns.

## 2023-08-18 ENCOUNTER — OUTSIDE SERVICES (OUTPATIENT)
Dept: PRIMARY CARE CLINIC | Age: 73
End: 2023-08-18
Payer: MEDICARE

## 2023-08-18 DIAGNOSIS — F03.918 DEMENTIA WITH AGGRESSIVE BEHAVIOR (HCC): Primary | ICD-10-CM

## 2023-08-18 DIAGNOSIS — L10.0: ICD-10-CM

## 2023-08-18 DIAGNOSIS — C50.919 CARCINOMA OF BREAST METASTATIC TO BONE, UNSPECIFIED LATERALITY (HCC): ICD-10-CM

## 2023-08-18 DIAGNOSIS — L03.019: ICD-10-CM

## 2023-08-18 DIAGNOSIS — D72.819 LEUKOPENIA, UNSPECIFIED TYPE: ICD-10-CM

## 2023-08-18 DIAGNOSIS — L03.039: ICD-10-CM

## 2023-08-18 DIAGNOSIS — D64.9 ANEMIA, UNSPECIFIED TYPE: ICD-10-CM

## 2023-08-18 DIAGNOSIS — R26.9 GAIT ABNORMALITY: ICD-10-CM

## 2023-08-18 DIAGNOSIS — C50.612 MALIGNANT NEOPLASM OF AXILLARY TAIL OF LEFT FEMALE BREAST, UNSPECIFIED ESTROGEN RECEPTOR STATUS (HCC): ICD-10-CM

## 2023-08-18 DIAGNOSIS — C79.51 CARCINOMA OF BREAST METASTATIC TO BONE, UNSPECIFIED LATERALITY (HCC): ICD-10-CM

## 2023-08-18 PROCEDURE — 99309 SBSQ NF CARE MODERATE MDM 30: CPT | Performed by: INTERNAL MEDICINE

## 2023-08-24 ENCOUNTER — HOSPITAL ENCOUNTER (OUTPATIENT)
Dept: INFUSION THERAPY | Age: 73
Discharge: HOME | End: 2023-08-24
Payer: MEDICARE

## 2023-08-24 ENCOUNTER — OFFICE VISIT (OUTPATIENT)
Dept: ONCOLOGY | Age: 73
End: 2023-08-24
Payer: MEDICARE

## 2023-08-24 ENCOUNTER — CLINICAL DOCUMENTATION (OUTPATIENT)
Dept: ONCOLOGY | Age: 73
End: 2023-08-24

## 2023-08-24 VITALS
HEART RATE: 69 BPM | OXYGEN SATURATION: 99 % | HEIGHT: 66 IN | DIASTOLIC BLOOD PRESSURE: 62 MMHG | TEMPERATURE: 98.1 F | SYSTOLIC BLOOD PRESSURE: 122 MMHG | BODY MASS INDEX: 22.76 KG/M2

## 2023-08-24 DIAGNOSIS — C50.919 CARCINOMA OF BREAST METASTATIC TO BONE, UNSPECIFIED LATERALITY (HCC): ICD-10-CM

## 2023-08-24 DIAGNOSIS — C79.51 CARCINOMA OF BREAST METASTATIC TO BONE, UNSPECIFIED LATERALITY (HCC): Primary | ICD-10-CM

## 2023-08-24 DIAGNOSIS — C50.612 MALIGNANT NEOPLASM OF AXILLARY TAIL OF LEFT FEMALE BREAST, UNSPECIFIED ESTROGEN RECEPTOR STATUS (HCC): Primary | ICD-10-CM

## 2023-08-24 DIAGNOSIS — C50.911 CARCINOMA OF RIGHT BREAST METASTATIC TO BONE (HCC): ICD-10-CM

## 2023-08-24 DIAGNOSIS — C79.51 CARCINOMA OF RIGHT BREAST METASTATIC TO BONE (HCC): ICD-10-CM

## 2023-08-24 DIAGNOSIS — C79.51 CARCINOMA OF BREAST METASTATIC TO BONE, UNSPECIFIED LATERALITY (HCC): ICD-10-CM

## 2023-08-24 DIAGNOSIS — C50.919 CARCINOMA OF BREAST METASTATIC TO BONE, UNSPECIFIED LATERALITY (HCC): Primary | ICD-10-CM

## 2023-08-24 PROCEDURE — 96372 THER/PROPH/DIAG INJ SC/IM: CPT

## 2023-08-24 PROCEDURE — 99212 OFFICE O/P EST SF 10 MIN: CPT

## 2023-08-24 PROCEDURE — 1090F PRES/ABSN URINE INCON ASSESS: CPT | Performed by: CLINICAL NURSE SPECIALIST

## 2023-08-24 PROCEDURE — G8427 DOCREV CUR MEDS BY ELIG CLIN: HCPCS | Performed by: CLINICAL NURSE SPECIALIST

## 2023-08-24 PROCEDURE — 1036F TOBACCO NON-USER: CPT | Performed by: CLINICAL NURSE SPECIALIST

## 2023-08-24 PROCEDURE — G8400 PT W/DXA NO RESULTS DOC: HCPCS | Performed by: CLINICAL NURSE SPECIALIST

## 2023-08-24 PROCEDURE — 3017F COLORECTAL CA SCREEN DOC REV: CPT | Performed by: CLINICAL NURSE SPECIALIST

## 2023-08-24 PROCEDURE — 99213 OFFICE O/P EST LOW 20 MIN: CPT | Performed by: CLINICAL NURSE SPECIALIST

## 2023-08-24 PROCEDURE — 6360000002 HC RX W HCPCS: Performed by: INTERNAL MEDICINE

## 2023-08-24 PROCEDURE — G8420 CALC BMI NORM PARAMETERS: HCPCS | Performed by: CLINICAL NURSE SPECIALIST

## 2023-08-24 PROCEDURE — 1123F ACP DISCUSS/DSCN MKR DOCD: CPT | Performed by: CLINICAL NURSE SPECIALIST

## 2023-08-24 RX ORDER — OXYCODONE HYDROCHLORIDE AND ACETAMINOPHEN 5; 325 MG/1; MG/1
1 TABLET ORAL EVERY 6 HOURS PRN
COMMUNITY

## 2023-08-24 RX ADMIN — DENOSUMAB 120 MG: 120 INJECTION SUBCUTANEOUS at 13:55

## 2023-08-24 NOTE — PROGRESS NOTES
Pt. Has paper copy of bloodwork. Ca is 8.8  Cr 0.68. Labs from 8/21/23. Results reported to pharmacist, Reyes Grass.     Electronically signed by Kalli Gudino RN on 8/24/2023 at 1:52 PM

## 2023-08-24 NOTE — PROGRESS NOTES
[address] [place] [address] Drive [address] [place] [address] MEDICAL ONCOLOGY  [address]  [facility] [address]  Dept: [phone]  Loc: [phone]  Attending Progress Note      Reason for Visit:   Metastatic breast cancer. PCP:  Oly Thompson DO    History of Present Illness: The patient is a 74 y.o. lady, with a past medical history significant for hydrocephalus, who had presented with left breast changes, she had noticed a dark spot on her left breast, then she had developed induration, she thinks that she had the breast changes for months. The patient had never had a mammogram done, had a CT scan of the chest done, revealing a solid left breast mass measuring at least 6.8 x 2.9 cm in size is identified. The mass appears to extend to the skin surface. The deep margin of the mass abuts the chest wall but a fat plane appears to be preserved. Pathologically enlarged left  axillary lymph nodes are seen measuring up to 4.5 x 1.9 cm in size. CT abdomen and pelvis was done on 8/30/2019 revealing partial visualization of soft tissue mass in the left breast, nonspecific lymph nodes in the upper abdomen. Bone scan was done on 8/30/2019, revealing a Prominent focus of radiotracer uptake in the left anterior superior iliac spine, with corresponding abnormality on recent CT, suggestive of metastasis. More subtle focal radiotracer uptake in the right anterior superior iliac spine could be due to metastasis or  degenerative change. 2. Focal radiotracer uptake in the lateral aspect of the right ninth  rib, which appears to be due to a nondisplaced healing fracture. Correlation with history of recent trauma is recommended. In light of  other findings, this could represent a pathologic fracture with an  underlying metastatic lesion. 3. Osteoarthritic uptake in the shoulders, knees, wrists, and hands.     She underwent on 8/30/2019 a biopsy of 1 of the left axillary lymph nodes, she was

## 2023-08-24 NOTE — PROGRESS NOTES
Patient is to have CBC re-drawn prior to the start of new cycle of Ibrance. This nurse called [facility], and spoke to her nurse and gave the verbal order. Nurse states that lab will be drawn Monday, 8-28-23.

## 2023-08-27 VITALS
RESPIRATION RATE: 16 BRPM | TEMPERATURE: 97.7 F | DIASTOLIC BLOOD PRESSURE: 57 MMHG | HEART RATE: 70 BPM | OXYGEN SATURATION: 98 % | SYSTOLIC BLOOD PRESSURE: 108 MMHG

## 2023-08-27 ASSESSMENT — ENCOUNTER SYMPTOMS
NAUSEA: 0
ABDOMINAL PAIN: 0
DIARRHEA: 0
VOMITING: 0
SHORTNESS OF BREATH: 0

## 2023-08-28 NOTE — PROGRESS NOTES
Amanda Saleem (:  1950) is a 74 y.o. female. Subjective   SUBJECTIVE/OBJECTIVE:  Patient is seen today for follow-up visit. She is whining that she wants to go home. She is less aggressive and more cooperative. She is cooperating with PT and stands up with therapy. She is a 2 assist transfer. Her right shoulder wound is now healed. Podiatry consult for thick toenails. Location:  All nursing and progress notes reviewed. Past Medical History:   Diagnosis Date    Arm fracture, left 2009    Balance problems since     Hydrocephalus [facility])     compensated    Thyroid disease     Vertigo        No Known Allergies   Current Outpatient Medications   Medication Sig Dispense Refill    oxyCODONE-acetaminophen (PERCOCET) 5-325 MG per tablet Take 1 tablet by mouth every 6 hours as needed for Pain. Max Daily Amount: 4 tablets      divalproex (DEPAKOTE SPRINKLE) 125 MG DR capsule Take 1 capsule by mouth in the morning and 1 capsule at noon and 1 capsule in the evening.       Polyethylene Glycol 1450 POWD Take 17 g by mouth daily as needed      metoprolol tartrate (LOPRESSOR) 25 MG tablet Take 1 tablet by mouth 2 times daily Hold if SBP<100 or AP<50 180 tablet 1    acetaminophen (TYLENOL) 325 MG tablet Take 2 tablets by mouth every 6 hours as needed for Pain or Fever      bisacodyl (DULCOLAX) 10 MG suppository Place 1 suppository rectally daily as needed for Constipation If no results from MOM, may administer 1 suppository      SODIUM PHOSPHATES RE Place 1 Bottle rectally daily as needed Fleet enema every 24 hrs      magnesium hydroxide (MILK OF MAGNESIA) 400 MG/5ML suspension Take 30 mLs by mouth daily as needed for Constipation      pantoprazole (PROTONIX) 40 MG tablet Take 1 tablet by mouth every morning (before breakfast) 30 tablet 0    melatonin 5 MG TBDP disintegrating tablet Take 1 tablet by mouth nightly      aspirin 325 MG EC tablet Take 1 tablet by mouth 2 times daily 60 tablet 0

## 2023-09-21 ENCOUNTER — OFFICE VISIT (OUTPATIENT)
Dept: ONCOLOGY | Age: 73
End: 2023-09-21
Payer: MEDICARE

## 2023-09-21 ENCOUNTER — HOSPITAL ENCOUNTER (OUTPATIENT)
Dept: INFUSION THERAPY | Age: 73
Discharge: HOME | End: 2023-09-21
Payer: MEDICARE

## 2023-09-21 VITALS
DIASTOLIC BLOOD PRESSURE: 52 MMHG | SYSTOLIC BLOOD PRESSURE: 124 MMHG | HEIGHT: 66 IN | BODY MASS INDEX: 22.76 KG/M2 | TEMPERATURE: 98.1 F | RESPIRATION RATE: 16 BRPM | HEART RATE: 62 BPM | OXYGEN SATURATION: 96 %

## 2023-09-21 DIAGNOSIS — C50.919 CARCINOMA OF BREAST METASTATIC TO BONE, UNSPECIFIED LATERALITY (HCC): Primary | ICD-10-CM

## 2023-09-21 DIAGNOSIS — C50.612 MALIGNANT NEOPLASM OF AXILLARY TAIL OF LEFT FEMALE BREAST, UNSPECIFIED ESTROGEN RECEPTOR STATUS (HCC): Primary | ICD-10-CM

## 2023-09-21 DIAGNOSIS — C79.51 CARCINOMA OF BREAST METASTATIC TO BONE, UNSPECIFIED LATERALITY (HCC): Primary | ICD-10-CM

## 2023-09-21 DIAGNOSIS — C79.51 CARCINOMA OF BREAST METASTATIC TO BONE, UNSPECIFIED LATERALITY (HCC): ICD-10-CM

## 2023-09-21 DIAGNOSIS — C50.919 CARCINOMA OF BREAST METASTATIC TO BONE, UNSPECIFIED LATERALITY (HCC): ICD-10-CM

## 2023-09-21 PROCEDURE — 96372 THER/PROPH/DIAG INJ SC/IM: CPT

## 2023-09-21 PROCEDURE — 99213 OFFICE O/P EST LOW 20 MIN: CPT | Performed by: CLINICAL NURSE SPECIALIST

## 2023-09-21 PROCEDURE — 6360000002 HC RX W HCPCS: Performed by: INTERNAL MEDICINE

## 2023-09-21 PROCEDURE — 1036F TOBACCO NON-USER: CPT | Performed by: CLINICAL NURSE SPECIALIST

## 2023-09-21 PROCEDURE — 3017F COLORECTAL CA SCREEN DOC REV: CPT | Performed by: CLINICAL NURSE SPECIALIST

## 2023-09-21 PROCEDURE — 1090F PRES/ABSN URINE INCON ASSESS: CPT | Performed by: CLINICAL NURSE SPECIALIST

## 2023-09-21 PROCEDURE — 1123F ACP DISCUSS/DSCN MKR DOCD: CPT | Performed by: CLINICAL NURSE SPECIALIST

## 2023-09-21 PROCEDURE — G8420 CALC BMI NORM PARAMETERS: HCPCS | Performed by: CLINICAL NURSE SPECIALIST

## 2023-09-21 PROCEDURE — 99212 OFFICE O/P EST SF 10 MIN: CPT

## 2023-09-21 PROCEDURE — G8427 DOCREV CUR MEDS BY ELIG CLIN: HCPCS | Performed by: CLINICAL NURSE SPECIALIST

## 2023-09-21 PROCEDURE — G8400 PT W/DXA NO RESULTS DOC: HCPCS | Performed by: CLINICAL NURSE SPECIALIST

## 2023-09-21 RX ADMIN — DENOSUMAB 120 MG: 120 INJECTION SUBCUTANEOUS at 11:02

## 2023-09-21 NOTE — PROGRESS NOTES
Ca 9.3 from 9/20/23 and dental clearance both scanned and filed in media. OK to administer Keyon Judd per Jenni Pradhan NP.     Electronically signed by Candie Padilla RN on 9/21/2023 at 10:56 AM

## 2023-09-21 NOTE — PROGRESS NOTES
[address] [place] [address] Drive [address] [place] [address] MEDICAL ONCOLOGY  [address]  [facility] [address]  Dept: [phone]  Loc: [phone]  Attending Progress Note      Reason for Visit:   Metastatic breast cancer. PCP:  Wilfrid Powers DO    History of Present Illness: The patient is a 74 y.o. lady, with a past medical history significant for hydrocephalus, who had presented with left breast changes, she had noticed a dark spot on her left breast, then she had developed induration, she thinks that she had the breast changes for months. The patient had never had a mammogram done, had a CT scan of the chest done, revealing a solid left breast mass measuring at least 6.8 x 2.9 cm in size is identified. The mass appears to extend to the skin surface. The deep margin of the mass abuts the chest wall but a fat plane appears to be preserved. Pathologically enlarged left  axillary lymph nodes are seen measuring up to 4.5 x 1.9 cm in size. CT abdomen and pelvis was done on 8/30/2019 revealing partial visualization of soft tissue mass in the left breast, nonspecific lymph nodes in the upper abdomen. Bone scan was done on 8/30/2019, revealing a Prominent focus of radiotracer uptake in the left anterior superior iliac spine, with corresponding abnormality on recent CT, suggestive of metastasis. More subtle focal radiotracer uptake in the right anterior superior iliac spine could be due to metastasis or  degenerative change. 2. Focal radiotracer uptake in the lateral aspect of the right ninth  rib, which appears to be due to a nondisplaced healing fracture. Correlation with history of recent trauma is recommended. In light of  other findings, this could represent a pathologic fracture with an  underlying metastatic lesion. 3. Osteoarthritic uptake in the shoulders, knees, wrists, and hands.     She underwent on 8/30/2019 a biopsy of 1 of the left axillary lymph nodes, she was

## 2023-10-09 ENCOUNTER — OUTSIDE SERVICES (OUTPATIENT)
Dept: PRIMARY CARE CLINIC | Age: 73
End: 2023-10-09
Payer: MEDICARE

## 2023-10-09 DIAGNOSIS — C50.919 CARCINOMA OF BREAST METASTATIC TO BONE, UNSPECIFIED LATERALITY (HCC): ICD-10-CM

## 2023-10-09 DIAGNOSIS — D64.9 ANEMIA, UNSPECIFIED TYPE: ICD-10-CM

## 2023-10-09 DIAGNOSIS — R26.9 GAIT ABNORMALITY: ICD-10-CM

## 2023-10-09 DIAGNOSIS — D72.819 LEUKOPENIA, UNSPECIFIED TYPE: ICD-10-CM

## 2023-10-09 DIAGNOSIS — L10.0: ICD-10-CM

## 2023-10-09 DIAGNOSIS — C50.612 MALIGNANT NEOPLASM OF AXILLARY TAIL OF LEFT FEMALE BREAST, UNSPECIFIED ESTROGEN RECEPTOR STATUS (HCC): ICD-10-CM

## 2023-10-09 DIAGNOSIS — F03.918 DEMENTIA WITH AGGRESSIVE BEHAVIOR (HCC): Primary | ICD-10-CM

## 2023-10-09 DIAGNOSIS — C79.51 CARCINOMA OF BREAST METASTATIC TO BONE, UNSPECIFIED LATERALITY (HCC): ICD-10-CM

## 2023-10-09 DIAGNOSIS — L03.039: ICD-10-CM

## 2023-10-09 DIAGNOSIS — L03.019: ICD-10-CM

## 2023-10-09 PROCEDURE — 99309 SBSQ NF CARE MODERATE MDM 30: CPT | Performed by: INTERNAL MEDICINE

## 2023-10-09 NOTE — PROGRESS NOTES
Visit Date: 10/9/2023  Vaishali Arriola (:  1950) is a 74 y.o. female. Subjective   SUBJECTIVE/OBJECTIVE:  Patient is seen for follow up examination of dementia. She is calm today. She denies chest pain or shortness of breath. Location:  All nursing and progress notes reviewed. Past Medical History:   Diagnosis Date    Arm fracture, left 2009    Balance problems since     Hydrocephalus [facility])     compensated    Thyroid disease     Vertigo        No Known Allergies   Current Outpatient Medications   Medication Sig Dispense Refill    oxyCODONE-acetaminophen (PERCOCET) 5-325 MG per tablet Take 1 tablet by mouth every 6 hours as needed for Pain. Max Daily Amount: 4 tablets      divalproex (DEPAKOTE SPRINKLE) 125 MG DR capsule Take 1 capsule by mouth in the morning and 1 capsule at noon and 1 capsule in the evening.       Polyethylene Glycol 1450 POWD Take 17 g by mouth daily as needed      metoprolol tartrate (LOPRESSOR) 25 MG tablet Take 1 tablet by mouth 2 times daily Hold if SBP<100 or AP<50 180 tablet 1    acetaminophen (TYLENOL) 325 MG tablet Take 2 tablets by mouth every 6 hours as needed for Pain or Fever      bisacodyl (DULCOLAX) 10 MG suppository Place 1 suppository rectally daily as needed for Constipation If no results from MOM, may administer 1 suppository      SODIUM PHOSPHATES RE Place 1 Bottle rectally daily as needed Fleet enema every 24 hrs      magnesium hydroxide (MILK OF MAGNESIA) 400 MG/5ML suspension Take 30 mLs by mouth daily as needed for Constipation      pantoprazole (PROTONIX) 40 MG tablet Take 1 tablet by mouth every morning (before breakfast) 30 tablet 0    melatonin 5 MG TBDP disintegrating tablet Take 1 tablet by mouth nightly      aspirin 325 MG EC tablet Take 1 tablet by mouth 2 times daily 60 tablet 0    levothyroxine (SYNTHROID) 25 MCG tablet Take 1 tablet by mouth Daily      palbociclib (IBRANCE) 75 MG capsule Take 75 mg by mouth See Admin Instructions

## 2023-10-11 VITALS
SYSTOLIC BLOOD PRESSURE: 122 MMHG | DIASTOLIC BLOOD PRESSURE: 63 MMHG | RESPIRATION RATE: 18 BRPM | HEART RATE: 79 BPM | OXYGEN SATURATION: 93 % | TEMPERATURE: 99.3 F | WEIGHT: 143.6 LBS | BODY MASS INDEX: 23.18 KG/M2

## 2023-10-11 ASSESSMENT — ENCOUNTER SYMPTOMS
VOMITING: 0
DIARRHEA: 0
ABDOMINAL PAIN: 0
SHORTNESS OF BREATH: 0
NAUSEA: 0

## 2023-10-24 NOTE — PROGRESS NOTES
Attempted to speak to nursing staff at [facility] AND [facility] where pt. Resides regarding bloodwork for appointment tomorrow - no answer at this time.     Electronically signed by Guillaume Ctoton RN on 10/24/2023 at 2:00 PM

## 2023-10-25 ENCOUNTER — HOSPITAL ENCOUNTER (OUTPATIENT)
Dept: INFUSION THERAPY | Age: 73
Discharge: HOME | End: 2023-10-25
Payer: MEDICARE

## 2023-10-25 ENCOUNTER — OFFICE VISIT (OUTPATIENT)
Dept: ONCOLOGY | Age: 73
End: 2023-10-25
Payer: MEDICARE

## 2023-10-25 VITALS
HEIGHT: 66 IN | DIASTOLIC BLOOD PRESSURE: 68 MMHG | OXYGEN SATURATION: 100 % | TEMPERATURE: 97.8 F | BODY MASS INDEX: 23.18 KG/M2 | HEART RATE: 63 BPM | SYSTOLIC BLOOD PRESSURE: 128 MMHG

## 2023-10-25 DIAGNOSIS — C50.919 CARCINOMA OF BREAST METASTATIC TO BONE, UNSPECIFIED LATERALITY (HCC): Primary | ICD-10-CM

## 2023-10-25 DIAGNOSIS — C79.51 CARCINOMA OF BREAST METASTATIC TO BONE, UNSPECIFIED LATERALITY (HCC): ICD-10-CM

## 2023-10-25 DIAGNOSIS — C50.919 CARCINOMA OF BREAST METASTATIC TO BONE, UNSPECIFIED LATERALITY (HCC): ICD-10-CM

## 2023-10-25 DIAGNOSIS — C50.612 MALIGNANT NEOPLASM OF AXILLARY TAIL OF LEFT FEMALE BREAST, UNSPECIFIED ESTROGEN RECEPTOR STATUS (HCC): Primary | ICD-10-CM

## 2023-10-25 DIAGNOSIS — C79.51 CARCINOMA OF BREAST METASTATIC TO BONE, UNSPECIFIED LATERALITY (HCC): Primary | ICD-10-CM

## 2023-10-25 PROCEDURE — 6360000002 HC RX W HCPCS: Performed by: INTERNAL MEDICINE

## 2023-10-25 PROCEDURE — G8427 DOCREV CUR MEDS BY ELIG CLIN: HCPCS | Performed by: INTERNAL MEDICINE

## 2023-10-25 PROCEDURE — G8420 CALC BMI NORM PARAMETERS: HCPCS | Performed by: INTERNAL MEDICINE

## 2023-10-25 PROCEDURE — 99213 OFFICE O/P EST LOW 20 MIN: CPT

## 2023-10-25 PROCEDURE — G8484 FLU IMMUNIZE NO ADMIN: HCPCS | Performed by: INTERNAL MEDICINE

## 2023-10-25 PROCEDURE — 1036F TOBACCO NON-USER: CPT | Performed by: INTERNAL MEDICINE

## 2023-10-25 PROCEDURE — G8400 PT W/DXA NO RESULTS DOC: HCPCS | Performed by: INTERNAL MEDICINE

## 2023-10-25 PROCEDURE — 1123F ACP DISCUSS/DSCN MKR DOCD: CPT | Performed by: INTERNAL MEDICINE

## 2023-10-25 PROCEDURE — 3017F COLORECTAL CA SCREEN DOC REV: CPT | Performed by: INTERNAL MEDICINE

## 2023-10-25 PROCEDURE — 1090F PRES/ABSN URINE INCON ASSESS: CPT | Performed by: INTERNAL MEDICINE

## 2023-10-25 PROCEDURE — 6370000000 HC RX 637 (ALT 250 FOR IP): Performed by: INTERNAL MEDICINE

## 2023-10-25 PROCEDURE — 96372 THER/PROPH/DIAG INJ SC/IM: CPT

## 2023-10-25 PROCEDURE — 99214 OFFICE O/P EST MOD 30 MIN: CPT | Performed by: INTERNAL MEDICINE

## 2023-10-25 RX ORDER — ACETAMINOPHEN 325 MG/1
650 TABLET ORAL ONCE
Status: COMPLETED | OUTPATIENT
Start: 2023-10-25 | End: 2023-10-25

## 2023-10-25 RX ADMIN — DENOSUMAB 120 MG: 120 INJECTION SUBCUTANEOUS at 11:23

## 2023-10-25 RX ADMIN — ACETAMINOPHEN 650 MG: 325 TABLET, FILM COATED ORAL at 11:24

## 2023-10-25 ASSESSMENT — PAIN DESCRIPTION - ORIENTATION: ORIENTATION: RIGHT

## 2023-10-25 ASSESSMENT — PAIN DESCRIPTION - LOCATION: LOCATION: LEG

## 2023-10-25 ASSESSMENT — PAIN DESCRIPTION - DESCRIPTORS: DESCRIPTORS: ACHING

## 2023-10-25 ASSESSMENT — PAIN SCALES - GENERAL: PAINLEVEL_OUTOF10: 5

## 2023-10-25 NOTE — PROGRESS NOTES
[address] [place] [address] Drive [address] [place] [address] MEDICAL ONCOLOGY  [address]  [facility] [address]  Dept: [phone]  Loc: [phone]  Attending Progress Note      Reason for Visit:   Metastatic breast cancer. PCP:  Bret Campbell DO    History of Present Illness: The patient is a 74 y.o. lady, with a past medical history significant for hydrocephalus, who had presented with left breast changes, she had noticed a dark spot on her left breast, then she had developed induration, she thinks that she had the breast changes for months. The patient had never had a mammogram done, had a CT scan of the chest done, revealing a solid left breast mass measuring at least 6.8 x 2.9 cm in size is identified. The mass appears to extend to the skin surface. The deep margin of the mass abuts the chest wall but a fat plane appears to be preserved. Pathologically enlarged left  axillary lymph nodes are seen measuring up to 4.5 x 1.9 cm in size. CT abdomen and pelvis was done on 8/30/2019 revealing partial visualization of soft tissue mass in the left breast, nonspecific lymph nodes in the upper abdomen. Bone scan was done on 8/30/2019, revealing a Prominent focus of radiotracer uptake in the left anterior superior iliac spine, with corresponding abnormality on recent CT, suggestive of metastasis. More subtle focal radiotracer uptake in the right anterior superior iliac spine could be due to metastasis or  degenerative change. 2. Focal radiotracer uptake in the lateral aspect of the right ninth  rib, which appears to be due to a nondisplaced healing fracture. Correlation with history of recent trauma is recommended. In light of  other findings, this could represent a pathologic fracture with an  underlying metastatic lesion. 3. Osteoarthritic uptake in the shoulders, knees, wrists, and hands.     She underwent on 8/30/2019 a biopsy of 1 of the left axillary lymph nodes, she was

## 2023-11-06 ENCOUNTER — CLINICAL DOCUMENTATION (OUTPATIENT)
Facility: HOSPITAL | Age: 73
End: 2023-11-06

## 2023-11-22 ENCOUNTER — HOSPITAL ENCOUNTER (OUTPATIENT)
Dept: INFUSION THERAPY | Age: 73
Discharge: HOME | End: 2023-11-22
Payer: MEDICARE

## 2023-11-22 ENCOUNTER — OFFICE VISIT (OUTPATIENT)
Dept: ONCOLOGY | Age: 73
End: 2023-11-22
Payer: MEDICARE

## 2023-11-22 VITALS
HEIGHT: 66 IN | BODY MASS INDEX: 23.18 KG/M2 | SYSTOLIC BLOOD PRESSURE: 108 MMHG | TEMPERATURE: 97.7 F | HEART RATE: 65 BPM | DIASTOLIC BLOOD PRESSURE: 82 MMHG | OXYGEN SATURATION: 100 %

## 2023-11-22 DIAGNOSIS — C50.919 CARCINOMA OF BREAST METASTATIC TO BONE, UNSPECIFIED LATERALITY (HCC): ICD-10-CM

## 2023-11-22 DIAGNOSIS — C50.919 CARCINOMA OF BREAST METASTATIC TO BONE, UNSPECIFIED LATERALITY (HCC): Primary | ICD-10-CM

## 2023-11-22 DIAGNOSIS — C79.51 CARCINOMA OF BREAST METASTATIC TO BONE, UNSPECIFIED LATERALITY (HCC): Primary | ICD-10-CM

## 2023-11-22 DIAGNOSIS — C79.51 CARCINOMA OF BREAST METASTATIC TO BONE, UNSPECIFIED LATERALITY (HCC): ICD-10-CM

## 2023-11-22 DIAGNOSIS — C50.612 MALIGNANT NEOPLASM OF AXILLARY TAIL OF LEFT FEMALE BREAST, UNSPECIFIED ESTROGEN RECEPTOR STATUS (HCC): Primary | ICD-10-CM

## 2023-11-22 PROCEDURE — 1036F TOBACCO NON-USER: CPT | Performed by: INTERNAL MEDICINE

## 2023-11-22 PROCEDURE — 3017F COLORECTAL CA SCREEN DOC REV: CPT | Performed by: INTERNAL MEDICINE

## 2023-11-22 PROCEDURE — 1090F PRES/ABSN URINE INCON ASSESS: CPT | Performed by: INTERNAL MEDICINE

## 2023-11-22 PROCEDURE — G8420 CALC BMI NORM PARAMETERS: HCPCS | Performed by: INTERNAL MEDICINE

## 2023-11-22 PROCEDURE — G8427 DOCREV CUR MEDS BY ELIG CLIN: HCPCS | Performed by: INTERNAL MEDICINE

## 2023-11-22 PROCEDURE — G8400 PT W/DXA NO RESULTS DOC: HCPCS | Performed by: INTERNAL MEDICINE

## 2023-11-22 PROCEDURE — 96372 THER/PROPH/DIAG INJ SC/IM: CPT

## 2023-11-22 PROCEDURE — 1123F ACP DISCUSS/DSCN MKR DOCD: CPT | Performed by: INTERNAL MEDICINE

## 2023-11-22 PROCEDURE — 6360000002 HC RX W HCPCS: Performed by: INTERNAL MEDICINE

## 2023-11-22 PROCEDURE — G8484 FLU IMMUNIZE NO ADMIN: HCPCS | Performed by: INTERNAL MEDICINE

## 2023-11-22 PROCEDURE — 99214 OFFICE O/P EST MOD 30 MIN: CPT | Performed by: INTERNAL MEDICINE

## 2023-11-22 RX ADMIN — DENOSUMAB 120 MG: 120 INJECTION SUBCUTANEOUS at 09:06

## 2023-11-30 ENCOUNTER — HOSPITAL ENCOUNTER (OUTPATIENT)
Dept: NUCLEAR MEDICINE | Age: 73
Discharge: HOME | End: 2023-11-30
Attending: INTERNAL MEDICINE
Payer: MEDICARE

## 2023-11-30 ENCOUNTER — OUTSIDE SERVICES (OUTPATIENT)
Dept: PRIMARY CARE CLINIC | Age: 73
End: 2023-11-30

## 2023-11-30 ENCOUNTER — HOSPITAL ENCOUNTER (OUTPATIENT)
Dept: CT IMAGING | Age: 73
Discharge: HOME | End: 2023-12-02
Attending: INTERNAL MEDICINE
Payer: MEDICARE

## 2023-11-30 VITALS
HEART RATE: 60 BPM | BODY MASS INDEX: 23.57 KG/M2 | OXYGEN SATURATION: 97 % | RESPIRATION RATE: 18 BRPM | WEIGHT: 146 LBS | SYSTOLIC BLOOD PRESSURE: 101 MMHG | TEMPERATURE: 97 F | DIASTOLIC BLOOD PRESSURE: 69 MMHG

## 2023-11-30 DIAGNOSIS — C50.612 MALIGNANT NEOPLASM OF AXILLARY TAIL OF LEFT FEMALE BREAST, UNSPECIFIED ESTROGEN RECEPTOR STATUS (HCC): ICD-10-CM

## 2023-11-30 DIAGNOSIS — D72.819 LEUKOPENIA, UNSPECIFIED TYPE: ICD-10-CM

## 2023-11-30 DIAGNOSIS — C50.919 CARCINOMA OF BREAST METASTATIC TO BONE, UNSPECIFIED LATERALITY (HCC): ICD-10-CM

## 2023-11-30 DIAGNOSIS — L03.019: ICD-10-CM

## 2023-11-30 DIAGNOSIS — R26.9 GAIT ABNORMALITY: ICD-10-CM

## 2023-11-30 DIAGNOSIS — C79.51 CARCINOMA OF BREAST METASTATIC TO BONE, UNSPECIFIED LATERALITY (HCC): ICD-10-CM

## 2023-11-30 DIAGNOSIS — L03.039: ICD-10-CM

## 2023-11-30 DIAGNOSIS — F03.918 DEMENTIA WITH AGGRESSIVE BEHAVIOR (HCC): Primary | ICD-10-CM

## 2023-11-30 DIAGNOSIS — D64.9 ANEMIA, UNSPECIFIED TYPE: ICD-10-CM

## 2023-11-30 DIAGNOSIS — L10.0: ICD-10-CM

## 2023-11-30 PROCEDURE — 78306 BONE IMAGING WHOLE BODY: CPT | Performed by: INTERNAL MEDICINE

## 2023-11-30 PROCEDURE — 6360000004 HC RX CONTRAST MEDICATION: Performed by: RADIOLOGY

## 2023-11-30 PROCEDURE — 74176 CT ABD & PELVIS W/O CONTRAST: CPT

## 2023-11-30 PROCEDURE — A9503 TC99M MEDRONATE: HCPCS | Performed by: RADIOLOGY

## 2023-11-30 PROCEDURE — 3430000000 HC RX DIAGNOSTIC RADIOPHARMACEUTICAL: Performed by: RADIOLOGY

## 2023-11-30 PROCEDURE — 71250 CT THORAX DX C-: CPT

## 2023-11-30 RX ORDER — TC 99M MEDRONATE 20 MG/10ML
25 INJECTION, POWDER, LYOPHILIZED, FOR SOLUTION INTRAVENOUS
Status: COMPLETED | OUTPATIENT
Start: 2023-11-30 | End: 2023-11-30

## 2023-11-30 RX ADMIN — IOPAMIDOL 18 ML: 755 INJECTION, SOLUTION INTRAVENOUS at 10:54

## 2023-11-30 RX ADMIN — TC 99M MEDRONATE 25 MILLICURIE: 20 INJECTION, POWDER, LYOPHILIZED, FOR SOLUTION INTRAVENOUS at 09:33

## 2023-11-30 NOTE — PROGRESS NOTES
LABVLDL 20 08/10/2020     No results found for: \"CHOLHDLRATIO\"  Lab Results   Component Value Date    TSH 2.940 04/07/2023               ASSESSMENT/PLAN:  Toe nail fungus  1. Dementia with aggressive behavior ([address])  2. Gait abnormality  Comments:  Continue PT  3. Carcinoma of breast metastatic to bone, unspecified laterality ([address])  4. Leukopenia, unspecified type  5. Anemia, unspecified type  6. 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     --Renato Rubi

## 2023-12-04 ASSESSMENT — ENCOUNTER SYMPTOMS
ABDOMINAL PAIN: 0
SHORTNESS OF BREATH: 0
VOMITING: 0
NAUSEA: 0
DIARRHEA: 0

## 2023-12-20 ENCOUNTER — HOSPITAL ENCOUNTER (OUTPATIENT)
Dept: INFUSION THERAPY | Age: 73
Discharge: HOME | End: 2023-12-20
Payer: MEDICARE

## 2023-12-20 ENCOUNTER — OUTSIDE SERVICES (OUTPATIENT)
Dept: PRIMARY CARE CLINIC | Age: 73
End: 2023-12-20
Payer: MEDICARE

## 2023-12-20 DIAGNOSIS — F03.918 DEMENTIA WITH AGGRESSIVE BEHAVIOR (HCC): ICD-10-CM

## 2023-12-20 DIAGNOSIS — R48.2 GAIT APRAXIA: ICD-10-CM

## 2023-12-20 DIAGNOSIS — F01.C11 SEVERE VASCULAR DEMENTIA WITH AGITATION (HCC): ICD-10-CM

## 2023-12-20 DIAGNOSIS — C50.911 CARCINOMA OF RIGHT BREAST METASTATIC TO BONE (HCC): ICD-10-CM

## 2023-12-20 DIAGNOSIS — C79.51 CARCINOMA OF BREAST METASTATIC TO BONE, UNSPECIFIED LATERALITY (HCC): ICD-10-CM

## 2023-12-20 DIAGNOSIS — C50.919 CARCINOMA OF BREAST METASTATIC TO BONE, UNSPECIFIED LATERALITY (HCC): ICD-10-CM

## 2023-12-20 DIAGNOSIS — G91.9 HYDROCEPHALUS, ADULT (HCC): ICD-10-CM

## 2023-12-20 DIAGNOSIS — R26.9 GAIT ABNORMALITY: Primary | ICD-10-CM

## 2023-12-20 DIAGNOSIS — R26.89 BALANCE PROBLEM: ICD-10-CM

## 2023-12-20 DIAGNOSIS — C50.612 MALIGNANT NEOPLASM OF AXILLARY TAIL OF LEFT FEMALE BREAST, UNSPECIFIED ESTROGEN RECEPTOR STATUS (HCC): Primary | ICD-10-CM

## 2023-12-20 DIAGNOSIS — C79.51 CARCINOMA OF RIGHT BREAST METASTATIC TO BONE (HCC): ICD-10-CM

## 2023-12-20 PROCEDURE — 6360000002 HC RX W HCPCS: Performed by: INTERNAL MEDICINE

## 2023-12-20 PROCEDURE — 99309 SBSQ NF CARE MODERATE MDM 30: CPT | Performed by: INTERNAL MEDICINE

## 2023-12-20 PROCEDURE — 96372 THER/PROPH/DIAG INJ SC/IM: CPT

## 2023-12-20 RX ADMIN — DENOSUMAB 120 MG: 120 INJECTION SUBCUTANEOUS at 09:23

## 2023-12-21 NOTE — PROGRESS NOTES
Visit Date: 2023  Barbara Lewis (:  1950) is a 74 y.o. female. Subjective   SUBJECTIVE/OBJECTIVE:  Patient is seen for follow-up of dementia. She is calm,states she wants to go home      Location:  All nursing and progress notes reviewed. Past Medical History:   Diagnosis Date    Arm fracture, left 2009    Balance problems since     Hydrocephalus [facility])     compensated    Thyroid disease     Vertigo        No Known Allergies   Current Outpatient Medications   Medication Sig Dispense Refill    oxyCODONE-acetaminophen (PERCOCET) 5-325 MG per tablet Take 1 tablet by mouth every 6 hours as needed for Pain.      divalproex (DEPAKOTE SPRINKLE) 125 MG DR capsule Take 1 capsule by mouth in the morning and 1 capsule at noon and 1 capsule in the evening.       Polyethylene Glycol 1450 POWD Take 17 g by mouth daily as needed      metoprolol tartrate (LOPRESSOR) 25 MG tablet Take 1 tablet by mouth 2 times daily Hold if SBP<100 or AP<50 180 tablet 1    acetaminophen (TYLENOL) 325 MG tablet Take 2 tablets by mouth every 6 hours as needed for Pain or Fever      bisacodyl (DULCOLAX) 10 MG suppository Place 1 suppository rectally daily as needed for Constipation If no results from MOM, may administer 1 suppository      SODIUM PHOSPHATES RE Place 1 Bottle rectally daily as needed Fleet enema every 24 hrs      magnesium hydroxide (MILK OF MAGNESIA) 400 MG/5ML suspension Take 30 mLs by mouth daily as needed for Constipation      pantoprazole (PROTONIX) 40 MG tablet Take 1 tablet by mouth every morning (before breakfast) 30 tablet 0    melatonin 5 MG TBDP disintegrating tablet Take 1 tablet by mouth nightly      aspirin 325 MG EC tablet Take 1 tablet by mouth 2 times daily 60 tablet 0    levothyroxine (SYNTHROID) 25 MCG tablet Take 1 tablet by mouth Daily      palbociclib (IBRANCE) 75 MG capsule Take 75 mg by mouth See Admin Instructions Given for 21 days then off for 7 days      ergocalciferol

## 2024-01-11 ENCOUNTER — OUTSIDE SERVICES (OUTPATIENT)
Dept: PRIMARY CARE CLINIC | Age: 74
End: 2024-01-11
Payer: MEDICARE

## 2024-01-11 DIAGNOSIS — C79.51 CARCINOMA OF BREAST METASTATIC TO BONE, UNSPECIFIED LATERALITY (HCC): ICD-10-CM

## 2024-01-11 DIAGNOSIS — R26.9 GAIT ABNORMALITY: Primary | ICD-10-CM

## 2024-01-11 DIAGNOSIS — D72.819 LEUKOPENIA, UNSPECIFIED TYPE: ICD-10-CM

## 2024-01-11 DIAGNOSIS — R26.89 BALANCE PROBLEM: ICD-10-CM

## 2024-01-11 DIAGNOSIS — C79.51 CARCINOMA OF RIGHT BREAST METASTATIC TO BONE (HCC): ICD-10-CM

## 2024-01-11 DIAGNOSIS — C50.911 CARCINOMA OF RIGHT BREAST METASTATIC TO BONE (HCC): ICD-10-CM

## 2024-01-11 DIAGNOSIS — D64.9 ANEMIA, UNSPECIFIED TYPE: ICD-10-CM

## 2024-01-11 DIAGNOSIS — C50.919 CARCINOMA OF BREAST METASTATIC TO BONE, UNSPECIFIED LATERALITY (HCC): ICD-10-CM

## 2024-01-11 DIAGNOSIS — F01.C11 SEVERE VASCULAR DEMENTIA WITH AGITATION (HCC): ICD-10-CM

## 2024-01-11 DIAGNOSIS — F03.918 DEMENTIA WITH AGGRESSIVE BEHAVIOR (HCC): ICD-10-CM

## 2024-01-11 DIAGNOSIS — G91.9 HYDROCEPHALUS, ADULT (HCC): ICD-10-CM

## 2024-01-11 DIAGNOSIS — R48.2 GAIT APRAXIA: ICD-10-CM

## 2024-01-11 PROCEDURE — 99309 SBSQ NF CARE MODERATE MDM 30: CPT | Performed by: INTERNAL MEDICINE

## 2024-01-12 NOTE — PROGRESS NOTES
Visit Date: 2024  Cary Santacruz (:  1950) is a 73 y.o. female.    Subjective   SUBJECTIVE/OBJECTIVE:  Patient is seen for follow-up of dementia. She is agitated stating that she wants to go home.       Location:  All nursing and progress notes reviewed.    Past Medical History:   Diagnosis Date    Arm fracture, left 2009    Balance problems since     Hydrocephalus (HCC)     compensated    Thyroid disease     Vertigo        No Known Allergies   Current Outpatient Medications   Medication Sig Dispense Refill    oxyCODONE-acetaminophen (PERCOCET) 5-325 MG per tablet Take 1 tablet by mouth every 6 hours as needed for Pain.      divalproex (DEPAKOTE SPRINKLE) 125 MG DR capsule Take 1 capsule by mouth in the morning and 1 capsule at noon and 1 capsule in the evening.      Polyethylene Glycol 1450 POWD Take 17 g by mouth daily as needed      metoprolol tartrate (LOPRESSOR) 25 MG tablet Take 1 tablet by mouth 2 times daily Hold if SBP<100 or AP<50 180 tablet 1    acetaminophen (TYLENOL) 325 MG tablet Take 2 tablets by mouth every 6 hours as needed for Pain or Fever      bisacodyl (DULCOLAX) 10 MG suppository Place 1 suppository rectally daily as needed for Constipation If no results from MOM, may administer 1 suppository      SODIUM PHOSPHATES RE Place 1 Bottle rectally daily as needed Fleet enema every 24 hrs      magnesium hydroxide (MILK OF MAGNESIA) 400 MG/5ML suspension Take 30 mLs by mouth daily as needed for Constipation      pantoprazole (PROTONIX) 40 MG tablet Take 1 tablet by mouth every morning (before breakfast) 30 tablet 0    melatonin 5 MG TBDP disintegrating tablet Take 1 tablet by mouth nightly      aspirin 325 MG EC tablet Take 1 tablet by mouth 2 times daily 60 tablet 0    levothyroxine (SYNTHROID) 25 MCG tablet Take 1 tablet by mouth Daily      palbociclib (IBRANCE) 75 MG capsule Take 75 mg by mouth See Admin Instructions Given for 21 days then off for 7 days      ergocalciferol

## 2024-01-13 VITALS
TEMPERATURE: 97.3 F | OXYGEN SATURATION: 97 % | BODY MASS INDEX: 24.28 KG/M2 | WEIGHT: 150.4 LBS | SYSTOLIC BLOOD PRESSURE: 106 MMHG | DIASTOLIC BLOOD PRESSURE: 47 MMHG | RESPIRATION RATE: 16 BRPM | HEART RATE: 66 BPM

## 2024-01-17 ENCOUNTER — OFFICE VISIT (OUTPATIENT)
Dept: ONCOLOGY | Age: 74
End: 2024-01-17
Payer: MEDICARE

## 2024-01-17 ENCOUNTER — CLINICAL DOCUMENTATION (OUTPATIENT)
Facility: HOSPITAL | Age: 74
End: 2024-01-17

## 2024-01-17 ENCOUNTER — HOSPITAL ENCOUNTER (OUTPATIENT)
Dept: INFUSION THERAPY | Age: 74
Discharge: HOME | End: 2024-01-17
Payer: MEDICARE

## 2024-01-17 VITALS
WEIGHT: 159.4 LBS | TEMPERATURE: 97 F | DIASTOLIC BLOOD PRESSURE: 65 MMHG | BODY MASS INDEX: 25.62 KG/M2 | SYSTOLIC BLOOD PRESSURE: 113 MMHG | HEART RATE: 58 BPM | OXYGEN SATURATION: 99 % | HEIGHT: 66 IN

## 2024-01-17 DIAGNOSIS — C79.51 CARCINOMA OF BREAST METASTATIC TO BONE, UNSPECIFIED LATERALITY (HCC): Primary | ICD-10-CM

## 2024-01-17 DIAGNOSIS — D72.818 OTHER DECREASED WHITE BLOOD CELL (WBC) COUNT: ICD-10-CM

## 2024-01-17 DIAGNOSIS — C50.919 CARCINOMA OF BREAST METASTATIC TO BONE, UNSPECIFIED LATERALITY (HCC): ICD-10-CM

## 2024-01-17 DIAGNOSIS — C50.612 MALIGNANT NEOPLASM OF AXILLARY TAIL OF LEFT FEMALE BREAST, UNSPECIFIED ESTROGEN RECEPTOR STATUS (HCC): Primary | ICD-10-CM

## 2024-01-17 DIAGNOSIS — C50.919 CARCINOMA OF BREAST METASTATIC TO BONE, UNSPECIFIED LATERALITY (HCC): Primary | ICD-10-CM

## 2024-01-17 DIAGNOSIS — C79.51 CARCINOMA OF BREAST METASTATIC TO BONE, UNSPECIFIED LATERALITY (HCC): ICD-10-CM

## 2024-01-17 PROBLEM — D69.6 THROMBOCYTOPENIA, UNSPECIFIED (HCC): Status: RESOLVED | Noted: 2022-03-09 | Resolved: 2024-01-17

## 2024-01-17 PROCEDURE — 1036F TOBACCO NON-USER: CPT | Performed by: INTERNAL MEDICINE

## 2024-01-17 PROCEDURE — 99214 OFFICE O/P EST MOD 30 MIN: CPT | Performed by: INTERNAL MEDICINE

## 2024-01-17 PROCEDURE — G8427 DOCREV CUR MEDS BY ELIG CLIN: HCPCS | Performed by: INTERNAL MEDICINE

## 2024-01-17 PROCEDURE — 96372 THER/PROPH/DIAG INJ SC/IM: CPT

## 2024-01-17 PROCEDURE — 99212 OFFICE O/P EST SF 10 MIN: CPT

## 2024-01-17 PROCEDURE — G8484 FLU IMMUNIZE NO ADMIN: HCPCS | Performed by: INTERNAL MEDICINE

## 2024-01-17 PROCEDURE — 6360000002 HC RX W HCPCS: Performed by: INTERNAL MEDICINE

## 2024-01-17 PROCEDURE — 1123F ACP DISCUSS/DSCN MKR DOCD: CPT | Performed by: INTERNAL MEDICINE

## 2024-01-17 PROCEDURE — G8400 PT W/DXA NO RESULTS DOC: HCPCS | Performed by: INTERNAL MEDICINE

## 2024-01-17 PROCEDURE — G8417 CALC BMI ABV UP PARAM F/U: HCPCS | Performed by: INTERNAL MEDICINE

## 2024-01-17 PROCEDURE — 3017F COLORECTAL CA SCREEN DOC REV: CPT | Performed by: INTERNAL MEDICINE

## 2024-01-17 PROCEDURE — 1090F PRES/ABSN URINE INCON ASSESS: CPT | Performed by: INTERNAL MEDICINE

## 2024-01-17 RX ADMIN — DENOSUMAB 120 MG: 120 INJECTION SUBCUTANEOUS at 09:31

## 2024-01-17 NOTE — PROGRESS NOTES
[facility] CARE [facility] MEDICAL ONCOLOGY  [address] [address]  Dept: [phone]  Loc: [phone]  Attending Progress Note      Reason for Visit:   Metastatic breast cancer.    PCP:  Tawny Ortiz DO    History of Present Illness:      The patient is a 73 y.o. lady, with a past medical history significant for hydrocephalus, who had presented with left breast changes, she had noticed a dark spot on her left breast, then she had developed induration, she thinks that she had the breast changes for months.  The patient had never had a mammogram done, had a CT scan of the chest done, revealing a solid left breast mass measuring at least 6.8 x 2.9 cm in size is identified. The mass appears to extend to the skin surface. The deep margin of the mass abuts the chest wall but a fat plane appears to be preserved. Pathologically enlarged left  axillary lymph nodes are seen measuring up to 4.5 x 1.9 cm in size.     CT abdomen and pelvis was done on 8/30/2019 revealing partial visualization of soft tissue mass in the left breast, nonspecific lymph nodes in the upper abdomen.    Bone scan was done on 8/30/2019, revealing a Prominent focus of radiotracer uptake in the left anterior superior iliac spine, with corresponding abnormality on recent CT, suggestive of metastasis. More subtle focal radiotracer uptake in the right anterior superior iliac spine could be due to metastasis or  degenerative change.   2. Focal radiotracer uptake in the lateral aspect of the right ninth  rib, which appears to be due to a nondisplaced healing fracture.  Correlation with history of recent trauma is recommended. In light of  other findings, this could represent a pathologic fracture with an  underlying metastatic lesion.  3. Osteoarthritic uptake in the shoulders, knees, wrists, and hands.    She underwent on 8/30/2019 a biopsy of 1 of the left axillary lymph nodes, she was

## 2024-01-23 ENCOUNTER — CLINICAL DOCUMENTATION (OUTPATIENT)
Facility: HOSPITAL | Age: 74
End: 2024-01-23

## 2024-01-23 NOTE — PROGRESS NOTES
RECVD CALL BACK FROM PTS SISTER, IBRANCE IS TO BE DEL TO THE SISTERS HOME AS BEFORE,     RECVD ALL SIGNATURES FOR THE 2024 REENROLLMENT IBRANCE APPLICATION AND FAXED OVER TO [employer].

## 2024-01-31 ENCOUNTER — CLINICAL DOCUMENTATION (OUTPATIENT)
Facility: HOSPITAL | Age: 74
End: 2024-01-31

## 2024-01-31 NOTE — PROGRESS NOTES
CALLED HELENA DOMINGO FOR STATUS OF THE IBRANCE 2024 RENEWAL KURT, SHE STATED THAT IT WAS STILL PENDING.     I EXPLAINED THAT THE PT WILL NEED A REFILL BY 2/11 AND SHE STATED THAT SHE WAS GOING TO REDD THIS AS URGENT, NORMALLY WHEN THE PT CALLS IN FOR A REILL THEY CAN OVERNIGHT.     I CALLED THE PTS SISTER VENTURA THAT HANDLES THE PTS ORDERING AND GAVE HER THIS INFO AND SHE WAS CONCERNED THAT THE PT WOULD RUN OUT. I ASSURED HER THAT I WOULD FOLLOW UP WITH [employer] ON MONDAY.     I ANSWERED ALL OF HER QUESTIONS.

## 2024-02-02 ENCOUNTER — CLINICAL DOCUMENTATION (OUTPATIENT)
Facility: HOSPITAL | Age: 74
End: 2024-02-02

## 2024-02-14 ENCOUNTER — OFFICE VISIT (OUTPATIENT)
Dept: ONCOLOGY | Age: 74
End: 2024-02-14
Payer: MEDICARE

## 2024-02-14 ENCOUNTER — HOSPITAL ENCOUNTER (OUTPATIENT)
Dept: INFUSION THERAPY | Age: 74
Discharge: HOME | End: 2024-02-14
Payer: MEDICARE

## 2024-02-14 ENCOUNTER — TELEPHONE (OUTPATIENT)
Dept: INFUSION THERAPY | Age: 74
End: 2024-02-14

## 2024-02-14 VITALS
DIASTOLIC BLOOD PRESSURE: 46 MMHG | HEART RATE: 71 BPM | BODY MASS INDEX: 26.65 KG/M2 | OXYGEN SATURATION: 100 % | SYSTOLIC BLOOD PRESSURE: 118 MMHG | HEIGHT: 66 IN | TEMPERATURE: 98.6 F | WEIGHT: 165.8 LBS

## 2024-02-14 DIAGNOSIS — C50.919 CARCINOMA OF BREAST METASTATIC TO BONE, UNSPECIFIED LATERALITY (HCC): Primary | ICD-10-CM

## 2024-02-14 DIAGNOSIS — C79.51 CARCINOMA OF BREAST METASTATIC TO BONE, UNSPECIFIED LATERALITY (HCC): Primary | ICD-10-CM

## 2024-02-14 DIAGNOSIS — C79.51 CARCINOMA OF BREAST METASTATIC TO BONE, UNSPECIFIED LATERALITY (HCC): ICD-10-CM

## 2024-02-14 DIAGNOSIS — C50.919 CARCINOMA OF BREAST METASTATIC TO BONE, UNSPECIFIED LATERALITY (HCC): ICD-10-CM

## 2024-02-14 DIAGNOSIS — C50.612 MALIGNANT NEOPLASM OF AXILLARY TAIL OF LEFT FEMALE BREAST, UNSPECIFIED ESTROGEN RECEPTOR STATUS (HCC): Primary | ICD-10-CM

## 2024-02-14 PROCEDURE — G8427 DOCREV CUR MEDS BY ELIG CLIN: HCPCS | Performed by: INTERNAL MEDICINE

## 2024-02-14 PROCEDURE — 99212 OFFICE O/P EST SF 10 MIN: CPT

## 2024-02-14 PROCEDURE — G8484 FLU IMMUNIZE NO ADMIN: HCPCS | Performed by: INTERNAL MEDICINE

## 2024-02-14 PROCEDURE — G8400 PT W/DXA NO RESULTS DOC: HCPCS | Performed by: INTERNAL MEDICINE

## 2024-02-14 PROCEDURE — 1123F ACP DISCUSS/DSCN MKR DOCD: CPT | Performed by: INTERNAL MEDICINE

## 2024-02-14 PROCEDURE — 1036F TOBACCO NON-USER: CPT | Performed by: INTERNAL MEDICINE

## 2024-02-14 PROCEDURE — 6360000002 HC RX W HCPCS: Performed by: INTERNAL MEDICINE

## 2024-02-14 PROCEDURE — G8417 CALC BMI ABV UP PARAM F/U: HCPCS | Performed by: INTERNAL MEDICINE

## 2024-02-14 PROCEDURE — 3017F COLORECTAL CA SCREEN DOC REV: CPT | Performed by: INTERNAL MEDICINE

## 2024-02-14 PROCEDURE — 96372 THER/PROPH/DIAG INJ SC/IM: CPT

## 2024-02-14 PROCEDURE — 99214 OFFICE O/P EST MOD 30 MIN: CPT | Performed by: INTERNAL MEDICINE

## 2024-02-14 PROCEDURE — 1090F PRES/ABSN URINE INCON ASSESS: CPT | Performed by: INTERNAL MEDICINE

## 2024-02-14 RX ADMIN — DENOSUMAB 120 MG: 120 INJECTION SUBCUTANEOUS at 09:09

## 2024-02-14 NOTE — PROGRESS NOTES
[facility] CARE [facility] MEDICAL ONCOLOGY  [address] [address]  Dept: [phone]  Loc: [phone]  Attending Progress Note      Reason for Visit:   Metastatic breast cancer.    PCP:  Tawny Ortiz DO    History of Present Illness:      The patient is a 73 y.o. lady, with a past medical history significant for hydrocephalus, who had presented with left breast changes, she had noticed a dark spot on her left breast, then she had developed induration, she thinks that she had the breast changes for months.  The patient had never had a mammogram done, had a CT scan of the chest done, revealing a solid left breast mass measuring at least 6.8 x 2.9 cm in size is identified. The mass appears to extend to the skin surface. The deep margin of the mass abuts the chest wall but a fat plane appears to be preserved. Pathologically enlarged left  axillary lymph nodes are seen measuring up to 4.5 x 1.9 cm in size.     CT abdomen and pelvis was done on 8/30/2019 revealing partial visualization of soft tissue mass in the left breast, nonspecific lymph nodes in the upper abdomen.    Bone scan was done on 8/30/2019, revealing a Prominent focus of radiotracer uptake in the left anterior superior iliac spine, with corresponding abnormality on recent CT, suggestive of metastasis. More subtle focal radiotracer uptake in the right anterior superior iliac spine could be due to metastasis or  degenerative change.   2. Focal radiotracer uptake in the lateral aspect of the right ninth  rib, which appears to be due to a nondisplaced healing fracture.  Correlation with history of recent trauma is recommended. In light of  other findings, this could represent a pathologic fracture with an  underlying metastatic lesion.  3. Osteoarthritic uptake in the shoulders, knees, wrists, and hands.    She underwent on 8/30/2019 a biopsy of 1 of the left axillary lymph nodes, she was

## 2024-02-14 NOTE — TELEPHONE ENCOUNTER
Spoke with Kassidy ( Xander) and Sveta()regarding Ibrance samples for this patient. Awaiting a  to pick it up from our office [facility] to take to Kassidy at [facility]. Ibrance 75 mg Lot #[id number] EXP 7/2024

## 2024-02-28 ENCOUNTER — OUTSIDE SERVICES (OUTPATIENT)
Dept: PRIMARY CARE CLINIC | Age: 74
End: 2024-02-28

## 2024-02-28 VITALS
OXYGEN SATURATION: 97 % | TEMPERATURE: 97.6 F | DIASTOLIC BLOOD PRESSURE: 50 MMHG | HEART RATE: 73 BPM | SYSTOLIC BLOOD PRESSURE: 129 MMHG | RESPIRATION RATE: 16 BRPM

## 2024-02-28 DIAGNOSIS — C79.51 CARCINOMA OF RIGHT BREAST METASTATIC TO BONE (HCC): ICD-10-CM

## 2024-02-28 DIAGNOSIS — E88.09 HYPOALBUMINEMIA DUE TO PROTEIN-CALORIE MALNUTRITION (HCC): ICD-10-CM

## 2024-02-28 DIAGNOSIS — D61.818 PANCYTOPENIA (HCC): ICD-10-CM

## 2024-02-28 DIAGNOSIS — B35.1 TOENAIL FUNGUS: ICD-10-CM

## 2024-02-28 DIAGNOSIS — E46 HYPOALBUMINEMIA DUE TO PROTEIN-CALORIE MALNUTRITION (HCC): ICD-10-CM

## 2024-02-28 DIAGNOSIS — R26.9 GAIT ABNORMALITY: ICD-10-CM

## 2024-02-28 DIAGNOSIS — L03.019: ICD-10-CM

## 2024-02-28 DIAGNOSIS — G91.9 HYDROCEPHALUS, ADULT (HCC): ICD-10-CM

## 2024-02-28 DIAGNOSIS — F01.C11 SEVERE VASCULAR DEMENTIA WITH AGITATION (HCC): ICD-10-CM

## 2024-02-28 DIAGNOSIS — R26.89 BALANCE PROBLEM: ICD-10-CM

## 2024-02-28 DIAGNOSIS — F03.918 DEMENTIA WITH AGGRESSIVE BEHAVIOR (HCC): ICD-10-CM

## 2024-02-28 DIAGNOSIS — D72.819 LEUKOPENIA, UNSPECIFIED TYPE: ICD-10-CM

## 2024-02-28 DIAGNOSIS — C50.911 CARCINOMA OF RIGHT BREAST METASTATIC TO BONE (HCC): ICD-10-CM

## 2024-02-28 DIAGNOSIS — R48.2 GAIT APRAXIA: ICD-10-CM

## 2024-02-28 DIAGNOSIS — J02.9 SORE THROAT: Primary | ICD-10-CM

## 2024-02-28 DIAGNOSIS — D64.9 ANEMIA, UNSPECIFIED TYPE: ICD-10-CM

## 2024-02-28 DIAGNOSIS — L03.039: ICD-10-CM

## 2024-02-28 DIAGNOSIS — C50.612 MALIGNANT NEOPLASM OF AXILLARY TAIL OF LEFT FEMALE BREAST, UNSPECIFIED ESTROGEN RECEPTOR STATUS (HCC): ICD-10-CM

## 2024-02-28 DIAGNOSIS — L10.0: ICD-10-CM

## 2024-02-28 NOTE — PROGRESS NOTES
Visit Date: 2024  Cary Santacruz (:  1950) is a 73 y.o. female.    Subjective   SUBJECTIVE/OBJECTIVE:  Patient is complaining of sore throat denies any fever or chills      Location:  All nursing and progress notes reviewed.    Past Medical History:   Diagnosis Date    Arm fracture, left 2009    Balance problems since     Hydrocephalus (HCC)     compensated    Thyroid disease     Vertigo        No Known Allergies   Current Outpatient Medications   Medication Sig Dispense Refill    oxyCODONE-acetaminophen (PERCOCET) 5-325 MG per tablet Take 1 tablet by mouth every 6 hours as needed for Pain.      divalproex (DEPAKOTE SPRINKLE) 125 MG DR capsule Take 1 capsule by mouth in the morning and 1 capsule at noon and 1 capsule in the evening.      Polyethylene Glycol 1450 POWD Take 17 g by mouth daily as needed      metoprolol tartrate (LOPRESSOR) 25 MG tablet Take 1 tablet by mouth 2 times daily Hold if SBP<100 or AP<50 180 tablet 1    acetaminophen (TYLENOL) 325 MG tablet Take 2 tablets by mouth every 6 hours as needed for Pain or Fever      bisacodyl (DULCOLAX) 10 MG suppository Place 1 suppository rectally daily as needed for Constipation If no results from MOM, may administer 1 suppository      SODIUM PHOSPHATES RE Place 1 Bottle rectally daily as needed Fleet enema every 24 hrs      magnesium hydroxide (MILK OF MAGNESIA) 400 MG/5ML suspension Take 30 mLs by mouth daily as needed for Constipation      pantoprazole (PROTONIX) 40 MG tablet Take 1 tablet by mouth every morning (before breakfast) 30 tablet 0    melatonin 5 MG TBDP disintegrating tablet Take 1 tablet by mouth nightly      aspirin 325 MG EC tablet Take 1 tablet by mouth 2 times daily 60 tablet 0    levothyroxine (SYNTHROID) 25 MCG tablet Take 1 tablet by mouth Daily      palbociclib (IBRANCE) 75 MG capsule Take 75 mg by mouth See Admin Instructions Given for 21 days then off for 7 days      ergocalciferol (ERGOCALCIFEROL) 1.25 MG

## 2024-03-13 ENCOUNTER — HOSPITAL ENCOUNTER (OUTPATIENT)
Dept: INFUSION THERAPY | Age: 74
Discharge: HOME | End: 2024-03-13
Payer: MEDICARE

## 2024-03-13 ENCOUNTER — OFFICE VISIT (OUTPATIENT)
Dept: ONCOLOGY | Age: 74
End: 2024-03-13
Payer: MEDICARE

## 2024-03-13 VITALS
SYSTOLIC BLOOD PRESSURE: 102 MMHG | TEMPERATURE: 98.3 F | DIASTOLIC BLOOD PRESSURE: 68 MMHG | HEART RATE: 74 BPM | HEIGHT: 66 IN | OXYGEN SATURATION: 99 % | BODY MASS INDEX: 26.76 KG/M2

## 2024-03-13 DIAGNOSIS — C79.51 CARCINOMA OF BREAST METASTATIC TO BONE, UNSPECIFIED LATERALITY (HCC): Primary | ICD-10-CM

## 2024-03-13 DIAGNOSIS — C79.51 CARCINOMA OF BREAST METASTATIC TO BONE, UNSPECIFIED LATERALITY (HCC): ICD-10-CM

## 2024-03-13 DIAGNOSIS — C50.919 CARCINOMA OF BREAST METASTATIC TO BONE, UNSPECIFIED LATERALITY (HCC): Primary | ICD-10-CM

## 2024-03-13 DIAGNOSIS — D72.818 OTHER DECREASED WHITE BLOOD CELL (WBC) COUNT: ICD-10-CM

## 2024-03-13 DIAGNOSIS — C50.612 MALIGNANT NEOPLASM OF AXILLARY TAIL OF LEFT FEMALE BREAST, UNSPECIFIED ESTROGEN RECEPTOR STATUS (HCC): Primary | ICD-10-CM

## 2024-03-13 DIAGNOSIS — C50.919 CARCINOMA OF BREAST METASTATIC TO BONE, UNSPECIFIED LATERALITY (HCC): ICD-10-CM

## 2024-03-13 PROCEDURE — G8417 CALC BMI ABV UP PARAM F/U: HCPCS | Performed by: INTERNAL MEDICINE

## 2024-03-13 PROCEDURE — 96372 THER/PROPH/DIAG INJ SC/IM: CPT

## 2024-03-13 PROCEDURE — 3017F COLORECTAL CA SCREEN DOC REV: CPT | Performed by: INTERNAL MEDICINE

## 2024-03-13 PROCEDURE — 1123F ACP DISCUSS/DSCN MKR DOCD: CPT | Performed by: INTERNAL MEDICINE

## 2024-03-13 PROCEDURE — G8400 PT W/DXA NO RESULTS DOC: HCPCS | Performed by: INTERNAL MEDICINE

## 2024-03-13 PROCEDURE — 1090F PRES/ABSN URINE INCON ASSESS: CPT | Performed by: INTERNAL MEDICINE

## 2024-03-13 PROCEDURE — 1036F TOBACCO NON-USER: CPT | Performed by: INTERNAL MEDICINE

## 2024-03-13 PROCEDURE — 99212 OFFICE O/P EST SF 10 MIN: CPT

## 2024-03-13 PROCEDURE — G8484 FLU IMMUNIZE NO ADMIN: HCPCS | Performed by: INTERNAL MEDICINE

## 2024-03-13 PROCEDURE — 6360000002 HC RX W HCPCS: Performed by: INTERNAL MEDICINE

## 2024-03-13 PROCEDURE — G8427 DOCREV CUR MEDS BY ELIG CLIN: HCPCS | Performed by: INTERNAL MEDICINE

## 2024-03-13 PROCEDURE — 99214 OFFICE O/P EST MOD 30 MIN: CPT | Performed by: INTERNAL MEDICINE

## 2024-03-13 RX ADMIN — DENOSUMAB 120 MG: 120 INJECTION SUBCUTANEOUS at 09:19

## 2024-03-13 NOTE — PROGRESS NOTES
[facility] CARE [facility] MEDICAL ONCOLOGY  [address] [address]  Dept: [phone]  Loc: [phone]  Attending Progress Note      Reason for Visit:   Metastatic breast cancer.    PCP:  Tawny Ortiz DO    History of Present Illness:      The patient is a 73 y.o. lady, with a past medical history significant for hydrocephalus, who had presented with left breast changes, she had noticed a dark spot on her left breast, then she had developed induration, she thinks that she had the breast changes for months.  The patient had never had a mammogram done, had a CT scan of the chest done, revealing a solid left breast mass measuring at least 6.8 x 2.9 cm in size is identified. The mass appears to extend to the skin surface. The deep margin of the mass abuts the chest wall but a fat plane appears to be preserved. Pathologically enlarged left  axillary lymph nodes are seen measuring up to 4.5 x 1.9 cm in size.     CT abdomen and pelvis was done on 8/30/2019 revealing partial visualization of soft tissue mass in the left breast, nonspecific lymph nodes in the upper abdomen.    Bone scan was done on 8/30/2019, revealing a Prominent focus of radiotracer uptake in the left anterior superior iliac spine, with corresponding abnormality on recent CT, suggestive of metastasis. More subtle focal radiotracer uptake in the right anterior superior iliac spine could be due to metastasis or  degenerative change.   2. Focal radiotracer uptake in the lateral aspect of the right ninth  rib, which appears to be due to a nondisplaced healing fracture.  Correlation with history of recent trauma is recommended. In light of  other findings, this could represent a pathologic fracture with an  underlying metastatic lesion.  3. Osteoarthritic uptake in the shoulders, knees, wrists, and hands.    She underwent on 8/30/2019 a biopsy of 1 of the left axillary lymph nodes, she was

## 2024-03-14 ASSESSMENT — ENCOUNTER SYMPTOMS: SORE THROAT: 1

## 2024-04-10 ENCOUNTER — OFFICE VISIT (OUTPATIENT)
Dept: ONCOLOGY | Age: 74
End: 2024-04-10
Payer: MEDICARE

## 2024-04-10 ENCOUNTER — HOSPITAL ENCOUNTER (OUTPATIENT)
Dept: INFUSION THERAPY | Age: 74
Discharge: HOME | End: 2024-04-10
Payer: MEDICARE

## 2024-04-10 VITALS
HEART RATE: 65 BPM | OXYGEN SATURATION: 99 % | SYSTOLIC BLOOD PRESSURE: 109 MMHG | BODY MASS INDEX: 26.76 KG/M2 | HEIGHT: 66 IN | DIASTOLIC BLOOD PRESSURE: 58 MMHG | TEMPERATURE: 98.7 F

## 2024-04-10 DIAGNOSIS — C79.51 CARCINOMA OF BREAST METASTATIC TO BONE, UNSPECIFIED LATERALITY (HCC): ICD-10-CM

## 2024-04-10 DIAGNOSIS — C50.612 MALIGNANT NEOPLASM OF AXILLARY TAIL OF LEFT FEMALE BREAST, UNSPECIFIED ESTROGEN RECEPTOR STATUS (HCC): Primary | ICD-10-CM

## 2024-04-10 DIAGNOSIS — D72.818 OTHER DECREASED WHITE BLOOD CELL (WBC) COUNT: ICD-10-CM

## 2024-04-10 DIAGNOSIS — C79.51 CARCINOMA OF BREAST METASTATIC TO BONE, UNSPECIFIED LATERALITY (HCC): Primary | ICD-10-CM

## 2024-04-10 DIAGNOSIS — C50.919 CARCINOMA OF BREAST METASTATIC TO BONE, UNSPECIFIED LATERALITY (HCC): Primary | ICD-10-CM

## 2024-04-10 DIAGNOSIS — C50.919 CARCINOMA OF BREAST METASTATIC TO BONE, UNSPECIFIED LATERALITY (HCC): ICD-10-CM

## 2024-04-10 DIAGNOSIS — Z51.81 THERAPEUTIC DRUG MONITORING: ICD-10-CM

## 2024-04-10 PROCEDURE — 6360000002 HC RX W HCPCS: Performed by: INTERNAL MEDICINE

## 2024-04-10 PROCEDURE — 96372 THER/PROPH/DIAG INJ SC/IM: CPT

## 2024-04-10 PROCEDURE — 1036F TOBACCO NON-USER: CPT | Performed by: INTERNAL MEDICINE

## 2024-04-10 PROCEDURE — 99213 OFFICE O/P EST LOW 20 MIN: CPT

## 2024-04-10 PROCEDURE — 1090F PRES/ABSN URINE INCON ASSESS: CPT | Performed by: INTERNAL MEDICINE

## 2024-04-10 PROCEDURE — G8417 CALC BMI ABV UP PARAM F/U: HCPCS | Performed by: INTERNAL MEDICINE

## 2024-04-10 PROCEDURE — 99214 OFFICE O/P EST MOD 30 MIN: CPT | Performed by: INTERNAL MEDICINE

## 2024-04-10 PROCEDURE — G8400 PT W/DXA NO RESULTS DOC: HCPCS | Performed by: INTERNAL MEDICINE

## 2024-04-10 PROCEDURE — 1123F ACP DISCUSS/DSCN MKR DOCD: CPT | Performed by: INTERNAL MEDICINE

## 2024-04-10 PROCEDURE — G8427 DOCREV CUR MEDS BY ELIG CLIN: HCPCS | Performed by: INTERNAL MEDICINE

## 2024-04-10 PROCEDURE — 3017F COLORECTAL CA SCREEN DOC REV: CPT | Performed by: INTERNAL MEDICINE

## 2024-04-10 RX ADMIN — DENOSUMAB 120 MG: 120 INJECTION SUBCUTANEOUS at 09:32

## 2024-04-24 ENCOUNTER — OUTSIDE SERVICES (OUTPATIENT)
Dept: PRIMARY CARE CLINIC | Age: 74
End: 2024-04-24

## 2024-04-24 DIAGNOSIS — G91.9 HYDROCEPHALUS, ADULT (HCC): ICD-10-CM

## 2024-04-24 DIAGNOSIS — E87.1 HYPONATREMIA: ICD-10-CM

## 2024-04-24 DIAGNOSIS — F03.918 DEMENTIA WITH AGGRESSIVE BEHAVIOR (HCC): ICD-10-CM

## 2024-04-24 DIAGNOSIS — R26.9 GAIT ABNORMALITY: ICD-10-CM

## 2024-04-24 DIAGNOSIS — F32.A DEPRESSION, UNSPECIFIED DEPRESSION TYPE: ICD-10-CM

## 2024-04-24 DIAGNOSIS — C79.51 CARCINOMA OF RIGHT BREAST METASTATIC TO BONE (HCC): Primary | ICD-10-CM

## 2024-04-24 DIAGNOSIS — E44.0 MODERATE PROTEIN-CALORIE MALNUTRITION (HCC): ICD-10-CM

## 2024-04-24 DIAGNOSIS — C50.911 CARCINOMA OF RIGHT BREAST METASTATIC TO BONE (HCC): Primary | ICD-10-CM

## 2024-04-24 DIAGNOSIS — E88.09 HYPOALBUMINEMIA: ICD-10-CM

## 2024-04-26 ASSESSMENT — ENCOUNTER SYMPTOMS
DIARRHEA: 0
ABDOMINAL PAIN: 0
EYE PAIN: 0
VOMITING: 0
NAUSEA: 0
COUGH: 0
CHEST TIGHTNESS: 0

## 2024-04-26 NOTE — PROGRESS NOTES
Visit Date: 2024  Cary Santacruz (:  1950) is a 74 y.o. female.    Subjective   SUBJECTIVE/OBJECTIVE:  Patient is seen for follow-up of her dementia. She is angry because she wants to go home but has no family to help at home and she is unable to care for herself at this time.       Location:  All nursing and progress notes reviewed.    Past Medical History:   Diagnosis Date    Arm fracture, left 2009    Balance problems since     Hydrocephalus (HCC)     compensated    Thyroid disease     Vertigo        No Known Allergies   Current Outpatient Medications   Medication Sig Dispense Refill    oxyCODONE-acetaminophen (PERCOCET) 5-325 MG per tablet Take 1 tablet by mouth every 6 hours as needed for Pain.      divalproex (DEPAKOTE SPRINKLE) 125 MG DR capsule Take 1 capsule by mouth in the morning and 1 capsule at noon and 1 capsule in the evening.      Polyethylene Glycol 1450 POWD Take 17 g by mouth daily as needed      metoprolol tartrate (LOPRESSOR) 25 MG tablet Take 1 tablet by mouth 2 times daily Hold if SBP<100 or AP<50 180 tablet 1    acetaminophen (TYLENOL) 325 MG tablet Take 2 tablets by mouth every 6 hours as needed for Pain or Fever      bisacodyl (DULCOLAX) 10 MG suppository Place 1 suppository rectally daily as needed for Constipation If no results from MOM, may administer 1 suppository      SODIUM PHOSPHATES RE Place 1 Bottle rectally daily as needed Fleet enema every 24 hrs      magnesium hydroxide (MILK OF MAGNESIA) 400 MG/5ML suspension Take 30 mLs by mouth daily as needed for Constipation      pantoprazole (PROTONIX) 40 MG tablet Take 1 tablet by mouth every morning (before breakfast) 30 tablet 0    melatonin 5 MG TBDP disintegrating tablet Take 1 tablet by mouth nightly      aspirin 325 MG EC tablet Take 1 tablet by mouth 2 times daily 60 tablet 0    levothyroxine (SYNTHROID) 25 MCG tablet Take 1 tablet by mouth Daily      palbociclib (IBRANCE) 75 MG capsule Take 75 mg by mouth

## 2024-04-28 VITALS
SYSTOLIC BLOOD PRESSURE: 134 MMHG | HEART RATE: 77 BPM | OXYGEN SATURATION: 97 % | RESPIRATION RATE: 16 BRPM | TEMPERATURE: 96.8 F | DIASTOLIC BLOOD PRESSURE: 62 MMHG | WEIGHT: 159.4 LBS | BODY MASS INDEX: 25.73 KG/M2

## 2024-05-06 ENCOUNTER — HOSPITAL ENCOUNTER (OUTPATIENT)
Dept: NUCLEAR MEDICINE | Age: 74
Discharge: HOME | End: 2024-05-06
Attending: INTERNAL MEDICINE
Payer: MEDICARE

## 2024-05-06 ENCOUNTER — HOSPITAL ENCOUNTER (OUTPATIENT)
Dept: CT IMAGING | Age: 74
Discharge: HOME | End: 2024-05-08
Attending: INTERNAL MEDICINE
Payer: MEDICARE

## 2024-05-06 DIAGNOSIS — C79.51 CARCINOMA OF BREAST METASTATIC TO BONE, UNSPECIFIED LATERALITY (HCC): ICD-10-CM

## 2024-05-06 DIAGNOSIS — C50.919 CARCINOMA OF BREAST METASTATIC TO BONE, UNSPECIFIED LATERALITY (HCC): ICD-10-CM

## 2024-05-06 PROCEDURE — 74176 CT ABD & PELVIS W/O CONTRAST: CPT

## 2024-05-06 PROCEDURE — 78306 BONE IMAGING WHOLE BODY: CPT | Performed by: INTERNAL MEDICINE

## 2024-05-06 PROCEDURE — 3430000000 HC RX DIAGNOSTIC RADIOPHARMACEUTICAL: Performed by: RADIOLOGY

## 2024-05-06 PROCEDURE — A9503 TC99M MEDRONATE: HCPCS | Performed by: RADIOLOGY

## 2024-05-06 PROCEDURE — 71250 CT THORAX DX C-: CPT

## 2024-05-06 RX ORDER — TC 99M MEDRONATE 20 MG/10ML
25 INJECTION, POWDER, LYOPHILIZED, FOR SOLUTION INTRAVENOUS
Status: COMPLETED | OUTPATIENT
Start: 2024-05-06 | End: 2024-05-06

## 2024-05-06 RX ADMIN — TC 99M MEDRONATE 25 MILLICURIE: 20 INJECTION, POWDER, LYOPHILIZED, FOR SOLUTION INTRAVENOUS at 11:18

## 2024-05-08 ENCOUNTER — OFFICE VISIT (OUTPATIENT)
Dept: ONCOLOGY | Age: 74
End: 2024-05-08
Payer: MEDICARE

## 2024-05-08 ENCOUNTER — HOSPITAL ENCOUNTER (OUTPATIENT)
Dept: INFUSION THERAPY | Age: 74
Discharge: HOME | End: 2024-05-08
Payer: MEDICARE

## 2024-05-08 VITALS
SYSTOLIC BLOOD PRESSURE: 109 MMHG | BODY MASS INDEX: 26.86 KG/M2 | TEMPERATURE: 98.1 F | WEIGHT: 167.11 LBS | OXYGEN SATURATION: 100 % | DIASTOLIC BLOOD PRESSURE: 55 MMHG | HEART RATE: 62 BPM | HEIGHT: 66 IN

## 2024-05-08 DIAGNOSIS — C50.919 CARCINOMA OF BREAST METASTATIC TO BONE, UNSPECIFIED LATERALITY (HCC): Primary | ICD-10-CM

## 2024-05-08 DIAGNOSIS — C50.919 CARCINOMA OF BREAST METASTATIC TO BONE, UNSPECIFIED LATERALITY (HCC): ICD-10-CM

## 2024-05-08 DIAGNOSIS — C50.612 MALIGNANT NEOPLASM OF AXILLARY TAIL OF LEFT FEMALE BREAST, UNSPECIFIED ESTROGEN RECEPTOR STATUS (HCC): Primary | ICD-10-CM

## 2024-05-08 DIAGNOSIS — C79.51 CARCINOMA OF BREAST METASTATIC TO BONE, UNSPECIFIED LATERALITY (HCC): Primary | ICD-10-CM

## 2024-05-08 DIAGNOSIS — D72.818 OTHER DECREASED WHITE BLOOD CELL (WBC) COUNT: ICD-10-CM

## 2024-05-08 DIAGNOSIS — C79.51 CARCINOMA OF BREAST METASTATIC TO BONE, UNSPECIFIED LATERALITY (HCC): ICD-10-CM

## 2024-05-08 DIAGNOSIS — Z51.81 THERAPEUTIC DRUG MONITORING: ICD-10-CM

## 2024-05-08 PROCEDURE — G8417 CALC BMI ABV UP PARAM F/U: HCPCS | Performed by: INTERNAL MEDICINE

## 2024-05-08 PROCEDURE — 96372 THER/PROPH/DIAG INJ SC/IM: CPT

## 2024-05-08 PROCEDURE — 1036F TOBACCO NON-USER: CPT | Performed by: INTERNAL MEDICINE

## 2024-05-08 PROCEDURE — G8400 PT W/DXA NO RESULTS DOC: HCPCS | Performed by: INTERNAL MEDICINE

## 2024-05-08 PROCEDURE — G8427 DOCREV CUR MEDS BY ELIG CLIN: HCPCS | Performed by: INTERNAL MEDICINE

## 2024-05-08 PROCEDURE — 99214 OFFICE O/P EST MOD 30 MIN: CPT | Performed by: INTERNAL MEDICINE

## 2024-05-08 PROCEDURE — 6360000002 HC RX W HCPCS: Performed by: INTERNAL MEDICINE

## 2024-05-08 PROCEDURE — 1090F PRES/ABSN URINE INCON ASSESS: CPT | Performed by: INTERNAL MEDICINE

## 2024-05-08 PROCEDURE — 99212 OFFICE O/P EST SF 10 MIN: CPT

## 2024-05-08 PROCEDURE — 1123F ACP DISCUSS/DSCN MKR DOCD: CPT | Performed by: INTERNAL MEDICINE

## 2024-05-08 RX ADMIN — DENOSUMAB 120 MG: 120 INJECTION SUBCUTANEOUS at 09:13

## 2024-05-08 NOTE — PROGRESS NOTES
[facility] CARE [facility] MEDICAL ONCOLOGY  [address] [address]  Dept: [phone]  Loc: [phone]  Attending Progress Note      Reason for Visit:   Metastatic breast cancer.    PCP:  Tawny Ortiz DO    History of Present Illness:      The patient is a 74 y.o. lady, with a past medical history significant for hydrocephalus, who had presented with left breast changes, she had noticed a dark spot on her left breast, then she had developed induration, she thinks that she had the breast changes for months.  The patient had never had a mammogram done, had a CT scan of the chest done, revealing a solid left breast mass measuring at least 6.8 x 2.9 cm in size is identified. The mass appears to extend to the skin surface. The deep margin of the mass abuts the chest wall but a fat plane appears to be preserved. Pathologically enlarged left  axillary lymph nodes are seen measuring up to 4.5 x 1.9 cm in size.     CT abdomen and pelvis was done on 8/30/2019 revealing partial visualization of soft tissue mass in the left breast, nonspecific lymph nodes in the upper abdomen.    Bone scan was done on 8/30/2019, revealing a Prominent focus of radiotracer uptake in the left anterior superior iliac spine, with corresponding abnormality on recent CT, suggestive of metastasis. More subtle focal radiotracer uptake in the right anterior superior iliac spine could be due to metastasis or  degenerative change.   2. Focal radiotracer uptake in the lateral aspect of the right ninth  rib, which appears to be due to a nondisplaced healing fracture.  Correlation with history of recent trauma is recommended. In light of  other findings, this could represent a pathologic fracture with an  underlying metastatic lesion.  3. Osteoarthritic uptake in the shoulders, knees, wrists, and hands.    She underwent on 8/30/2019 a biopsy of 1 of the left axillary lymph nodes, she was

## 2024-06-05 ENCOUNTER — HOSPITAL ENCOUNTER (OUTPATIENT)
Dept: INFUSION THERAPY | Age: 74
Discharge: HOME | End: 2024-06-05
Payer: MEDICARE

## 2024-06-05 ENCOUNTER — OFFICE VISIT (OUTPATIENT)
Dept: ONCOLOGY | Age: 74
End: 2024-06-05
Payer: MEDICARE

## 2024-06-05 VITALS
HEIGHT: 66 IN | BODY MASS INDEX: 26.97 KG/M2 | SYSTOLIC BLOOD PRESSURE: 109 MMHG | HEART RATE: 66 BPM | DIASTOLIC BLOOD PRESSURE: 64 MMHG | TEMPERATURE: 98.2 F | OXYGEN SATURATION: 100 %

## 2024-06-05 DIAGNOSIS — C50.919 CARCINOMA OF BREAST METASTATIC TO BONE, UNSPECIFIED LATERALITY (HCC): ICD-10-CM

## 2024-06-05 DIAGNOSIS — D72.818 OTHER DECREASED WHITE BLOOD CELL (WBC) COUNT: ICD-10-CM

## 2024-06-05 DIAGNOSIS — C79.51 CARCINOMA OF BREAST METASTATIC TO BONE, UNSPECIFIED LATERALITY (HCC): ICD-10-CM

## 2024-06-05 DIAGNOSIS — C50.612 MALIGNANT NEOPLASM OF AXILLARY TAIL OF LEFT FEMALE BREAST, UNSPECIFIED ESTROGEN RECEPTOR STATUS (HCC): Primary | ICD-10-CM

## 2024-06-05 DIAGNOSIS — C50.919 CARCINOMA OF BREAST METASTATIC TO BONE, UNSPECIFIED LATERALITY (HCC): Primary | ICD-10-CM

## 2024-06-05 DIAGNOSIS — C79.51 CARCINOMA OF BREAST METASTATIC TO BONE, UNSPECIFIED LATERALITY (HCC): Primary | ICD-10-CM

## 2024-06-05 DIAGNOSIS — Z51.81 THERAPEUTIC DRUG MONITORING: ICD-10-CM

## 2024-06-05 PROCEDURE — G8400 PT W/DXA NO RESULTS DOC: HCPCS | Performed by: INTERNAL MEDICINE

## 2024-06-05 PROCEDURE — 96372 THER/PROPH/DIAG INJ SC/IM: CPT

## 2024-06-05 PROCEDURE — G8417 CALC BMI ABV UP PARAM F/U: HCPCS | Performed by: INTERNAL MEDICINE

## 2024-06-05 PROCEDURE — 99212 OFFICE O/P EST SF 10 MIN: CPT

## 2024-06-05 PROCEDURE — 99214 OFFICE O/P EST MOD 30 MIN: CPT | Performed by: INTERNAL MEDICINE

## 2024-06-05 PROCEDURE — G8427 DOCREV CUR MEDS BY ELIG CLIN: HCPCS | Performed by: INTERNAL MEDICINE

## 2024-06-05 PROCEDURE — 1123F ACP DISCUSS/DSCN MKR DOCD: CPT | Performed by: INTERNAL MEDICINE

## 2024-06-05 PROCEDURE — 1036F TOBACCO NON-USER: CPT | Performed by: INTERNAL MEDICINE

## 2024-06-05 PROCEDURE — 1090F PRES/ABSN URINE INCON ASSESS: CPT | Performed by: INTERNAL MEDICINE

## 2024-06-05 PROCEDURE — 6360000002 HC RX W HCPCS: Performed by: INTERNAL MEDICINE

## 2024-06-05 RX ADMIN — DENOSUMAB 120 MG: 120 INJECTION SUBCUTANEOUS at 09:18

## 2024-06-05 NOTE — PROGRESS NOTES
Patient provided with discharge instructions.  All questions answered.  Patient understands follow up plan of care.     
noted stable small bilateral pulmonary nodules; CT abdomen/pelvis no evidence of new active neoplasm compared to prior.  Bone scan 3 foci of radiotracer uptake in the lower right ribs, 2 of which demonstrate less activity on the current study, possibly indicative of healing fractures.  Radiotracer in the more anterior focus is more persistent, and may be a metastatic lesion.  The results/images of the scans were reviewed with the patient and her sister, the patient has an overall stable disease.  Recommended to continue with Femara and Ibrance.    The patient had a PET scan done on December 3, 2020, she had complete metabolic response since the PET scan on September 17, 2019, no metabolically active recurrent or metastatic disease, metabolic activity throughout the thyroid lobes can be seen with chronic thyroiditis.  The patient had complete response to treatment, continue Ibrance and Femara.    The dose of the Ibrance has been to 75 mg due to leukopenia/neutropenia.      The patient had restaging CT scans of the chest, abdomen pelvis done on 6/1/2021, bone scan on 6/11/2021, revealing an overall stable disease.  Recommended to continue Ibrance and Femara.    Patient had restaging scans done on 10/26/2021, revealing an overall stable disease, couple of upper lobe pulmonary nodules and rib sclerosis are redemonstrated, stable.  No new areas of concern.  Recommended to continue with Ibrance and Femara.    Restaging CT scans of the chest, abdomen, pelvis and a bone scan were done on 5/2/2022, there was no evidence of progression of disease within the chest with stable left upper lobe pulmonary nodule areas and indeterminate sclerosis in the rib, there was no uptake on the bone scan, recommended to continue with Ibrance and Femara.       Restaging CT scans of the chest, abdomen, pelvis and a bone scan were ordered, she had a CT scan of the abdomen the pelvis and a bone scan done on 2/14/2023, were negative for disease

## 2024-06-12 ENCOUNTER — OUTSIDE SERVICES (OUTPATIENT)
Dept: PRIMARY CARE CLINIC | Age: 74
End: 2024-06-12

## 2024-06-12 DIAGNOSIS — C50.911 CARCINOMA OF RIGHT BREAST METASTATIC TO BONE (HCC): Primary | ICD-10-CM

## 2024-06-12 DIAGNOSIS — E44.0 MODERATE PROTEIN-CALORIE MALNUTRITION (HCC): ICD-10-CM

## 2024-06-12 DIAGNOSIS — C79.51 CARCINOMA OF RIGHT BREAST METASTATIC TO BONE (HCC): Primary | ICD-10-CM

## 2024-06-12 DIAGNOSIS — F32.A DEPRESSION, UNSPECIFIED DEPRESSION TYPE: ICD-10-CM

## 2024-06-12 DIAGNOSIS — G91.9 HYDROCEPHALUS, ADULT (HCC): ICD-10-CM

## 2024-06-12 DIAGNOSIS — R26.9 GAIT ABNORMALITY: ICD-10-CM

## 2024-06-12 DIAGNOSIS — F03.918 DEMENTIA WITH AGGRESSIVE BEHAVIOR (HCC): ICD-10-CM

## 2024-06-12 NOTE — PROGRESS NOTES
Visit Date: 2024  Cary Santacruz (:  1950) is a 74 y.o. female.    Subjective   SUBJECTIVE/OBJECTIVE:  Patient is seen for follow-up of her dementia. She keeps stating \"I want out\".       Location:  All nursing and progress notes reviewed.    Past Medical History:   Diagnosis Date    Arm fracture, left 2009    Balance problems since     Hydrocephalus (HCC)     compensated    Thyroid disease     Vertigo        No Known Allergies   Current Outpatient Medications   Medication Sig Dispense Refill    oxyCODONE-acetaminophen (PERCOCET) 5-325 MG per tablet Take 1 tablet by mouth every 6 hours as needed for Pain.      divalproex (DEPAKOTE SPRINKLE) 125 MG DR capsule Take 1 capsule by mouth in the morning and 1 capsule at noon and 1 capsule in the evening.      Polyethylene Glycol 1450 POWD Take 17 g by mouth daily as needed      metoprolol tartrate (LOPRESSOR) 25 MG tablet Take 1 tablet by mouth 2 times daily Hold if SBP<100 or AP<50 180 tablet 1    acetaminophen (TYLENOL) 325 MG tablet Take 2 tablets by mouth every 6 hours as needed for Pain or Fever      bisacodyl (DULCOLAX) 10 MG suppository Place 1 suppository rectally daily as needed for Constipation If no results from MOM, may administer 1 suppository      SODIUM PHOSPHATES RE Place 1 Bottle rectally daily as needed Fleet enema every 24 hrs      magnesium hydroxide (MILK OF MAGNESIA) 400 MG/5ML suspension Take 30 mLs by mouth daily as needed for Constipation      pantoprazole (PROTONIX) 40 MG tablet Take 1 tablet by mouth every morning (before breakfast) 30 tablet 0    melatonin 5 MG TBDP disintegrating tablet Take 1 tablet by mouth nightly      aspirin 325 MG EC tablet Take 1 tablet by mouth 2 times daily 60 tablet 0    levothyroxine (SYNTHROID) 25 MCG tablet Take 1 tablet by mouth Daily      palbociclib (IBRANCE) 75 MG capsule Take 75 mg by mouth See Admin Instructions Given for 21 days then off for 7 days      ergocalciferol

## 2024-06-13 VITALS
DIASTOLIC BLOOD PRESSURE: 68 MMHG | RESPIRATION RATE: 18 BRPM | OXYGEN SATURATION: 96 % | WEIGHT: 162.8 LBS | HEART RATE: 69 BPM | TEMPERATURE: 97.3 F | BODY MASS INDEX: 26.28 KG/M2 | SYSTOLIC BLOOD PRESSURE: 155 MMHG

## 2024-07-01 DIAGNOSIS — C79.51 CARCINOMA OF BREAST METASTATIC TO BONE, UNSPECIFIED LATERALITY (HCC): Primary | ICD-10-CM

## 2024-07-01 DIAGNOSIS — C50.919 CARCINOMA OF BREAST METASTATIC TO BONE, UNSPECIFIED LATERALITY (HCC): Primary | ICD-10-CM

## 2024-07-03 ENCOUNTER — HOSPITAL ENCOUNTER (OUTPATIENT)
Dept: INFUSION THERAPY | Age: 74
Discharge: HOME | End: 2024-07-03
Payer: MEDICARE

## 2024-07-03 ENCOUNTER — TELEPHONE (OUTPATIENT)
Dept: ONCOLOGY | Age: 74
End: 2024-07-03

## 2024-07-03 ENCOUNTER — OFFICE VISIT (OUTPATIENT)
Dept: ONCOLOGY | Age: 74
End: 2024-07-03
Payer: MEDICARE

## 2024-07-03 VITALS
TEMPERATURE: 98.2 F | OXYGEN SATURATION: 100 % | SYSTOLIC BLOOD PRESSURE: 121 MMHG | HEART RATE: 63 BPM | BODY MASS INDEX: 26.28 KG/M2 | HEIGHT: 66 IN | DIASTOLIC BLOOD PRESSURE: 55 MMHG

## 2024-07-03 DIAGNOSIS — C50.919 CARCINOMA OF BREAST METASTATIC TO BONE, UNSPECIFIED LATERALITY (HCC): Primary | ICD-10-CM

## 2024-07-03 DIAGNOSIS — C79.51 CARCINOMA OF BREAST METASTATIC TO BONE, UNSPECIFIED LATERALITY (HCC): Primary | ICD-10-CM

## 2024-07-03 DIAGNOSIS — Z51.81 THERAPEUTIC DRUG MONITORING: ICD-10-CM

## 2024-07-03 DIAGNOSIS — C50.612 MALIGNANT NEOPLASM OF AXILLARY TAIL OF LEFT FEMALE BREAST, UNSPECIFIED ESTROGEN RECEPTOR STATUS (HCC): Primary | ICD-10-CM

## 2024-07-03 DIAGNOSIS — C79.51 CARCINOMA OF BREAST METASTATIC TO BONE, UNSPECIFIED LATERALITY (HCC): ICD-10-CM

## 2024-07-03 DIAGNOSIS — D72.818 OTHER DECREASED WHITE BLOOD CELL (WBC) COUNT: ICD-10-CM

## 2024-07-03 DIAGNOSIS — C50.919 CARCINOMA OF BREAST METASTATIC TO BONE, UNSPECIFIED LATERALITY (HCC): ICD-10-CM

## 2024-07-03 PROCEDURE — 96372 THER/PROPH/DIAG INJ SC/IM: CPT

## 2024-07-03 PROCEDURE — G8400 PT W/DXA NO RESULTS DOC: HCPCS | Performed by: INTERNAL MEDICINE

## 2024-07-03 PROCEDURE — 99212 OFFICE O/P EST SF 10 MIN: CPT

## 2024-07-03 PROCEDURE — 99214 OFFICE O/P EST MOD 30 MIN: CPT | Performed by: INTERNAL MEDICINE

## 2024-07-03 PROCEDURE — G8417 CALC BMI ABV UP PARAM F/U: HCPCS | Performed by: INTERNAL MEDICINE

## 2024-07-03 PROCEDURE — G8427 DOCREV CUR MEDS BY ELIG CLIN: HCPCS | Performed by: INTERNAL MEDICINE

## 2024-07-03 PROCEDURE — 1123F ACP DISCUSS/DSCN MKR DOCD: CPT | Performed by: INTERNAL MEDICINE

## 2024-07-03 PROCEDURE — 1036F TOBACCO NON-USER: CPT | Performed by: INTERNAL MEDICINE

## 2024-07-03 PROCEDURE — 1090F PRES/ABSN URINE INCON ASSESS: CPT | Performed by: INTERNAL MEDICINE

## 2024-07-03 PROCEDURE — 6360000002 HC RX W HCPCS: Performed by: INTERNAL MEDICINE

## 2024-07-03 RX ADMIN — DENOSUMAB 120 MG: 120 INJECTION SUBCUTANEOUS at 09:04

## 2024-07-03 NOTE — TELEPHONE ENCOUNTER
7/3/24 Per Dr Naun Marino request called [facility] spoke with nurse Sveta to update on change in medication prescription; pt instructed to increase Calcium Carbonate 600 mg to twice daily. Sveta verbalized understanding and expressed concern that pt does refuse medication at times. Nurse was unable to state frequency of missed dose on MAR. This RN notified Dr Naun Marino. No new orders.

## 2024-07-03 NOTE — PROGRESS NOTES
[facility] CARE [facility] MEDICAL ONCOLOGY  [address] [address]  Dept: [phone]  Loc: [phone]  Attending Progress Note      Reason for Visit:   Metastatic breast cancer.    PCP:  Tawny Ortiz DO    History of Present Illness:      The patient is a 74 y.o. lady, with a past medical history significant for hydrocephalus, who had presented with left breast changes, she had noticed a dark spot on her left breast, then she had developed induration, she thinks that she had the breast changes for months.  The patient had never had a mammogram done, had a CT scan of the chest done, revealing a solid left breast mass measuring at least 6.8 x 2.9 cm in size is identified. The mass appears to extend to the skin surface. The deep margin of the mass abuts the chest wall but a fat plane appears to be preserved. Pathologically enlarged left  axillary lymph nodes are seen measuring up to 4.5 x 1.9 cm in size.     CT abdomen and pelvis was done on 8/30/2019 revealing partial visualization of soft tissue mass in the left breast, nonspecific lymph nodes in the upper abdomen.    Bone scan was done on 8/30/2019, revealing a Prominent focus of radiotracer uptake in the left anterior superior iliac spine, with corresponding abnormality on recent CT, suggestive of metastasis. More subtle focal radiotracer uptake in the right anterior superior iliac spine could be due to metastasis or  degenerative change.   2. Focal radiotracer uptake in the lateral aspect of the right ninth  rib, which appears to be due to a nondisplaced healing fracture.  Correlation with history of recent trauma is recommended. In light of  other findings, this could represent a pathologic fracture with an  underlying metastatic lesion.  3. Osteoarthritic uptake in the shoulders, knees, wrists, and hands.    She underwent on 8/30/2019 a biopsy of 1 of the left axillary lymph nodes, she was

## 2024-07-19 ENCOUNTER — OUTSIDE SERVICES (OUTPATIENT)
Dept: PRIMARY CARE CLINIC | Age: 74
End: 2024-07-19
Payer: MEDICARE

## 2024-07-19 DIAGNOSIS — E44.0 MODERATE PROTEIN-CALORIE MALNUTRITION (HCC): ICD-10-CM

## 2024-07-19 DIAGNOSIS — R26.9 GAIT ABNORMALITY: ICD-10-CM

## 2024-07-19 DIAGNOSIS — C50.911 CARCINOMA OF RIGHT BREAST METASTATIC TO BONE (HCC): Primary | ICD-10-CM

## 2024-07-19 DIAGNOSIS — C79.51 CARCINOMA OF RIGHT BREAST METASTATIC TO BONE (HCC): Primary | ICD-10-CM

## 2024-07-19 DIAGNOSIS — F32.A DEPRESSION, UNSPECIFIED DEPRESSION TYPE: ICD-10-CM

## 2024-07-19 DIAGNOSIS — G91.9 HYDROCEPHALUS, ADULT (HCC): ICD-10-CM

## 2024-07-19 DIAGNOSIS — F03.918 DEMENTIA WITH AGGRESSIVE BEHAVIOR (HCC): ICD-10-CM

## 2024-07-19 PROCEDURE — 99309 SBSQ NF CARE MODERATE MDM 30: CPT | Performed by: INTERNAL MEDICINE

## 2024-07-19 NOTE — PROGRESS NOTES
Visit Date: 2024  Cary Santacruz (:  1950) is a 74 y.o. female.    Subjective   SUBJECTIVE/OBJECTIVE:  Patient is seen for follow-up of her dementia.     Patient has history of hydrocephalus and history of left breast CA she is currently under treatment following with Dr. Saadia Marino reviewed reports July 3, 2024 from Dr. Naun Marino notes  Location:  All nursing and progress notes reviewed.    Past Medical History:   Diagnosis Date    Arm fracture, left 2009    Balance problems since     Hydrocephalus (HCC)     compensated    Thyroid disease     Vertigo        No Known Allergies   Current Outpatient Medications   Medication Sig Dispense Refill    oxyCODONE-acetaminophen (PERCOCET) 5-325 MG per tablet Take 1 tablet by mouth every 6 hours as needed for Pain.      divalproex (DEPAKOTE SPRINKLE) 125 MG DR capsule Take 1 capsule by mouth in the morning and 1 capsule at noon and 1 capsule in the evening.      Polyethylene Glycol 1450 POWD Take 17 g by mouth daily as needed      metoprolol tartrate (LOPRESSOR) 25 MG tablet Take 1 tablet by mouth 2 times daily Hold if SBP<100 or AP<50 180 tablet 1    acetaminophen (TYLENOL) 325 MG tablet Take 2 tablets by mouth every 6 hours as needed for Pain or Fever      bisacodyl (DULCOLAX) 10 MG suppository Place 1 suppository rectally daily as needed for Constipation If no results from MOM, may administer 1 suppository      SODIUM PHOSPHATES RE Place 1 Bottle rectally daily as needed Fleet enema every 24 hrs      magnesium hydroxide (MILK OF MAGNESIA) 400 MG/5ML suspension Take 30 mLs by mouth daily as needed for Constipation      pantoprazole (PROTONIX) 40 MG tablet Take 1 tablet by mouth every morning (before breakfast) 30 tablet 0    melatonin 5 MG TBDP disintegrating tablet Take 1 tablet by mouth nightly      aspirin 325 MG EC tablet Take 1 tablet by mouth 2 times daily 60 tablet 0    levothyroxine (SYNTHROID) 25 MCG tablet Take 1 tablet by mouth Daily

## 2024-07-22 VITALS
WEIGHT: 165.8 LBS | RESPIRATION RATE: 16 BRPM | SYSTOLIC BLOOD PRESSURE: 108 MMHG | DIASTOLIC BLOOD PRESSURE: 91 MMHG | BODY MASS INDEX: 26.76 KG/M2 | OXYGEN SATURATION: 98 % | TEMPERATURE: 98.1 F | HEART RATE: 74 BPM

## 2024-07-31 ENCOUNTER — OFFICE VISIT (OUTPATIENT)
Dept: ONCOLOGY | Age: 74
End: 2024-07-31
Payer: MEDICARE

## 2024-07-31 ENCOUNTER — HOSPITAL ENCOUNTER (OUTPATIENT)
Dept: INFUSION THERAPY | Age: 74
Discharge: HOME | End: 2024-07-31
Payer: MEDICARE

## 2024-07-31 VITALS
TEMPERATURE: 97.9 F | HEIGHT: 66 IN | OXYGEN SATURATION: 99 % | SYSTOLIC BLOOD PRESSURE: 111 MMHG | HEART RATE: 78 BPM | BODY MASS INDEX: 26.76 KG/M2 | DIASTOLIC BLOOD PRESSURE: 61 MMHG

## 2024-07-31 DIAGNOSIS — D72.818 OTHER DECREASED WHITE BLOOD CELL (WBC) COUNT: ICD-10-CM

## 2024-07-31 DIAGNOSIS — C50.612 MALIGNANT NEOPLASM OF AXILLARY TAIL OF LEFT FEMALE BREAST, UNSPECIFIED ESTROGEN RECEPTOR STATUS (HCC): Primary | ICD-10-CM

## 2024-07-31 DIAGNOSIS — C79.51 CARCINOMA OF BREAST METASTATIC TO BONE, UNSPECIFIED LATERALITY (HCC): Primary | ICD-10-CM

## 2024-07-31 DIAGNOSIS — C50.919 CARCINOMA OF BREAST METASTATIC TO BONE, UNSPECIFIED LATERALITY (HCC): Primary | ICD-10-CM

## 2024-07-31 DIAGNOSIS — C79.51 CARCINOMA OF BREAST METASTATIC TO BONE, UNSPECIFIED LATERALITY (HCC): ICD-10-CM

## 2024-07-31 DIAGNOSIS — Z51.81 THERAPEUTIC DRUG MONITORING: ICD-10-CM

## 2024-07-31 DIAGNOSIS — C50.919 CARCINOMA OF BREAST METASTATIC TO BONE, UNSPECIFIED LATERALITY (HCC): ICD-10-CM

## 2024-07-31 PROCEDURE — 99212 OFFICE O/P EST SF 10 MIN: CPT

## 2024-07-31 PROCEDURE — 1036F TOBACCO NON-USER: CPT | Performed by: INTERNAL MEDICINE

## 2024-07-31 PROCEDURE — 99214 OFFICE O/P EST MOD 30 MIN: CPT | Performed by: INTERNAL MEDICINE

## 2024-07-31 PROCEDURE — G8400 PT W/DXA NO RESULTS DOC: HCPCS | Performed by: INTERNAL MEDICINE

## 2024-07-31 PROCEDURE — 6360000002 HC RX W HCPCS: Performed by: INTERNAL MEDICINE

## 2024-07-31 PROCEDURE — G8417 CALC BMI ABV UP PARAM F/U: HCPCS | Performed by: INTERNAL MEDICINE

## 2024-07-31 PROCEDURE — 96372 THER/PROPH/DIAG INJ SC/IM: CPT

## 2024-07-31 PROCEDURE — G8427 DOCREV CUR MEDS BY ELIG CLIN: HCPCS | Performed by: INTERNAL MEDICINE

## 2024-07-31 PROCEDURE — 1090F PRES/ABSN URINE INCON ASSESS: CPT | Performed by: INTERNAL MEDICINE

## 2024-07-31 PROCEDURE — 1123F ACP DISCUSS/DSCN MKR DOCD: CPT | Performed by: INTERNAL MEDICINE

## 2024-07-31 RX ADMIN — DENOSUMAB 120 MG: 120 INJECTION SUBCUTANEOUS at 09:27

## 2024-07-31 NOTE — PROGRESS NOTES
Patient provided with discharge instructions.  All questions answered.  Patient understands follow up plan of care.     
[facility] [address]  Dept: [phone]  Loc: [phone]

## 2024-08-28 ENCOUNTER — OFFICE VISIT (OUTPATIENT)
Dept: ONCOLOGY | Age: 74
End: 2024-08-28
Payer: MEDICARE

## 2024-08-28 ENCOUNTER — HOSPITAL ENCOUNTER (OUTPATIENT)
Dept: INFUSION THERAPY | Age: 74
Discharge: HOME | End: 2024-08-28
Payer: MEDICARE

## 2024-08-28 VITALS
HEIGHT: 66 IN | SYSTOLIC BLOOD PRESSURE: 134 MMHG | DIASTOLIC BLOOD PRESSURE: 67 MMHG | OXYGEN SATURATION: 99 % | HEART RATE: 66 BPM | TEMPERATURE: 97.9 F | BODY MASS INDEX: 26.76 KG/M2

## 2024-08-28 DIAGNOSIS — C79.51 CARCINOMA OF BREAST METASTATIC TO BONE, UNSPECIFIED LATERALITY (HCC): Primary | ICD-10-CM

## 2024-08-28 DIAGNOSIS — C79.51 CARCINOMA OF BREAST METASTATIC TO BONE, UNSPECIFIED LATERALITY (HCC): ICD-10-CM

## 2024-08-28 DIAGNOSIS — C50.919 CARCINOMA OF BREAST METASTATIC TO BONE, UNSPECIFIED LATERALITY (HCC): Primary | ICD-10-CM

## 2024-08-28 DIAGNOSIS — C50.612 MALIGNANT NEOPLASM OF AXILLARY TAIL OF LEFT FEMALE BREAST, UNSPECIFIED ESTROGEN RECEPTOR STATUS (HCC): Primary | ICD-10-CM

## 2024-08-28 DIAGNOSIS — Z51.81 THERAPEUTIC DRUG MONITORING: ICD-10-CM

## 2024-08-28 DIAGNOSIS — C50.919 CARCINOMA OF BREAST METASTATIC TO BONE, UNSPECIFIED LATERALITY (HCC): ICD-10-CM

## 2024-08-28 PROCEDURE — 99212 OFFICE O/P EST SF 10 MIN: CPT

## 2024-08-28 PROCEDURE — 1090F PRES/ABSN URINE INCON ASSESS: CPT | Performed by: INTERNAL MEDICINE

## 2024-08-28 PROCEDURE — G8417 CALC BMI ABV UP PARAM F/U: HCPCS | Performed by: INTERNAL MEDICINE

## 2024-08-28 PROCEDURE — G8427 DOCREV CUR MEDS BY ELIG CLIN: HCPCS | Performed by: INTERNAL MEDICINE

## 2024-08-28 PROCEDURE — 1123F ACP DISCUSS/DSCN MKR DOCD: CPT | Performed by: INTERNAL MEDICINE

## 2024-08-28 PROCEDURE — 1036F TOBACCO NON-USER: CPT | Performed by: INTERNAL MEDICINE

## 2024-08-28 PROCEDURE — 6360000002 HC RX W HCPCS: Performed by: INTERNAL MEDICINE

## 2024-08-28 PROCEDURE — 99214 OFFICE O/P EST MOD 30 MIN: CPT | Performed by: INTERNAL MEDICINE

## 2024-08-28 PROCEDURE — G8400 PT W/DXA NO RESULTS DOC: HCPCS | Performed by: INTERNAL MEDICINE

## 2024-08-28 PROCEDURE — 96372 THER/PROPH/DIAG INJ SC/IM: CPT

## 2024-08-28 RX ADMIN — DENOSUMAB 120 MG: 120 INJECTION SUBCUTANEOUS at 09:12

## 2024-08-28 NOTE — PROGRESS NOTES
Patient tolerated injection well without complications or complaints. Alert and oriented x3. Patient aware of potential side effects and denies questions regarding treatment. Patient left via wheelchair with caregiver

## 2024-08-28 NOTE — PROGRESS NOTES
[facility] CARE [facility] MEDICAL ONCOLOGY  [address] [facility] [address]  Dept: [phone]  Loc: [phone]  Attending Progress Note      Reason for Visit:   Metastatic breast cancer.    PCP:  Tawny Ortiz DO    History of Present Illness:      The patient is a 74 y.o. lady, with a past medical history significant for hydrocephalus, who had presented with left breast changes, she had noticed a dark spot on her left breast, then she had developed induration, she thinks that she had the breast changes for months.  The patient had never had a mammogram done, had a CT scan of the chest done, revealing a solid left breast mass measuring at least 6.8 x 2.9 cm in size is identified. The mass appears to extend to the skin surface. The deep margin of the mass abuts the chest wall but a fat plane appears to be preserved. Pathologically enlarged left  axillary lymph nodes are seen measuring up to 4.5 x 1.9 cm in size.     CT abdomen and pelvis was done on 8/30/2019 revealing partial visualization of soft tissue mass in the left breast, nonspecific lymph nodes in the upper abdomen.    Bone scan was done on 8/30/2019, revealing a Prominent focus of radiotracer uptake in the left anterior superior iliac spine, with corresponding abnormality on recent CT, suggestive of metastasis. More subtle focal radiotracer uptake in the right anterior superior iliac spine could be due to metastasis or  degenerative change.   2. Focal radiotracer uptake in the lateral aspect of the right ninth  rib, which appears to be due to a nondisplaced healing fracture.  Correlation with history of recent trauma is recommended. In light of  other findings, this could represent a pathologic fracture with an  underlying metastatic lesion.  3. Osteoarthritic uptake in the shoulders, knees, wrists, and hands.    She underwent on 8/30/2019 a biopsy of 1 of the left axillary lymph nodes, she was  [id number]  Dept: [phone]  Loc: [phone]

## 2024-09-23 ENCOUNTER — OUTSIDE SERVICES (OUTPATIENT)
Dept: PRIMARY CARE CLINIC | Age: 74
End: 2024-09-23
Payer: MEDICARE

## 2024-09-23 DIAGNOSIS — F03.918 DEMENTIA WITH AGGRESSIVE BEHAVIOR (HCC): ICD-10-CM

## 2024-09-23 DIAGNOSIS — C50.911 CARCINOMA OF RIGHT BREAST METASTATIC TO BONE (HCC): Primary | ICD-10-CM

## 2024-09-23 DIAGNOSIS — E88.09 HYPOALBUMINEMIA: ICD-10-CM

## 2024-09-23 DIAGNOSIS — E44.0 MODERATE PROTEIN-CALORIE MALNUTRITION (HCC): ICD-10-CM

## 2024-09-23 DIAGNOSIS — E87.1 HYPONATREMIA: ICD-10-CM

## 2024-09-23 DIAGNOSIS — R26.9 GAIT ABNORMALITY: ICD-10-CM

## 2024-09-23 DIAGNOSIS — C79.51 CARCINOMA OF RIGHT BREAST METASTATIC TO BONE (HCC): Primary | ICD-10-CM

## 2024-09-23 DIAGNOSIS — G91.9 HYDROCEPHALUS, ADULT (HCC): ICD-10-CM

## 2024-09-23 DIAGNOSIS — F32.A DEPRESSION, UNSPECIFIED DEPRESSION TYPE: ICD-10-CM

## 2024-09-23 PROCEDURE — 99309 SBSQ NF CARE MODERATE MDM 30: CPT | Performed by: INTERNAL MEDICINE

## 2024-09-25 ENCOUNTER — OFFICE VISIT (OUTPATIENT)
Dept: ONCOLOGY | Age: 74
End: 2024-09-25

## 2024-09-25 ENCOUNTER — HOSPITAL ENCOUNTER (OUTPATIENT)
Dept: INFUSION THERAPY | Age: 74
Discharge: HOME | End: 2024-09-25
Payer: MEDICARE

## 2024-09-25 VITALS
SYSTOLIC BLOOD PRESSURE: 107 MMHG | OXYGEN SATURATION: 100 % | HEART RATE: 68 BPM | BODY MASS INDEX: 26.76 KG/M2 | HEIGHT: 66 IN | DIASTOLIC BLOOD PRESSURE: 62 MMHG | TEMPERATURE: 97.9 F

## 2024-09-25 DIAGNOSIS — C79.51 CARCINOMA OF BREAST METASTATIC TO BONE, UNSPECIFIED LATERALITY (HCC): ICD-10-CM

## 2024-09-25 DIAGNOSIS — C50.919 CARCINOMA OF BREAST METASTATIC TO BONE, UNSPECIFIED LATERALITY (HCC): ICD-10-CM

## 2024-09-25 DIAGNOSIS — C50.612 MALIGNANT NEOPLASM OF AXILLARY TAIL OF LEFT FEMALE BREAST, UNSPECIFIED ESTROGEN RECEPTOR STATUS (HCC): Primary | ICD-10-CM

## 2024-09-25 DIAGNOSIS — Z51.81 THERAPEUTIC DRUG MONITORING: Primary | ICD-10-CM

## 2024-09-25 PROCEDURE — 96372 THER/PROPH/DIAG INJ SC/IM: CPT

## 2024-09-25 PROCEDURE — 6360000002 HC RX W HCPCS: Performed by: INTERNAL MEDICINE

## 2024-09-25 RX ADMIN — DENOSUMAB 120 MG: 120 INJECTION SUBCUTANEOUS at 09:19

## 2024-10-07 VITALS
DIASTOLIC BLOOD PRESSURE: 52 MMHG | TEMPERATURE: 96.4 F | BODY MASS INDEX: 27.41 KG/M2 | RESPIRATION RATE: 16 BRPM | WEIGHT: 169.8 LBS | OXYGEN SATURATION: 97 % | HEART RATE: 75 BPM | SYSTOLIC BLOOD PRESSURE: 121 MMHG

## 2024-10-23 ENCOUNTER — HOSPITAL ENCOUNTER (OUTPATIENT)
Dept: INFUSION THERAPY | Age: 74
Discharge: HOME | End: 2024-10-23
Payer: MEDICARE

## 2024-10-23 ENCOUNTER — OFFICE VISIT (OUTPATIENT)
Dept: ONCOLOGY | Age: 74
End: 2024-10-23

## 2024-10-23 VITALS
WEIGHT: 182.3 LBS | BODY MASS INDEX: 29.3 KG/M2 | HEART RATE: 65 BPM | TEMPERATURE: 98.1 F | OXYGEN SATURATION: 96 % | HEIGHT: 66 IN | DIASTOLIC BLOOD PRESSURE: 42 MMHG | SYSTOLIC BLOOD PRESSURE: 101 MMHG

## 2024-10-23 DIAGNOSIS — C79.51 CARCINOMA OF BREAST METASTATIC TO BONE, UNSPECIFIED LATERALITY (HCC): Primary | ICD-10-CM

## 2024-10-23 DIAGNOSIS — C50.919 CARCINOMA OF BREAST METASTATIC TO BONE, UNSPECIFIED LATERALITY (HCC): Primary | ICD-10-CM

## 2024-10-23 DIAGNOSIS — C50.919 CARCINOMA OF BREAST METASTATIC TO BONE, UNSPECIFIED LATERALITY (HCC): ICD-10-CM

## 2024-10-23 DIAGNOSIS — D72.818 OTHER DECREASED WHITE BLOOD CELL (WBC) COUNT: ICD-10-CM

## 2024-10-23 DIAGNOSIS — C50.612 MALIGNANT NEOPLASM OF AXILLARY TAIL OF LEFT FEMALE BREAST, UNSPECIFIED ESTROGEN RECEPTOR STATUS (HCC): Primary | ICD-10-CM

## 2024-10-23 DIAGNOSIS — C79.51 CARCINOMA OF BREAST METASTATIC TO BONE, UNSPECIFIED LATERALITY (HCC): ICD-10-CM

## 2024-10-23 DIAGNOSIS — Z51.81 THERAPEUTIC DRUG MONITORING: ICD-10-CM

## 2024-10-23 PROCEDURE — 6360000002 HC RX W HCPCS: Performed by: INTERNAL MEDICINE

## 2024-10-23 PROCEDURE — 96372 THER/PROPH/DIAG INJ SC/IM: CPT

## 2024-10-23 RX ADMIN — DENOSUMAB 120 MG: 120 INJECTION SUBCUTANEOUS at 09:21

## 2024-10-23 NOTE — PROGRESS NOTES
Patient provided with discharge instructions.  All questions answered.  Patient understands follow up plan of care.   Patient will provide facility with AVS of next appointment time. Facility aware to draw labs prior to appointment.

## 2024-10-23 NOTE — PROGRESS NOTES
[facility] CARE [facility] MEDICAL ONCOLOGY  [address] [facility] [address]  Dept: [phone]  Loc: [phone]  Attending Progress Note      Reason for Visit:   Metastatic breast cancer.    PCP:  No primary care provider on file.    History of Present Illness:      The patient is a 74 y.o. lady, with a past medical history significant for hydrocephalus, who had presented with left breast changes, she had noticed a dark spot on her left breast, then she had developed induration, she thinks that she had the breast changes for months.  The patient had never had a mammogram done, had a CT scan of the chest done, revealing a solid left breast mass measuring at least 6.8 x 2.9 cm in size is identified. The mass appears to extend to the skin surface. The deep margin of the mass abuts the chest wall but a fat plane appears to be preserved. Pathologically enlarged left  axillary lymph nodes are seen measuring up to 4.5 x 1.9 cm in size.     CT abdomen and pelvis was done on 8/30/2019 revealing partial visualization of soft tissue mass in the left breast, nonspecific lymph nodes in the upper abdomen.    Bone scan was done on 8/30/2019, revealing a Prominent focus of radiotracer uptake in the left anterior superior iliac spine, with corresponding abnormality on recent CT, suggestive of metastasis. More subtle focal radiotracer uptake in the right anterior superior iliac spine could be due to metastasis or  degenerative change.   2. Focal radiotracer uptake in the lateral aspect of the right ninth  rib, which appears to be due to a nondisplaced healing fracture.  Correlation with history of recent trauma is recommended. In light of  other findings, this could represent a pathologic fracture with an  underlying metastatic lesion.  3. Osteoarthritic uptake in the shoulders, knees, wrists, and hands.    She underwent on 8/30/2019 a biopsy of 1 of the left axillary lymph

## 2024-11-19 ENCOUNTER — CLINICAL DOCUMENTATION (OUTPATIENT)
Facility: HOSPITAL | Age: 74
End: 2024-11-19

## 2024-11-19 NOTE — PROGRESS NOTES
Recvd call from patients sister (juliet little) or the Ibrance asst. They have recvd forms and have questions regarding how to complete,   She made an appt for 12/3 @ 9am with me to help with forms,

## 2024-11-20 ENCOUNTER — OFFICE VISIT (OUTPATIENT)
Dept: ONCOLOGY | Age: 74
End: 2024-11-20
Payer: MEDICARE

## 2024-11-20 ENCOUNTER — HOSPITAL ENCOUNTER (OUTPATIENT)
Dept: INFUSION THERAPY | Age: 74
Discharge: HOME | End: 2024-11-20
Payer: MEDICARE

## 2024-11-20 VITALS
HEART RATE: 64 BPM | WEIGHT: 177 LBS | HEIGHT: 66 IN | DIASTOLIC BLOOD PRESSURE: 63 MMHG | BODY MASS INDEX: 28.45 KG/M2 | OXYGEN SATURATION: 98 % | SYSTOLIC BLOOD PRESSURE: 118 MMHG | TEMPERATURE: 97.9 F

## 2024-11-20 DIAGNOSIS — C50.612 MALIGNANT NEOPLASM OF AXILLARY TAIL OF LEFT FEMALE BREAST, UNSPECIFIED ESTROGEN RECEPTOR STATUS (HCC): Primary | ICD-10-CM

## 2024-11-20 DIAGNOSIS — C50.919 CARCINOMA OF BREAST METASTATIC TO BONE, UNSPECIFIED LATERALITY (HCC): Primary | ICD-10-CM

## 2024-11-20 DIAGNOSIS — C50.919 CARCINOMA OF BREAST METASTATIC TO BONE, UNSPECIFIED LATERALITY (HCC): ICD-10-CM

## 2024-11-20 DIAGNOSIS — C79.51 CARCINOMA OF BREAST METASTATIC TO BONE, UNSPECIFIED LATERALITY (HCC): Primary | ICD-10-CM

## 2024-11-20 DIAGNOSIS — Z51.81 THERAPEUTIC DRUG MONITORING: ICD-10-CM

## 2024-11-20 DIAGNOSIS — C79.51 CARCINOMA OF BREAST METASTATIC TO BONE, UNSPECIFIED LATERALITY (HCC): ICD-10-CM

## 2024-11-20 DIAGNOSIS — D72.818 OTHER DECREASED WHITE BLOOD CELL (WBC) COUNT: ICD-10-CM

## 2024-11-20 PROCEDURE — 96372 THER/PROPH/DIAG INJ SC/IM: CPT

## 2024-11-20 PROCEDURE — 6360000002 HC RX W HCPCS: Performed by: INTERNAL MEDICINE

## 2024-11-20 RX ADMIN — DENOSUMAB 120 MG: 120 INJECTION SUBCUTANEOUS at 09:22

## 2024-11-20 NOTE — PROGRESS NOTES
Patient provided with discharge instructions.  All questions answered.  Patient understands follow up plan of care.   
malignancy.    RTC in 4 weeks.    Thank you for allowing us to participate in the care of Ms. Santacruz.    BOBBY GRADY MD   HEMATOLOGY/MEDICAL ONCOLOGY  [facility] MEDICAL ONCOLOGY  [address] [place] [address]  Dept: [phone]  Loc: [phone]

## 2024-11-21 ENCOUNTER — OUTSIDE SERVICES (OUTPATIENT)
Dept: PRIMARY CARE CLINIC | Age: 74
End: 2024-11-21

## 2024-11-21 DIAGNOSIS — B35.1 TOENAIL FUNGUS: ICD-10-CM

## 2024-11-21 DIAGNOSIS — C50.612 MALIGNANT NEOPLASM OF AXILLARY TAIL OF LEFT FEMALE BREAST, UNSPECIFIED ESTROGEN RECEPTOR STATUS (HCC): ICD-10-CM

## 2024-11-21 DIAGNOSIS — D64.9 ANEMIA, UNSPECIFIED TYPE: ICD-10-CM

## 2024-11-21 DIAGNOSIS — F03.918 DEMENTIA WITH BEHAVIORAL DISTURBANCE (HCC): Primary | ICD-10-CM

## 2024-11-21 DIAGNOSIS — R26.9 GAIT ABNORMALITY: ICD-10-CM

## 2024-11-21 DIAGNOSIS — F01.50 VASCULAR DEMENTIA, UNSPECIFIED DEMENTIA SEVERITY, UNSPECIFIED WHETHER BEHAVIORAL, PSYCHOTIC, OR MOOD DISTURBANCE OR ANXIETY (HCC): ICD-10-CM

## 2024-11-21 DIAGNOSIS — Z86.69 HISTORY OF HYDROCEPHALUS: ICD-10-CM

## 2024-11-24 VITALS
TEMPERATURE: 98.7 F | OXYGEN SATURATION: 97 % | DIASTOLIC BLOOD PRESSURE: 62 MMHG | RESPIRATION RATE: 17 BRPM | HEART RATE: 74 BPM | SYSTOLIC BLOOD PRESSURE: 108 MMHG

## 2024-11-25 NOTE — PROGRESS NOTES
Visit Date: 2024  Cary Santacruz (:  1950) is a 74 y.o. female.    Subjective   SUBJECTIVE/OBJECTIVE:  Patient is seen today for monthly assessment.  No reported new complaint today.    Location:  All nursing and progress notes reviewed.    Past Medical History:   Diagnosis Date    Arm fracture, left 2009    Balance problems since     Hydrocephalus (HCC)     compensated    Thyroid disease     Vertigo        No Known Allergies   Current Outpatient Medications   Medication Sig Dispense Refill    oxyCODONE-acetaminophen (PERCOCET) 5-325 MG per tablet Take 1 tablet by mouth every 6 hours as needed for Pain.      divalproex (DEPAKOTE SPRINKLE) 125 MG DR capsule Take 1 capsule by mouth in the morning and 1 capsule at noon and 1 capsule in the evening.      Polyethylene Glycol 1450 POWD Take 17 g by mouth daily as needed      metoprolol tartrate (LOPRESSOR) 25 MG tablet Take 1 tablet by mouth 2 times daily Hold if SBP<100 or AP<50 180 tablet 1    acetaminophen (TYLENOL) 325 MG tablet Take 2 tablets by mouth every 6 hours as needed for Pain or Fever      bisacodyl (DULCOLAX) 10 MG suppository Place 1 suppository rectally daily as needed for Constipation If no results from MOM, may administer 1 suppository      SODIUM PHOSPHATES RE Place 1 Bottle rectally daily as needed Fleet enema every 24 hrs      magnesium hydroxide (MILK OF MAGNESIA) 400 MG/5ML suspension Take 30 mLs by mouth daily as needed for Constipation      pantoprazole (PROTONIX) 40 MG tablet Take 1 tablet by mouth every morning (before breakfast) 30 tablet 0    melatonin 5 MG TBDP disintegrating tablet Take 1 tablet by mouth nightly      aspirin 325 MG EC tablet Take 1 tablet by mouth 2 times daily 60 tablet 0    levothyroxine (SYNTHROID) 25 MCG tablet Take 1 tablet by mouth Daily      palbociclib (IBRANCE) 75 MG capsule Take 75 mg by mouth See Admin Instructions Given for 21 days then off for 7 days      ergocalciferol

## 2024-11-26 ENCOUNTER — HOSPITAL ENCOUNTER (OUTPATIENT)
Dept: NUCLEAR MEDICINE | Age: 74
Discharge: HOME | End: 2024-11-26
Attending: INTERNAL MEDICINE
Payer: MEDICARE

## 2024-11-26 ENCOUNTER — HOSPITAL ENCOUNTER (OUTPATIENT)
Dept: CT IMAGING | Age: 74
Discharge: HOME | End: 2024-11-28
Attending: INTERNAL MEDICINE
Payer: MEDICARE

## 2024-11-26 DIAGNOSIS — C79.51 CARCINOMA OF BREAST METASTATIC TO BONE, UNSPECIFIED LATERALITY (HCC): ICD-10-CM

## 2024-11-26 DIAGNOSIS — C50.919 CARCINOMA OF BREAST METASTATIC TO BONE, UNSPECIFIED LATERALITY (HCC): ICD-10-CM

## 2024-11-26 PROCEDURE — 71250 CT THORAX DX C-: CPT

## 2024-11-26 PROCEDURE — A9503 TC99M MEDRONATE: HCPCS | Performed by: RADIOLOGY

## 2024-11-26 PROCEDURE — 74176 CT ABD & PELVIS W/O CONTRAST: CPT

## 2024-11-26 PROCEDURE — 3430000000 HC RX DIAGNOSTIC RADIOPHARMACEUTICAL: Performed by: RADIOLOGY

## 2024-11-26 PROCEDURE — 6360000004 HC RX CONTRAST MEDICATION: Performed by: RADIOLOGY

## 2024-11-26 RX ORDER — TC 99M MEDRONATE 20 MG/10ML
25 INJECTION, POWDER, LYOPHILIZED, FOR SOLUTION INTRAVENOUS
Status: COMPLETED | OUTPATIENT
Start: 2024-11-26 | End: 2024-11-26

## 2024-11-26 RX ORDER — IOPAMIDOL 755 MG/ML
18 INJECTION, SOLUTION INTRAVASCULAR
Status: COMPLETED | OUTPATIENT
Start: 2024-11-26 | End: 2024-11-26

## 2024-11-26 RX ADMIN — TC 99M MEDRONATE 25 MILLICURIE: 20 INJECTION, POWDER, LYOPHILIZED, FOR SOLUTION INTRAVENOUS at 11:04

## 2024-11-26 RX ADMIN — IOPAMIDOL 18 ML: 755 INJECTION, SOLUTION INTRAVENOUS at 13:43

## 2024-12-03 ENCOUNTER — CLINICAL DOCUMENTATION (OUTPATIENT)
Facility: HOSPITAL | Age: 74
End: 2024-12-03

## 2024-12-03 NOTE — PROGRESS NOTES
Pts sister Chelly brought in paperwork for pts Ibrance renewal, I asked that she leaves with Tammi at  and I will get when I am back in office,     I sent message to Tammi that I will get information this Thursday.

## 2024-12-18 ENCOUNTER — OFFICE VISIT (OUTPATIENT)
Dept: ONCOLOGY | Age: 74
End: 2024-12-18
Payer: MEDICARE

## 2024-12-18 ENCOUNTER — HOSPITAL ENCOUNTER (OUTPATIENT)
Dept: INFUSION THERAPY | Age: 74
Discharge: HOME | End: 2024-12-18
Payer: MEDICARE

## 2024-12-18 VITALS
HEART RATE: 73 BPM | OXYGEN SATURATION: 96 % | BODY MASS INDEX: 27.1 KG/M2 | HEIGHT: 66 IN | DIASTOLIC BLOOD PRESSURE: 51 MMHG | WEIGHT: 168.6 LBS | SYSTOLIC BLOOD PRESSURE: 113 MMHG | TEMPERATURE: 97 F

## 2024-12-18 DIAGNOSIS — C79.51 CARCINOMA OF BREAST METASTATIC TO BONE, UNSPECIFIED LATERALITY (HCC): ICD-10-CM

## 2024-12-18 DIAGNOSIS — C50.919 CARCINOMA OF BREAST METASTATIC TO BONE, UNSPECIFIED LATERALITY (HCC): ICD-10-CM

## 2024-12-18 DIAGNOSIS — Z51.81 THERAPEUTIC DRUG MONITORING: ICD-10-CM

## 2024-12-18 DIAGNOSIS — C50.612 MALIGNANT NEOPLASM OF AXILLARY TAIL OF LEFT FEMALE BREAST, UNSPECIFIED ESTROGEN RECEPTOR STATUS (HCC): Primary | ICD-10-CM

## 2024-12-18 DIAGNOSIS — E01.0 THYROMEGALY: Primary | ICD-10-CM

## 2024-12-18 DIAGNOSIS — D72.818 OTHER DECREASED WHITE BLOOD CELL (WBC) COUNT: ICD-10-CM

## 2024-12-18 PROCEDURE — 1160F RVW MEDS BY RX/DR IN RCRD: CPT | Performed by: INTERNAL MEDICINE

## 2024-12-18 PROCEDURE — G8484 FLU IMMUNIZE NO ADMIN: HCPCS | Performed by: INTERNAL MEDICINE

## 2024-12-18 PROCEDURE — 1159F MED LIST DOCD IN RCRD: CPT | Performed by: INTERNAL MEDICINE

## 2024-12-18 PROCEDURE — 1123F ACP DISCUSS/DSCN MKR DOCD: CPT | Performed by: INTERNAL MEDICINE

## 2024-12-18 PROCEDURE — 6360000002 HC RX W HCPCS: Performed by: INTERNAL MEDICINE

## 2024-12-18 PROCEDURE — G8427 DOCREV CUR MEDS BY ELIG CLIN: HCPCS | Performed by: INTERNAL MEDICINE

## 2024-12-18 PROCEDURE — 1036F TOBACCO NON-USER: CPT | Performed by: INTERNAL MEDICINE

## 2024-12-18 PROCEDURE — G8400 PT W/DXA NO RESULTS DOC: HCPCS | Performed by: INTERNAL MEDICINE

## 2024-12-18 PROCEDURE — G8417 CALC BMI ABV UP PARAM F/U: HCPCS | Performed by: INTERNAL MEDICINE

## 2024-12-18 PROCEDURE — 96372 THER/PROPH/DIAG INJ SC/IM: CPT

## 2024-12-18 PROCEDURE — 99214 OFFICE O/P EST MOD 30 MIN: CPT | Performed by: INTERNAL MEDICINE

## 2024-12-18 PROCEDURE — 1090F PRES/ABSN URINE INCON ASSESS: CPT | Performed by: INTERNAL MEDICINE

## 2024-12-18 RX ADMIN — DENOSUMAB 120 MG: 120 INJECTION SUBCUTANEOUS at 09:38

## 2024-12-18 NOTE — FLOWSHEET NOTE
Patient arrived to clinic for xgeva and office visit. Tolerated well. Patient alert and oriented x3, no questions or concerns at the time of discharge. Verbalized next appointment time and date.

## 2024-12-18 NOTE — PROGRESS NOTES
Patient provided discharge AVS, all questions answered  
Per Kirsten at [facility], patient was unable to complete NM bone scan 11/26/24.   
supplement.    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    RTC in 4 weeks.    Thank you for allowing us to participate in the care of Ms. Santacruz.    BOBBY GRADY MD   HEMATOLOGY/MEDICAL ONCOLOGY  [facility] [place] [facility] CARE [facility] MEDICAL ONCOLOGY  [address] [place] [address]  Dept: [phone]  Loc: [phone]

## 2024-12-26 ENCOUNTER — OUTSIDE SERVICES (OUTPATIENT)
Dept: PRIMARY CARE CLINIC | Age: 74
End: 2024-12-26

## 2024-12-26 DIAGNOSIS — C50.911 CARCINOMA OF RIGHT BREAST METASTATIC TO BONE (HCC): ICD-10-CM

## 2024-12-26 DIAGNOSIS — F01.50 VASCULAR DEMENTIA, UNSPECIFIED DEMENTIA SEVERITY, UNSPECIFIED WHETHER BEHAVIORAL, PSYCHOTIC, OR MOOD DISTURBANCE OR ANXIETY (HCC): ICD-10-CM

## 2024-12-26 DIAGNOSIS — E44.0 MODERATE PROTEIN-CALORIE MALNUTRITION (HCC): ICD-10-CM

## 2024-12-26 DIAGNOSIS — R26.9 GAIT ABNORMALITY: ICD-10-CM

## 2024-12-26 DIAGNOSIS — C50.612 MALIGNANT NEOPLASM OF AXILLARY TAIL OF LEFT FEMALE BREAST, UNSPECIFIED ESTROGEN RECEPTOR STATUS (HCC): ICD-10-CM

## 2024-12-26 DIAGNOSIS — F03.918 DEMENTIA WITH BEHAVIORAL DISTURBANCE (HCC): Primary | ICD-10-CM

## 2024-12-26 DIAGNOSIS — D64.9 ANEMIA, UNSPECIFIED TYPE: ICD-10-CM

## 2024-12-26 DIAGNOSIS — Z86.69 HISTORY OF HYDROCEPHALUS: ICD-10-CM

## 2024-12-26 DIAGNOSIS — F03.918 DEMENTIA WITH AGGRESSIVE BEHAVIOR (HCC): ICD-10-CM

## 2024-12-26 DIAGNOSIS — C79.51 CARCINOMA OF RIGHT BREAST METASTATIC TO BONE (HCC): ICD-10-CM

## 2024-12-26 DIAGNOSIS — F32.A DEPRESSION, UNSPECIFIED DEPRESSION TYPE: ICD-10-CM

## 2024-12-26 DIAGNOSIS — B35.1 TOENAIL FUNGUS: ICD-10-CM

## 2024-12-26 DIAGNOSIS — G91.9 HYDROCEPHALUS, ADULT (HCC): ICD-10-CM

## 2025-01-01 VITALS
HEART RATE: 84 BPM | WEIGHT: 168.6 LBS | TEMPERATURE: 97.5 F | OXYGEN SATURATION: 98 % | DIASTOLIC BLOOD PRESSURE: 72 MMHG | BODY MASS INDEX: 27.21 KG/M2 | RESPIRATION RATE: 16 BRPM | SYSTOLIC BLOOD PRESSURE: 132 MMHG

## 2025-01-01 ASSESSMENT — ENCOUNTER SYMPTOMS
ABDOMINAL PAIN: 0
VOMITING: 0
DIARRHEA: 0
NAUSEA: 0
COUGH: 0

## 2025-01-01 NOTE — PROGRESS NOTES
Visit Date: 2024  Cary Santacruz (:  1950) is a 74 y.o. female.    Subjective   SUBJECTIVE/OBJECTIVE:  Patient is seen today for monthly assessment.  She has no new complaints but does ask when she can go home.     Location:  All nursing and progress notes reviewed.    Past Medical History:   Diagnosis Date    Arm fracture, left 2009    Balance problems since     Hydrocephalus (HCC)     compensated    Thyroid disease     Vertigo        No Known Allergies   Current Outpatient Medications   Medication Sig Dispense Refill    palbociclib (IBRANCE) 75 MG capsule Take 75 mg by mouth See Admin Instructions Given for 21 days then off for 7 days 21 capsule 12    oxyCODONE-acetaminophen (PERCOCET) 5-325 MG per tablet Take 1 tablet by mouth every 6 hours as needed for Pain.      divalproex (DEPAKOTE SPRINKLE) 125 MG DR capsule Take 1 capsule by mouth in the morning and 1 capsule at noon and 1 capsule in the evening.      Polyethylene Glycol 1450 POWD Take 17 g by mouth daily as needed      metoprolol tartrate (LOPRESSOR) 25 MG tablet Take 1 tablet by mouth 2 times daily Hold if SBP<100 or AP<50 180 tablet 1    acetaminophen (TYLENOL) 325 MG tablet Take 2 tablets by mouth every 6 hours as needed for Pain or Fever      bisacodyl (DULCOLAX) 10 MG suppository Place 1 suppository rectally daily as needed for Constipation If no results from MOM, may administer 1 suppository      SODIUM PHOSPHATES RE Place 1 Bottle rectally daily as needed Fleet enema every 24 hrs      magnesium hydroxide (MILK OF MAGNESIA) 400 MG/5ML suspension Take 30 mLs by mouth daily as needed for Constipation      pantoprazole (PROTONIX) 40 MG tablet Take 1 tablet by mouth every morning (before breakfast) 30 tablet 0    melatonin 5 MG TBDP disintegrating tablet Take 1 tablet by mouth nightly      aspirin 325 MG EC tablet Take 1 tablet by mouth 2 times daily 60 tablet 0    levothyroxine (SYNTHROID) 25 MCG tablet Take 1 tablet by mouth

## 2025-01-03 ENCOUNTER — CLINICAL DOCUMENTATION (OUTPATIENT)
Facility: HOSPITAL | Age: 75
End: 2025-01-03

## 2025-01-03 RX ORDER — PALBOCICLIB 75 MG/1
75 CAPSULE ORAL SEE ADMIN INSTRUCTIONS
Qty: 21 CAPSULE | Refills: 12 | Status: SHIPPED | OUTPATIENT
Start: 2025-01-03

## 2025-01-03 ASSESSMENT — ENCOUNTER SYMPTOMS
WHEEZING: 0
EYE REDNESS: 0
EYE DISCHARGE: 0
RHINORRHEA: 0
SHORTNESS OF BREATH: 0
SORE THROAT: 0

## 2025-01-03 NOTE — PROGRESS NOTES
CALLED AND SPOKE WITH VENTURA MONTES SISTER, I EXPLAINED THAT IN ORDER TO ENROLL FOR 2025 PAP KURT FOR THE IBRANCE THEY HAVE TO APPLY FOR THE MEDICARE PPP.     I ASKED THAT SHE CALL ME BACK WITH THE REF # SO THAT IT CAN BE GIVEN WITH THE PAP.     IF ANY ISSUES CAME UP, I TOLD HER TO CALL ME.     SHE VERBALIZED ALL

## 2025-01-03 NOTE — PROGRESS NOTES
PTS SISTER VENTURA CALLED WITH THE MEDICARE PP REF# [id number] AFTER [employer] EMAILED STATING THAT THEY HAVE OBTAINED FUNDING THROUGH [employer] FOR HER IBRANCE.     ONCE MONIES RUN OUT THEN WE WILL RETURN TO THE PAP KURT FOR ASSISTANCE.

## 2025-01-15 ENCOUNTER — HOSPITAL ENCOUNTER (OUTPATIENT)
Dept: INFUSION THERAPY | Age: 75
Discharge: HOME | End: 2025-01-15
Payer: MEDICARE

## 2025-01-15 ENCOUNTER — OFFICE VISIT (OUTPATIENT)
Dept: ONCOLOGY | Age: 75
End: 2025-01-15
Payer: MEDICARE

## 2025-01-15 VITALS
OXYGEN SATURATION: 96 % | WEIGHT: 181.6 LBS | HEART RATE: 73 BPM | TEMPERATURE: 98.4 F | BODY MASS INDEX: 29.18 KG/M2 | HEIGHT: 66 IN | SYSTOLIC BLOOD PRESSURE: 117 MMHG | DIASTOLIC BLOOD PRESSURE: 86 MMHG

## 2025-01-15 DIAGNOSIS — C50.919 CARCINOMA OF BREAST METASTATIC TO BONE, UNSPECIFIED LATERALITY (HCC): ICD-10-CM

## 2025-01-15 DIAGNOSIS — C50.612 MALIGNANT NEOPLASM OF AXILLARY TAIL OF LEFT FEMALE BREAST, UNSPECIFIED ESTROGEN RECEPTOR STATUS (HCC): Primary | ICD-10-CM

## 2025-01-15 DIAGNOSIS — D61.818 PANCYTOPENIA (HCC): ICD-10-CM

## 2025-01-15 DIAGNOSIS — D72.818 OTHER DECREASED WHITE BLOOD CELL (WBC) COUNT: ICD-10-CM

## 2025-01-15 DIAGNOSIS — C79.51 CARCINOMA OF BREAST METASTATIC TO BONE, UNSPECIFIED LATERALITY (HCC): Primary | ICD-10-CM

## 2025-01-15 DIAGNOSIS — C79.51 CARCINOMA OF BREAST METASTATIC TO BONE, UNSPECIFIED LATERALITY (HCC): ICD-10-CM

## 2025-01-15 DIAGNOSIS — C50.919 CARCINOMA OF BREAST METASTATIC TO BONE, UNSPECIFIED LATERALITY (HCC): Primary | ICD-10-CM

## 2025-01-15 DIAGNOSIS — Z51.81 THERAPEUTIC DRUG MONITORING: ICD-10-CM

## 2025-01-15 PROCEDURE — 6360000002 HC RX W HCPCS: Performed by: INTERNAL MEDICINE

## 2025-01-15 PROCEDURE — 99212 OFFICE O/P EST SF 10 MIN: CPT

## 2025-01-15 PROCEDURE — 1159F MED LIST DOCD IN RCRD: CPT | Performed by: INTERNAL MEDICINE

## 2025-01-15 PROCEDURE — 1036F TOBACCO NON-USER: CPT | Performed by: INTERNAL MEDICINE

## 2025-01-15 PROCEDURE — 1160F RVW MEDS BY RX/DR IN RCRD: CPT | Performed by: INTERNAL MEDICINE

## 2025-01-15 PROCEDURE — 1126F AMNT PAIN NOTED NONE PRSNT: CPT | Performed by: INTERNAL MEDICINE

## 2025-01-15 PROCEDURE — 96372 THER/PROPH/DIAG INJ SC/IM: CPT

## 2025-01-15 PROCEDURE — G8400 PT W/DXA NO RESULTS DOC: HCPCS | Performed by: INTERNAL MEDICINE

## 2025-01-15 PROCEDURE — G8427 DOCREV CUR MEDS BY ELIG CLIN: HCPCS | Performed by: INTERNAL MEDICINE

## 2025-01-15 PROCEDURE — G8417 CALC BMI ABV UP PARAM F/U: HCPCS | Performed by: INTERNAL MEDICINE

## 2025-01-15 PROCEDURE — 99214 OFFICE O/P EST MOD 30 MIN: CPT | Performed by: INTERNAL MEDICINE

## 2025-01-15 PROCEDURE — 1123F ACP DISCUSS/DSCN MKR DOCD: CPT | Performed by: INTERNAL MEDICINE

## 2025-01-15 PROCEDURE — 1090F PRES/ABSN URINE INCON ASSESS: CPT | Performed by: INTERNAL MEDICINE

## 2025-01-15 RX ADMIN — DENOSUMAB 120 MG: 120 INJECTION SUBCUTANEOUS at 09:19

## 2025-01-15 NOTE — PROGRESS NOTES
Patient provided discharge AVS, all questions answered  
Chest/Abdomen/Pelvis and bone scan on 04/24/2020:  CT Chest noted stable small bilateral pulmonary nodules; CT abdomen/pelvis no evidence of new active neoplasm compared to prior.  Bone scan 3 foci of radiotracer uptake in the lower right ribs, 2 of which demonstrate less activity on the current study, possibly indicative of healing fractures.  Radiotracer in the more anterior focus is more persistent, and may be a metastatic lesion.  The results/images of the scans were reviewed with the patient and her sister, the patient has an overall stable disease.  Recommended to continue with Femara and Ibrance.    The patient had a PET scan done on December 3, 2020, she had complete metabolic response since the PET scan on September 17, 2019, no metabolically active recurrent or metastatic disease, metabolic activity throughout the thyroid lobes can be seen with chronic thyroiditis.  The patient had complete response to treatment, continue Ibrance and Femara.    The dose of the Ibrance has been to 75 mg due to leukopenia/neutropenia.      The patient had restaging CT scans of the chest, abdomen pelvis done on 6/1/2021, bone scan on 6/11/2021, revealing an overall stable disease.  Recommended to continue Ibrance and Femara.    Patient had restaging scans done on 10/26/2021, revealing an overall stable disease, couple of upper lobe pulmonary nodules and rib sclerosis are redemonstrated, stable.  No new areas of concern.  Recommended to continue with Ibrance and Femara.    Restaging CT scans of the chest, abdomen, pelvis and a bone scan were done on 5/2/2022, there was no evidence of progression of disease within the chest with stable left upper lobe pulmonary nodule areas and indeterminate sclerosis in the rib, there was no uptake on the bone scan, recommended to continue with Ibrance and Femara.       Restaging CT scans of the chest, abdomen, pelvis and a bone scan were ordered, she had a CT scan of the abdomen the pelvis

## 2025-02-12 ENCOUNTER — HOSPITAL ENCOUNTER (OUTPATIENT)
Dept: INFUSION THERAPY | Age: 75
Discharge: HOME | End: 2025-02-12
Payer: MEDICARE

## 2025-02-12 ENCOUNTER — OFFICE VISIT (OUTPATIENT)
Dept: ONCOLOGY | Age: 75
End: 2025-02-12
Payer: MEDICARE

## 2025-02-12 VITALS
OXYGEN SATURATION: 100 % | SYSTOLIC BLOOD PRESSURE: 122 MMHG | BODY MASS INDEX: 29.31 KG/M2 | HEIGHT: 66 IN | HEART RATE: 81 BPM | DIASTOLIC BLOOD PRESSURE: 100 MMHG | TEMPERATURE: 97.9 F

## 2025-02-12 DIAGNOSIS — Z51.81 THERAPEUTIC DRUG MONITORING: ICD-10-CM

## 2025-02-12 DIAGNOSIS — C79.51 CARCINOMA OF BREAST METASTATIC TO BONE, UNSPECIFIED LATERALITY (HCC): ICD-10-CM

## 2025-02-12 DIAGNOSIS — C50.919 CARCINOMA OF BREAST METASTATIC TO BONE, UNSPECIFIED LATERALITY (HCC): ICD-10-CM

## 2025-02-12 DIAGNOSIS — C50.919 CARCINOMA OF BREAST METASTATIC TO BONE, UNSPECIFIED LATERALITY (HCC): Primary | ICD-10-CM

## 2025-02-12 DIAGNOSIS — C50.612 MALIGNANT NEOPLASM OF AXILLARY TAIL OF LEFT FEMALE BREAST, UNSPECIFIED ESTROGEN RECEPTOR STATUS (HCC): Primary | ICD-10-CM

## 2025-02-12 DIAGNOSIS — D72.818 OTHER DECREASED WHITE BLOOD CELL (WBC) COUNT: ICD-10-CM

## 2025-02-12 DIAGNOSIS — D61.818 PANCYTOPENIA (HCC): ICD-10-CM

## 2025-02-12 DIAGNOSIS — C79.51 CARCINOMA OF BREAST METASTATIC TO BONE, UNSPECIFIED LATERALITY (HCC): Primary | ICD-10-CM

## 2025-02-12 PROCEDURE — 1123F ACP DISCUSS/DSCN MKR DOCD: CPT | Performed by: INTERNAL MEDICINE

## 2025-02-12 PROCEDURE — 6360000002 HC RX W HCPCS: Performed by: INTERNAL MEDICINE

## 2025-02-12 PROCEDURE — 1036F TOBACCO NON-USER: CPT | Performed by: INTERNAL MEDICINE

## 2025-02-12 PROCEDURE — 99214 OFFICE O/P EST MOD 30 MIN: CPT | Performed by: INTERNAL MEDICINE

## 2025-02-12 PROCEDURE — 99212 OFFICE O/P EST SF 10 MIN: CPT

## 2025-02-12 PROCEDURE — 1160F RVW MEDS BY RX/DR IN RCRD: CPT | Performed by: INTERNAL MEDICINE

## 2025-02-12 PROCEDURE — 96372 THER/PROPH/DIAG INJ SC/IM: CPT

## 2025-02-12 PROCEDURE — 1090F PRES/ABSN URINE INCON ASSESS: CPT | Performed by: INTERNAL MEDICINE

## 2025-02-12 PROCEDURE — 1159F MED LIST DOCD IN RCRD: CPT | Performed by: INTERNAL MEDICINE

## 2025-02-12 PROCEDURE — G8417 CALC BMI ABV UP PARAM F/U: HCPCS | Performed by: INTERNAL MEDICINE

## 2025-02-12 PROCEDURE — G8400 PT W/DXA NO RESULTS DOC: HCPCS | Performed by: INTERNAL MEDICINE

## 2025-02-12 PROCEDURE — G8427 DOCREV CUR MEDS BY ELIG CLIN: HCPCS | Performed by: INTERNAL MEDICINE

## 2025-02-12 RX ADMIN — DENOSUMAB 120 MG: 120 INJECTION SUBCUTANEOUS at 09:32

## 2025-02-12 NOTE — PROGRESS NOTES
[facility] MEDICAL ONCOLOGY  [address] [facility] [address]  Dept: [phone]  Loc: [phone]  Attending Progress Note      Reason for Visit:   Metastatic breast cancer.    PCP:  Makenzie Mccann MD    History of Present Illness:      The patient is a 74 y.o. lady, with a past medical history significant for hydrocephalus, who had presented with left breast changes, she had noticed a dark spot on her left breast, then she had developed induration, she thinks that she had the breast changes for months.  The patient had never had a mammogram done, had a CT scan of the chest done, revealing a solid left breast mass measuring at least 6.8 x 2.9 cm in size is identified. The mass appears to extend to the skin surface. The deep margin of the mass abuts the chest wall but a fat plane appears to be preserved. Pathologically enlarged left  axillary lymph nodes are seen measuring up to 4.5 x 1.9 cm in size.     CT abdomen and pelvis was done on 8/30/2019 revealing partial visualization of soft tissue mass in the left breast, nonspecific lymph nodes in the upper abdomen.    Bone scan was done on 8/30/2019, revealing a Prominent focus of radiotracer uptake in the left anterior superior iliac spine, with corresponding abnormality on recent CT, suggestive of metastasis. More subtle focal radiotracer uptake in the right anterior superior iliac spine could be due to metastasis or  degenerative change.   2. Focal radiotracer uptake in the lateral aspect of the right ninth  rib, which appears to be due to a nondisplaced healing fracture.  Correlation with history of recent trauma is recommended. In light of  other findings, this could represent a pathologic fracture with an  underlying metastatic lesion.  3. Osteoarthritic uptake in the shoulders, knees, wrists, and hands.    She underwent on 8/30/2019 a biopsy of 1 of the left axillary lymph nodes, she was

## 2025-03-12 ENCOUNTER — HOSPITAL ENCOUNTER (OUTPATIENT)
Dept: INFUSION THERAPY | Age: 75
Discharge: HOME | End: 2025-03-12
Payer: MEDICARE

## 2025-03-12 ENCOUNTER — OFFICE VISIT (OUTPATIENT)
Dept: ONCOLOGY | Age: 75
End: 2025-03-12
Payer: MEDICARE

## 2025-03-12 VITALS
BODY MASS INDEX: 29.31 KG/M2 | SYSTOLIC BLOOD PRESSURE: 105 MMHG | TEMPERATURE: 97.9 F | DIASTOLIC BLOOD PRESSURE: 57 MMHG | HEIGHT: 66 IN | OXYGEN SATURATION: 97 % | HEART RATE: 72 BPM

## 2025-03-12 DIAGNOSIS — C50.919 CARCINOMA OF BREAST METASTATIC TO BONE, UNSPECIFIED LATERALITY: ICD-10-CM

## 2025-03-12 DIAGNOSIS — D61.818 PANCYTOPENIA (HCC): ICD-10-CM

## 2025-03-12 DIAGNOSIS — C79.51 CARCINOMA OF BREAST METASTATIC TO BONE, UNSPECIFIED LATERALITY (HCC): Primary | ICD-10-CM

## 2025-03-12 DIAGNOSIS — C79.51 CARCINOMA OF BREAST METASTATIC TO BONE, UNSPECIFIED LATERALITY: ICD-10-CM

## 2025-03-12 DIAGNOSIS — C50.612 MALIGNANT NEOPLASM OF AXILLARY TAIL OF LEFT FEMALE BREAST, UNSPECIFIED ESTROGEN RECEPTOR STATUS: Primary | ICD-10-CM

## 2025-03-12 DIAGNOSIS — C50.919 CARCINOMA OF BREAST METASTATIC TO BONE, UNSPECIFIED LATERALITY (HCC): Primary | ICD-10-CM

## 2025-03-12 DIAGNOSIS — Z51.81 THERAPEUTIC DRUG MONITORING: ICD-10-CM

## 2025-03-12 LAB
ALBUMIN SERPL-MCNC: 3.9 G/DL (ref 3.5–5.2)
ALP SERPL-CCNC: 70 U/L (ref 35–104)
ALT SERPL-CCNC: 12 U/L (ref 0–32)
ANION GAP SERPL CALCULATED.3IONS-SCNC: 9 MMOL/L (ref 7–16)
AST SERPL-CCNC: 19 U/L (ref 0–31)
ATYPICAL LYMPHOCYTE ABSOLUTE COUNT: 0.08 K/UL (ref 0–0.46)
ATYPICAL LYMPHOCYTES: 4 % (ref 0–4)
BASOPHILS # BLD: 0.08 K/UL (ref 0–0.2)
BASOPHILS NFR BLD: 4 % (ref 0–2)
BILIRUB SERPL-MCNC: 0.4 MG/DL (ref 0–1.2)
BUN SERPL-MCNC: 14 MG/DL (ref 6–23)
CALCIUM SERPL-MCNC: 9.6 MG/DL (ref 8.6–10.2)
CEA SERPL-MCNC: 1.5 NG/ML (ref 0–5.2)
CHLORIDE SERPL-SCNC: 94 MMOL/L (ref 98–107)
CO2 SERPL-SCNC: 28 MMOL/L (ref 22–29)
CREAT SERPL-MCNC: 0.8 MG/DL (ref 0.5–1)
EOSINOPHIL # BLD: 0.02 K/UL (ref 0.05–0.5)
EOSINOPHILS RELATIVE PERCENT: 1 % (ref 0–6)
ERYTHROCYTE [DISTWIDTH] IN BLOOD BY AUTOMATED COUNT: 13.8 % (ref 11.5–15)
GFR, ESTIMATED: 72 ML/MIN/1.73M2
GLUCOSE SERPL-MCNC: 102 MG/DL (ref 74–99)
HCT VFR BLD AUTO: 36.7 % (ref 34–48)
HGB BLD-MCNC: 12.2 G/DL (ref 11.5–15.5)
LYMPHOCYTES NFR BLD: 0.38 K/UL (ref 1.5–4)
LYMPHOCYTES RELATIVE PERCENT: 16 % (ref 20–42)
MCH RBC QN AUTO: 32.9 PG (ref 26–35)
MCHC RBC AUTO-ENTMCNC: 33.2 G/DL (ref 32–34.5)
MCV RBC AUTO: 98.9 FL (ref 80–99.9)
MONOCYTES NFR BLD: 0.1 K/UL (ref 0.1–0.95)
MONOCYTES NFR BLD: 4 % (ref 2–12)
NEUTROPHILS NFR BLD: 72 % (ref 43–80)
NEUTS SEG NFR BLD: 1.73 K/UL (ref 1.8–7.3)
PLATELET # BLD AUTO: 196 K/UL (ref 130–450)
PMV BLD AUTO: 9.4 FL (ref 7–12)
POTASSIUM SERPL-SCNC: 4.3 MMOL/L (ref 3.5–5)
PROT SERPL-MCNC: 8.3 G/DL (ref 6.4–8.3)
RBC # BLD AUTO: 3.71 M/UL (ref 3.5–5.5)
RBC # BLD: NORMAL 10*6/UL
SODIUM SERPL-SCNC: 131 MMOL/L (ref 132–146)
WBC OTHER # BLD: 2.4 K/UL (ref 4.5–11.5)

## 2025-03-12 PROCEDURE — 86300 IMMUNOASSAY TUMOR CA 15-3: CPT

## 2025-03-12 PROCEDURE — 85025 COMPLETE CBC W/AUTO DIFF WBC: CPT

## 2025-03-12 PROCEDURE — 96372 THER/PROPH/DIAG INJ SC/IM: CPT

## 2025-03-12 PROCEDURE — 80053 COMPREHEN METABOLIC PANEL: CPT

## 2025-03-12 PROCEDURE — 6360000002 HC RX W HCPCS: Performed by: INTERNAL MEDICINE

## 2025-03-12 PROCEDURE — 82378 CARCINOEMBRYONIC ANTIGEN: CPT

## 2025-03-12 PROCEDURE — 36415 COLL VENOUS BLD VENIPUNCTURE: CPT

## 2025-03-12 PROCEDURE — 99212 OFFICE O/P EST SF 10 MIN: CPT

## 2025-03-12 RX ADMIN — DENOSUMAB 120 MG: 120 INJECTION SUBCUTANEOUS at 11:59

## 2025-03-12 NOTE — PROGRESS NOTES
Patient provided discharge AVS, all questions answered  
  Physical Activity: Inactive (4/21/2022)    Exercise Vital Sign     Days of Exercise per Week: 0 days     Minutes of Exercise per Session: 0 min   Stress: Not on file   Social Connections: Not on file   Intimate Partner Violence: Not on file   Housing Stability: Not on file       Allergies:  No Known Allergies    Physical Exam:  BP (!) 105/57   Pulse 72   Temp 97.9 °F (36.6 °C)   Ht 1.676 m (5' 6\")   SpO2 97%   BMI 29.31 kg/m²     GENERAL: Alert, oriented x 3, not in acute distress.  HEENT: PERRLA; EOMI. Oropharynx clear.   NECK: Supple. No palpable cervical or supraclavicular lymphadenopathy.   LUNGS: Good air entry bilaterally. No wheezing, crackles or rhonchi.   CARDIOVASCULAR: Regular rate. No murmurs, rubs or gallops.   BREASTS: The right breast exam is negative for any skin changes, nipple discharge, no palpable masses, no palpable right axillary adenopathy, left breast appearance had markedly improved, the nodules had resolved, the mass decreased in size, no drainage, no bleeding, decrease in the size of the left axillary lymphadenopathy  ABDOMEN: Soft. Non-tender, non-distended. Positive bowel sounds.  EXTREMITIES: Left lower extremity edema, stable.  NEUROLOGIC: No focal deficits.   ECOG PS 1     Impression/Plan:     The patient is a 74 y.o. lady, with a past medical history significant for hydrocephalus, who had presented with left breast changes, who was diagnosed with a left breast ductal carcinoma, ER positive greater than 95% [place] +70% HER-2/elder 1+, she has a very locally advanced disease, and metastatic disease to the bones.  I discussed with the patient and her sister today her diagnosis, prognosis and recommendations for treatment.  Will complete the staging work-up, the patient will have a brain MRI with and without contrast done, and a PET scan.  For her HR pos, HER-2/elder negative metastatic breast cancer, I recommended treatment with Femara and Ibrance, the schedule and side effects of the

## 2025-03-12 NOTE — PROGRESS NOTES
Patient tolerated injection well without complications or complaints. Alert and oriented x3. Patient aware of potential side effects and denies questions regarding treatment. Pain assessed, patient denies any new or worsening pain. Patient left via wheelchair with sister.

## 2025-03-15 LAB — CANCER AG15-3 SERPL-ACNC: 20 U/ML (ref 0–31)

## 2025-04-08 ENCOUNTER — OUTSIDE SERVICES (OUTPATIENT)
Dept: PRIMARY CARE CLINIC | Age: 75
End: 2025-04-08
Payer: MEDICARE

## 2025-04-08 VITALS
SYSTOLIC BLOOD PRESSURE: 116 MMHG | HEART RATE: 72 BPM | TEMPERATURE: 97 F | WEIGHT: 173 LBS | OXYGEN SATURATION: 97 % | DIASTOLIC BLOOD PRESSURE: 62 MMHG | BODY MASS INDEX: 27.92 KG/M2

## 2025-04-08 DIAGNOSIS — B35.1 TOENAIL FUNGUS: ICD-10-CM

## 2025-04-08 DIAGNOSIS — C50.911 CARCINOMA OF RIGHT BREAST METASTATIC TO BONE: ICD-10-CM

## 2025-04-08 DIAGNOSIS — F01.50 VASCULAR DEMENTIA, UNSPECIFIED DEMENTIA SEVERITY, UNSPECIFIED WHETHER BEHAVIORAL, PSYCHOTIC, OR MOOD DISTURBANCE OR ANXIETY (HCC): Primary | ICD-10-CM

## 2025-04-08 DIAGNOSIS — D64.9 ANEMIA, UNSPECIFIED TYPE: ICD-10-CM

## 2025-04-08 DIAGNOSIS — C79.9 METASTASIS FROM BREAST CANCER: ICD-10-CM

## 2025-04-08 DIAGNOSIS — C50.612 MALIGNANT NEOPLASM OF AXILLARY TAIL OF LEFT FEMALE BREAST, UNSPECIFIED ESTROGEN RECEPTOR STATUS: ICD-10-CM

## 2025-04-08 DIAGNOSIS — C79.51 CARCINOMA OF RIGHT BREAST METASTATIC TO BONE: ICD-10-CM

## 2025-04-08 DIAGNOSIS — C50.919 METASTASIS FROM BREAST CANCER: ICD-10-CM

## 2025-04-08 DIAGNOSIS — G91.9 HYDROCEPHALUS, UNSPECIFIED TYPE (HCC): ICD-10-CM

## 2025-04-08 DIAGNOSIS — R26.9 GAIT ABNORMALITY: ICD-10-CM

## 2025-04-08 DIAGNOSIS — Z86.69 HISTORY OF HYDROCEPHALUS: ICD-10-CM

## 2025-04-08 PROCEDURE — 99309 SBSQ NF CARE MODERATE MDM 30: CPT | Performed by: INTERNAL MEDICINE

## 2025-04-08 ASSESSMENT — ENCOUNTER SYMPTOMS
BACK PAIN: 0
NAUSEA: 0
SORE THROAT: 0
DIARRHEA: 0
SINUS PAIN: 0
ABDOMINAL DISTENTION: 0
WHEEZING: 0
EYE REDNESS: 0
EYE DISCHARGE: 0
ABDOMINAL PAIN: 0
CONSTIPATION: 0
RHINORRHEA: 0
COUGH: 0
SHORTNESS OF BREATH: 0

## 2025-04-09 ENCOUNTER — OFFICE VISIT (OUTPATIENT)
Dept: ONCOLOGY | Age: 75
End: 2025-04-09
Payer: MEDICARE

## 2025-04-09 ENCOUNTER — HOSPITAL ENCOUNTER (OUTPATIENT)
Dept: INFUSION THERAPY | Age: 75
Discharge: HOME | End: 2025-04-09
Payer: MEDICARE

## 2025-04-09 VITALS
TEMPERATURE: 97.5 F | DIASTOLIC BLOOD PRESSURE: 60 MMHG | SYSTOLIC BLOOD PRESSURE: 150 MMHG | BODY MASS INDEX: 30.73 KG/M2 | HEIGHT: 66 IN | OXYGEN SATURATION: 99 % | HEART RATE: 72 BPM | WEIGHT: 191.2 LBS

## 2025-04-09 DIAGNOSIS — C50.919 CARCINOMA OF BREAST METASTATIC TO BONE, UNSPECIFIED LATERALITY: Primary | ICD-10-CM

## 2025-04-09 DIAGNOSIS — C79.51 CARCINOMA OF BREAST METASTATIC TO BONE, UNSPECIFIED LATERALITY: Primary | ICD-10-CM

## 2025-04-09 DIAGNOSIS — Z51.81 THERAPEUTIC DRUG MONITORING: ICD-10-CM

## 2025-04-09 DIAGNOSIS — C79.51 CARCINOMA OF BREAST METASTATIC TO BONE, UNSPECIFIED LATERALITY: ICD-10-CM

## 2025-04-09 DIAGNOSIS — C50.919 CARCINOMA OF BREAST METASTATIC TO BONE, UNSPECIFIED LATERALITY: ICD-10-CM

## 2025-04-09 DIAGNOSIS — D61.818 PANCYTOPENIA: ICD-10-CM

## 2025-04-09 DIAGNOSIS — C50.612 MALIGNANT NEOPLASM OF AXILLARY TAIL OF LEFT FEMALE BREAST, UNSPECIFIED ESTROGEN RECEPTOR STATUS: Primary | ICD-10-CM

## 2025-04-09 PROCEDURE — G8417 CALC BMI ABV UP PARAM F/U: HCPCS | Performed by: INTERNAL MEDICINE

## 2025-04-09 PROCEDURE — G8427 DOCREV CUR MEDS BY ELIG CLIN: HCPCS | Performed by: INTERNAL MEDICINE

## 2025-04-09 PROCEDURE — 99214 OFFICE O/P EST MOD 30 MIN: CPT | Performed by: INTERNAL MEDICINE

## 2025-04-09 PROCEDURE — 1159F MED LIST DOCD IN RCRD: CPT | Performed by: INTERNAL MEDICINE

## 2025-04-09 PROCEDURE — G8400 PT W/DXA NO RESULTS DOC: HCPCS | Performed by: INTERNAL MEDICINE

## 2025-04-09 PROCEDURE — 1090F PRES/ABSN URINE INCON ASSESS: CPT | Performed by: INTERNAL MEDICINE

## 2025-04-09 PROCEDURE — 1123F ACP DISCUSS/DSCN MKR DOCD: CPT | Performed by: INTERNAL MEDICINE

## 2025-04-09 PROCEDURE — 1160F RVW MEDS BY RX/DR IN RCRD: CPT | Performed by: INTERNAL MEDICINE

## 2025-04-09 PROCEDURE — 6360000002 HC RX W HCPCS: Performed by: INTERNAL MEDICINE

## 2025-04-09 PROCEDURE — 1036F TOBACCO NON-USER: CPT | Performed by: INTERNAL MEDICINE

## 2025-04-09 PROCEDURE — 96372 THER/PROPH/DIAG INJ SC/IM: CPT

## 2025-04-09 RX ADMIN — DENOSUMAB 120 MG: 120 INJECTION SUBCUTANEOUS at 09:14

## 2025-04-09 NOTE — PROGRESS NOTES
[facility] MEDICAL ONCOLOGY  [address] [facility] [address]  Dept: [phone]  Loc: [phone]  Attending Progress Note      Reason for Visit:   Metastatic breast cancer.    PCP:  Makenzie Mccann MD    History of Present Illness:      The patient is a 74 y.o. lady, with a past medical history significant for hydrocephalus, who had presented with left breast changes, she had noticed a dark spot on her left breast, then she had developed induration, she thinks that she had the breast changes for months.  The patient had never had a mammogram done, had a CT scan of the chest done, revealing a solid left breast mass measuring at least 6.8 x 2.9 cm in size is identified. The mass appears to extend to the skin surface. The deep margin of the mass abuts the chest wall but a fat plane appears to be preserved. Pathologically enlarged left  axillary lymph nodes are seen measuring up to 4.5 x 1.9 cm in size.     CT abdomen and pelvis was done on 8/30/2019 revealing partial visualization of soft tissue mass in the left breast, nonspecific lymph nodes in the upper abdomen.    Bone scan was done on 8/30/2019, revealing a Prominent focus of radiotracer uptake in the left anterior superior iliac spine, with corresponding abnormality on recent CT, suggestive of metastasis. More subtle focal radiotracer uptake in the right anterior superior iliac spine could be due to metastasis or  degenerative change.   2. Focal radiotracer uptake in the lateral aspect of the right ninth  rib, which appears to be due to a nondisplaced healing fracture.  Correlation with history of recent trauma is recommended. In light of  other findings, this could represent a pathologic fracture with an  underlying metastatic lesion.  3. Osteoarthritic uptake in the shoulders, knees, wrists, and hands.    She underwent on 8/30/2019 a biopsy of 1 of the left axillary lymph nodes, she was

## 2025-04-09 NOTE — PROGRESS NOTES
Visit Date: 25  Cary Santacruz (:  1950) is a 74 y.o. female.    Subjective   SUBJECTIVE/OBJECTIVE:  Patient is seen today for monthly assessment.  She has no new complaints but does ask when she can go home.     Location:  All nursing and progress notes reviewed.  Vitals reviewed& all lab reviewed.    Past Medical History:   Diagnosis Date    Arm fracture, left 2009    Balance problems since     Hydrocephalus (HCC)     compensated    Thyroid disease     Vertigo        No Known Allergies   Current Outpatient Medications   Medication Sig Dispense Refill    palbociclib (IBRANCE) 75 MG capsule Take 75 mg by mouth See Admin Instructions Given for 21 days then off for 7 days 21 capsule 12    oxyCODONE-acetaminophen (PERCOCET) 5-325 MG per tablet Take 1 tablet by mouth every 6 hours as needed for Pain.      divalproex (DEPAKOTE SPRINKLE) 125 MG DR capsule Take 1 capsule by mouth in the morning and 1 capsule at noon and 1 capsule in the evening.      Polyethylene Glycol 1450 POWD Take 17 g by mouth daily as needed      metoprolol tartrate (LOPRESSOR) 25 MG tablet Take 1 tablet by mouth 2 times daily Hold if SBP<100 or AP<50 180 tablet 1    acetaminophen (TYLENOL) 325 MG tablet Take 2 tablets by mouth every 6 hours as needed for Pain or Fever      bisacodyl (DULCOLAX) 10 MG suppository Place 1 suppository rectally daily as needed for Constipation If no results from MOM, may administer 1 suppository      SODIUM PHOSPHATES RE Place 1 Bottle rectally daily as needed Fleet enema every 24 hrs      magnesium hydroxide (MILK OF MAGNESIA) 400 MG/5ML suspension Take 30 mLs by mouth daily as needed for Constipation      pantoprazole (PROTONIX) 40 MG tablet Take 1 tablet by mouth every morning (before breakfast) 30 tablet 0    melatonin 5 MG TBDP disintegrating tablet Take 1 tablet by mouth nightly      aspirin 325 MG EC tablet Take 1 tablet by mouth 2 times daily 60 tablet 0    levothyroxine (SYNTHROID) 25

## 2025-05-15 ENCOUNTER — HOSPITAL ENCOUNTER (OUTPATIENT)
Dept: CT IMAGING | Age: 75
Discharge: HOME | End: 2025-05-17
Attending: INTERNAL MEDICINE
Payer: MEDICARE

## 2025-05-15 ENCOUNTER — HOSPITAL ENCOUNTER (OUTPATIENT)
Dept: NUCLEAR MEDICINE | Age: 75
Discharge: HOME | End: 2025-05-15
Attending: INTERNAL MEDICINE
Payer: MEDICARE

## 2025-05-15 DIAGNOSIS — C79.51 CARCINOMA OF BREAST METASTATIC TO BONE, UNSPECIFIED LATERALITY (HCC): ICD-10-CM

## 2025-05-15 DIAGNOSIS — C50.919 CARCINOMA OF BREAST METASTATIC TO BONE, UNSPECIFIED LATERALITY (HCC): ICD-10-CM

## 2025-05-15 PROCEDURE — A9503 TC99M MEDRONATE: HCPCS | Performed by: RADIOLOGY

## 2025-05-15 PROCEDURE — 74176 CT ABD & PELVIS W/O CONTRAST: CPT

## 2025-05-15 PROCEDURE — 71250 CT THORAX DX C-: CPT

## 2025-05-15 PROCEDURE — 78306 BONE IMAGING WHOLE BODY: CPT | Performed by: INTERNAL MEDICINE

## 2025-05-15 PROCEDURE — 3430000000 HC RX DIAGNOSTIC RADIOPHARMACEUTICAL: Performed by: RADIOLOGY

## 2025-05-15 PROCEDURE — 6360000004 HC RX CONTRAST MEDICATION: Performed by: RADIOLOGY

## 2025-05-15 RX ORDER — TC 99M MEDRONATE 20 MG/10ML
25 INJECTION, POWDER, LYOPHILIZED, FOR SOLUTION INTRAVENOUS
Status: COMPLETED | OUTPATIENT
Start: 2025-05-15 | End: 2025-05-15

## 2025-05-15 RX ORDER — IOPAMIDOL 755 MG/ML
18 INJECTION, SOLUTION INTRAVASCULAR
Status: COMPLETED | OUTPATIENT
Start: 2025-05-15 | End: 2025-05-15

## 2025-05-15 RX ADMIN — TC 99M MEDRONATE 25 MILLICURIE: 20 INJECTION, POWDER, LYOPHILIZED, FOR SOLUTION INTRAVENOUS at 11:14

## 2025-05-15 RX ADMIN — IOPAMIDOL 18 ML: 755 INJECTION, SOLUTION INTRAVENOUS at 13:05

## 2025-05-28 ENCOUNTER — HOSPITAL ENCOUNTER (OUTPATIENT)
Dept: INFUSION THERAPY | Age: 75
Discharge: HOME | End: 2025-05-28
Payer: MEDICARE

## 2025-05-28 ENCOUNTER — OFFICE VISIT (OUTPATIENT)
Age: 75
End: 2025-05-28
Payer: MEDICARE

## 2025-05-28 VITALS
HEIGHT: 66 IN | TEMPERATURE: 98 F | WEIGHT: 200.8 LBS | BODY MASS INDEX: 32.27 KG/M2 | DIASTOLIC BLOOD PRESSURE: 54 MMHG | OXYGEN SATURATION: 100 % | HEART RATE: 80 BPM | SYSTOLIC BLOOD PRESSURE: 130 MMHG

## 2025-05-28 DIAGNOSIS — D72.818 OTHER DECREASED WHITE BLOOD CELL (WBC) COUNT: ICD-10-CM

## 2025-05-28 DIAGNOSIS — Z51.81 THERAPEUTIC DRUG MONITORING: ICD-10-CM

## 2025-05-28 DIAGNOSIS — C50.919 CARCINOMA OF BREAST METASTATIC TO BONE, UNSPECIFIED LATERALITY (HCC): Primary | ICD-10-CM

## 2025-05-28 DIAGNOSIS — C79.51 CARCINOMA OF BREAST METASTATIC TO BONE, UNSPECIFIED LATERALITY (HCC): ICD-10-CM

## 2025-05-28 DIAGNOSIS — C50.612 MALIGNANT NEOPLASM OF AXILLARY TAIL OF LEFT FEMALE BREAST, UNSPECIFIED ESTROGEN RECEPTOR STATUS (HCC): Primary | ICD-10-CM

## 2025-05-28 DIAGNOSIS — C79.51 CARCINOMA OF BREAST METASTATIC TO BONE, UNSPECIFIED LATERALITY (HCC): Primary | ICD-10-CM

## 2025-05-28 DIAGNOSIS — C50.919 CARCINOMA OF BREAST METASTATIC TO BONE, UNSPECIFIED LATERALITY (HCC): ICD-10-CM

## 2025-05-28 PROCEDURE — 1159F MED LIST DOCD IN RCRD: CPT | Performed by: INTERNAL MEDICINE

## 2025-05-28 PROCEDURE — 1160F RVW MEDS BY RX/DR IN RCRD: CPT | Performed by: INTERNAL MEDICINE

## 2025-05-28 PROCEDURE — 1090F PRES/ABSN URINE INCON ASSESS: CPT | Performed by: INTERNAL MEDICINE

## 2025-05-28 PROCEDURE — 1123F ACP DISCUSS/DSCN MKR DOCD: CPT | Performed by: INTERNAL MEDICINE

## 2025-05-28 PROCEDURE — 6360000002 HC RX W HCPCS: Performed by: INTERNAL MEDICINE

## 2025-05-28 PROCEDURE — G8427 DOCREV CUR MEDS BY ELIG CLIN: HCPCS | Performed by: INTERNAL MEDICINE

## 2025-05-28 PROCEDURE — 99214 OFFICE O/P EST MOD 30 MIN: CPT | Performed by: INTERNAL MEDICINE

## 2025-05-28 PROCEDURE — 99213 OFFICE O/P EST LOW 20 MIN: CPT | Performed by: INTERNAL MEDICINE

## 2025-05-28 PROCEDURE — G8400 PT W/DXA NO RESULTS DOC: HCPCS | Performed by: INTERNAL MEDICINE

## 2025-05-28 PROCEDURE — 96372 THER/PROPH/DIAG INJ SC/IM: CPT

## 2025-05-28 PROCEDURE — G8417 CALC BMI ABV UP PARAM F/U: HCPCS | Performed by: INTERNAL MEDICINE

## 2025-05-28 PROCEDURE — 1036F TOBACCO NON-USER: CPT | Performed by: INTERNAL MEDICINE

## 2025-05-28 PROCEDURE — 1126F AMNT PAIN NOTED NONE PRSNT: CPT | Performed by: INTERNAL MEDICINE

## 2025-05-28 RX ADMIN — DENOSUMAB 120 MG: 120 INJECTION SUBCUTANEOUS at 09:45

## 2025-05-28 NOTE — PROGRESS NOTES
Patient did not stop at checkout,Does not have My Chart, SHRUTHI will call with appointment information.

## 2025-05-28 NOTE — PROGRESS NOTES
[facility] MEDICAL ONCOLOGY  [address] [facility] [address]  Dept: [phone]  Loc: [phone]  Attending Progress Note      Reason for Visit:   Metastatic breast cancer.    PCP:  Makenzie Mccann MD    History of Present Illness:      The patient is a 75 y.o. lady, with a past medical history significant for hydrocephalus, who had presented with left breast changes, she had noticed a dark spot on her left breast, then she had developed induration, she thinks that she had the breast changes for months.  The patient had never had a mammogram done, had a CT scan of the chest done, revealing a solid left breast mass measuring at least 6.8 x 2.9 cm in size is identified. The mass appears to extend to the skin surface. The deep margin of the mass abuts the chest wall but a fat plane appears to be preserved. Pathologically enlarged left  axillary lymph nodes are seen measuring up to 4.5 x 1.9 cm in size.     CT abdomen and pelvis was done on 8/30/2019 revealing partial visualization of soft tissue mass in the left breast, nonspecific lymph nodes in the upper abdomen.    Bone scan was done on 8/30/2019, revealing a Prominent focus of radiotracer uptake in the left anterior superior iliac spine, with corresponding abnormality on recent CT, suggestive of metastasis. More subtle focal radiotracer uptake in the right anterior superior iliac spine could be due to metastasis or  degenerative change.   2. Focal radiotracer uptake in the lateral aspect of the right ninth  rib, which appears to be due to a nondisplaced healing fracture.  Correlation with history of recent trauma is recommended. In light of  other findings, this could represent a pathologic fracture with an  underlying metastatic lesion.  3. Osteoarthritic uptake in the shoulders, knees, wrists, and hands.    She underwent on 8/30/2019 a biopsy of 1 of the left axillary lymph nodes, she was

## 2025-06-06 ENCOUNTER — OUTSIDE SERVICES (OUTPATIENT)
Dept: PRIMARY CARE CLINIC | Age: 75
End: 2025-06-06

## 2025-06-06 VITALS
RESPIRATION RATE: 16 BRPM | SYSTOLIC BLOOD PRESSURE: 129 MMHG | HEART RATE: 73 BPM | DIASTOLIC BLOOD PRESSURE: 63 MMHG | OXYGEN SATURATION: 95 % | TEMPERATURE: 97.6 F

## 2025-06-06 DIAGNOSIS — C50.919 METASTASIS FROM BREAST CANCER (HCC): ICD-10-CM

## 2025-06-06 DIAGNOSIS — C79.9 METASTASIS FROM BREAST CANCER (HCC): ICD-10-CM

## 2025-06-06 DIAGNOSIS — G91.9 HYDROCEPHALUS, UNSPECIFIED TYPE (HCC): ICD-10-CM

## 2025-06-06 DIAGNOSIS — F01.50 VASCULAR DEMENTIA, UNSPECIFIED DEMENTIA SEVERITY, UNSPECIFIED WHETHER BEHAVIORAL, PSYCHOTIC, OR MOOD DISTURBANCE OR ANXIETY (HCC): Primary | ICD-10-CM

## 2025-06-07 NOTE — PROGRESS NOTES
Visit Date: 25  Cary Santacruz (:  1950) is a 74 y.o. female.    Subjective   SUBJECTIVE/OBJECTIVE:  Patient is seen today for monthly assessment.  She is pleasant with no new complaints but does ask when she can go home.     Location:  All nursing and progress notes reviewed.  Vitals reviewed& all lab reviewed.    Past Medical History:   Diagnosis Date    Arm fracture, left 2009    Balance problems since     Hydrocephalus (HCC)     compensated    Thyroid disease     Vertigo        No Known Allergies   Current Outpatient Medications   Medication Sig Dispense Refill    palbociclib (IBRANCE) 75 MG capsule Take 75 mg by mouth See Admin Instructions Given for 21 days then off for 7 days 21 capsule 12    oxyCODONE-acetaminophen (PERCOCET) 5-325 MG per tablet Take 1 tablet by mouth every 6 hours as needed for Pain.      divalproex (DEPAKOTE SPRINKLE) 125 MG DR capsule Take 1 capsule by mouth in the morning and 1 capsule at noon and 1 capsule in the evening.      Polyethylene Glycol 1450 POWD Take 17 g by mouth daily as needed      metoprolol tartrate (LOPRESSOR) 25 MG tablet Take 1 tablet by mouth 2 times daily Hold if SBP<100 or AP<50 180 tablet 1    acetaminophen (TYLENOL) 325 MG tablet Take 2 tablets by mouth every 6 hours as needed for Pain or Fever      bisacodyl (DULCOLAX) 10 MG suppository Place 1 suppository rectally daily as needed for Constipation If no results from MOM, may administer 1 suppository      SODIUM PHOSPHATES RE Place 1 Bottle rectally daily as needed Fleet enema every 24 hrs      magnesium hydroxide (MILK OF MAGNESIA) 400 MG/5ML suspension Take 30 mLs by mouth daily as needed for Constipation      pantoprazole (PROTONIX) 40 MG tablet Take 1 tablet by mouth every morning (before breakfast) 30 tablet 0    melatonin 5 MG TBDP disintegrating tablet Take 1 tablet by mouth nightly      aspirin 325 MG EC tablet Take 1 tablet by mouth 2 times daily 60 tablet 0    levothyroxine

## 2025-06-23 NOTE — TELEPHONE ENCOUNTER
test PAST SURGICAL HISTORY:  No significant past surgical history PAST SURGICAL HISTORY:  No significant past surgical history

## 2025-08-27 ENCOUNTER — OFFICE VISIT (OUTPATIENT)
Age: 75
End: 2025-08-27
Payer: MEDICARE

## 2025-08-27 ENCOUNTER — HOSPITAL ENCOUNTER (OUTPATIENT)
Dept: INFUSION THERAPY | Age: 75
Discharge: HOME | End: 2025-08-27
Payer: MEDICARE

## 2025-08-27 VITALS
OXYGEN SATURATION: 95 % | TEMPERATURE: 97.7 F | WEIGHT: 190.4 LBS | DIASTOLIC BLOOD PRESSURE: 59 MMHG | HEART RATE: 84 BPM | BODY MASS INDEX: 30.73 KG/M2 | RESPIRATION RATE: 20 BRPM | SYSTOLIC BLOOD PRESSURE: 123 MMHG

## 2025-08-27 DIAGNOSIS — C50.919 CARCINOMA OF BREAST METASTATIC TO BONE, UNSPECIFIED LATERALITY (HCC): Primary | ICD-10-CM

## 2025-08-27 DIAGNOSIS — C79.51 CARCINOMA OF BREAST METASTATIC TO BONE, UNSPECIFIED LATERALITY (HCC): Primary | ICD-10-CM

## 2025-08-27 DIAGNOSIS — C50.612 MALIGNANT NEOPLASM OF AXILLARY TAIL OF LEFT FEMALE BREAST, UNSPECIFIED ESTROGEN RECEPTOR STATUS (HCC): Primary | ICD-10-CM

## 2025-08-27 DIAGNOSIS — Z51.81 THERAPEUTIC DRUG MONITORING: ICD-10-CM

## 2025-08-27 DIAGNOSIS — D72.818 OTHER DECREASED WHITE BLOOD CELL (WBC) COUNT: ICD-10-CM

## 2025-08-27 DIAGNOSIS — C79.51 CARCINOMA OF BREAST METASTATIC TO BONE, UNSPECIFIED LATERALITY (HCC): ICD-10-CM

## 2025-08-27 DIAGNOSIS — C50.919 CARCINOMA OF BREAST METASTATIC TO BONE, UNSPECIFIED LATERALITY (HCC): ICD-10-CM

## 2025-08-27 LAB
ALBUMIN SERPL-MCNC: 3.9 G/DL (ref 3.5–5.2)
ALP SERPL-CCNC: 83 U/L (ref 35–104)
ALT SERPL-CCNC: 15 U/L (ref 0–35)
ANION GAP SERPL CALCULATED.3IONS-SCNC: 10 MMOL/L (ref 7–16)
AST SERPL-CCNC: 24 U/L (ref 0–35)
BASOPHILS # BLD: 0.02 K/UL (ref 0–0.2)
BASOPHILS NFR BLD: 1 % (ref 0–2)
BILIRUB SERPL-MCNC: 0.3 MG/DL (ref 0–1.2)
BUN SERPL-MCNC: 22 MG/DL (ref 8–23)
CALCIUM SERPL-MCNC: 9.6 MG/DL (ref 8.8–10.2)
CEA SERPL-MCNC: 1.5 NG/ML (ref 0–5.2)
CHLORIDE SERPL-SCNC: 101 MMOL/L (ref 98–107)
CO2 SERPL-SCNC: 27 MMOL/L (ref 22–29)
CREAT SERPL-MCNC: 0.8 MG/DL (ref 0.5–1)
EOSINOPHIL # BLD: 0.06 K/UL (ref 0.05–0.5)
EOSINOPHILS RELATIVE PERCENT: 3 % (ref 0–6)
ERYTHROCYTE [DISTWIDTH] IN BLOOD BY AUTOMATED COUNT: 14.5 % (ref 11.5–15)
GFR, ESTIMATED: 73 ML/MIN/1.73M2
GLUCOSE SERPL-MCNC: 98 MG/DL (ref 74–99)
HCT VFR BLD AUTO: 36.1 % (ref 34–48)
HGB BLD-MCNC: 12 G/DL (ref 11.5–15.5)
LYMPHOCYTES NFR BLD: 0.41 K/UL (ref 1.5–4)
LYMPHOCYTES RELATIVE PERCENT: 17 % (ref 20–42)
MCH RBC QN AUTO: 33.3 PG (ref 26–35)
MCHC RBC AUTO-ENTMCNC: 33.2 G/DL (ref 32–34.5)
MCV RBC AUTO: 100.3 FL (ref 80–99.9)
MONOCYTES NFR BLD: 0.18 K/UL (ref 0.1–0.95)
MONOCYTES NFR BLD: 8 % (ref 2–12)
NEUTROPHILS NFR BLD: 72 % (ref 43–80)
NEUTS SEG NFR BLD: 1.72 K/UL (ref 1.8–7.3)
PLATELET # BLD AUTO: 266 K/UL (ref 130–450)
PMV BLD AUTO: 9.8 FL (ref 7–12)
POTASSIUM SERPL-SCNC: 4.6 MMOL/L (ref 3.5–5.1)
PROT SERPL-MCNC: 8.3 G/DL (ref 6.4–8.3)
RBC # BLD AUTO: 3.6 M/UL (ref 3.5–5.5)
RBC # BLD: ABNORMAL 10*6/UL
RBC # BLD: ABNORMAL 10*6/UL
SODIUM SERPL-SCNC: 139 MMOL/L (ref 136–145)
WBC OTHER # BLD: 2.4 K/UL (ref 4.5–11.5)

## 2025-08-27 PROCEDURE — 82378 CARCINOEMBRYONIC ANTIGEN: CPT

## 2025-08-27 PROCEDURE — G8417 CALC BMI ABV UP PARAM F/U: HCPCS | Performed by: INTERNAL MEDICINE

## 2025-08-27 PROCEDURE — 80053 COMPREHEN METABOLIC PANEL: CPT

## 2025-08-27 PROCEDURE — 1123F ACP DISCUSS/DSCN MKR DOCD: CPT | Performed by: INTERNAL MEDICINE

## 2025-08-27 PROCEDURE — 85025 COMPLETE CBC W/AUTO DIFF WBC: CPT

## 2025-08-27 PROCEDURE — 86300 IMMUNOASSAY TUMOR CA 15-3: CPT

## 2025-08-27 PROCEDURE — 99213 OFFICE O/P EST LOW 20 MIN: CPT | Performed by: INTERNAL MEDICINE

## 2025-08-27 PROCEDURE — G8400 PT W/DXA NO RESULTS DOC: HCPCS | Performed by: INTERNAL MEDICINE

## 2025-08-27 PROCEDURE — 99214 OFFICE O/P EST MOD 30 MIN: CPT | Performed by: INTERNAL MEDICINE

## 2025-08-27 PROCEDURE — 6360000002 HC RX W HCPCS: Performed by: INTERNAL MEDICINE

## 2025-08-27 PROCEDURE — 1159F MED LIST DOCD IN RCRD: CPT | Performed by: INTERNAL MEDICINE

## 2025-08-27 PROCEDURE — G8427 DOCREV CUR MEDS BY ELIG CLIN: HCPCS | Performed by: INTERNAL MEDICINE

## 2025-08-27 PROCEDURE — 36415 COLL VENOUS BLD VENIPUNCTURE: CPT

## 2025-08-27 PROCEDURE — 1160F RVW MEDS BY RX/DR IN RCRD: CPT | Performed by: INTERNAL MEDICINE

## 2025-08-27 PROCEDURE — 96372 THER/PROPH/DIAG INJ SC/IM: CPT

## 2025-08-27 PROCEDURE — 1090F PRES/ABSN URINE INCON ASSESS: CPT | Performed by: INTERNAL MEDICINE

## 2025-08-27 PROCEDURE — 1036F TOBACCO NON-USER: CPT | Performed by: INTERNAL MEDICINE

## 2025-08-27 RX ADMIN — DENOSUMAB 120 MG: 120 INJECTION SUBCUTANEOUS at 11:30

## 2025-08-29 LAB — CANCER AG15-3 SERPL-ACNC: 22 U/ML (ref 0–31)
